# Patient Record
Sex: FEMALE | Race: WHITE | NOT HISPANIC OR LATINO | Employment: OTHER | URBAN - METROPOLITAN AREA
[De-identification: names, ages, dates, MRNs, and addresses within clinical notes are randomized per-mention and may not be internally consistent; named-entity substitution may affect disease eponyms.]

---

## 2017-01-03 ENCOUNTER — MYC MEDICAL ADVICE (OUTPATIENT)
Dept: FAMILY MEDICINE | Facility: OTHER | Age: 32
End: 2017-01-03

## 2017-01-03 DIAGNOSIS — D59.39 ATYPICAL HUS (HEMOLYTIC UREMIC SYNDROME) WITH MUTATION IN THROMBOMODULIN GENE (H): ICD-10-CM

## 2017-01-03 LAB
ALBUMIN UR-MCNC: NEGATIVE MG/DL
APPEARANCE UR: CLEAR
BILIRUB UR QL STRIP: NEGATIVE
COLOR UR AUTO: YELLOW
GLUCOSE UR STRIP-MCNC: NEGATIVE MG/DL
HGB UR QL STRIP: NEGATIVE
KETONES UR STRIP-MCNC: NEGATIVE MG/DL
LEUKOCYTE ESTERASE UR QL STRIP: NEGATIVE
NITRATE UR QL: NEGATIVE
NON-SQ EPI CELLS #/AREA URNS LPF: NORMAL /LPF
PH UR STRIP: 6.5 PH (ref 5–7)
RBC #/AREA URNS AUTO: NORMAL /HPF (ref 0–2)
SP GR UR STRIP: 1.01 (ref 1–1.03)
URN SPEC COLLECT METH UR: NORMAL
UROBILINOGEN UR STRIP-ACNC: 0.2 EU/DL (ref 0.2–1)
WBC #/AREA URNS AUTO: NORMAL /HPF (ref 0–2)

## 2017-01-03 PROCEDURE — 81001 URINALYSIS AUTO W/SCOPE: CPT | Performed by: FAMILY MEDICINE

## 2017-01-03 PROCEDURE — 84156 ASSAY OF PROTEIN URINE: CPT | Mod: 90 | Performed by: FAMILY MEDICINE

## 2017-01-03 PROCEDURE — 99000 SPECIMEN HANDLING OFFICE-LAB: CPT | Performed by: FAMILY MEDICINE

## 2017-01-04 LAB
CREAT UR-MCNC: 57 MG/DL
PROT UR-MCNC: 0.06 G/L
PROT/CREAT 24H UR: 0.1 G/G CR (ref 0–0.2)

## 2017-01-05 ENCOUNTER — OFFICE VISIT (OUTPATIENT)
Dept: PSYCHOLOGY | Facility: CLINIC | Age: 32
End: 2017-01-05
Payer: COMMERCIAL

## 2017-01-05 DIAGNOSIS — D59.4 MICROANGIOPATHIC HEMOLYTIC ANEMIA (H): ICD-10-CM

## 2017-01-05 DIAGNOSIS — F33.0 MILD EPISODE OF RECURRENT MAJOR DEPRESSIVE DISORDER (H): ICD-10-CM

## 2017-01-05 DIAGNOSIS — F43.10 PTSD (POST-TRAUMATIC STRESS DISORDER): ICD-10-CM

## 2017-01-05 DIAGNOSIS — F41.1 GAD (GENERALIZED ANXIETY DISORDER): Primary | ICD-10-CM

## 2017-01-05 LAB
ALBUMIN SERPL-MCNC: 4.1 G/DL (ref 3.4–5)
ALP SERPL-CCNC: 39 U/L (ref 40–150)
ALT SERPL W P-5'-P-CCNC: 22 U/L (ref 0–50)
ANION GAP SERPL CALCULATED.3IONS-SCNC: 7 MMOL/L (ref 3–14)
AST SERPL W P-5'-P-CCNC: 15 U/L (ref 0–45)
BASOPHILS # BLD AUTO: 0 10E9/L (ref 0–0.2)
BASOPHILS NFR BLD AUTO: 0.6 %
BILIRUB SERPL-MCNC: 0.2 MG/DL (ref 0.2–1.3)
BUN SERPL-MCNC: 16 MG/DL (ref 7–30)
CALCIUM SERPL-MCNC: 8.8 MG/DL (ref 8.5–10.1)
CHLORIDE SERPL-SCNC: 107 MMOL/L (ref 94–109)
CO2 SERPL-SCNC: 28 MMOL/L (ref 20–32)
CREAT SERPL-MCNC: 1.09 MG/DL (ref 0.52–1.04)
DIFFERENTIAL METHOD BLD: ABNORMAL
EOSINOPHIL # BLD AUTO: 0 10E9/L (ref 0–0.7)
EOSINOPHIL NFR BLD AUTO: 0.8 %
ERYTHROCYTE [DISTWIDTH] IN BLOOD BY AUTOMATED COUNT: 12.5 % (ref 10–15)
GFR SERPL CREATININE-BSD FRML MDRD: 59 ML/MIN/1.7M2
GLUCOSE SERPL-MCNC: 81 MG/DL (ref 70–99)
HCT VFR BLD AUTO: 35.3 % (ref 35–47)
HGB BLD-MCNC: 11.7 G/DL (ref 11.7–15.7)
LDH SERPL L TO P-CCNC: 136 U/L (ref 81–234)
LYMPHOCYTES # BLD AUTO: 1.3 10E9/L (ref 0.8–5.3)
LYMPHOCYTES NFR BLD AUTO: 25.2 %
MCH RBC QN AUTO: 31.5 PG (ref 26.5–33)
MCHC RBC AUTO-ENTMCNC: 33.1 G/DL (ref 31.5–36.5)
MCV RBC AUTO: 95 FL (ref 78–100)
MONOCYTES # BLD AUTO: 0.4 10E9/L (ref 0–1.3)
MONOCYTES NFR BLD AUTO: 6.7 %
NEUTROPHILS # BLD AUTO: 3.5 10E9/L (ref 1.6–8.3)
NEUTROPHILS NFR BLD AUTO: 66.7 %
PLATELET # BLD AUTO: 256 10E9/L (ref 150–450)
POTASSIUM SERPL-SCNC: 4.5 MMOL/L (ref 3.4–5.3)
PROT SERPL-MCNC: 7.3 G/DL (ref 6.8–8.8)
RBC # BLD AUTO: 3.72 10E12/L (ref 3.8–5.2)
RETICS # AUTO: 62.4 10E9/L (ref 25–95)
RETICS/RBC NFR AUTO: 1.6 % (ref 0.5–2)
SODIUM SERPL-SCNC: 142 MMOL/L (ref 133–144)
WBC # BLD AUTO: 5.2 10E9/L (ref 4–11)

## 2017-01-05 PROCEDURE — 83010 ASSAY OF HAPTOGLOBIN QUANT: CPT | Mod: 90 | Performed by: FAMILY MEDICINE

## 2017-01-05 PROCEDURE — 36415 COLL VENOUS BLD VENIPUNCTURE: CPT | Performed by: FAMILY MEDICINE

## 2017-01-05 PROCEDURE — 85045 AUTOMATED RETICULOCYTE COUNT: CPT | Mod: 90 | Performed by: FAMILY MEDICINE

## 2017-01-05 PROCEDURE — 90834 PSYTX W PT 45 MINUTES: CPT | Performed by: MARRIAGE & FAMILY THERAPIST

## 2017-01-05 PROCEDURE — 80053 COMPREHEN METABOLIC PANEL: CPT | Mod: 90 | Performed by: FAMILY MEDICINE

## 2017-01-05 PROCEDURE — 85025 COMPLETE CBC W/AUTO DIFF WBC: CPT | Performed by: FAMILY MEDICINE

## 2017-01-05 PROCEDURE — 83615 LACTATE (LD) (LDH) ENZYME: CPT | Mod: 90 | Performed by: FAMILY MEDICINE

## 2017-01-05 PROCEDURE — 99000 SPECIMEN HANDLING OFFICE-LAB: CPT | Performed by: FAMILY MEDICINE

## 2017-01-05 NOTE — PROGRESS NOTES
Progress Note    Client Name: Queta Cortez  Date: 1/5/2017       Service Type: Individual      Session Start Time: 9:30am  Session End Time: 10:15am      Session Length: 45 minutes     Session #: 3     Attendees: Client attended alone    Treatment Plan Last Reviewed: completed today  PHQ-9 / JOAO-7 : completed 12/21/16     DATA      Progress Since Last Session (Related to Symptoms / Goals / Homework):   Symptoms: Improving, depression  and anxiety reducing    Homework: Achieved / completed to satisfaction      Episode of Care Goals: Satisfactory progress - ACTION (Actively working towards change); Intervened by reinforcing change plan / affirming steps taken     Current / Ongoing Stressors and Concerns:   medical, recent birth of first child, marital, financial, occupational     Treatment Objective(s) Addressed in This Session:   use at least 1-2 coping skills for anxiety management in the next 2 weeks  ID triggers for anxiety  Client will share thoughts, feelings, and wants with      Intervention:   CBT:     Client report she has noticed since last week the following triggers for anxiety: conflict with , past hx of 's gambling addiction/lack of trust, medical health, return to work, and balance of home/work. Client reports hx of shutting down when anxious, withdrawal, and not communicating. Discussed that she and  have had difficulty communicating since the beginning of the relationship as they are both a bit conflict avoidant. Gave client a handout on the Awareness Wheel and discussed how to use to improve communication between client and . 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 Processed anxiety related to managing children and getting places on time. Disussed that as she gets accustomed over time it will become easier. Problem  solved ways to get nephew to the but stop with other children. Client reports this week she has been bonding well with her son and is talking with a lactation consultant today about re starting lactation if possible. She is working with her MD to wean herself off her Wellbutrin as she believes mood is more stable and can't be on this medication to breastfeed. Discussed considering keeping a journal for several weeks while weaning off medication to monitor mood, and consider a breast feeding safe med if mood shifts. Client reprots she has been practicing radical acceptance with illness and focusing on positive things that are going on in her life presently, as well as taking one day at a time. Briefly discussed thought containment, creating a worry time, thought stopping, and distraction to reduce anxiety.      ASSESSMENT: Current Emotional / Mental Status (status of significant symptoms):   Risk status (Self / Other harm or suicidal ideation)   Client denies current fears or concerns for personal safety.   Client denies current or recent suicidal ideation or behaviors.   Client denies current or recent homicidal ideation or behaviors.   Client denies current or recent self injurious behavior or ideation.   Client denies other safety concerns.   A safety and risk management plan has not been developed at this time, however client was given the after-hours number / 911 should there be a change in any of these risk factors.     Appearance:   Appropriate    Eye Contact:   Good    Psychomotor Behavior: Normal    Attitude:   Cooperative    Orientation:   All   Speech    Rate / Production: Normal     Volume:  Normal    Mood:    Anxious    Affect:    Appropriate    Thought Content:  Clear    Thought Form:  Coherent  Logical    Insight:    Fair      Medication Review:   Changes to psychiatric medications, see updated Medication List in EPIC.    Current Outpatient Prescriptions   Medication     norethindrone-ethinyl estradiol  (ORTHO-NOVUM 1-35 TAB,NORTREL 1-35 TAB) 1-35 MG-MCG per tablet     NIFEdipine ER (ADALAT CC) 90 MG TB24     order for DME     carvedilol (COREG) 6.25 MG tablet     buPROPion (WELLBUTRIN SR) 100 MG 12 hr tablet     Ferrous Sulfate (IRON SUPPLEMENT PO)     Prenatal Vit-Min-FA-Fish Oil (CVS PRENATAL GUMMY) 0.4-113.5 MG CHEW     Cholecalciferol (D 1000) 1000 UNITS CAPS     FOLIC ACID PO     No current facility-administered medications for this visit.          Medication Compliance:   Yes- tapering off Wellbutrin with help of MD     Changes in Health Issues:   None reported     Chemical Use Review:   Substance Use: Chemical use reviewed, no active concerns identified      Tobacco Use: No current tobacco use.       Collateral Reports Completed:   Not Applicable    PLAN: (Client Tasks / Therapist Tasks / Other)   Encouraged client to notice anxiety triggers this week and identify whether trigger is avoidable, if not utilize her own coping skills to get through the anxiety triggering event.  Encouraged client to utilize coping skills when first noticing bodily cues to deescalate anxiety.  Encouraged client to share thoughts, feelings, and wants with .          Bella Styles                                                    Treatment Plan    Client's Name: Queta Cortez  YOB: 1985    Date: 1/5/2017      Diagnoses: 296.31 Major Depressive Disorder, Recurrent Episode, Mild With peripartum onset  300.02 (F41.1) Generalized Anxiety Disorder  309.81 (F43.10) Posttraumatic Stress Disorder (includes Posttraumatic Stress Disorder for Children 6 Years and Younger)  Without dissociative symptoms    Medical dx: HELLP syndrome, HUS, recent birth of son  Psychosocial & Contextual Factors: medical, recent birth of first child, marital, financial, occupational  WHODAS 2.0 (12 item) 17    Referral / Collaboration:  Collaboration was initiated with PCP MD.    Anticipated number of session or this episode of care:  6-12      MeasurableTreatment Goal(s) related to diagnosis / functional impairment(s)  Goal 1: Client will decrease depressed mood and anxiety as evidenced by PHQ9 and GAD7 scores 4 and Under within the next 3 months. Initial scorin2016:  GAD7:16,  PHQ9: 9.     I will know I've met my goal when I'm better able to manage my anxiety.        Objective #A (Client Action)   Continued - Date(s): 2017  Client will identify 1-2 initial signs or symptoms of anxiety and utilize anxiety reduction techniques daily to decrease anxiety over the next month.Current Baseline is sporadic use. Client will ID triggers for depression and anxiety and work towards decreasing reactivity to or eliminating stressor if possible. client will develop more effective coping skills to manage depression and anxiety symptoms, will develop healthy cognitive patterns and beliefs, will increase ability to function adaptively and will continue to take medications as prescribed / participate in supportive activities. Client will improve communication with others and be able to work through anxiety rather than shutting down. 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  Intervention(s)   Therapist will teach client how to ID body cues for anxiety, anxiety reduction techniques, how to ID triggers for depression and anxiety- decrease reactivity/eliminate, lifestyle changes to reduce depression and anxiety, communication skills, explore cognitive beliefs and help client to develop healthy cognitive patterns and beliefs.  Client has reviewed and agreed to the above plan.    Bella Styles  2017

## 2017-01-06 LAB — HAPTOGLOB SERPL-MCNC: 95 MG/DL (ref 15–200)

## 2017-01-09 ENCOUNTER — OFFICE VISIT (OUTPATIENT)
Dept: NEPHROLOGY | Facility: CLINIC | Age: 32
End: 2017-01-09
Payer: COMMERCIAL

## 2017-01-09 ENCOUNTER — TELEPHONE (OUTPATIENT)
Dept: OBGYN | Facility: OTHER | Age: 32
End: 2017-01-09

## 2017-01-09 VITALS
DIASTOLIC BLOOD PRESSURE: 81 MMHG | HEART RATE: 65 BPM | BODY MASS INDEX: 22.47 KG/M2 | WEIGHT: 158 LBS | SYSTOLIC BLOOD PRESSURE: 125 MMHG

## 2017-01-09 DIAGNOSIS — D59.4 MICROANGIOPATHIC HEMOLYTIC ANEMIA (H): ICD-10-CM

## 2017-01-09 DIAGNOSIS — I10 BENIGN ESSENTIAL HYPERTENSION: ICD-10-CM

## 2017-01-09 DIAGNOSIS — D59.39 ATYPICAL HUS (HEMOLYTIC UREMIC SYNDROME) WITH MUTATION IN THROMBOMODULIN GENE (H): ICD-10-CM

## 2017-01-09 DIAGNOSIS — M31.19 TTP (THROMBOTIC THROMBOCYTOPENIC PURPURA) (H): ICD-10-CM

## 2017-01-09 DIAGNOSIS — N17.9 ACUTE KIDNEY INJURY (H): Primary | ICD-10-CM

## 2017-01-09 LAB
ALBUMIN SERPL-MCNC: 4.2 G/DL (ref 3.4–5)
ALBUMIN UR-MCNC: NEGATIVE MG/DL
ALP SERPL-CCNC: 39 U/L (ref 40–150)
ALT SERPL W P-5'-P-CCNC: 26 U/L (ref 0–50)
ANION GAP SERPL CALCULATED.3IONS-SCNC: 8 MMOL/L (ref 3–14)
APPEARANCE UR: CLEAR
AST SERPL W P-5'-P-CCNC: 14 U/L (ref 0–45)
BASOPHILS # BLD AUTO: 0 10E9/L (ref 0–0.2)
BASOPHILS NFR BLD AUTO: 0.2 %
BILIRUB SERPL-MCNC: 0.3 MG/DL (ref 0.2–1.3)
BILIRUB UR QL STRIP: NEGATIVE
BUN SERPL-MCNC: 14 MG/DL (ref 7–30)
CALCIUM SERPL-MCNC: 9.3 MG/DL (ref 8.5–10.1)
CHLORIDE SERPL-SCNC: 108 MMOL/L (ref 94–109)
CO2 SERPL-SCNC: 25 MMOL/L (ref 20–32)
COLOR UR AUTO: ABNORMAL
CREAT SERPL-MCNC: 1.08 MG/DL (ref 0.52–1.04)
CREAT UR-MCNC: 17 MG/DL
DIFFERENTIAL METHOD BLD: NORMAL
EOSINOPHIL # BLD AUTO: 0.1 10E9/L (ref 0–0.7)
EOSINOPHIL NFR BLD AUTO: 1 %
ERYTHROCYTE [DISTWIDTH] IN BLOOD BY AUTOMATED COUNT: 12.5 % (ref 10–15)
GFR SERPL CREATININE-BSD FRML MDRD: 59 ML/MIN/1.7M2
GLUCOSE SERPL-MCNC: 83 MG/DL (ref 70–99)
GLUCOSE UR STRIP-MCNC: NEGATIVE MG/DL
HCT VFR BLD AUTO: 35.8 % (ref 35–47)
HGB BLD-MCNC: 12.2 G/DL (ref 11.7–15.7)
HGB UR QL STRIP: NEGATIVE
KETONES UR STRIP-MCNC: NEGATIVE MG/DL
LDH SERPL L TO P-CCNC: 144 U/L (ref 81–234)
LEUKOCYTE ESTERASE UR QL STRIP: NEGATIVE
LYMPHOCYTES # BLD AUTO: 1.4 10E9/L (ref 0.8–5.3)
LYMPHOCYTES NFR BLD AUTO: 27.9 %
MCH RBC QN AUTO: 31.4 PG (ref 26.5–33)
MCHC RBC AUTO-ENTMCNC: 34.1 G/DL (ref 31.5–36.5)
MCV RBC AUTO: 92 FL (ref 78–100)
MONOCYTES # BLD AUTO: 0.3 10E9/L (ref 0–1.3)
MONOCYTES NFR BLD AUTO: 5.1 %
NEUTROPHILS # BLD AUTO: 3.2 10E9/L (ref 1.6–8.3)
NEUTROPHILS NFR BLD AUTO: 65.8 %
NITRATE UR QL: NEGATIVE
PH UR STRIP: 6.5 PH (ref 5–7)
PLATELET # BLD AUTO: 239 10E9/L (ref 150–450)
POTASSIUM SERPL-SCNC: 3.9 MMOL/L (ref 3.4–5.3)
PROT SERPL-MCNC: 7.6 G/DL (ref 6.8–8.8)
PROT UR-MCNC: <0.05 G/L
PROT/CREAT 24H UR: NORMAL G/G CR (ref 0–0.2)
RBC # BLD AUTO: 3.89 10E12/L (ref 3.8–5.2)
RBC #/AREA URNS AUTO: ABNORMAL /HPF (ref 0–2)
RETICS # AUTO: 77.8 10E9/L (ref 25–95)
RETICS/RBC NFR AUTO: 2 % (ref 0.5–2)
SODIUM SERPL-SCNC: 141 MMOL/L (ref 133–144)
SP GR UR STRIP: 1 (ref 1–1.03)
URN SPEC COLLECT METH UR: ABNORMAL
UROBILINOGEN UR STRIP-MCNC: NORMAL MG/DL (ref 0–2)
WBC # BLD AUTO: 4.9 10E9/L (ref 4–11)
WBC #/AREA URNS AUTO: ABNORMAL /HPF (ref 0–2)

## 2017-01-09 PROCEDURE — 84156 ASSAY OF PROTEIN URINE: CPT | Mod: 90 | Performed by: INTERNAL MEDICINE

## 2017-01-09 PROCEDURE — 81001 URINALYSIS AUTO W/SCOPE: CPT | Performed by: INTERNAL MEDICINE

## 2017-01-09 PROCEDURE — 99000 SPECIMEN HANDLING OFFICE-LAB: CPT | Performed by: INTERNAL MEDICINE

## 2017-01-09 PROCEDURE — 85045 AUTOMATED RETICULOCYTE COUNT: CPT | Performed by: FAMILY MEDICINE

## 2017-01-09 PROCEDURE — 85025 COMPLETE CBC W/AUTO DIFF WBC: CPT | Performed by: FAMILY MEDICINE

## 2017-01-09 PROCEDURE — 83010 ASSAY OF HAPTOGLOBIN QUANT: CPT | Mod: 90 | Performed by: FAMILY MEDICINE

## 2017-01-09 PROCEDURE — 99214 OFFICE O/P EST MOD 30 MIN: CPT | Performed by: INTERNAL MEDICINE

## 2017-01-09 PROCEDURE — 36415 COLL VENOUS BLD VENIPUNCTURE: CPT | Performed by: FAMILY MEDICINE

## 2017-01-09 PROCEDURE — 80053 COMPREHEN METABOLIC PANEL: CPT | Performed by: FAMILY MEDICINE

## 2017-01-09 PROCEDURE — 83615 LACTATE (LD) (LDH) ENZYME: CPT | Performed by: FAMILY MEDICINE

## 2017-01-09 NOTE — PROGRESS NOTES
2017     CC: RYAN    HPI: Queta Cortez is a 31 year old female who presents for follow-up of RYAN. Ms. Cortez was hospitalized from 16- 16 and is here today for follow-up. She is a healthy female who underwent  for failure to descend on 10/29. She then presented with dizziness, weakness, nausea, vomiting on POD 6. She was found to be in RYAN which was felt tob e aHUS vs HELLP syndrome. She received plasmapheresis while inpt and her creatinine and proteinuria have been improving since. She is following with hematology closely since discharge. NSAID use was 600 mg q 6 hours post initial discharge after her delivery. 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 Please see Dr. Vanessa's note for further details. She is overall feeling well. Blood pressures at home have been 103-126. She is attempting to reinitiate her milk supply at this time by pumping - working with lactation nurse for this issue.        Allergies   Allergen Reactions     Hydralazine Shortness Of Breath, Anxiety, Other (See Comments), Swelling and Rash     40-50 minutes after IV administration had swelling to the arm where hydralazine was given, had swelling to lips, flushing to face, generalized anxiety which lead to shortness of breath. Symptoms resolved with dose of 25 mg IV benadryl.          Current Outpatient Prescriptions on File Prior to Visit:  NIFEdipine ER (ADALAT CC) 90 MG TB24 Take 1 tablet (90 mg) by mouth daily   order for DME Equipment being ordered: Compression stockings. Knee high 20-30   Ferrous Sulfate (IRON SUPPLEMENT PO)    Prenatal Vit-Min-FA-Fish Oil (CVS PRENATAL GUMMY) 0.4-113.5 MG CHEW    Cholecalciferol (D 1000) 1000 UNITS CAPS    FOLIC ACID PO Take 800 mcg by mouth daily     No current facility-administered medications on file prior to visit.    Past Medical History   Diagnosis Date     NO ACTIVE PROBLEMS        Past Surgical History    Procedure Laterality Date     Appendectomy  2014      section N/A 10/29/2016     Procedure:  SECTION;  Surgeon: Adonis Dooley MD;  Location:  L+D       Social History   Substance Use Topics     Smoking status: Never Smoker      Smokeless tobacco: Never Used     Alcohol Use: No       Family History   Problem Relation Age of Onset     Hypertension Mother      Unknown/Adopted Father      OSTEOPOROSIS Maternal Grandmother      Unknown/Adopted Paternal Grandmother      Unknown/Adopted Paternal Grandfather        ROS: A 4 system review of systems was negative other than noted here or above.     Exam:  /81 mmHg  Pulse 65  Wt 71.668 kg (158 lb)  LMP 2015    GENERAL APPEARANCE: alert and no distress  RESP: lungs clear to auscultation   CV: regular rhythm, normal rate, no rub  Extremities: no clubbing, cyanosis, or edema  SKIN: no rash  NEURO: mentation intact and speech normal  PSYCH: affect normal/bright    Results:   Orders Only on 2017   Component Date Value Ref Range Status     Color Urine 2017 Straw   Final     Appearance Urine 2017 Clear   Final     Glucose Urine 2017 Negative  NEG mg/dL Final     Bilirubin Urine 2017 Negative  NEG Final     Ketones Urine 2017 Negative  NEG mg/dL Final     Specific Gravity Urine 2017 1.000* 1.003 - 1.035 Final     Blood Urine 2017 Negative  NEG Final     pH Urine 2017 6.5  5.0 - 7.0 pH Final     Protein Albumin Urine 2017 Negative  NEG mg/dL Final     Urobilinogen mg/dL 2017 Normal  0.0 - 2.0 mg/dL Final     Nitrite Urine 2017 Negative  NEG Final     Leukocyte Esterase Urine 2017 Negative  NEG Final     Source 2017 Midstream Urine   Final     WBC Urine 2017 O - 2  0 - 2 /HPF Final     RBC Urine 2017 O - 2  0 - 2 /HPF Final     Sodium 2017 141  133 - 144 mmol/L Final     Potassium 2017 3.9  3.4 - 5.3 mmol/L Final     Chloride 2017 108   94 - 109 mmol/L Final     Carbon Dioxide 01/09/2017 25  20 - 32 mmol/L Final     Anion Gap 01/09/2017 8  3 - 14 mmol/L Final     Glucose 01/09/2017 83  70 - 99 mg/dL Final    Non Fasting     Urea Nitrogen 01/09/2017 14  7 - 30 mg/dL Final     Creatinine 01/09/2017 1.08* 0.52 - 1.04 mg/dL Final     GFR Estimate 01/09/2017 59* >60 mL/min/1.7m2 Final    Non  GFR Calc     GFR Estimate If Black 01/09/2017 72  >60 mL/min/1.7m2 Final    African American GFR Calc     Calcium 01/09/2017 9.3  8.5 - 10.1 mg/dL Final     Bilirubin Total 01/09/2017 0.3  0.2 - 1.3 mg/dL Final     Albumin 01/09/2017 4.2  3.4 - 5.0 g/dL Final     Protein Total 01/09/2017 7.6  6.8 - 8.8 g/dL Final     Alkaline Phosphatase 01/09/2017 39* 40 - 150 U/L Final     ALT 01/09/2017 26  0 - 50 U/L Final     AST 01/09/2017 14  0 - 45 U/L Final     Lactate Dehydrogenase 01/09/2017 144  81 - 234 U/L Final     WBC 01/09/2017 4.9  4.0 - 11.0 10e9/L Final     RBC Count 01/09/2017 3.89  3.8 - 5.2 10e12/L Final     Hemoglobin 01/09/2017 12.2  11.7 - 15.7 g/dL Final     Hematocrit 01/09/2017 35.8  35.0 - 47.0 % Final     MCV 01/09/2017 92  78 - 100 fl Final     MCH 01/09/2017 31.4  26.5 - 33.0 pg Final     MCHC 01/09/2017 34.1  31.5 - 36.5 g/dL Final     RDW 01/09/2017 12.5  10.0 - 15.0 % Final     Platelet Count 01/09/2017 239  150 - 450 10e9/L Final     Diff Method 01/09/2017 Automated Method   Final     % Neutrophils 01/09/2017 65.8   Final     % Lymphocytes 01/09/2017 27.9   Final     % Monocytes 01/09/2017 5.1   Final     % Eosinophils 01/09/2017 1.0   Final     % Basophils 01/09/2017 0.2   Final     Absolute Neutrophil 01/09/2017 3.2  1.6 - 8.3 10e9/L Final     Absolute Lymphocytes 01/09/2017 1.4  0.8 - 5.3 10e9/L Final     Absolute Monocytes 01/09/2017 0.3  0.0 - 1.3 10e9/L Final     Absolute Eosinophils 01/09/2017 0.1  0.0 - 0.7 10e9/L Final     Absolute Basophils 01/09/2017 0.0  0.0 - 0.2 10e9/L Final     % Retic 01/09/2017 2.0  0.5 - 2.0 %  Final     Absolute Retic 01/09/2017 77.8  25 - 95 10e9/L Final        Assessment/Plan:   1. 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 Pleased to see that her creatinine has improved (peaked at 2.93 and now 1.08). It is possible that she will have a sustained kidney injury from this event but too early to tell. From a kidney standpoint, will plan to have her back in 6 months or sooner as needed.     2.Hypertension: at goal of <140/90 - if she is interested in breastfeeding at this time, I would like to DC the carvedilol at this time. Will have blood pressure followed at home and educated to call if greater than 140/90.     3. Anemia/aHUS: anemia resolved. Please see heme's note for further details about her genetic findings.      Patient Instructions   1. Stop the carvedilol  2. Follow pressures - please call if greater than 140/90. [phone] or MYChart  3. Follow-up in 6 months      Karen Collins,

## 2017-01-09 NOTE — MR AVS SNAPSHOT
After Visit Summary   1/9/2017    Queta Cortez    MRN: [id number]           Patient Information     Date Of Birth          1985        Visit Information        Provider Department      1/9/2017 3:00 PM Karen Collins MD [facility]        Care Instructions    1. Stop the carvedilol  2. Follow pressures - please call if greater than 140/90. [phone] or Synerscopehart  3.         Follow-ups after your visit        Your next 10 appointments already scheduled     Jan 18, 2017 11:00 AM   Return Visit with Bella DUTTA [facility] ([facility])    [address]  [facility] [id number] [phone]            Jan 18, 2017  3:45 PM   LAB with LAB ONC [facility] ([facility])    [id number] [place] [address] [phone]           Patient must bring picture ID.  Patient should be prepared to give a urine specimen  Please do not eat 10-12 hours before your appointment if you are coming in fasting for labs on lipids, cholesterol, or glucose (sugar).  Pregnant women should follow their Care Team instructions. Water with medications is okay. Do not drink coffee or other fluids.   If you have concerns about taking  your medications, please ask at office or if scheduling via Glance Apphart, send a message by clicking on Secure Messaging, Message Your Care Team.            Jan 18, 2017  4:15 PM   Return Visit with Ivan Vanessa MD   [facility])    [address] [address] [place] [address] [phone]            Feb 09, 2017  5:00 PM   Return Visit with Bella DUTTA [facility] ([facility])    [address]  [facility] [id number] [phone]            Jul 11, 2017  2:30 PM   LAB with LAB FIRST FLOOR [facility] ([facility])    [id number]  [facility] [address] [phone]           Patient must bring picture ID.  Patient should be prepared to give a urine specimen  Please do not eat 10-12 hours before your appointment if you are coming in fasting for labs on lipids, cholesterol, or glucose (sugar).  Pregnant women should follow their Care Team instructions. Water with medications is okay. Do not drink coffee or other fluids.   If you have concerns about taking  your medications, please ask at office or if scheduling via Telepathy, send a message by clicking on Secure Messaging, Message Your Care Team.            Jul 11, 2017  3:00 PM   Return Visit with Karen Collins MD   [facility]    [address] [facility] [address] [phone]              Who to contact     If you have questions or need follow up information about today's clinic visit or your schedule please contact [facility] directly at [phone].  Normal or non-critical lab and imaging results will be communicated to you by Netheoshart, letter or phone within 4 business days after the clinic has received the results. If you do not hear from us within 7 days, please contact the clinic through Telepathy or phone. If you have a critical or abnormal lab result, we will notify you by phone as soon as possible.  Submit refill requests through Telepathy or call your pharmacy and they will forward the refill request to us. Please allow 3 business days for your refill to be completed.          Additional Information About Your Visit        Telepathy Information     Telepathy gives you secure access to your electronic health record. If you see a primary care provider, you can also send messages to your care team and make appointments. If you have questions, please call your primary care clinic.  If you do not have a primary care provider, please call [phone] and they will assist you.      Telepathy is an  electronic gateway that provides easy, online access to your medical records. With [employer], you can request a clinic appointment, read your test results, renew a prescription or communicate with your care team.     To access your existing account, please contact your [facility] or call [phone] for assistance.        Care EveryWhere ID     This is your Care EveryWhere ID. This could be used by other organizations to access your [facility] medical records  [id number]        Your Vitals Were     Pulse Last Period                65 12/11/2015           Blood Pressure from Last 3 Encounters:   01/09/17 125/81   12/27/16 110/84   12/21/16 117/80    Weight from Last 3 Encounters:   01/09/17 71.668 kg (158 lb)   12/27/16 73.029 kg (161 lb)   12/21/16 72.576 kg (160 lb)              Today, you had the following     No orders found for display         Today's Medication Changes          These changes are accurate as of: 1/9/17  3:23 PM.  If you have any questions, ask your nurse or doctor.               Stop taking these medicines if you haven't already. Please contact your care team if you have questions.     buPROPion 100 MG 12 hr tablet   Commonly known as:  WELLBUTRIN SR   Stopped by:  Karen Collins MD           carvedilol 6.25 MG tablet   Commonly known as:  COREG   Stopped by:  Karen Collins MD           norethindrone-ethinyl estradiol 1-35 MG-MCG per tablet   Commonly known as:  ORTHO-NOVUM 1-35 TAB,NORTREL 1-35 TAB   Stopped by:  Karen Collins MD                    Primary Care Provider Office Phone # Fax #    Alanis Lundberg [phone]       [facility] [address] [facility] [address]    [facility] [address]        Thank you!     Thank you for choosing [facility]  for your care. Our goal is always to provide you with excellent care. Hearing back from our patients is one way we can continue to improve our services.  Please take a few minutes to complete the written survey that you may receive in the mail after your visit with us. Thank you!             Your Updated Medication List - Protect others around you: Learn how to safely use, store and throw away your medicines at www.disposemymeds.org.          This list is accurate as of: 1/9/17  3:23 PM.  Always use your most recent med list.                   Brand Name Dispense Instructions for use    CVS PRENATAL GUMMY 0.4-113.5 MG Chew          D 1000 1000 UNITS Caps          FOLIC ACID PO      Take 800 mcg by mouth daily       IRON SUPPLEMENT PO          NIFEdipine ER 90 MG Tb24    ADALAT CC    30 tablet    Take 1 tablet (90 mg) by mouth daily       order for DME     2 Units    Equipment being ordered: Compression stockings. Knee high 20-30

## 2017-01-09 NOTE — Clinical Note
[address] [address]  Phone: [phone]    January 9, 2017        Queta Cortez  [address] [facility] [facility] [address]          To whom it may concern:    RE: Queta Cortez    Patient is able to return to work on January 14th with no restrictions.        Please contact me for questions or concerns.      Sincerely,        Harriet Rascon, DO

## 2017-01-09 NOTE — TELEPHONE ENCOUNTER
Reason for call:  Pt needs a letter stating she can go back to work on Saturday Jan 14th. She would like to  at the [place] location. Please call pt when ready.

## 2017-01-09 NOTE — PATIENT INSTRUCTIONS
1. Stop the carvedilol  2. Follow pressures - please call if greater than 140/90. [phone] or MYChart  3. Follow-up in 6 months

## 2017-01-10 LAB — HAPTOGLOB SERPL-MCNC: 100 MG/DL (ref 15–200)

## 2017-01-16 ENCOUNTER — DOCUMENTATION ONLY (OUTPATIENT)
Dept: LAB | Facility: CLINIC | Age: 32
End: 2017-01-16

## 2017-01-16 DIAGNOSIS — D59.4 MICROANGIOPATHIC HEMOLYTIC ANEMIA (H): Primary | ICD-10-CM

## 2017-01-16 DIAGNOSIS — M31.19 TTP (THROMBOTIC THROMBOCYTOPENIC PURPURA) (H): ICD-10-CM

## 2017-01-18 ENCOUNTER — ONCOLOGY VISIT (OUTPATIENT)
Dept: ONCOLOGY | Facility: CLINIC | Age: 32
End: 2017-01-18
Payer: COMMERCIAL

## 2017-01-18 ENCOUNTER — OFFICE VISIT (OUTPATIENT)
Dept: PSYCHOLOGY | Facility: CLINIC | Age: 32
End: 2017-01-18
Payer: COMMERCIAL

## 2017-01-18 VITALS
BODY MASS INDEX: 22.76 KG/M2 | HEART RATE: 90 BPM | OXYGEN SATURATION: 100 % | RESPIRATION RATE: 18 BRPM | DIASTOLIC BLOOD PRESSURE: 86 MMHG | TEMPERATURE: 97.7 F | HEIGHT: 70 IN | WEIGHT: 159 LBS | SYSTOLIC BLOOD PRESSURE: 126 MMHG

## 2017-01-18 DIAGNOSIS — D59.4 MICROANGIOPATHIC HEMOLYTIC ANEMIA (H): ICD-10-CM

## 2017-01-18 DIAGNOSIS — F43.10 PTSD (POST-TRAUMATIC STRESS DISORDER): ICD-10-CM

## 2017-01-18 DIAGNOSIS — F41.1 GAD (GENERALIZED ANXIETY DISORDER): Primary | ICD-10-CM

## 2017-01-18 DIAGNOSIS — F33.0 MILD EPISODE OF RECURRENT MAJOR DEPRESSIVE DISORDER (H): ICD-10-CM

## 2017-01-18 DIAGNOSIS — D59.4 MICROANGIOPATHIC HEMOLYTIC ANEMIA (H): Primary | ICD-10-CM

## 2017-01-18 DIAGNOSIS — M31.19 TTP (THROMBOTIC THROMBOCYTOPENIC PURPURA) (H): ICD-10-CM

## 2017-01-18 LAB
ALBUMIN SERPL-MCNC: 4 G/DL (ref 3.4–5)
ALP SERPL-CCNC: 38 U/L (ref 40–150)
ALT SERPL W P-5'-P-CCNC: 29 U/L (ref 0–50)
ANION GAP SERPL CALCULATED.3IONS-SCNC: 5 MMOL/L (ref 3–14)
AST SERPL W P-5'-P-CCNC: 13 U/L (ref 0–45)
BASOPHILS # BLD AUTO: 0 10E9/L (ref 0–0.2)
BASOPHILS NFR BLD AUTO: 0.5 %
BILIRUB SERPL-MCNC: 0.2 MG/DL (ref 0.2–1.3)
BUN SERPL-MCNC: 12 MG/DL (ref 7–30)
CALCIUM SERPL-MCNC: 8.9 MG/DL (ref 8.5–10.1)
CHLORIDE SERPL-SCNC: 109 MMOL/L (ref 94–109)
CO2 SERPL-SCNC: 28 MMOL/L (ref 20–32)
CREAT SERPL-MCNC: 1 MG/DL (ref 0.52–1.04)
DIFFERENTIAL METHOD BLD: ABNORMAL
EOSINOPHIL # BLD AUTO: 0 10E9/L (ref 0–0.7)
EOSINOPHIL NFR BLD AUTO: 0.9 %
ERYTHROCYTE [DISTWIDTH] IN BLOOD BY AUTOMATED COUNT: 12.3 % (ref 10–15)
GFR SERPL CREATININE-BSD FRML MDRD: 65 ML/MIN/1.7M2
GLUCOSE SERPL-MCNC: 121 MG/DL (ref 70–99)
HCT VFR BLD AUTO: 34.2 % (ref 35–47)
HGB BLD-MCNC: 11.7 G/DL (ref 11.7–15.7)
LDH SERPL L TO P-CCNC: 144 U/L (ref 81–234)
LYMPHOCYTES # BLD AUTO: 1.4 10E9/L (ref 0.8–5.3)
LYMPHOCYTES NFR BLD AUTO: 33.2 %
MCH RBC QN AUTO: 31.6 PG (ref 26.5–33)
MCHC RBC AUTO-ENTMCNC: 34.2 G/DL (ref 31.5–36.5)
MCV RBC AUTO: 92 FL (ref 78–100)
MONOCYTES # BLD AUTO: 0.2 10E9/L (ref 0–1.3)
MONOCYTES NFR BLD AUTO: 5.1 %
NEUTROPHILS # BLD AUTO: 2.6 10E9/L (ref 1.6–8.3)
NEUTROPHILS NFR BLD AUTO: 60.3 %
PLATELET # BLD AUTO: 229 10E9/L (ref 150–450)
POTASSIUM SERPL-SCNC: 3.5 MMOL/L (ref 3.4–5.3)
PROT SERPL-MCNC: 6.9 G/DL (ref 6.8–8.8)
RBC # BLD AUTO: 3.7 10E12/L (ref 3.8–5.2)
RETICS # AUTO: 62.2 10E9/L (ref 25–95)
RETICS/RBC NFR AUTO: 1.7 % (ref 0.5–2)
SODIUM SERPL-SCNC: 142 MMOL/L (ref 133–144)
WBC # BLD AUTO: 4.3 10E9/L (ref 4–11)

## 2017-01-18 PROCEDURE — 90834 PSYTX W PT 45 MINUTES: CPT | Performed by: MARRIAGE & FAMILY THERAPIST

## 2017-01-18 PROCEDURE — 85025 COMPLETE CBC W/AUTO DIFF WBC: CPT | Performed by: FAMILY MEDICINE

## 2017-01-18 PROCEDURE — 80053 COMPREHEN METABOLIC PANEL: CPT | Performed by: FAMILY MEDICINE

## 2017-01-18 PROCEDURE — 83615 LACTATE (LD) (LDH) ENZYME: CPT | Performed by: FAMILY MEDICINE

## 2017-01-18 PROCEDURE — 99214 OFFICE O/P EST MOD 30 MIN: CPT | Performed by: INTERNAL MEDICINE

## 2017-01-18 PROCEDURE — 85045 AUTOMATED RETICULOCYTE COUNT: CPT | Performed by: FAMILY MEDICINE

## 2017-01-18 PROCEDURE — 36415 COLL VENOUS BLD VENIPUNCTURE: CPT | Performed by: FAMILY MEDICINE

## 2017-01-18 PROCEDURE — 83010 ASSAY OF HAPTOGLOBIN QUANT: CPT | Performed by: FAMILY MEDICINE

## 2017-01-18 ASSESSMENT — PAIN SCALES - GENERAL: PAINLEVEL: NO PAIN (0)

## 2017-01-18 NOTE — NURSING NOTE
"Queta Cortez is a 31 year old female who presents for:  Chief Complaint   Patient presents with     Oncology Clinic Visit     4 wk f/u        Initial Vitals:  /86 mmHg  Pulse 90  Temp(Src) 97.7  F (36.5  C) (Oral)  Resp 18  Ht 1.786 m (5' 10.32\")  Wt 72.122 kg (159 lb)  BMI 22.61 kg/m2  SpO2 100%  LMP 01/12/2017 Estimated body mass index is 22.61 kg/(m^2) as calculated from the following:    Height as of this encounter: 1.786 m (5' 10.32\").    Weight as of this encounter: 72.122 kg (159 lb).. Body surface area is 1.89 meters squared. BP completed using cuff size: regular  No Pain (0) Patient's last menstrual period was 01/12/2017. Allergies and medications reviewed.     Medications: Medication refills not needed today.  Pharmacy name entered into EPIC:    [id number] - [place], [place] - [address] [facility]  [facility] [place] [address] [facility]    Comments:     8 minutes for nursing intake (face to face time)   SHANA DIA LPN          "

## 2017-01-18 NOTE — MR AVS SNAPSHOT
After Visit Summary   1/18/2017    Queta Cortez    MRN: [id number]           Patient Information     Date Of Birth          1985        Visit Information        Provider Department      1/18/2017 4:15 PM Ivan Vanessa MD [facility]        Today's Diagnoses     Microangiopathic hemolytic anemia (H)    -  1        Follow-ups after your visit        Your next 10 appointments already scheduled     Feb 01, 2017  2:00 PM   Return Visit with Bella DUTTA  ([facility])    [address]  [facility] [address]   [phone]            Feb 15, 2017  2:00 PM   Return Visit with Bella DUTTA  ([facility])    [address]  [facility] [address]   [phone]            Apr 19, 2017  3:00 PM   LAB with LAB ONC [facility])    [address] [place] [id number] [phone]           Patient must bring picture ID.  Patient should be prepared to give a urine specimen  Please do not eat 10-12 hours before your appointment if you are coming in fasting for labs on lipids, cholesterol, or glucose (sugar).  Pregnant women should follow their Care Team instructions. Water with medications is okay. Do not drink coffee or other fluids.   If you have concerns about taking  your medications, please ask at office or if scheduling via [employer], send a message by clicking on Secure Messaging, Message Your Care Team.            Apr 19, 2017  3:45 PM   Return Visit with Ivan Vanessa MD   [facility])    [address] [facility] [id number] [phone]            Jul 11, 2017  2:30 PM   LAB with LAB FIRST FLOOR [facility])    [address] [facility] [id number] [phone]           Patient  must bring picture ID.  Patient should be prepared to give a urine specimen  Please do not eat 10-12 hours before your appointment if you are coming in fasting for labs on lipids, cholesterol, or glucose (sugar).  Pregnant women should follow their Care Team instructions. Water with medications is okay. Do not drink coffee or other fluids.   If you have concerns about taking  your medications, please ask at office or if scheduling via [employer], send a message by clicking on Secure Messaging, Message Your Care Team.            Jul 11, 2017  3:00 PM   [facility] Visit with Karen Collins MD   [facility]    [address] [place] [address] [phone]              Who to contact     If you have questions or need follow up information about today's clinic visit or your schedule please contact [facility] directly at [phone].  Normal or non-critical lab and imaging results will be communicated to you by [employer], letter or phone within 4 business days after the clinic has received the results. If you do not hear from us within 7 days, please contact the clinic through [employer] or phone. If you have a critical or abnormal lab result, we will notify you by phone as soon as possible.  Submit refill requests through [employer] or call your pharmacy and they will forward the refill request to us. Please allow 3 business days for your refill to be completed.          Additional Information About Your Visit        [employer] Information     [employer] gives you secure access to your electronic health record. If you see a primary care provider, you can also send messages to your care team and make appointments. If you have questions, please call your primary care clinic.  If you do not have a primary care provider, please call [phone] and they will assist you.      [employer] is an electronic gateway that provides easy, online access to your medical  "records. With [employer], you can request a clinic appointment, read your test results, renew a prescription or communicate with your care team.     To access your existing account, please contact your [facility] or call [phone] for assistance.        Care EveryWhere ID     This is your Care EveryWhere ID. This could be used by other organizations to access your [facility] medical records  [id number]        Your Vitals Were     Pulse Temperature Respirations Height BMI (Body Mass Index) Pulse Oximetry    90 97.7  F (36.5  C) (Oral) 18 1.786 m (5' 10.32\") 22.61 kg/m2 100%    Last Period                   01/12/2017            Blood Pressure from Last 3 Encounters:   01/18/17 126/86   01/09/17 125/81   12/27/16 110/84    Weight from Last 3 Encounters:   01/18/17 72.122 kg (159 lb)   01/09/17 71.668 kg (158 lb)   12/27/16 73.029 kg (161 lb)               Primary Care Provider Office Phone # Fax #    Alanis Lundberg [phone]       [facility] [address] [address]        Thank you!     Thank you for choosing [facility]  for your care. Our goal is always to provide you with excellent care. Hearing back from our patients is one way we can continue to improve our services. Please take a few minutes to complete the written survey that you may receive in the mail after your visit with us. Thank you!             Your Updated Medication List - Protect others around you: Learn how to safely use, store and throw away your medicines at www.disposemymeds.org.          This list is accurate as of: 1/18/17 11:59 PM.  Always use your most recent med list.                   Brand Name Dispense Instructions for use    [employer] PRENATAL GUMMY 0.4-113.5 MG Chew          D 1000 1000 UNITS Caps          FOLIC ACID PO      Take 800 mcg by mouth daily       IRON SUPPLEMENT PO          order for DME     2 Units    Equipment being ordered: " Compression stockings. Knee high 20-30

## 2017-01-18 NOTE — PROGRESS NOTES
Progress Note    Client Name: Queta Cortez  Date: 1/18/2017       Service Type: Individual      Session Start Time: 11am  Session End Time: 11:45am      Session Length: 45 minutes     Session #: 4     Attendees: Client attended alone      PHQ-9 / JOAO-7 : declined     DATA      Progress Since Last Session (Related to Symptoms / Goals / Homework):   Symptoms: Improving, depression  and anxiety reducing    Homework: Achieved / completed to satisfaction      Episode of Care Goals: Satisfactory progress - ACTION (Actively working towards change); Intervened by reinforcing change plan / affirming steps taken     Current / Ongoing Stressors and Concerns:   medical, recent birth of first child, marital, financial, occupational     Treatment Objective(s) Addressed in This Session:   identify at least 1-2 triggers for anxiety  Client will share thoughts, feelings, and wants with      Intervention:   CBT:     Client reports her appointment with lactation was positive and she has started ro pump every 2 hours and milk seems to be starting to return. She has been working to continue to build up bond with son which has improved, and is enjoying being a mother. Discussed frustrations related to 's ideal view of roles as a wife, and wanting  to share the load. Encouraged client to share these feelings with . Discussed anxiety related to upcoming medical appointments, dx, and  what it means for her future. Discussed focusing on here and now rather than allowing what she believes could happen in the future to increase anxiety. Client reports she started back at both jobs in the past week. Reports some stress and anxiety related but feels over time she will adjust.    ASSESSMENT: Current Emotional / Mental Status (status of significant symptoms):   Risk status (Self / Other harm or suicidal ideation)   Client denies current fears or concerns for personal  safety.   Client denies current or recent suicidal ideation or behaviors.   Client denies current or recent homicidal ideation or behaviors.   Client denies current or recent self injurious behavior or ideation.   Client denies other safety concerns.   A safety and risk management plan has not been developed at this time, however client was given the after-hours number / 911 should there be a change in any of these risk factors.     Appearance:   Appropriate    Eye Contact:   Good    Psychomotor Behavior: Normal    Attitude:   Cooperative    Orientation:   All   Speech    Rate / Production: Normal     Volume:  Normal    Mood:    Anxious    Affect:    Appropriate    Thought Content:  Clear    Thought Form:  Coherent  Logical    Insight:    Fair      Medication Review:   Changes to psychiatric medications, see updated Medication List in EPIC.    Current Outpatient Prescriptions   Medication     NIFEdipine ER (ADALAT CC) 90 MG TB24     order for DME     Ferrous Sulfate (IRON SUPPLEMENT PO)     Prenatal Vit-Min-FA-Fish Oil ([employer] PRENATAL GUMMY) 0.4-113.5 MG CHEW     Cholecalciferol (D 1000) 1000 UNITS CAPS     FOLIC ACID PO     No current facility-administered medications for this visit.          Medication Compliance:   Yes- tapering off Wellbutrin with help of MD     Changes in Health Issues:   None reported     Chemical Use Review:   Substance Use: Chemical use reviewed, no active concerns identified      Tobacco Use: No current tobacco use.       Collateral Reports Completed:   Not Applicable    PLAN: (Client Tasks / Therapist Tasks / Other)   Encouraged client to notice anxiety triggers this week and identify whether trigger is avoidable, if not utilize her own coping skills to get through the anxiety triggering event.  Encouraged client to utilize coping skills when first noticing bodily cues to deescalate anxiety.  Encouraged client to share thoughts, feelings, and wants with .   Encouraged client to focus  on here and now rather than allowing what she believes could happen in the future to increase anxiety.         Bella Styles                                                    Treatment Plan    Client's Name: Queta Cortez  YOB: 1985    Date: 2017      Diagnoses: 296.31 Major Depressive Disorder, Recurrent Episode, Mild With peripartum onset  300.02 (F41.1) Generalized Anxiety Disorder  309.81 (F43.10) Posttraumatic Stress Disorder (includes Posttraumatic Stress Disorder for Children 6 Years and Younger)  Without dissociative symptoms    Medical dx: HELLP syndrome, HUS, recent birth of son  Psychosocial & Contextual Factors: medical, recent birth of first child, marital, financial, occupational  WHODAS 2.0 (12 item) 17    Referral / Collaboration:  Collaboration was initiated with PCP MD.    Anticipated number of session or this episode of care: 6-12      MeasurableTreatment Goal(s) related to diagnosis / functional impairment(s)  Goal 1: Client will decrease depressed mood and anxiety as evidenced by PHQ9 and GAD7 scores 4 and Under within the next 3 months. Initial scorin2016:  GAD7:16,  PHQ9: 9.     I will know I've met my goal when I'm better able to manage my anxiety.        Objective #A (Client Action)   Continued - Date(s): 2017  Client will identify 1-2 initial signs or symptoms of anxiety and utilize anxiety reduction techniques daily to decrease anxiety over the next month.Current Baseline is sporadic use. Client will ID triggers for depression and anxiety and work towards decreasing reactivity to or eliminating stressor if possible. client will develop more effective coping skills to manage depression and anxiety symptoms, will develop healthy cognitive patterns and beliefs, will increase ability to function adaptively and will continue to take medications as prescribed / participate in supportive activities. Client will improve communication with others and be able  to work through anxiety rather than shutting down. 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  Intervention(s)   Therapist will teach client how to ID body cues for anxiety, anxiety reduction techniques, how to ID triggers for depression and anxiety- decrease reactivity/eliminate, lifestyle changes to reduce depression and anxiety, communication skills, explore cognitive beliefs and help client to develop healthy cognitive patterns and beliefs.  Client has reviewed and agreed to the above plan.    Bella Styles  January 5, 2017

## 2017-01-18 NOTE — PROGRESS NOTES
2017      CHIEF COMPLAINT:  Followup for microangiopathic hemolytic anemia.      HISTORY OF PRESENT ILLNESS:  Please refer to my note from 11/15/2016 for more details.  Briefly, Queta is a very pleasant, previously healthy 31-year-old female who was recently pregnant with her first child when at 39 weeks of gestation she went into labor but due to failure to descend she had an uncomplicated  on 10/29/2016.  She was discharged on postoperative day #3, but a couple of days later started experiencing profound nausea and vomiting with dizziness and weakness and decreased appetite.  At that point, she was found to have marked anemia and thrombocytopenia with a hemoglobin of 4.4 and platelet count of 20.  There was schistocytes seen on peripheral blood smear and LDH was elevated and she also found to have acute kidney injury.  At that point she was admitted to the [facility] for the possibility of TTP versus HELLP syndrome.  Initially she was started on plasma exchange as well as dexamethasone 10 mg twice a day, but her HKLECP15 returned to be 45%, making TTP unlikely.  As the transaminitis was not very severe the diagnosis of HELLP syndrome was entertained, but thought relatively unlikely.  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  Eventually plasma exchange was continued for 7 days.  She had resolution of thrombocytopenia, anemia improved, renal insufficiency also improved.  She did have significant proteinuria which also was improving at the time of the discharge.  The genetic testing for atypical HUS was sent prior to the discharge.  She also developed uncontrolled hypertension during that admission for which she was started on Coreg and nifedipine.  She was seen by Psychiatry for depression and anxiety and was recommended Wellbutrin, which she just started.    On 16 she was evaluated by primary care doctor as she was complaining of dyspnea on  "exertion.  Chest x-ray showed the possibility of minimal pleural effusion.  She was evaluated in the emergency room and was discharged home as the symptoms were not felt to be consistent with PE or heart failure or an infection. I even did an echo to rule out post partum cardiomyopathy which was unremarkable as she was complaining that if she lies down flat on the bed then she feels congested and she coughs and sometimes gets some shortness of breath    She continues to do well and now essentially feels back to her baseline. She is trying to breast feed and recently her Coreg was d/cd. Her menstrual periods are back to normal. She has been using condoms for contraception.   Mood is good.    REVIEW OF SYSTEMS:  Otherwise, a comprehensive review of the systems was negative.      I did review her past medical history, family history, social history as well as medications and allergies.      PHYSICAL EXAMINATION:   VITAL SIGNS:  /86 mmHg  Pulse 90  Temp(Src) 97.7  F (36.5  C) (Oral)  Resp 18  Ht 1.786 m (5' 10.32\")  Wt 72.122 kg (159 lb)  BMI 22.61 kg/m2  SpO2 100%  LMP 01/12/2017    CONSTITUTIONAL:  A young female who looks well today and in no acute distress  EYES:  Pupils are equal, reactive to light and accommodation.  No pallor or icterus.   ENT:  Ears are unremarkable.  Mouth without any lesions or ulcers.   LYMPHATICS:  No palpable LNs  CARDIOVASCULAR:  S1, S2 is audible.  No murmurs, rubs or gallops. No JVD  RESPIRATORY:  Clear to auscultation bilaterally.    ABDOMEN:  Soft, nontender, no hepatosplenomegaly. Surgical scar has healed very well  EXTREMITIES:  She has trace pedal edema bilaterally. She is wearing stockings  PSYCHIATRIC:  Mentation, mood and affect are normal.   INTEGUMENT:  Skin without any obvious rashes.   NEUROLOGIC:  Alert, oriented x3.  She has a nonfocal neurological exam.      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  It is a known pathogenic mutation in homozygous state but she is heterozygous     Echo unremarkable     ASSESSMENT AND PLAN:     1.  Microangiopathic hemolytic anemia along with acute kidney injury, marked thrombocytopenia with some elevation of liver enzymes in a patient who recently delivered a baby boy through .  Her UYNTAO22 was 46% on  and 72% on 11/10/2016, making TTP unlikely. 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 It is a known pathogenic mutation in homozygous state but she is heterozygous for it.  It is hard to label her as congenital TTP since she never had low RQCBKV08.  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   Currently, with a wait and watch approach, the patient is recuperating well and her CBC has normalized with improving kidney functions and proteinuria. I plan to continue observing her for now.  She will continue with prenatal vitamins, folic acid, vitamin B12 as well as oral iron.   I would like to repeat her labs in 3 months with labs prior    Based on the above, I think she will be at a high risk of recurrence of her disease if she again becomes pregnant in the future  It would be important that in the future, if she decides to get pregnant again, that she be evaluated by Maternal Fetal Medicine for a risk assessment and detailed plan for monitoring during her pregnancy.      I did discuss her case with Dr Jeremy Vaughn at the [facility], who is an expert on thrombotic microangiopathies. I offered her to see Dr Vaughn but at this time she feels comfortable and does not feel a need to see him which I think is reasonable. In the future if her clinical picture changes, then I will reconsider sending her to the [facility] to see him    At some point it will be worthwhile to get genetic testing for her son as well. We talked  about this today and she will ask his pediatrician about this    2.  Acute kidney injury is improving and proteinuria has resolved. She is following with Nephrology  3.  Hypertension.  Blood pressure is stable. Nifedipine was decreased by Dr Collins to 90mg from 120 mg daily. Her coreg has been d/cd  4.  Lower extremity swelling and dyspnea on exertion and some dyspnea lying down flat, this is likely in the setting of recent illness and edema could also be a side effect of nifedipine. Her dyspnea on exertion could also be due to the anemia.  Considering she is postpartum I did echo to rule out postpartum cardiomyopathy and Echo is unremarkable. Her SOB has resolved. Her edema is also better after wearing stockings.  5.  Depression and anxiety.  She was seen by inpatient Psychiatry during hospitalization and she is on Wellbutrin.  Her mood is better.      All of her questions were answered to her satisfaction, and she is agreeable and is comfortable with the plan as mentioned above.         TEMO ENGLE MD

## 2017-01-19 LAB — HAPTOGLOB SERPL-MCNC: 95 MG/DL (ref 15–200)

## 2017-01-24 DIAGNOSIS — I10 BENIGN ESSENTIAL HYPERTENSION: ICD-10-CM

## 2017-01-24 DIAGNOSIS — D59.39 ATYPICAL HUS (HEMOLYTIC UREMIC SYNDROME) WITH MUTATION IN THROMBOMODULIN GENE (H): Primary | ICD-10-CM

## 2017-01-24 RX ORDER — NIFEDIPINE 30 MG
60 TABLET, EXTENDED RELEASE ORAL DAILY
Qty: 180 TABLET | Refills: 3 | Status: SHIPPED | OUTPATIENT
Start: 2017-01-24 | End: 2017-07-11

## 2017-01-25 ENCOUNTER — CARE COORDINATION (OUTPATIENT)
Dept: ONCOLOGY | Facility: CLINIC | Age: 32
End: 2017-01-25

## 2017-02-01 ENCOUNTER — OFFICE VISIT (OUTPATIENT)
Dept: PSYCHOLOGY | Facility: CLINIC | Age: 32
End: 2017-02-01
Payer: COMMERCIAL

## 2017-02-01 DIAGNOSIS — F41.1 GAD (GENERALIZED ANXIETY DISORDER): Primary | ICD-10-CM

## 2017-02-01 DIAGNOSIS — F43.10 PTSD (POST-TRAUMATIC STRESS DISORDER): ICD-10-CM

## 2017-02-01 DIAGNOSIS — F33.0 MILD EPISODE OF RECURRENT MAJOR DEPRESSIVE DISORDER (H): ICD-10-CM

## 2017-02-01 PROCEDURE — 90834 PSYTX W PT 45 MINUTES: CPT | Performed by: MARRIAGE & FAMILY THERAPIST

## 2017-02-01 NOTE — PROGRESS NOTES
Progress Note    Client Name: Queta Cortze  Date: 2/1/2017       Service Type: Individual      Session Start Time: 2pm  Session End Time: 2:45pm      Session Length: 45 minutes     Session #: 5     Attendees: Client attended alone      PHQ-9 / JOAO-7 : declined     DATA      Progress Since Last Session (Related to Symptoms / Goals / Homework):   Symptoms: Improving, depression  and anxiety reducing    Homework: Achieved / completed to satisfaction      Episode of Care Goals: Satisfactory progress - ACTION (Actively working towards change); Intervened by reinforcing change plan / affirming steps taken     Current / Ongoing Stressors and Concerns:   medical, recent birth of first child, marital, financial, occupational     Treatment Objective(s) Addressed in This Session:   identify at least 1-2 triggers for anxiety  Client will share thoughts, feelings, and wants with      Intervention:   CBT:     Client reports medical dx is still unknown and finds it frustrating. She is wanting a dx, wanting to know how it will impact her in the future, and has worry she may not be able to carry another child. Discussed that as providers find out more details they will continue to talk with her about how her health conditions may effect her if she were to be pregnant again. Discussed focusing on the present and positive aspects in life: supportive family, her baby, that health is improving, that she has been able to start producing some breast milk.  Discussed ongoing conflict with  related to sharing household chores, and care for son. Discussed that they are both rather conflict avoidant, and has noticed that this at times leads to a negative evaluation of herself. Encouraged client to consider whether  would be willing to come in with her for a visit to discuss dis chord and how to improve communication.  Encouraged use of awareness wheel and sharing thoughts,  feelings, and wants.     ASSESSMENT: Current Emotional / Mental Status (status of significant symptoms):   Risk status (Self / Other harm or suicidal ideation)   Client denies current fears or concerns for personal safety.   Client denies current or recent suicidal ideation or behaviors.   Client denies current or recent homicidal ideation or behaviors.   Client denies current or recent self injurious behavior or ideation.   Client denies other safety concerns.   A safety and risk management plan has not been developed at this time, however client was given the after-hours number / 911 should there be a change in any of these risk factors.     Appearance:   Appropriate    Eye Contact:   Good    Psychomotor Behavior: Normal    Attitude:   Cooperative    Orientation:   All   Speech    Rate / Production: Normal     Volume:  Normal    Mood:    Anxious    Affect:    Appropriate    Thought Content:  Clear    Thought Form:  Coherent  Logical    Insight:    Fair      Medication Review:   No current psychiatric medications prescribed   Current Outpatient Prescriptions   Medication     NIFEdipine ER (ADALAT CC) 30 MG TB24     order for DME     Ferrous Sulfate (IRON SUPPLEMENT PO)     Prenatal Vit-Min-FA-Fish Oil ([employer] PRENATAL GUMMY) 0.4-113.5 MG CHEW     Cholecalciferol (D 1000) 1000 UNITS CAPS     FOLIC ACID PO     No current facility-administered medications for this visit.          Medication Compliance:   Yes   Changes in Health Issues:   None reported     Chemical Use Review:   Substance Use: Chemical use reviewed, no active concerns identified      Tobacco Use: No current tobacco use.       Collateral Reports Completed:   Not Applicable    PLAN: (Client Tasks / Therapist Tasks / Other)    Encouraged client to share thoughts, feelings, and wants with . Ask  if he is willing to come in for a session with client. Encouraged client to focus on here and now rather than allowing what she believes could happen in  the future to increase anxiety.         Bella Styles                                                    Treatment Plan    Client's Name: Queta Cortez  YOB: 1985    Date: 2017      Diagnoses: 296.31 Major Depressive Disorder, Recurrent Episode, Mild With peripartum onset  300.02 (F41.1) Generalized Anxiety Disorder  309.81 (F43.10) Posttraumatic Stress Disorder (includes Posttraumatic Stress Disorder for Children 6 Years and Younger)  Without dissociative symptoms    Medical dx: HELLP syndrome, HUS, recent birth of son  Psychosocial & Contextual Factors: medical, recent birth of first child, marital, financial, occupational  WHODAS 2.0 (12 item) 17    Referral / Collaboration:  Collaboration was initiated with PCP MD.    Anticipated number of session or this episode of care: 6-12      MeasurableTreatment Goal(s) related to diagnosis / functional impairment(s)  Goal 1: Client will decrease depressed mood and anxiety as evidenced by PHQ9 and GAD7 scores 4 and Under within the next 3 months. Initial scorin2016:  GAD7:16,  PHQ9: 9.     I will know I've met my goal when I'm better able to manage my anxiety.        Objective #A (Client Action)   Continued - Date(s): 2017  Client will identify 1-2 initial signs or symptoms of anxiety and utilize anxiety reduction techniques daily to decrease anxiety over the next month.Current Baseline is sporadic use. Client will ID triggers for depression and anxiety and work towards decreasing reactivity to or eliminating stressor if possible. client will develop more effective coping skills to manage depression and anxiety symptoms, will develop healthy cognitive patterns and beliefs, will increase ability to function adaptively and will continue to take medications as prescribed / participate in supportive activities. Client will improve communication with others and be able to work through anxiety rather than shutting down. Client will maintain  remission from hx of SIB- scratching that last occurred as a teen.  Intervention(s)   Therapist will teach client how to ID body cues for anxiety, anxiety reduction techniques, how to ID triggers for depression and anxiety- decrease reactivity/eliminate, lifestyle changes to reduce depression and anxiety, communication skills, explore cognitive beliefs and help client to develop healthy cognitive patterns and beliefs.  Client has reviewed and agreed to the above plan.    Bella Styles  January 5, 2017

## 2017-02-15 ENCOUNTER — OFFICE VISIT (OUTPATIENT)
Dept: PSYCHOLOGY | Facility: CLINIC | Age: 32
End: 2017-02-15
Payer: COMMERCIAL

## 2017-02-15 DIAGNOSIS — F41.1 GAD (GENERALIZED ANXIETY DISORDER): Primary | ICD-10-CM

## 2017-02-15 DIAGNOSIS — F43.10 PTSD (POST-TRAUMATIC STRESS DISORDER): ICD-10-CM

## 2017-02-15 DIAGNOSIS — F33.0 MILD EPISODE OF RECURRENT MAJOR DEPRESSIVE DISORDER (H): ICD-10-CM

## 2017-02-15 PROCEDURE — 90834 PSYTX W PT 45 MINUTES: CPT | Performed by: MARRIAGE & FAMILY THERAPIST

## 2017-02-15 ASSESSMENT — ANXIETY QUESTIONNAIRES
IF YOU CHECKED OFF ANY PROBLEMS ON THIS QUESTIONNAIRE, HOW DIFFICULT HAVE THESE PROBLEMS MADE IT FOR YOU TO DO YOUR WORK, TAKE CARE OF THINGS AT HOME, OR GET ALONG WITH OTHER PEOPLE: SOMEWHAT DIFFICULT
1. FEELING NERVOUS, ANXIOUS, OR ON EDGE: SEVERAL DAYS
7. FEELING AFRAID AS IF SOMETHING AWFUL MIGHT HAPPEN: MORE THAN HALF THE DAYS
2. NOT BEING ABLE TO STOP OR CONTROL WORRYING: SEVERAL DAYS
3. WORRYING TOO MUCH ABOUT DIFFERENT THINGS: SEVERAL DAYS
6. BECOMING EASILY ANNOYED OR IRRITABLE: MORE THAN HALF THE DAYS
GAD7 TOTAL SCORE: 7
5. BEING SO RESTLESS THAT IT IS HARD TO SIT STILL: NOT AT ALL

## 2017-02-15 ASSESSMENT — PATIENT HEALTH QUESTIONNAIRE - PHQ9: 5. POOR APPETITE OR OVEREATING: NOT AT ALL

## 2017-02-15 NOTE — MR AVS SNAPSHOT
MRN:[id number]                      After Visit Summary   2/15/2017    Queta Cortez    MRN: [id number]           Visit Information        Provider Department      2/15/2017 2:00 PM Bella Harrington [facility] Generic      Your next 10 appointments already scheduled     Mar 01, 2017  2:00 PM CST   Return Visit with Bella Ortegalure   [facility] ([facility])    [address]  [facility] [address]   [phone]            Mar 16, 2017  1:00 PM CDT   Return Visit with Bella LUZMARIA Clayre   [facility] ([facility])    [address]  [facility] [address]   [phone]            Apr 19, 2017  3:00 PM CDT   LAB with LAB ONC [facility])    [address] [place] [address] [phone]           Patient must bring picture ID.  Patient should be prepared to give a urine specimen  Please do not eat 10-12 hours before your appointment if you are coming in fasting for labs on lipids, cholesterol, or glucose (sugar).  Pregnant women should follow their Care Team instructions. Water with medications is okay. Do not drink coffee or other fluids.   If you have concerns about taking  your medications, please ask at office or if scheduling via [employer], send a message by clicking on Secure Messaging, Message Your Care Team.            Apr 19, 2017  3:45 PM CDT   Return Visit with Ivan Vanessa MD   [facility])    [address] [facility] [id number] [phone]            Jul 11, 2017  2:30 PM CDT   LAB with LAB FIRST FLOOR [facility])    [id number] [facility] [address] [phone]           Patient must bring picture ID.  Patient should be prepared to give a urine specimen  Please do not eat  10-12 hours before your appointment if you are coming in fasting for labs on lipids, cholesterol, or glucose (sugar).  Pregnant women should follow their Care Team instructions. Water with medications is okay. Do not drink coffee or other fluids.   If you have concerns about taking  your medications, please ask at office or if scheduling via [employer] send a message by clicking on Secure Messaging, Message Your Care Team.            Jul 11, 2017  3:00 PM CDT   [facility] Visit with Karen Collins MD   [facility]    [address] [place] [address] [phone]              [employer] Information     [employer] gives you secure access to your electronic health record. If you see a primary care provider, you can also send messages to your care team and make appointments. If you have questions, please call your primary care clinic.  If you do not have a primary care provider, please call [phone] and they will assist you.        Care EveryWhere ID     This is your Care EveryWhere ID. This could be used by other organizations to access your [facility] medical records  [id number]

## 2017-02-15 NOTE — PROGRESS NOTES
Progress Note    Client Name: Queta Cortez  Date: 2/15/2017       Service Type: Individual      Session Start Time: 2pm  Session End Time: 2:45pm      Session Length: 45 minutes     Session #: 6     Attendees: Client attended alone      PHQ-9 / JOAO-7 : completed today   DATA      Progress Since Last Session (Related to Symptoms / Goals / Homework):   Symptoms: Improving, depression  and anxiety reducing    Homework: Achieved / completed to satisfaction      Episode of Care Goals: Satisfactory progress - ACTION (Actively working towards change); Intervened by reinforcing change plan / affirming steps taken     Current / Ongoing Stressors and Concerns:   medical, recent birth of first child, marital, financial, occupational     Treatment Objective(s) Addressed in This Session:   identify at least 1-2 triggers for anxiety  Client will share thoughts, feelings, and wants with      Intervention:   CBT:     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 She has been working on making a little time each day for herself. Client reports because she often wants to be in control and do things right, this overwhelms her and at times does nothing at all. Discussed the idea of mindfulness- 1 thing at a time, 1 day at a time, and focusing on the present rather than the past or future. Discussed breaking house cleaning into smaller tasks rather than making a large list of to do's.  Encouraged client to notice anxiety triggers this week and identify whether trigger is avoidable, if not utilize her own coping skills to get through the anxiety triggering event. Discussed bodily cues for anxiety as rapid heart beat. Encouraged client to utilize coping skills when first noticing bodily cues to deescalate anxiety. Gave client handouts on lifestyle changes to reduce anxiety and anxiety reduction techniques: deep breathing, sensate  interventions, visualization/guided imagery, mindfulness, thought confinement/worry time, thought stopping, distraction, wise mind vs emotion mind and encouraged daily use.   ASSESSMENT: Current Emotional / Mental Status (status of significant symptoms):   Risk status (Self / Other harm or suicidal ideation)   Client denies current fears or concerns for personal safety.   Client denies current or recent suicidal ideation or behaviors.   Client denies current or recent homicidal ideation or behaviors.   Client denies current or recent self injurious behavior or ideation.   Client denies other safety concerns.   A safety and risk management plan has not been developed at this time, however client was given the after-hours number / 911 should there be a change in any of these risk factors.     Appearance:   Appropriate    Eye Contact:   Good    Psychomotor Behavior: Normal    Attitude:   Cooperative    Orientation:   All   Speech    Rate / Production: Normal     Volume:  Normal    Mood:    Anxious  Depressed    Affect:    Appropriate    Thought Content:  Clear    Thought Form:  Coherent  Logical    Insight:    Fair      Medication Review:   No current psychiatric medications prescribed   Current Outpatient Prescriptions   Medication     NIFEdipine ER (ADALAT CC) 30 MG TB24     order for DME     Ferrous Sulfate (IRON SUPPLEMENT PO)     Prenatal Vit-Min-FA-Fish Oil ([employer] PRENATAL GUMMY) 0.4-113.5 MG CHEW     Cholecalciferol (D 1000) 1000 UNITS CAPS     FOLIC ACID PO     No current facility-administered medications for this visit.           Medication Compliance:   Yes   Changes in Health Issues:   None reported     Chemical Use Review:   Substance Use: Chemical use reviewed, no active concerns identified      Tobacco Use: No current tobacco use.       Collateral Reports Completed:   Not Applicable    PLAN: (Client Tasks / Therapist Tasks / Other)    Encouraged client to share thoughts, feelings, and wants with .   Encouraged client to focus on here and now rather than allowing what she believes could happen in the future to increase anxiety.  Discussed breaking house cleaning into smaller tasks rather than making a large list of to do's.  Encouraged client to notice anxiety triggers this week and identify whether trigger is avoidable, if not utilize her own coping skills to get through the anxiety triggering event. Discussed bodily cues for anxiety as rapid heart beat. Encouraged client to utilize coping skills when first noticing bodily cues to deescalate anxiety. Gave client handouts on lifestyle changes to reduce anxiety and anxiety reduction techniques: deep breathing, sensate interventions, visualization/guided imagery, mindfulness, thought confinement/worry time, thought stopping, distraction, wise mind vs emotion mind and encouraged daily use.           Bella Styles                                                    Treatment Plan    Client's Name: Queta Cortez  YOB: 1985    Date: 2017      Diagnoses: 296.31 Major Depressive Disorder, Recurrent Episode, Mild With peripartum onset  300.02 (F41.1) Generalized Anxiety Disorder  309.81 (F43.10) Posttraumatic Stress Disorder (includes Posttraumatic Stress Disorder for Children 6 Years and Younger)  Without dissociative symptoms    Medical dx: HELLP syndrome, HUS, recent birth of son  Psychosocial & Contextual Factors: medical, recent birth of first child, marital, financial, occupational  WHODAS 2.0 (12 item) 17    Referral / Collaboration:  Collaboration was initiated with PCP MD.    Anticipated number of session or this episode of care: 6-12      MeasurableTreatment Goal(s) related to diagnosis / functional impairment(s)  Goal 1: Client will decrease depressed mood and anxiety as evidenced by PHQ9 and GAD7 scores 4 and Under within the next 3 months. Initial scorin2016:  GAD7:16,  PHQ9: 9. 2/15/2017: :  GAD7:7,  PHQ9: 8.    I will know  I've met my goal when I'm better able to manage my anxiety.        Objective #A (Client Action)   Continued - Date(s): 2/15/2017  Client will identify 1-2 initial signs or symptoms of anxiety and utilize anxiety reduction techniques daily to decrease anxiety over the next month.Current Baseline is sporadic use. Client will ID triggers for depression and anxiety and work towards decreasing reactivity to or eliminating stressor if possible. client will develop more effective coping skills to manage depression and anxiety symptoms, will develop healthy cognitive patterns and beliefs, will increase ability to function adaptively and will continue to take medications as prescribed / participate in supportive activities. Client will improve communication with others and be able to work through anxiety rather than shutting down. 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  Intervention(s)   Therapist will teach client how to ID body cues for anxiety, anxiety reduction techniques, how to ID triggers for depression and anxiety- decrease reactivity/eliminate, lifestyle changes to reduce depression and anxiety, communication skills, explore cognitive beliefs and help client to develop healthy cognitive patterns and beliefs.  Client has reviewed and agreed to the above plan.    Bella Styles  January 5, 2017

## 2017-02-16 ENCOUNTER — TELEPHONE (OUTPATIENT)
Dept: FAMILY MEDICINE | Facility: OTHER | Age: 32
End: 2017-02-16

## 2017-02-16 ASSESSMENT — PATIENT HEALTH QUESTIONNAIRE - PHQ9: SUM OF ALL RESPONSES TO PHQ QUESTIONS 1-9: 8

## 2017-02-16 ASSESSMENT — ANXIETY QUESTIONNAIRES: GAD7 TOTAL SCORE: 7

## 2017-02-16 NOTE — TELEPHONE ENCOUNTER
Received request to call client back. Returned call and left message. Client returned call to provider and was distraught. Stated that last night her 13 yr old niece was at her own home and was sick and vomiting. Some time in the night she became ill again and choked on her own vomit and passed away. Family did not find niece until morning and they were unable to resuscitate. Client reports she has spent all day at the hospital with her family. States she doesn't know what to do and is feeling very sad. Discussed that what she is feeling is a very normal grief response. Encouraged client time to allow herself to grieve. Gave client option of open appointment today or next week. Client chose an opening next week, and will call if she has any needs or concerns prior to this appointment.

## 2017-02-16 NOTE — TELEPHONE ENCOUNTER
Reason for Call:  Wants to talk to Bella from mental health    Detailed comments: Patient wouldn't leave a detailed message. Just said that she knows what it is regarding.    Phone Number Patient can be reached at: Home number on file [phone] (home)    Best Time: Anytime    Can we leave a detailed message on this number? YES    Call taken on 2/16/2017 at 10:36 AM by Claritza Monterroso

## 2017-02-23 ENCOUNTER — OFFICE VISIT (OUTPATIENT)
Dept: PSYCHOLOGY | Facility: CLINIC | Age: 32
End: 2017-02-23
Payer: COMMERCIAL

## 2017-02-23 DIAGNOSIS — F33.0 MILD EPISODE OF RECURRENT MAJOR DEPRESSIVE DISORDER (H): ICD-10-CM

## 2017-02-23 DIAGNOSIS — F43.10 PTSD (POST-TRAUMATIC STRESS DISORDER): ICD-10-CM

## 2017-02-23 DIAGNOSIS — F41.1 GAD (GENERALIZED ANXIETY DISORDER): Primary | ICD-10-CM

## 2017-02-23 PROCEDURE — 90834 PSYTX W PT 45 MINUTES: CPT | Performed by: MARRIAGE & FAMILY THERAPIST

## 2017-02-23 NOTE — MR AVS SNAPSHOT
MRN:[id number]                      After Visit Summary   2/23/2017    Queta Cortez    MRN: [id number]           Visit Information        Provider Department      2/23/2017 5:00 PM Bella Harrington [facility] [place] [facility] Generic      Your next 10 appointments already scheduled     Mar 01, 2017  2:00 PM CST   Return Visit with Bella Ortegalure   [facility] [place] ([facility] [place])    [address]  [place] [address]   [phone]            Mar 08, 2017 12:00 PM CST   Return Visit with Bella Ortegalure   [facility] [place] ([facility] [place])    [address]  [place] [address]   [phone]            Mar 16, 2017  1:00 PM CDT   Return Visit with Bella Harrington   [facility] [place] ([facility] [place])    [address]  [facility] [address]   [phone]            Apr 19, 2017  3:00 PM CDT   LAB with LAB ONC [facility] ([facility])    [address] [address] [place] [address] [phone]           Patient must bring picture ID.  Patient should be prepared to give a urine specimen  Please do not eat 10-12 hours before your appointment if you are coming in fasting for labs on lipids, cholesterol, or glucose (sugar).  Pregnant women should follow their Care Team instructions. Water with medications is okay. Do not drink coffee or other fluids.   If you have concerns about taking  your medications, please ask at office or if scheduling via [employer], send a message by clicking on Secure Messaging, Message Your Care Team.            Apr 19, 2017  3:45 PM CDT   Return Visit with Ivan Vanessa MD   [facility])    [address] [address] [place] [address] [phone]            Jul 11, 2017  2:30 PM CDT   LAB with LAB FIRST FLOOR [facility])    [address] [place] [address] [phone]           Patient must bring picture ID.  Patient should be prepared to give a urine specimen  Please do not eat 10-12 hours before your appointment if you are coming in fasting for labs on lipids, cholesterol, or glucose (sugar).  Pregnant women should follow their Care Team instructions. Water with medications is okay. Do not drink coffee or other fluids.   If you have concerns about taking  your medications, please ask at office or if scheduling via [employer], send a message by clicking on Secure Messaging, Message Your Care Team.            Jul 11, 2017  3:00 PM CDT   Return Visit with Karen Collins MD   [facility])    [address] [place] [address] [phone]              [employer] Information     [employer] gives you secure access to your electronic health record. If you see a primary care provider, you can also send messages to your care team and make appointments. If you have questions, please call your primary care clinic.  If you do not have a primary care provider, please call [phone] and they will assist you.        Care EveryWhere ID     This is your Care EveryWhere ID. This could be used by other organizations to access your [facility] medical records  [id number]

## 2017-02-24 NOTE — PROGRESS NOTES
Progress Note    Client Name: Queta Cortez  Date: 2/23/2017       Service Type: Individual      Session Start Time: 5pm  Session End Time: 5:45pm      Session Length: 45 minutes     Session #: 7     Attendees: Client attended alone      PHQ-9 / JOAO-7 : completed 2/15/2017  DATA      Progress Since Last Session (Related to Symptoms / Goals / Homework):  Symptoms: worsened to to situational stressors, depression and anxiety   Homework: Achieved / completed to satisfaction      Episode of Care Goals: Satisfactory progress - ACTION (Actively working towards change); Intervened by reinforcing change plan / affirming steps taken     Current / Ongoing Stressors and Concerns:   medical, recent birth of first child, marital, financial, occupational, death of niece     Treatment Objective(s) Addressed in This Session:   increase understanding of steps in the grief process       Intervention:   CBT:     Client reports this past week has been extremely difficult for her. Niece age 13 passed away last week at mother's home when she choked on her vomit in her bed. The child's mother found her unresponsive the next morning and they were unable to resuscitate her.Client reports this niece was from brother's first marriage, and that client was close with this niece but had not seen her a few months as she lived in [place], [place], and had plans this upcoming weekend to spend time together. Client reports after she left mother's home as a teen she lived with brother, his ex wife, and this niece for several years.Discussed the grief process, and encouraged client to allow herself times to grieve. Discussed several books or movies which may help with the grief process, as well as client reports her benita in [place] has been helpful to her in the process. Encouraged client to consider reminiscing about positive memories she had with niece, and know that saying goodbye to niece is a  temporary good bye, and they will be joined again in [facility]. Encouraged client to practice good self care, and set an alarm on her phone for self care needs if forgetting.     ASSESSMENT: Current Emotional / Mental Status (status of significant symptoms):   Risk status (Self / Other harm or suicidal ideation)   Client denies current fears or concerns for personal safety.   Client denies current or recent suicidal ideation or behaviors.   Client denies current or recent homicidal ideation or behaviors.   Client denies current or recent self injurious behavior or ideation.   Client denies other safety concerns.   A safety and risk management plan has not been developed at this time, however client was given the after-hours number / 911 should there be a change in any of these risk factors.     Appearance:   Appropriate    Eye Contact:   Good    Psychomotor Behavior: Normal    Attitude:   Cooperative    Orientation:   All   Speech    Rate / Production: Normal     Volume:  Normal    Mood:    Anxious  Depressed  Sad    Affect:    Appropriate    Thought Content:  Clear    Thought Form:  Coherent  Logical    Insight:    Fair      Medication Review:   No current psychiatric medications prescribed   Current Outpatient Prescriptions   Medication     NIFEdipine ER (ADALAT CC) 30 MG TB24     order for DME     Ferrous Sulfate (IRON SUPPLEMENT PO)     Prenatal Vit-Min-FA-Fish Oil ([facility] PRENATAL GUMMY) 0.4-113.5 MG CHEW     Cholecalciferol (D 1000) 1000 UNITS CAPS     FOLIC ACID PO     No current facility-administered medications for this visit.           Medication Compliance:   Yes   Changes in Health Issues:   None reported     Chemical Use Review:   Substance Use: Chemical use reviewed, no active concerns identified      Tobacco Use: No current tobacco use.       Collateral Reports Completed:   Not Applicable    PLAN: (Client Tasks / Therapist Tasks / Other)    Discussed several books or movies which may help with the grief  process, as well as client reports her benita in [place] has been helpful to her in the process. Encouraged client to allow herself time to grieve. Encouraged client to practice good self care, and set an alarm on her phone for self care needs if forgetting.           Bella Styles                                                    Treatment Plan    Client's Name: Queta Cortez  YOB: 1985    Date: 2017      Diagnoses: 296.31 Major Depressive Disorder, Recurrent Episode, Mild With peripartum onset  300.02 (F41.1) Generalized Anxiety Disorder  309.81 (F43.10) Posttraumatic Stress Disorder (includes Posttraumatic Stress Disorder for Children 6 Years and Younger)  Without dissociative symptoms    Medical dx: HELLP syndrome, HUS, recent birth of son  Psychosocial & Contextual Factors: medical, recent birth of first child, marital, financial, occupational  WHODAS 2.0 (12 item) 17    Referral / Collaboration:  Collaboration was initiated with PCP MD.    Anticipated number of session or this episode of care: 6-12      MeasurableTreatment Goal(s) related to diagnosis / functional impairment(s)  Goal 1: Client will decrease depressed mood and anxiety as evidenced by PHQ9 and GAD7 scores 4 and Under within the next 3 months. Initial scorin2016:  GAD7:16,  PHQ9: 9. 2/15/2017: :  GAD7:7,  PHQ9: 8.    I will know I've met my goal when I'm better able to manage my anxiety.        Objective #A (Client Action)   Continued - Date(s): 2017  Client will identify 1-2 initial signs or symptoms of anxiety and utilize anxiety reduction techniques daily to decrease anxiety over the next month.Current Baseline is sporadic use. Client will ID triggers for depression and anxiety and work towards decreasing reactivity to or eliminating stressor if possible. client will develop more effective coping skills to manage depression and anxiety symptoms, will develop healthy cognitive patterns and beliefs,  will increase ability to function adaptively and will continue to take medications as prescribed / participate in supportive activities. Client will improve communication with others and be able to work through anxiety rather than shutting down. 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  Intervention(s)   Therapist will teach client how to ID body cues for anxiety, anxiety reduction techniques, how to ID triggers for depression and anxiety- decrease reactivity/eliminate, lifestyle changes to reduce depression and anxiety, communication skills, explore cognitive beliefs and help client to develop healthy cognitive patterns and beliefs.  Client has reviewed and agreed to the above plan.    Bella Styles  January 5, 2017

## 2017-03-01 ENCOUNTER — OFFICE VISIT (OUTPATIENT)
Dept: PSYCHOLOGY | Facility: CLINIC | Age: 32
End: 2017-03-01
Payer: COMMERCIAL

## 2017-03-01 ENCOUNTER — MYC REFILL (OUTPATIENT)
Dept: NEPHROLOGY | Facility: CLINIC | Age: 32
End: 2017-03-01

## 2017-03-01 DIAGNOSIS — F41.1 GAD (GENERALIZED ANXIETY DISORDER): ICD-10-CM

## 2017-03-01 DIAGNOSIS — D59.39 ATYPICAL HUS (HEMOLYTIC UREMIC SYNDROME) WITH MUTATION IN THROMBOMODULIN GENE (H): ICD-10-CM

## 2017-03-01 DIAGNOSIS — F33.0 MILD EPISODE OF RECURRENT MAJOR DEPRESSIVE DISORDER (H): Primary | ICD-10-CM

## 2017-03-01 DIAGNOSIS — F43.10 PTSD (POST-TRAUMATIC STRESS DISORDER): ICD-10-CM

## 2017-03-01 PROCEDURE — 90834 PSYTX W PT 45 MINUTES: CPT | Performed by: MARRIAGE & FAMILY THERAPIST

## 2017-03-01 RX ORDER — NIFEDIPINE 30 MG
60 TABLET, EXTENDED RELEASE ORAL DAILY
Qty: 180 TABLET | Refills: 3 | Status: CANCELLED | OUTPATIENT
Start: 2017-03-01

## 2017-03-01 NOTE — MR AVS SNAPSHOT
MRN:[id number]                      After Visit Summary   3/1/2017    Queta Cortez    MRN: [id number]           Visit Information        Provider Department      3/1/2017 12:00 PM Bella Harrington [facility]      Your next 10 appointments already scheduled     Mar 08, 2017 12:00 PM CST   Return Visit with Bella Ortegalure   [facility] ([facility])    [address]  [facility] [address]   [phone]            Mar 16, 2017  1:00 PM CDT   Return Visit with Bella LUZMARIA Clayre   [facility] ([facility])    [address]  [facility] [address]   [phone]            Apr 19, 2017  3:00 PM CDT   LAB [facility]    [address] [place] [address] [phone]           Patient must bring picture ID.  Patient should be prepared to give a urine specimen  Please do not eat 10-12 hours before your appointment if you are coming in fasting for labs on lipids, cholesterol, or glucose (sugar).  Pregnant women should follow their Care Team instructions. Water with medications is okay. Do not drink coffee or other fluids.   If you have concerns about taking  your medications, please ask at office or if scheduling via [employer], send a message by clicking on Secure Messaging, Message Your Care Team.            Apr 19, 2017  3:45 PM CDT   Return Visit with Ivan Vanessa MD   [facility])    [address] [facility] [address] [phone]            Jul 11, 2017  2:30 PM CDT   LAB with LAB [facility]    [id number] [facility] [address] [phone]           Patient must bring picture ID.  Patient should be prepared to give a urine specimen  Please do not eat  10-12 hours before your appointment if you are coming in fasting for labs on lipids, cholesterol, or glucose (sugar).  Pregnant women should follow their Care Team instructions. Water with medications is okay. Do not drink coffee or other fluids.   If you have concerns about taking  your medications, please ask at office or if scheduling via [employer], send a message by clicking on Secure Messaging, Message Your Care Team.            Jul 11, 2017  3:00 PM CDT   [facility] Visit with Karen Collins MD   [facility]    [address] [place] [address] [phone]              [email or web address] Information     [employer] gives you secure access to your electronic health record. If you see a primary care provider, you can also send messages to your care team and make appointments. If you have questions, please call your primary care clinic.  If you do not have a primary care provider, please call [phone] and they will assist you.        Care EveryWhere ID     This is your Care EveryWhere ID. This could be used by other organizations to access your [facility] medical records  [id number]

## 2017-03-01 NOTE — PROGRESS NOTES
Progress Note    Client Name: Queta Cortez  Date: 3/1/2017       Service Type: Individual      Session Start Time: 12pm  Session End Time: 12:45pm      Session Length: 45 minutes     Session #: 8     Attendees: Client attended alone      PHQ-9 / JOAO-7 : completed 2/15/2017  DATA      Progress Since Last Session (Related to Symptoms / Goals / Homework):  Symptoms: worsened to to situational stressors, depression and anxiety   Homework: Achieved / completed to satisfaction      Episode of Care Goals: Satisfactory progress - ACTION (Actively working towards change); Intervened by reinforcing change plan / affirming steps taken     Current / Ongoing Stressors and Concerns:   medical, recent birth of first child, marital, financial, occupational, death of niece     Treatment Objective(s) Addressed in This Session:   increase understanding of steps in the grief process  Improve relationship with spouse     Intervention:   CBT:     Client reports the  for niece last week went well. Processed continued grief related, and encouraged client to allow herself time to grieve. Discussed ongoing difficulties in marital relationship and conflict avoidance. Client reports her family of origin growing up were confronters/screamers and 's family was stuffers/conflict avoiders. Discussed that client will initiate conversations about conflict but  will shut them down. Client was able to share the awareness wheel with  but he did not want to use the skill. Encouraged client to continue to talk with  about improving connection, reducing conflict, and spending quality time together. Encouraged client to pick back up her [place] book.         ASSESSMENT: Current Emotional / Mental Status (status of significant symptoms):   Risk status (Self / Other harm or suicidal ideation)   Client denies current fears or concerns for personal safety.   Client denies  current or recent suicidal ideation or behaviors.   Client denies current or recent homicidal ideation or behaviors.   Client denies current or recent self injurious behavior or ideation.   Client denies other safety concerns.   A safety and risk management plan has not been developed at this time, however client was given the after-hours number / 911 should there be a change in any of these risk factors.     Appearance:   Appropriate    Eye Contact:   Good    Psychomotor Behavior: Normal    Attitude:   Cooperative    Orientation:   All   Speech    Rate / Production: Normal     Volume:  Normal    Mood:    Anxious  Depressed    Affect:    Appropriate    Thought Content:  Clear    Thought Form:  Coherent  Logical    Insight:    Fair      Medication Review:   No current psychiatric medications prescribed   Current Outpatient Prescriptions   Medication     NIFEdipine ER (ADALAT CC) 30 MG TB24     order for DME     Ferrous Sulfate (IRON SUPPLEMENT PO)     Prenatal Vit-Min-FA-Fish Oil ([facility] PRENATAL GUMMY) 0.4-113.5 MG CHEW     Cholecalciferol (D 1000) 1000 UNITS CAPS     FOLIC ACID PO     No current facility-administered medications for this visit.           Medication Compliance:   Yes   Changes in Health Issues:   None reported     Chemical Use Review:   Substance Use: Chemical use reviewed, no active concerns identified      Tobacco Use: No current tobacco use.       Collateral Reports Completed:   Not Applicable    PLAN: (Client Tasks / Therapist Tasks / Other)    Encouraged client to allow herself time to grieve. Encouraged client to continue to talk with  about improving connection, reducing conflict, and spending quality time together. Encouraged client to pick back up her Love [place] book.             Bella Styles                                                    Treatment Plan    Client's Name: Queta Cortez  YOB: 1985    Date: 1/5/2017      Diagnoses: 296.31 Major Depressive Disorder,  Recurrent Episode, Mild With peripartum onset  300.02 (F41.1) Generalized Anxiety Disorder  309.81 (F43.10) Posttraumatic Stress Disorder (includes Posttraumatic Stress Disorder for Children 6 Years and Younger)  Without dissociative symptoms    Medical dx: HELLP syndrome, HUS, recent birth of son  Psychosocial & Contextual Factors: medical, recent birth of first child, marital, financial, occupational  WHODAS 2.0 (12 item) 17    Referral / Collaboration:  Collaboration was initiated with PCP MD.    Anticipated number of session or this episode of care: 6-12      MeasurableTreatment Goal(s) related to diagnosis / functional impairment(s)  Goal 1: Client will decrease depressed mood and anxiety as evidenced by PHQ9 and GAD7 scores 4 and Under within the next 3 months. Initial scorin2016:  GAD7:16,  PHQ9: 9. 2/15/2017: :  GAD7:7,  PHQ9: 8.    I will know I've met my goal when I'm better able to manage my anxiety.        Objective #A (Client Action)   Continued - Date(s): 3/1/2017  Client will identify 1-2 initial signs or symptoms of anxiety and utilize anxiety reduction techniques daily to decrease anxiety over the next month.Current Baseline is sporadic use. Client will ID triggers for depression and anxiety and work towards decreasing reactivity to or eliminating stressor if possible. client will develop more effective coping skills to manage depression and anxiety symptoms, will develop healthy cognitive patterns and beliefs, will increase ability to function adaptively and will continue to take medications as prescribed / participate in supportive activities. Client will improve communication with others and be able to work through anxiety rather than shutting down. 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  Intervention(s)   Therapist will teach client how to ID body cues for anxiety, anxiety reduction techniques, how to ID triggers for depression and  anxiety- decrease reactivity/eliminate, lifestyle changes to reduce depression and anxiety, communication skills, explore cognitive beliefs and help client to develop healthy cognitive patterns and beliefs.  Client has reviewed and agreed to the above plan.    Bella Styles  January 5, 2017

## 2017-03-01 NOTE — TELEPHONE ENCOUNTER
Confirmed with pharmacy that refills are available at this time.    Chelsi Salgado RN, BSN  Nephrology Care Coordinator  [facility]

## 2017-03-08 ENCOUNTER — OFFICE VISIT (OUTPATIENT)
Dept: PSYCHOLOGY | Facility: CLINIC | Age: 32
End: 2017-03-08
Payer: COMMERCIAL

## 2017-03-08 DIAGNOSIS — F33.0 MILD EPISODE OF RECURRENT MAJOR DEPRESSIVE DISORDER (H): ICD-10-CM

## 2017-03-08 DIAGNOSIS — F43.10 PTSD (POST-TRAUMATIC STRESS DISORDER): ICD-10-CM

## 2017-03-08 DIAGNOSIS — F41.1 GAD (GENERALIZED ANXIETY DISORDER): Primary | ICD-10-CM

## 2017-03-08 PROCEDURE — 90834 PSYTX W PT 45 MINUTES: CPT | Performed by: MARRIAGE & FAMILY THERAPIST

## 2017-03-08 NOTE — PROGRESS NOTES
Progress Note    Client Name: Queta Cortez  Date: 3/8/2017       Service Type: Individual      Session Start Time: 12pm  Session End Time: 12:45pm      Session Length: 45 minutes     Session #: 9     Attendees: Client attended alone      PHQ-9 / JOAO-7 : completed 2/15/2017  DATA      Progress Since Last Session (Related to Symptoms / Goals / Homework):  Symptoms: worsened to to situational stressors, depression and anxiety   Homework: Achieved / completed to satisfaction      Episode of Care Goals: Satisfactory progress - ACTION (Actively working towards change); Intervened by reinforcing change plan / affirming steps taken     Current / Ongoing Stressors and Concerns:   medical, recent birth of first child, marital, financial, occupational, death of niece     Treatment Objective(s) Addressed in This Session:   increase understanding of steps in the grief process       Intervention:   grief counseling   Client reports continued grief over loss of niece. Processed feelings of guilt for moving on in her life and feeling that she is going to forget about niece. She plans to attend a [facility] group next week at a [facility]. Brother is still not back to work, which means there is not as much structure as usual in her life which usually provides distraction from her worry Processed frustrations that son has not been feeling well and has been cranky and not sleeping  Since getting his shots last Friday. She has noticed how lack of sleep impacts mood negatively. Encouraged her to advocate for herself with  and family to get sleep or possible naps. Reports due to present situation she and  have not yet talked more about conflict and how they might address it. 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          ASSESSMENT: Current Emotional / Mental Status (status of significant symptoms):   Risk status (Self / Other  harm or suicidal ideation)   Client denies current fears or concerns for personal safety.   Client denies current or recent suicidal ideation or behaviors.   Client denies current or recent homicidal ideation or behaviors.   Client denies current or recent self injurious behavior or ideation.   Client denies other safety concerns.   A safety and risk management plan has not been developed at this time, however client was given the after-hours number / 911 should there be a change in any of these risk factors.     Appearance:   Appropriate    Eye Contact:   Good    Psychomotor Behavior: Normal    Attitude:   Cooperative    Orientation:   All   Speech    Rate / Production: Normal     Volume:  Normal    Mood:    Anxious  Depressed  Sad    Affect:    Appropriate    Thought Content:  Clear    Thought Form:  Coherent  Logical    Insight:    Fair      Medication Review:   No current psychiatric medications prescribed   Current Outpatient Prescriptions   Medication     NIFEdipine ER (ADALAT CC) 30 MG TB24     order for DME     Ferrous Sulfate (IRON SUPPLEMENT PO)     Prenatal Vit-Min-FA-Fish Oil ([employer] PRENATAL GUMMY) 0.4-113.5 MG CHEW     Cholecalciferol (D 1000) 1000 UNITS CAPS     FOLIC ACID PO     No current facility-administered medications for this visit.           Medication Compliance:   Yes   Changes in Health Issues:   None reported     Chemical Use Review:   Substance Use: Chemical use reviewed, no active concerns identified      Tobacco Use: No current tobacco use.       Collateral Reports Completed:   Not Applicable    PLAN: (Client Tasks / Therapist Tasks / Other)    Encouraged client to allow herself time to grieve. Encouraged client to continue to talk with  about improving connection, reducing conflict, and spending quality time together. Encouraged client to pick back up her [place] book.             Bella Styles                                                    Treatment Plan    Client's  Name: Queta Cortez  YOB: 1985    Date: 2017      Diagnoses: 296.31 Major Depressive Disorder, Recurrent Episode, Mild With peripartum onset  300.02 (F41.1) Generalized Anxiety Disorder  309.81 (F43.10) Posttraumatic Stress Disorder (includes Posttraumatic Stress Disorder for Children 6 Years and Younger)  Without dissociative symptoms    Medical dx: HELLP syndrome, HUS, recent birth of son  Psychosocial & Contextual Factors: medical, recent birth of first child, marital, financial, occupational  WHODAS 2.0 (12 item) 17    Referral / Collaboration:  Collaboration was initiated with PCP MD.    Anticipated number of session or this episode of care: 6-12      MeasurableTreatment Goal(s) related to diagnosis / functional impairment(s)  Goal 1: Client will decrease depressed mood and anxiety as evidenced by PHQ9 and GAD7 scores 4 and Under within the next 3 months. Initial scorin2016:  GAD7:16,  PHQ9: 9. 2/15/2017: :  GAD7:7,  PHQ9: 8.    I will know I've met my goal when I'm better able to manage my anxiety.      Objective #A (Client Action)   Continued - Date(s): 3/8/2017  Client will identify 1-2 initial signs or symptoms of anxiety and utilize anxiety reduction techniques daily to decrease anxiety over the next month.Current Baseline is sporadic use. Client will ID triggers for depression and anxiety and work towards decreasing reactivity to or eliminating stressor if possible. client will develop more effective coping skills to manage depression and anxiety symptoms, will develop healthy cognitive patterns and beliefs, will increase ability to function adaptively and will continue to take medications as prescribed / participate in supportive activities. Client will improve communication with others and be able to work through anxiety rather than shutting down. 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  Intervention(s)   Therapist will teach  client how to ID body cues for anxiety, anxiety reduction techniques, how to ID triggers for depression and anxiety- decrease reactivity/eliminate, lifestyle changes to reduce depression and anxiety, communication skills, explore cognitive beliefs and help client to develop healthy cognitive patterns and beliefs.  Client has reviewed and agreed to the above plan.    Bella Styles  January 5, 2017

## 2017-03-08 NOTE — MR AVS SNAPSHOT
MRN:[id number]                      After Visit Summary   3/8/2017    Queta Cortez    MRN: [id number]           Visit Information        Provider Department      3/8/2017 12:00 PM Bella Harrington [facility] Generic      Your next 10 appointments already scheduled     Mar 22, 2017 10:00 AM CDT   Return Visit with Bella Harrington   [facility] ([facility])    [address]  [facility] [address]   [phone]            Apr 19, 2017  3:00 PM CDT   LAB with LAB ONC [facility] ([facility])    [address] [place] [id number] [phone]           Patient must bring picture ID.  Patient should be prepared to give a urine specimen  Please do not eat 10-12 hours before your appointment if you are coming in fasting for labs on lipids, cholesterol, or glucose (sugar).  Pregnant women should follow their Care Team instructions. Water with medications is okay. Do not drink coffee or other fluids.   If you have concerns about taking  your medications, please ask at office or if scheduling via [employer], send a message by clicking on Secure Messaging, Message Your Care Team.            Apr 19, 2017  3:45 PM CDT   Return Visit with Ivan Vanessa MD   [facility])    [address] [place] [id number] [phone]            Jul 11, 2017  2:30 PM CDT   LAB with LAB FIRST FLOOR [facility])    [address] [place] [address] [id number] [phone]           Patient must bring picture ID.  Patient should be prepared to give a urine specimen  Please do not eat 10-12 hours before your appointment if you are coming in fasting for labs on lipids, cholesterol, or glucose (sugar).  Pregnant women should follow their Care Team instructions. Water with medications  is okay. Do not drink coffee or other fluids.   If you have concerns about taking  your medications, please ask at office or if scheduling via [employer], send a message by clicking on Secure Messaging, Message Your Care Team.            Jul 11, 2017  3:00 PM CDT   Return Visit with Karen Collins MD   [facility])    [address] [place] [address] [phone]              elmenus Information     [employer]us gives you secure access to your electronic health record. If you see a primary care provider, you can also send messages to your care team and make appointments. If you have questions, please call your primary care clinic.  If you do not have a primary care provider, please call [phone] and they will assist you.        Care EveryWhere ID     This is your Care EveryWhere ID. This could be used by other organizations to access your [place] medical records  [id number]

## 2017-03-22 ENCOUNTER — OFFICE VISIT (OUTPATIENT)
Dept: PSYCHOLOGY | Facility: CLINIC | Age: 32
End: 2017-03-22
Payer: COMMERCIAL

## 2017-03-22 DIAGNOSIS — F33.0 MILD EPISODE OF RECURRENT MAJOR DEPRESSIVE DISORDER (H): ICD-10-CM

## 2017-03-22 DIAGNOSIS — F43.10 PTSD (POST-TRAUMATIC STRESS DISORDER): ICD-10-CM

## 2017-03-22 DIAGNOSIS — F41.1 GAD (GENERALIZED ANXIETY DISORDER): Primary | ICD-10-CM

## 2017-03-22 PROCEDURE — 90834 PSYTX W PT 45 MINUTES: CPT | Performed by: MARRIAGE & FAMILY THERAPIST

## 2017-03-22 NOTE — PROGRESS NOTES
Progress Note    Client Name: Queta Cortez  Date: 3/22/2017       Service Type: Individual      Session Start Time: 10am  Session End Time: 10:45am      Session Length: 45 minutes     Session #: 10     Attendees: Client attended alone      PHQ-9 / JOAO-7 : declined  DATA      Progress Since Last Session (Related to Symptoms / Goals / Homework):  Symptoms: depression and anxiety related to situational stressors  Homework: Achieved / completed to satisfaction      Episode of Care Goals: Satisfactory progress - ACTION (Actively working towards change); Intervened by reinforcing change plan / affirming steps taken     Current / Ongoing Stressors and Concerns:   medical, recent birth of first child, marital, financial, occupational, death of niece     Treatment Objective(s) Addressed in This Session:   increase understanding of steps in the grief process  Discussed concept of radical acceptance and considering options     Intervention:   grief counseling, solution focused   Client is back to work at both positions now. Brother and sister in law are still struggling with loss of their daughter and having some difficulty functioning which causes client increased worry. Client asked provider for area referrals for sister in law, and provider gave referrals. She continues to attend once a month grief support group which she has found to be helpful. Discussed frustration and worry related to whether she will be able to have another child in the next few years as she had previously planned. Plans to schedule appointment with MD's to seek advisement, and then make a determination for herself what she plans to do as she would like to have a second child. Discussed alternative options such as surrogacy or adoption as well but client is not interested in exploring these options at this time.       ASSESSMENT: Current Emotional / Mental Status (status of significant symptoms):   Risk  status (Self / Other harm or suicidal ideation)   Client denies current fears or concerns for personal safety.   Client denies current or recent suicidal ideation or behaviors.   Client denies current or recent homicidal ideation or behaviors.   Client denies current or recent self injurious behavior or ideation.   Client denies other safety concerns.   A safety and risk management plan has not been developed at this time, however client was given the after-hours number / 911 should there be a change in any of these risk factors.     Appearance:   Appropriate    Eye Contact:   Good    Psychomotor Behavior: Normal    Attitude:   Cooperative    Orientation:   All   Speech    Rate / Production: Normal     Volume:  Normal    Mood:    Anxious  Depressed    Affect:    Appropriate    Thought Content:  Clear    Thought Form:  Coherent  Logical    Insight:    Fair      Medication Review:   No current psychiatric medications prescribed   Current Outpatient Prescriptions   Medication     NIFEdipine ER (ADALAT CC) 30 MG TB24     order for DME     Ferrous Sulfate (IRON SUPPLEMENT PO)     Prenatal Vit-Min-FA-Fish Oil ([employer] PRENATAL GUMMY) 0.4-113.5 MG CHEW     Cholecalciferol (D 1000) 1000 UNITS CAPS     FOLIC ACID PO     No current facility-administered medications for this visit.           Medication Compliance:   Yes   Changes in Health Issues:   None reported     Chemical Use Review:   Substance Use: Chemical use reviewed, no active concerns identified      Tobacco Use: No current tobacco use.       Collateral Reports Completed:   Not Applicable    PLAN: (Client Tasks / Therapist Tasks / Other)    Encouraged client to allow herself time to grieve. Encouraged client to continue to talk with  about improving connection, reducing conflict, and spending quality time together. Plans to schedule appointment with MD's to seek advisement, and then make a determination for herself what she plans to do as she would like to have a  second child.           Bella Styles                                                    Treatment Plan    Client's Name: Queta Coretz  YOB: 1985    Date: 2017      Diagnoses: 296.31 Major Depressive Disorder, Recurrent Episode, Mild With peripartum onset  300.02 (F41.1) Generalized Anxiety Disorder  309.81 (F43.10) Posttraumatic Stress Disorder (includes Posttraumatic Stress Disorder for Children 6 Years and Younger)  Without dissociative symptoms    Medical dx: HELLP syndrome, HUS, recent birth of son  Psychosocial & Contextual Factors: medical, recent birth of first child, marital, financial, occupational  WHODAS 2.0 (12 item) 17    Referral / Collaboration:  Collaboration was initiated with PCP MD.    Anticipated number of session or this episode of care: 6-12      MeasurableTreatment Goal(s) related to diagnosis / functional impairment(s)  Goal 1: Client will decrease depressed mood and anxiety as evidenced by PHQ9 and GAD7 scores 4 and Under within the next 3 months. Initial scorin2016:  GAD7:16,  PHQ9: 9. 2/15/2017: :  GAD7:7,  PHQ9: 8.    I will know I've met my goal when I'm better able to manage my anxiety.      Objective #A (Client Action)   Continued - Date(s): 3/22/2017  Client will identify 1-2 initial signs or symptoms of anxiety and utilize anxiety reduction techniques daily to decrease anxiety over the next month.Current Baseline is sporadic use. Client will ID triggers for depression and anxiety and work towards decreasing reactivity to or eliminating stressor if possible. client will develop more effective coping skills to manage depression and anxiety symptoms, will develop healthy cognitive patterns and beliefs, will increase ability to function adaptively and will continue to take medications as prescribed / participate in supportive activities. Client will improve communication with others and be able to work through anxiety rather than shutting down. Client  will maintain remission from hx of SIB- scratching that last occurred as a teen. Client will process grief related to medical issues and loss of niece.  Intervention(s)   Therapist will teach client how to ID body cues for anxiety, anxiety reduction techniques, how to ID triggers for depression and anxiety- decrease reactivity/eliminate, lifestyle changes to reduce depression and anxiety, communication skills, explore cognitive beliefs and help client to develop healthy cognitive patterns and beliefs.  Client has reviewed and agreed to the above plan.    Bella Styles  January 5, 2017

## 2017-03-22 NOTE — MR AVS SNAPSHOT
MRN:[id number]                      After Visit Summary   3/22/2017    Queta Cortez    MRN: [id number]           Visit Information        Provider Department      3/22/2017 10:00 AM Bella Harrington [facility] Generic      Your next 10 appointments already scheduled     Apr 12, 2017  3:00 PM CDT   Return Visit with Bella Harrington   [facility] ([facility])    [address]  [facility] [address]   [phone]            Apr 19, 2017  3:00 PM CDT   LAB with LAB ONC [facility] ([facility])    [address] [place] [phone]           Patient must bring picture ID.  Patient should be prepared to give a urine specimen  Please do not eat 10-12 hours before your appointment if you are coming in fasting for labs on lipids, cholesterol, or glucose (sugar).  Pregnant women should follow their Care Team instructions. Water with medications is okay. Do not drink coffee or other fluids.   If you have concerns about taking  your medications, please ask at office or if scheduling via [employer], send a message by clicking on Secure Messaging, Message Your Care Team.            Apr 19, 2017  3:45 PM CDT   Return Visit with Ivan Vanessa MD   [facility])    [address] [place] [address] [phone]            Apr 26, 2017  1:00 PM CDT   Return Visit with Bella Harrington   [facility] ([facility])    [address]  [facility] [address]   [phone]            Jul 11, 2017  2:30 PM CDT   LAB with LAB FIRST FLOOR [facility] ([facility])    [address] [place] [address] [phone]           Patient must bring picture ID.  Patient should be prepared to give a urine specimen  Please do not  eat 10-12 hours before your appointment if you are coming in fasting for labs on lipids, cholesterol, or glucose (sugar).  Pregnant women should follow their Care Team instructions. Water with medications is okay. Do not drink coffee or other fluids.   If you have concerns about taking  your medications, please ask at office or if scheduling via [employer], send a message by clicking on Secure Messaging, Message Your Care Team.            Jul 11, 2017  3:00 PM CDT   Return Visit with Karen Collins MD   [facility]    [address] [place] [address] [phone]              [employer] Information     [employer] gives you secure access to your electronic health record. If you see a primary care provider, you can also send messages to your care team and make appointments. If you have questions, please call your primary care clinic.  If you do not have a primary care provider, please call [phone] and they will assist you.        Care EveryWhere ID     This is your Care EveryWhere ID. This could be used by other organizations to access your [place] medical records  [id number]

## 2017-04-12 ENCOUNTER — OFFICE VISIT (OUTPATIENT)
Dept: PSYCHOLOGY | Facility: CLINIC | Age: 32
End: 2017-04-12
Payer: COMMERCIAL

## 2017-04-12 DIAGNOSIS — F41.1 GAD (GENERALIZED ANXIETY DISORDER): Primary | ICD-10-CM

## 2017-04-12 DIAGNOSIS — F43.10 PTSD (POST-TRAUMATIC STRESS DISORDER): ICD-10-CM

## 2017-04-12 DIAGNOSIS — F33.0 MILD EPISODE OF RECURRENT MAJOR DEPRESSIVE DISORDER (H): ICD-10-CM

## 2017-04-12 PROCEDURE — 90834 PSYTX W PT 45 MINUTES: CPT | Performed by: MARRIAGE & FAMILY THERAPIST

## 2017-04-12 NOTE — MR AVS SNAPSHOT
MRN:[id number]                      After Visit Summary   4/12/2017    Queta Cortez    MRN: [id number]           Visit Information        Provider Department      4/12/2017 3:00 PM Bella Harrington [facility] Generic      Your next 10 appointments already scheduled     Apr 19, 2017  3:00 PM CDT   LAB with LAB ONC [facility] ([facility])    [address] [facility] [phone]           Patient must bring picture ID.  Patient should be prepared to give a urine specimen  Please do not eat 10-12 hours before your appointment if you are coming in fasting for labs on lipids, cholesterol, or glucose (sugar).  Pregnant women should follow their Care Team instructions. Water with medications is okay. Do not drink coffee or other fluids.   If you have concerns about taking  your medications, please ask at office or if scheduling via [employer], send a message by clicking on Secure Messaging, Message Your Care Team.            Apr 19, 2017  3:45 PM CDT   Return Visit with Ivan Vanessa MD   [facility]    [address] [facility] [address] [phone]            Apr 26, 2017  1:00 PM CDT   Return Visit with Bella Harrington   [facility] ([facility])    [address]  [facility] [address]   [phone]            May 10, 2017 10:00 AM CDT   Return Visit with Bella Harrington   [facility] ([facility])    [address]  [facility] [address]   [phone]            Jul 11, 2017  2:30 PM CDT   LAB with LAB FIRST FLOOR [facility] ([facility])    [address] [facility] [address] [phone]           Patient must bring picture ID.  Patient should be prepared to give a urine specimen  Please do not eat  10-12 hours before your appointment if you are coming in fasting for labs on lipids, cholesterol, or glucose (sugar).  Pregnant women should follow their Care Team instructions. Water with medications is okay. Do not drink coffee or other fluids.   If you have concerns about taking  your medications, please ask at office or if scheduling via [employer], send a message by clicking on Secure Messaging, Message Your Care Team.            Jul 11, 2017  3:00 PM CDT   Return Visit with Karen Collins MD   [facility] ([facility])    [address] [place] [address] [phone]              [employer] Information     [employer] gives you secure access to your electronic health record. If you see a primary care provider, you can also send messages to your care team and make appointments. If you have questions, please call your primary care clinic.  If you do not have a primary care provider, please call [phone] and they will assist you.        Care EveryWhere ID     This is your Care EveryWhere ID. This could be used by other organizations to access your [place] medical records  [id number]
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
## 2017-04-19 ENCOUNTER — ONCOLOGY VISIT (OUTPATIENT)
Dept: ONCOLOGY | Facility: CLINIC | Age: 32
End: 2017-04-19
Payer: COMMERCIAL

## 2017-04-19 VITALS
WEIGHT: 158.1 LBS | HEART RATE: 70 BPM | HEIGHT: 70 IN | BODY MASS INDEX: 22.63 KG/M2 | SYSTOLIC BLOOD PRESSURE: 131 MMHG | DIASTOLIC BLOOD PRESSURE: 78 MMHG | TEMPERATURE: 98 F | OXYGEN SATURATION: 98 % | RESPIRATION RATE: 15 BRPM

## 2017-04-19 DIAGNOSIS — D59.4 MICROANGIOPATHIC HEMOLYTIC ANEMIA (H): Primary | ICD-10-CM

## 2017-04-19 DIAGNOSIS — D59.4 MICROANGIOPATHIC HEMOLYTIC ANEMIA (H): ICD-10-CM

## 2017-04-19 LAB
ALBUMIN SERPL-MCNC: 3.9 G/DL (ref 3.4–5)
ALP SERPL-CCNC: 46 U/L (ref 40–150)
ALT SERPL W P-5'-P-CCNC: 27 U/L (ref 0–50)
ANION GAP SERPL CALCULATED.3IONS-SCNC: 6 MMOL/L (ref 3–14)
AST SERPL W P-5'-P-CCNC: 13 U/L (ref 0–45)
BASOPHILS # BLD AUTO: 0 10E9/L (ref 0–0.2)
BASOPHILS NFR BLD AUTO: 0.5 %
BILIRUB SERPL-MCNC: 0.3 MG/DL (ref 0.2–1.3)
BUN SERPL-MCNC: 12 MG/DL (ref 7–30)
CALCIUM SERPL-MCNC: 9 MG/DL (ref 8.5–10.1)
CHLORIDE SERPL-SCNC: 107 MMOL/L (ref 94–109)
CO2 SERPL-SCNC: 29 MMOL/L (ref 20–32)
CREAT SERPL-MCNC: 0.92 MG/DL (ref 0.52–1.04)
DIFFERENTIAL METHOD BLD: NORMAL
EOSINOPHIL # BLD AUTO: 0.1 10E9/L (ref 0–0.7)
EOSINOPHIL NFR BLD AUTO: 1.2 %
ERYTHROCYTE [DISTWIDTH] IN BLOOD BY AUTOMATED COUNT: 12.1 % (ref 10–15)
GFR SERPL CREATININE-BSD FRML MDRD: 71 ML/MIN/1.7M2
GLUCOSE SERPL-MCNC: 86 MG/DL (ref 70–99)
HCT VFR BLD AUTO: 36.2 % (ref 35–47)
HGB BLD-MCNC: 12.3 G/DL (ref 11.7–15.7)
LDH SERPL L TO P-CCNC: 169 U/L (ref 81–234)
LYMPHOCYTES # BLD AUTO: 1.4 10E9/L (ref 0.8–5.3)
LYMPHOCYTES NFR BLD AUTO: 22 %
MCH RBC QN AUTO: 31.2 PG (ref 26.5–33)
MCHC RBC AUTO-ENTMCNC: 34 G/DL (ref 31.5–36.5)
MCV RBC AUTO: 92 FL (ref 78–100)
MONOCYTES # BLD AUTO: 0.3 10E9/L (ref 0–1.3)
MONOCYTES NFR BLD AUTO: 5.3 %
NEUTROPHILS # BLD AUTO: 4.6 10E9/L (ref 1.6–8.3)
NEUTROPHILS NFR BLD AUTO: 71 %
PLATELET # BLD AUTO: 302 10E9/L (ref 150–450)
POTASSIUM SERPL-SCNC: 4.1 MMOL/L (ref 3.4–5.3)
PROT SERPL-MCNC: 7.4 G/DL (ref 6.8–8.8)
RBC # BLD AUTO: 3.94 10E12/L (ref 3.8–5.2)
RETICS # AUTO: 78.4 10E9/L (ref 25–95)
RETICS/RBC NFR AUTO: 2 % (ref 0.5–2)
SODIUM SERPL-SCNC: 142 MMOL/L (ref 133–144)
WBC # BLD AUTO: 6.4 10E9/L (ref 4–11)

## 2017-04-19 PROCEDURE — 85025 COMPLETE CBC W/AUTO DIFF WBC: CPT | Performed by: INTERNAL MEDICINE

## 2017-04-19 PROCEDURE — 85045 AUTOMATED RETICULOCYTE COUNT: CPT | Performed by: INTERNAL MEDICINE

## 2017-04-19 PROCEDURE — 99214 OFFICE O/P EST MOD 30 MIN: CPT | Performed by: INTERNAL MEDICINE

## 2017-04-19 PROCEDURE — 83615 LACTATE (LD) (LDH) ENZYME: CPT | Performed by: INTERNAL MEDICINE

## 2017-04-19 PROCEDURE — 80053 COMPREHEN METABOLIC PANEL: CPT | Performed by: INTERNAL MEDICINE

## 2017-04-19 PROCEDURE — 83010 ASSAY OF HAPTOGLOBIN QUANT: CPT | Performed by: INTERNAL MEDICINE

## 2017-04-19 PROCEDURE — 36415 COLL VENOUS BLD VENIPUNCTURE: CPT | Performed by: INTERNAL MEDICINE

## 2017-04-19 ASSESSMENT — PAIN SCALES - GENERAL: PAINLEVEL: NO PAIN (0)

## 2017-04-19 NOTE — MR AVS SNAPSHOT
After Visit Summary   4/19/2017    Queta Cortez    MRN: [id number]           Patient Information     Date Of Birth          1985        Visit Information        Provider Department      4/19/2017 3:45 PM Ivan Vanessa MD [facility]        Today's Diagnoses     Microangiopathic hemolytic anemia (H)    -  1      Care Instructions    See me back in 6 months with labs prior        Follow-ups after your visit        Your next 10 appointments already scheduled     Apr 26, 2017  1:00 PM CDT   Return Visit with Bella DUTTA [facility] ([facility])    [address]  [facility] [address]   [phone]            May 10, 2017 10:00 AM CDT   Return Visit with Bella DUTTA [facility] ([facility])    [address]  [facility] [address]   [phone]            Jul 11, 2017  2:30 PM CDT   LAB with LAB FIRST FLOOR [facility] [place]note 2[place]    [address] [place] [id number] [phone]           Patient must bring picture ID.  Patient should be prepared to give a urine specimen  Please do not eat 10-12 hours before your appointment if you are coming in fasting for labs on lipids, cholesterol, or glucose (sugar).  Pregnant women should follow their Care Team instructions. Water with medications is okay. Do not drink coffee or other fluids.   If you have concerns about taking  your medications, please ask at office or if scheduling via [facility], send a message by clicking on Secure Messaging, Message Your Care Team.            Jul 11, 2017  3:00 PM CDT   Return Visit with Karen Collins MD   [facility])    [address] [place] [address]   [phone]            Oct 18, 2017  2:30 PM CDT   LAB with LAB ONC [facility])    [address] [place] [address] [phone]           Patient must bring picture ID.  Patient should be prepared to give a urine specimen  Please do not eat 10-12 hours before your appointment if you are coming in fasting for labs on lipids, cholesterol, or glucose (sugar).  Pregnant women should follow their Care Team instructions. Water with medications is okay. Do not drink coffee or other fluids.   If you have concerns about taking  your medications, please ask at office or if scheduling via [employer], send a message by clicking on Secure Messaging, Message Your Care Team.            Oct 18, 2017  3:15 PM CDT   Return Visit with Ivan Vanessa MD   [facility] ([facility])    [address] [place] [id number] [phone]              Future tests that were ordered for you today     Open Future Orders        Priority Expected Expires Ordered    *CBC with platelets differential Routine 10/19/2017 4/19/2018 4/19/2017    Reticulocyte count Routine 10/19/2017 4/19/2018 4/19/2017    Lactate Dehydrogenase Routine 10/19/2017 4/19/2018 4/19/2017    Comprehensive metabolic panel Routine 10/19/2017 4/19/2018 4/19/2017            Who to contact     If you have questions or need follow up information about today's clinic visit or your schedule please contact [facility] directly at [phone].  Normal or non-critical lab and imaging results will be communicated to you by MyChart, letter or phone within 4 business days after the clinic has received the results. If you do not hear from us within 7 days, please contact the clinic through [employer] or phone. If you have a critical or abnormal lab result, we will notify you by phone as soon as possible.  Submit refill requests through [employer] or call your pharmacy and they will forward the refill request to us. Please allow 3 business days for your refill to be completed.           "Additional Information About Your Visit        [employer] Information     [employer] gives you secure access to your electronic health record. If you see a primary care provider, you can also send messages to your care team and make appointments. If you have questions, please call your primary care clinic.  If you do not have a primary care provider, please call [phone] and they will assist you.      [employer] is an electronic gateway that provides easy, online access to your medical records. With [employer], you can request a clinic appointment, read your test results, renew a prescription or communicate with your care team.     To access your existing account, please contact your [facility] or call [phone] for assistance.        Care EveryWhere ID     This is your Care EveryWhere ID. This could be used by other organizations to access your [place] medical records  [id number]        Your Vitals Were     Pulse Temperature Respirations Height Last Period Pulse Oximetry    70 98  F (36.7  C) 15 1.786 m (5' 10.32\") 04/17/2017 98%    BMI (Body Mass Index)                   22.48 kg/m2            Blood Pressure from Last 3 Encounters:   04/19/17 131/78   01/18/17 126/86   01/09/17 125/81    Weight from Last 3 Encounters:   04/19/17 71.7 kg (158 lb 1.6 oz)   01/18/17 72.1 kg (159 lb)   01/09/17 71.7 kg (158 lb)               Primary Care Provider Office Phone # Fax #    Alanis Lundberg [phone]       [address] [address]        Thank you!     Thank you for choosing [facility]  for your care. Our goal is always to provide you with excellent care. Hearing back from our patients is one way we can continue to improve our services. Please take a few minutes to complete the written survey that you may receive in the mail after your visit with us. Thank you!             Your Updated Medication List - Protect " others around you: Learn how to safely use, store and throw away your medicines at www.disposemymeds.org.          This list is accurate as of: 4/19/17  4:07 PM.  Always use your most recent med list.                   Brand Name Dispense Instructions for use    [employer] PRENATAL GUMMY 0.4-113.5 MG Chew      Reported on 4/19/2017       D 1000 1000 UNITS Caps      Reported on 4/19/2017       FOLIC ACID PO      Take 800 mcg by mouth daily Reported on 4/19/2017       IRON SUPPLEMENT PO      Reported on 4/19/2017       NIFEdipine ER 30 MG Tb24    ADALAT CC    180 tablet    Take 2 tablets (60 mg) by mouth daily       order for DME     2 Units    Equipment being ordered: Compression stockings. Knee high 20-30

## 2017-04-19 NOTE — PROGRESS NOTES
2017      CHIEF COMPLAINT:  Followup for microangiopathic hemolytic anemia.      HISTORY OF PRESENT ILLNESS:  Please refer to my note from 11/15/2016 for more details.      Copied and updated from prior  Briefly, Queta is a very pleasant, previously healthy 31-year-old female who was recently pregnant with her first child when at 39 weeks of gestation she went into labor but due to failure to descend she had an uncomplicated  on 10/29/2016.  She was discharged on postoperative day #3, but a couple of days later started experiencing profound nausea and vomiting with dizziness and weakness and decreased appetite.  At that point, she was found to have marked anemia and thrombocytopenia with a hemoglobin of 4.4 and platelet count of 20.  There was schistocytes seen on peripheral blood smear and LDH was elevated and she also found to have acute kidney injury.  At that point she was admitted to the [facility] for the possibility of TTP versus HELLP syndrome.  Initially she was started on plasma exchange as well as dexamethasone 10 mg twice a day, but her [id number] returned to be 45%, making TTP unlikely.  As the transaminitis was not very severe the diagnosis of HELLP syndrome was entertained, but thought relatively unlikely.  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  Eventually plasma exchange was continued for 7 days.  She had resolution of thrombocytopenia, anemia improved, renal insufficiency also improved.  She did have significant proteinuria which also was improving at the time of the discharge.  The genetic testing for atypical HUS was sent prior to the discharge.      She also developed uncontrolled hypertension during that admission for which she was started on Coreg and nifedipine.    On 16 she was evaluated by primary care doctor as she was complaining of dyspnea on exertion. Workup for PE and post partum cardiomyopathy was unremarkable. She  "recovered with time  Since she was slowly getting better and her blood work also continued to improve, we opted for a wait and watch approach.      INTERVAL HISTORY:  She is now coming for followup.  She tells me that she has been doing well in terms of energy.  She has been able to eat and drink well without any GI problems.  No interval infections, no new swellings, no trouble breathing.  Her energy is good.  Denies any abnormal bleeding.  Her menses is fine and not heavy.  She tells me that unfortunately her 13-year-old niece  a few days ago, so because of that her mood has been low.  Otherwise, her mood has kind of been stable, but she has been getting counseling for that.      REVIEW OF SYSTEMS:  Otherwise, a comprehensive review of systems was performed and it was negative.         MEDICATIONS:  Currently, she is not using any medication and she ran out of prenatal vitamin about 1-1/2 weeks ago and she has not started taking it back.  She also ran out of nifedipine a few days ago and she has not started taking it.  Currently, she is not taking any medications.        PHYSICAL EXAMINATION:   VITAL SIGNS:  /78  Pulse 70  Temp 98  F (36.7  C)  Resp 15  Ht 1.786 m (5' 10.32\")  Wt 71.7 kg (158 lb 1.6 oz)  LMP 2017  SpO2 98%  BMI 22.48 kg/m2  CONSTITUTIONAL: no acute distress  EYES: PERRLA, no palor or icterus.   ENT/MOUTH: no mouth lesions. Oropharynx normal  CVS: s1s2 no m r g .   RESPIRATORY: clear to auscultation b/l  GI: soft non tender no hepatosplenomegaly  NEURO: AAOX3  Grossly non focal neuro exam  INTEGUMENT: no obvious rashes  LYMPHATIC: no palpable cervical, supraclavicular, axillary or inguinal LAD  MUSCULOSKELETAL: Unremarkable. No bony tenderness.   EXTREMITIES: no edema  PSYCH: Mentation, mood and affect are normal. Decision making capacity is intact       LABORATORY DATA:    Reviewed and now unremarkable  Results for RENEE MORGAN (MRN [id number]) as of 2017 16:25   " Ref. Range 4/19/2017 14:58   Sodium Latest Ref Range: 133 - 144 mmol/L 142   Potassium Latest Ref Range: 3.4 - 5.3 mmol/L 4.1   Chloride Latest Ref Range: 94 - 109 mmol/L 107   Carbon Dioxide Latest Ref Range: 20 - 32 mmol/L 29   Urea Nitrogen Latest Ref Range: 7 - 30 mg/dL 12   Creatinine Latest Ref Range: 0.52 - 1.04 mg/dL 0.92   GFR Estimate Latest Ref Range: >60 mL/min/1.7m2 71   GFR Estimate If Black Latest Ref Range: >60 mL/min/1.7m2 86   Calcium Latest Ref Range: 8.5 - 10.1 mg/dL 9.0   Anion Gap Latest Ref Range: 3 - 14 mmol/L 6   Albumin Latest Ref Range: 3.4 - 5.0 g/dL 3.9   Protein Total Latest Ref Range: 6.8 - 8.8 g/dL 7.4   Bilirubin Total Latest Ref Range: 0.2 - 1.3 mg/dL 0.3   Alkaline Phosphatase Latest Ref Range: 40 - 150 U/L 46   ALT Latest Ref Range: 0 - 50 U/L 27   AST Latest Ref Range: 0 - 45 U/L 13   Lactate Dehydrogenase Latest Ref Range: 81 - 234 U/L 169   Glucose Latest Ref Range: 70 - 99 mg/dL 86   WBC Latest Ref Range: 4.0 - 11.0 10e9/L 6.4   Hemoglobin Latest Ref Range: 11.7 - 15.7 g/dL 12.3   Hematocrit Latest Ref Range: 35.0 - 47.0 % 36.2   Platelet Count Latest Ref Range: 150 - 450 10e9/L 302   RBC Count Latest Ref Range: 3.8 - 5.2 10e12/L 3.94   MCV Latest Ref Range: 78 - 100 fl 92   MCH Latest Ref Range: 26.5 - 33.0 pg 31.2   MCHC Latest Ref Range: 31.5 - 36.5 g/dL 34.0   RDW Latest Ref Range: 10.0 - 15.0 % 12.1   Diff Method Unknown Automated Method   % Neutrophils Latest Units: % 71.0   % Lymphocytes Latest Units: % 22.0   % Monocytes Latest Units: % 5.3   % Eosinophils Latest Units: % 1.2   % Basophils Latest Units: % 0.5   Absolute Neutrophil Latest Ref Range: 1.6 - 8.3 10e9/L 4.6   Absolute Lymphocytes Latest Ref Range: 0.8 - 5.3 10e9/L 1.4   Absolute Monocytes Latest Ref Range: 0.0 - 1.3 10e9/L 0.3   Absolute Eosinophils Latest Ref Range: 0.0 - 0.7 10e9/L 0.1   Absolute Basophils Latest Ref Range: 0.0 - 0.2 10e9/L 0.0   % Retic Latest Ref Range: 0.5 - 2.0 % 2.0   Absolute Retic  Latest Ref Range: 25 - 95 10e9/L 78.4       Genetic testing shows she is 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  It is a known pathogenic mutation in homozygous state but she is heterozygous        ASSESSMENT AND PLAN:     1.  Microangiopathic hemolytic anemia along with acute kidney injury, marked thrombocytopenia with some elevation of liver enzymes in a patient who recently delivered a baby boy through .  Her [id number] was 46% on  and 72% on 11/10/2016, making TTP unlikely. 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 It is a known pathogenic mutation in homozygous state but she is heterozygous for it.  It is hard to label her as congenital TTP since she never had low ZJNDBE86.  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   1.  We discussed that since with just a conservative approach, she has recovered and now her labs look normal.  Her liver functions have returned to baseline.  Her hemoglobin is normal.  My plan will be to continue to observe her.  For the last 1-1/2 weeks or so she has not taken the prenatal vitamins, folic acid and B12, nor oral iron and I am comfortable observing her without these medications as it seems that she has recovered well for now.  We again discussed that going forward in the future if she wants to become pregnant again, then it would be a very high risk pregnancy because there would be a higher risk of recurrence of the same condition which happened with her prior pregnancy and in that situation, I would really want her to be evaluated by Maternal Fetal Medicine to get a proper risk assessment as well as an adequate plan for managing her pregnancy.  She does note that she has also discussed with her son's pediatrician about this and they will decide when to test the son in the future   2.  For the acute kidney injury,  this has now resolved.  She has a followup with Dr. Collins in July.   3.  Hypertension.  Today her blood pressure is under good control and this is despite not taking any medications for it at this time.  We will keep a watch on this to see how she does in terms of her blood pressure, even without nifedipine.     4.  She does have some low mood and stress ongoing in her family, especially with the recent death of her niece and she is getting counseling for that.   5.  Since she is otherwise medically stable, I would like to see her back in 6 months with repeat blood work prior to that.  All of her questions were answered to her satisfaction.  She is agreeable and comfortable with this plan.         TEMO ENGLE MD

## 2017-04-19 NOTE — NURSING NOTE
"Queta Cortez is a 31 year old female who presents for:  Chief Complaint   Patient presents with     Oncology Clinic Visit     follow up        Initial Vitals:  /78  Pulse 70  Temp 98  F (36.7  C)  Resp 15  Ht 1.786 m (5' 10.32\")  Wt 71.7 kg (158 lb 1.6 oz)  LMP 04/17/2017  SpO2 98%  BMI 22.48 kg/m2 Estimated body mass index is 22.48 kg/(m^2) as calculated from the following:    Height as of this encounter: 1.786 m (5' 10.32\").    Weight as of this encounter: 71.7 kg (158 lb 1.6 oz).. Body surface area is 1.89 meters squared. BP completed using cuff size: regular  No Pain (0) Patient's last menstrual period was 04/17/2017. Allergies and medications reviewed.     Medications: Medication refills not needed today.  Pharmacy name entered into EPIC:    [id number] - [place], [place] - [address] [facility]  [facility] [place] [address] [facility]    Comments:     5 minutes for nursing intake (face to face time)   Rocio Knapp LPN        "

## 2017-04-20 LAB — HAPTOGLOB SERPL-MCNC: 143 MG/DL (ref 15–200)

## 2017-04-26 ENCOUNTER — OFFICE VISIT (OUTPATIENT)
Dept: PSYCHOLOGY | Facility: CLINIC | Age: 32
End: 2017-04-26
Payer: COMMERCIAL

## 2017-04-26 DIAGNOSIS — F33.0 MILD EPISODE OF RECURRENT MAJOR DEPRESSIVE DISORDER (H): ICD-10-CM

## 2017-04-26 DIAGNOSIS — F43.10 PTSD (POST-TRAUMATIC STRESS DISORDER): ICD-10-CM

## 2017-04-26 DIAGNOSIS — F41.1 GAD (GENERALIZED ANXIETY DISORDER): Primary | ICD-10-CM

## 2017-04-26 PROCEDURE — 90834 PSYTX W PT 45 MINUTES: CPT | Performed by: MARRIAGE & FAMILY THERAPIST

## 2017-04-26 ASSESSMENT — ANXIETY QUESTIONNAIRES
2. NOT BEING ABLE TO STOP OR CONTROL WORRYING: MORE THAN HALF THE DAYS
1. FEELING NERVOUS, ANXIOUS, OR ON EDGE: MORE THAN HALF THE DAYS
IF YOU CHECKED OFF ANY PROBLEMS ON THIS QUESTIONNAIRE, HOW DIFFICULT HAVE THESE PROBLEMS MADE IT FOR YOU TO DO YOUR WORK, TAKE CARE OF THINGS AT HOME, OR GET ALONG WITH OTHER PEOPLE: VERY DIFFICULT
GAD7 TOTAL SCORE: 14
7. FEELING AFRAID AS IF SOMETHING AWFUL MIGHT HAPPEN: NEARLY EVERY DAY
3. WORRYING TOO MUCH ABOUT DIFFERENT THINGS: MORE THAN HALF THE DAYS
6. BECOMING EASILY ANNOYED OR IRRITABLE: NEARLY EVERY DAY
5. BEING SO RESTLESS THAT IT IS HARD TO SIT STILL: NOT AT ALL

## 2017-04-26 ASSESSMENT — PATIENT HEALTH QUESTIONNAIRE - PHQ9: 5. POOR APPETITE OR OVEREATING: MORE THAN HALF THE DAYS
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
## 2017-04-26 NOTE — MR AVS SNAPSHOT
MRN:[id number]                      After Visit Summary   4/26/2017    Queta Cortez    MRN: [id number]           Visit Information        Provider Department      4/26/2017 1:00 PM Bella Harrington [facility] [place] [facility] Generic      Your next 10 appointments already scheduled     May 10, 2017 10:00 AM CDT   Return Visit with Bella LUZMARIA OrtegaHarrington   [facility] [place] ([facility])    [address]  [facility] [address]   [phone]            Jun 01, 2017  9:30 AM CDT   Return Visit with Bellalowell Harrington   [facility] [place] ([facility] [place])    [address]  [facility] [address]   [phone]            Jul 11, 2017  2:30 PM CDT   LAB with LAB FIRST FLOOR [facility])    [address] [place] [phone]           Patient must bring picture ID.  Patient should be prepared to give a urine specimen  Please do not eat 10-12 hours before your appointment if you are coming in fasting for labs on lipids, cholesterol, or glucose (sugar).  Pregnant women should follow their Care Team instructions. Water with medications is okay. Do not drink coffee or other fluids.   If you have concerns about taking  your medications, please ask at office or if scheduling via [facility], send a message by clicking on Secure Messaging, Message Your Care Team.            Jul 11, 2017  3:00 PM CDT   Return Visit with Karen Collins MD   [facility])    [address] [place] [address]   [phone]            Oct 18, 2017  2:30 PM CDT   LAB with LAB ONC [facility])    [address] [place] [phone]           Patient must bring picture ID.  Patient should be prepared to give a urine specimen  Please do  not eat 10-12 hours before your appointment if you are coming in fasting for labs on lipids, cholesterol, or glucose (sugar).  Pregnant women should follow their Care Team instructions. Water with medications is okay. Do not drink coffee or other fluids.   If you have concerns about taking  your medications, please ask at office or if scheduling via [employer], send a message by clicking on Secure Messaging, Message Your Care Team.            Oct 18, 2017  3:15 PM CDT   [facility]   [facility]    [address] [place] [address] [phone]              [employer] Information     [employer] gives you secure access to your electronic health record. If you see a primary care provider, you can also send messages to your care team and make appointments. If you have questions, please call your primary care clinic.  If you do not have a primary care provider, please call [phone] and they will assist you.        Care EveryWhere ID     This is your Care EveryWhere ID. This could be used by other organizations to access your [place] medical records  [id number]

## 2017-04-27 ASSESSMENT — ANXIETY QUESTIONNAIRES: GAD7 TOTAL SCORE: 14

## 2017-04-27 ASSESSMENT — PATIENT HEALTH QUESTIONNAIRE - PHQ9: SUM OF ALL RESPONSES TO PHQ QUESTIONS 1-9: 5

## 2017-05-10 ENCOUNTER — OFFICE VISIT (OUTPATIENT)
Dept: PSYCHOLOGY | Facility: CLINIC | Age: 32
End: 2017-05-10
Payer: COMMERCIAL

## 2017-05-10 DIAGNOSIS — F43.10 PTSD (POST-TRAUMATIC STRESS DISORDER): ICD-10-CM

## 2017-05-10 DIAGNOSIS — F33.0 MILD EPISODE OF RECURRENT MAJOR DEPRESSIVE DISORDER (H): ICD-10-CM

## 2017-05-10 DIAGNOSIS — F41.1 GAD (GENERALIZED ANXIETY DISORDER): Primary | ICD-10-CM

## 2017-05-10 PROCEDURE — 90834 PSYTX W PT 45 MINUTES: CPT | Performed by: MARRIAGE & FAMILY THERAPIST

## 2017-05-10 NOTE — PROGRESS NOTES
Progress Note    Client Name: Queta Cortez  Date: 5/10/2017       Service Type: Individual      Session Start Time: 10am Session End Time: 10:45am      Session Length: 45 minutes     Session #: 13     Attendees: Client attended alone      PHQ-9 / JOAO-7 : completed last session  DATA      Progress Since Last Session (Related to Symptoms / Goals / Homework):  Symptoms: depression and anxiety related to situational stressors  Homework: Achieved / completed to satisfaction      Episode of Care Goals: Satisfactory progress - ACTION (Actively working towards change); Intervened by reinforcing change plan / affirming steps taken     Current / Ongoing Stressors and Concerns:   medical, marital, financial, death of niece     Treatment Objective(s) Addressed in This Session:   Increase interest, engagement, and pleasure in doing things  Decrease frequency and intensity of feeling down, depressed, hopeless  increase understanding of steps in the grief process       Intervention:   grief counseling, solution focused, CBT   Client has been working on making time for herself. Was able to get coffee with two different friends this past week. Encouraged client to ask  for some support in getting at least one day a week where she has time to self. Client has not been attending [employer] due to frustration after nieces death but also because friend is not going due to friend feeling she is not spending enough time with her son and not wanting to put son in the nursery. Discussed building up trust with nursery providers and build up time left with son. Discussed that same staff are in [employer] nursery as the [facility]  during the week. As exercise has been helpful for mood and anxiety in the past encouraged client to consider a CA membership. Client has been talking more with  about how to build up relationship but much of the time  shuts down the conversation.  Discussed working on building up trust with others and how past trauma and losses in life have made trusting others difficult.         ASSESSMENT: Current Emotional / Mental Status (status of significant symptoms):   Risk status (Self / Other harm or suicidal ideation)   Client denies current fears or concerns for personal safety.   Client denies current or recent suicidal ideation or behaviors.   Client denies current or recent homicidal ideation or behaviors.   Client denies current or recent self injurious behavior or ideation.   Client denies other safety concerns.   A safety and risk management plan has not been developed at this time, however client was given the after-hours number / 911 should there be a change in any of these risk factors.     Appearance:   Appropriate    Eye Contact:   Good    Psychomotor Behavior: Normal    Attitude:   Cooperative    Orientation:   All   Speech    Rate / Production: Normal     Volume:  Normal    Mood:    Anxious  Depressed    Affect:    Appropriate    Thought Content:  Clear    Thought Form:  Coherent  Logical    Insight:    Fair      Medication Review:   No current psychiatric medications prescribed   Current Outpatient Prescriptions   Medication     NIFEdipine ER (ADALAT CC) 30 MG TB24     order for DME     Ferrous Sulfate (IRON SUPPLEMENT PO)     Prenatal Vit-Min-FA-Fish Oil ([employer] PRENATAL GUMMY) 0.4-113.5 MG CHEW     Cholecalciferol (D 1000) 1000 UNITS CAPS     FOLIC ACID PO     No current facility-administered medications for this visit.           Medication Compliance:   Yes   Changes in Health Issues:   None reported     Chemical Use Review:   Substance Use: Chemical use reviewed, no active concerns identified      Tobacco Use: No current tobacco use.       Collateral Reports Completed:   Not Applicable    PLAN: (Client Tasks / Therapist Tasks / Other)    Encouraged client to allow herself time to grieve. Encouraged client to continue to talk with  about  improving connection, reducing conflict, and spending quality time together. Encouraged client to explore hobbies and interests and make time for herself daily.             Bella Styles                                                    Treatment Plan    Client's Name: Queta Cortez  YOB: 1985    Date: 2017      Diagnoses: 296.31 Major Depressive Disorder, Recurrent Episode, Mild With peripartum onset  300.02 (F41.1) Generalized Anxiety Disorder  309.81 (F43.10) Posttraumatic Stress Disorder (includes Posttraumatic Stress Disorder for Children 6 Years and Younger)  Without dissociative symptoms    Medical dx: HELLP syndrome, HUS, recent birth of son  Psychosocial & Contextual Factors: medical, recent birth of first child, marital, financial, occupational  WHODAS 2.0 (12 item) 17    Referral / Collaboration:  Collaboration was initiated with PCP MD.    Anticipated number of session or this episode of care: 6-12      MeasurableTreatment Goal(s) related to diagnosis / functional impairment(s)  Goal 1: Client will decrease depressed mood and anxiety as evidenced by PHQ9 and GAD7 scores 4 and Under within the next 3 months. Initial scorin2016:  GAD7:16,  PHQ9: 9. 2/15/2017: :  GAD7:7,  PHQ9: 8.    I will know I've met my goal when I'm better able to manage my anxiety.      Objective #A (Client Action)   Continued - Date(s): 5/10/2017  Client will identify 1-2 initial signs or symptoms of anxiety and utilize anxiety reduction techniques daily to decrease anxiety over the next month.Current Baseline is sporadic use. Client will ID triggers for depression and anxiety and work towards decreasing reactivity to or eliminating stressor if possible. client will develop more effective coping skills to manage depression and anxiety symptoms, will develop healthy cognitive patterns and beliefs, will increase ability to function adaptively and will continue to take medications as prescribed /  participate in supportive activities. Client will improve communication with others and be able to work through anxiety rather than shutting down. 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 Client will process grief related to medical issues and loss of niece.  Intervention(s)   Therapist will teach client how to ID body cues for anxiety, anxiety reduction techniques, how to ID triggers for depression and anxiety- decrease reactivity/eliminate, lifestyle changes to reduce depression and anxiety, communication skills, explore cognitive beliefs and help client to develop healthy cognitive patterns and beliefs.  Client has reviewed and agreed to the above plan.    Bella Styles  January 5, 2017

## 2017-05-10 NOTE — MR AVS SNAPSHOT
MRN:[id number]                      After Visit Summary   5/10/2017    Queta Cortez    MRN: [id number]           Visit Information        Provider Department      5/10/2017 10:00 AM Bella Harrington [facility] Generic      Your next 10 appointments already scheduled     Jun 01, 2017  9:30 AM CDT   Return Visit with Bella Harrington   [facility] ([facility])    [address]  [facility] [address]   [phone]            Jun 21, 2017  9:00 AM CDT   Return Visit with Bella LUZMARIA Clayre   [facility] ([facility])    [address]  [facility] [address]   [phone]            Jul 11, 2017  2:30 PM CDT   LAB with LAB FIRST FLOOR [facility])    [address] [place] [phone]           Patient must bring picture ID.  Patient should be prepared to give a urine specimen  Please do not eat 10-12 hours before your appointment if you are coming in fasting for labs on lipids, cholesterol, or glucose (sugar).  Pregnant women should follow their Care Team instructions. Water with medications is okay. Do not drink coffee or other fluids.   If you have concerns about taking  your medications, please ask at office or if scheduling via [facility], send a message by clicking on Secure Messaging, Message Your Care Team.            Jul 11, 2017  3:00 PM CDT   Return Visit with Karen Collins MD   [facility])    [address] [place] [address]   [phone]            Oct 18, 2017  2:30 PM CDT   LAB with LAB ONC [facility])    [address] [place] [phone]           Patient must bring picture ID.  Patient should be prepared to give a urine specimen  Please  do not eat 10-12 hours before your appointment if you are coming in fasting for labs on lipids, cholesterol, or glucose (sugar).  Pregnant women should follow their Care Team instructions. Water with medications is okay. Do not drink coffee or other fluids.   If you have concerns about taking  your medications, please ask at office or if scheduling via [employer], send a message by clicking on Secure Messaging, Message Your Care Team.            Oct 18, 2017  3:15 PM CDT   Return Visit with Ivan Vanessa MD   [facility])    [address] [place] [address] [phone]              [employer] Information     [employer] gives you secure access to your electronic health record. If you see a primary care provider, you can also send messages to your care team and make appointments. If you have questions, please call your primary care clinic.  If you do not have a primary care provider, please call [phone] and they will assist you.        Care EveryWhere ID     This is your Care EveryWhere ID. This could be used by other organizations to access your [place] medical records  [id number]

## 2017-05-30 ENCOUNTER — OFFICE VISIT (OUTPATIENT)
Dept: PSYCHOLOGY | Facility: CLINIC | Age: 32
End: 2017-05-30
Payer: COMMERCIAL

## 2017-05-30 DIAGNOSIS — F33.0 MILD EPISODE OF RECURRENT MAJOR DEPRESSIVE DISORDER (H): ICD-10-CM

## 2017-05-30 DIAGNOSIS — F41.1 GAD (GENERALIZED ANXIETY DISORDER): Primary | ICD-10-CM

## 2017-05-30 DIAGNOSIS — F43.10 PTSD (POST-TRAUMATIC STRESS DISORDER): ICD-10-CM

## 2017-05-30 PROCEDURE — 90834 PSYTX W PT 45 MINUTES: CPT | Performed by: MARRIAGE & FAMILY THERAPIST

## 2017-05-30 NOTE — PROGRESS NOTES
Progress Note    Client Name: Queta Cortez  Date: 5/30/2017       Service Type: Individual      Session Start Time: 1pm Session End Time: 1:45pm      Session Length: 45 minutes     Session #: 14     Attendees: Client attended alone      PHQ-9 / JOAO-7 : declined  DATA      Progress Since Last Session (Related to Symptoms / Goals / Homework):  Symptoms: depression and anxiety related to situational stressors  Homework: Achieved / completed to satisfaction      Episode of Care Goals: Satisfactory progress - ACTION (Actively working towards change); Intervened by reinforcing change plan / affirming steps taken     Current / Ongoing Stressors and Concerns:   medical, marital, financial, death of niece     Treatment Objective(s) Addressed in This Session:   Increase interest, engagement, and pleasure in doing things  Decrease frequency and intensity of feeling down, depressed, hopeless  increase understanding of steps in the grief process       Intervention:   grief counseling, solution focused, CBT   Client reports  is frustrated with client's ongoing grief related to loss of niece and how it's effecting their family. Encouraged client to consider use of a grief workbook, in addition to continuing to attend grief support group. Client reports she has been working on making time for friends and getting some time away. Reports  is not always supportive of this. Encouraged client to talk with  about how they can each get some time to themselves as well as making time for each other. Client reports she and  are opposites and have little in common. Encouraged client to talk with  about building up hobbies and interests together. Discussed  possibly coming in to counseling with client to discuss marriage concerns and improve communication. Continue to encouraged client to increase socialization.    ASSESSMENT: Current Emotional / Mental  Status (status of significant symptoms):   Risk status (Self / Other harm or suicidal ideation)   Client denies current fears or concerns for personal safety.   Client denies current or recent suicidal ideation or behaviors.   Client denies current or recent homicidal ideation or behaviors.   Client denies current or recent self injurious behavior or ideation.   Client denies other safety concerns.   A safety and risk management plan has not been developed at this time, however client was given the after-hours number / 911 should there be a change in any of these risk factors.     Appearance:   Appropriate    Eye Contact:   Good    Psychomotor Behavior: Normal    Attitude:   Cooperative    Orientation:   All   Speech    Rate / Production: Normal     Volume:  Normal    Mood:    Anxious  Depressed    Affect:    Appropriate    Thought Content:  Clear    Thought Form:  Coherent  Logical    Insight:    Fair      Medication Review:   No current psychiatric medications prescribed   Current Outpatient Prescriptions   Medication     NIFEdipine ER (ADALAT CC) 30 MG TB24     order for DME     Ferrous Sulfate (IRON SUPPLEMENT PO)     Prenatal Vit-Min-FA-Fish Oil ([employer] PRENATAL GUMMY) 0.4-113.5 MG CHEW     Cholecalciferol (D 1000) 1000 UNITS CAPS     FOLIC ACID PO     No current facility-administered medications for this visit.           Medication Compliance:   Yes   Changes in Health Issues:   None reported     Chemical Use Review:   Substance Use: Chemical use reviewed, no active concerns identified      Tobacco Use: No current tobacco use.       Collateral Reports Completed:   Not Applicable    PLAN: (Client Tasks / Therapist Tasks / Other)    Encouraged client to allow herself time to grieve. Encouraged client to continue to talk with  about improving connection, reducing conflict, and spending quality time together. Encouraged client to explore hobbies and interests and make time for herself daily.              Bella Styles                                                    Treatment Plan    Client's Name: Queta Cortez  YOB: 1985    Date: 2017      Diagnoses: 296.31 Major Depressive Disorder, Recurrent Episode, Mild With peripartum onset  300.02 (F41.1) Generalized Anxiety Disorder  309.81 (F43.10) Posttraumatic Stress Disorder (includes Posttraumatic Stress Disorder for Children 6 Years and Younger)  Without dissociative symptoms    Medical dx: HELLP syndrome, HUS, recent birth of son  Psychosocial & Contextual Factors: medical, recent birth of first child, marital, financial, occupational  WHODAS 2.0 (12 item) 17    Referral / Collaboration:  Collaboration was initiated with PCP MD.    Anticipated number of session or this episode of care: 6-12      MeasurableTreatment Goal(s) related to diagnosis / functional impairment(s)  Goal 1: Client will decrease depressed mood and anxiety as evidenced by PHQ9 and GAD7 scores 4 and Under within the next 3 months. Initial scorin2016:  GAD7:16,  PHQ9: 9. 2/15/2017: :  GAD7:7,  PHQ9: 8.    I will know I've met my goal when I'm better able to manage my anxiety.      Objective #A (Client Action)   Continued - Date(s): 2017  Client will identify 1-2 initial signs or symptoms of anxiety and utilize anxiety reduction techniques daily to decrease anxiety over the next month.Current Baseline is sporadic use. Client will ID triggers for depression and anxiety and work towards decreasing reactivity to or eliminating stressor if possible. client will develop more effective coping skills to manage depression and anxiety symptoms, will develop healthy cognitive patterns and beliefs, will increase ability to function adaptively and will continue to take medications as prescribed / participate in supportive activities. Client will improve communication with others and be able to work through anxiety rather than shutting down. Client will maintain  remission from hx of SIB- scratching that last occurred as a teen. Client will process grief related to medical issues and loss of niece.  Intervention(s)   Therapist will teach client how to ID body cues for anxiety, anxiety reduction techniques, how to ID triggers for depression and anxiety- decrease reactivity/eliminate, lifestyle changes to reduce depression and anxiety, communication skills, explore cognitive beliefs and help client to develop healthy cognitive patterns and beliefs.  Client has reviewed and agreed to the above plan.    Bella Styels  January 5, 2017

## 2017-05-30 NOTE — MR AVS SNAPSHOT
MRN:[id number]                      After Visit Summary   5/30/2017    Queta Cortez    MRN: [id number]           Visit Information        Provider Department      5/30/2017 1:00 PM Bella Harrington [facility] Generic      Your next 10 appointments already scheduled     Jun 21, 2017  9:00 AM CDT   Return Visit with Bella Harrington   [facility] ([facility])    [address]  [facility] [address]   [phone]            Jul 11, 2017  2:30 PM CDT   LAB with LAB FIRST FLOOR [facility] ([facility])    [address] [place] [phone]           Patient must bring picture ID.  Patient should be prepared to give a urine specimen  Please do not eat 10-12 hours before your appointment if you are coming in fasting for labs on lipids, cholesterol, or glucose (sugar).  Pregnant women should follow their Care Team instructions. Water with medications is okay. Do not drink coffee or other fluids.   If you have concerns about taking  your medications, please ask at office or if scheduling via [employer], send a message by clicking on Secure Messaging, Message Your Care Team.            Jul 11, 2017  3:00 PM CDT   Return Visit with Karen Clolins MD   [facility])    [address] [place] [phone]   [phone]            Jul 12, 2017  1:00 PM CDT   Return Visit with Bella Harrington   [facility] ([facility])    [address]  [facility] [address]   [phone]            Oct 18, 2017  2:30 PM CDT   LAB with LAB ONC [facility] ([facility])    [address] [place] [address]   [phone]           Patient must bring picture ID.  Patient should be prepared to give a urine specimen  Please do  not eat 10-12 hours before your appointment if you are coming in fasting for labs on lipids, cholesterol, or glucose (sugar).  Pregnant women should follow their Care Team instructions. Water with medications is okay. Do not drink coffee or other fluids.   If you have concerns about taking  your medications, please ask at office or if scheduling via [employer], send a message by clicking on Secure Messaging, Message Your Care Team.            Oct 18, 2017  3:15 PM CDT   Return Visit with Ivan Vanessa MD   [facility]    [address] [place] [address] [phone]              [employer] Information     [employer] gives you secure access to your electronic health record. If you see a primary care provider, you can also send messages to your care team and make appointments. If you have questions, please call your primary care clinic.  If you do not have a primary care provider, please call [phone] and they will assist you.        Care EveryWhere ID     This is your Care EveryWhere ID. This could be used by other organizations to access your [place] medical records  [id number]

## 2017-06-19 DIAGNOSIS — N17.9 ACUTE KIDNEY INJURY (H): Primary | ICD-10-CM

## 2017-06-22 ENCOUNTER — OFFICE VISIT (OUTPATIENT)
Dept: PSYCHOLOGY | Facility: CLINIC | Age: 32
End: 2017-06-22
Payer: COMMERCIAL

## 2017-06-22 DIAGNOSIS — F33.0 MILD EPISODE OF RECURRENT MAJOR DEPRESSIVE DISORDER (H): ICD-10-CM

## 2017-06-22 DIAGNOSIS — F41.1 GAD (GENERALIZED ANXIETY DISORDER): Primary | ICD-10-CM

## 2017-06-22 DIAGNOSIS — F43.10 PTSD (POST-TRAUMATIC STRESS DISORDER): ICD-10-CM

## 2017-06-22 PROCEDURE — 90834 PSYTX W PT 45 MINUTES: CPT | Performed by: MARRIAGE & FAMILY THERAPIST

## 2017-06-22 NOTE — PROGRESS NOTES
Progress Note    Client Name: Queta Cortez  Date: 6/22/2017       Service Type: Individual      Session Start Time: 5pm Session End Time:5:45pm      Session Length: 45 minutes     Session #: 15     Attendees: Client attended alone      PHQ-9 / JOAO-7 : declined  DATA      Progress Since Last Session (Related to Symptoms / Goals / Homework):  Symptoms: depression and anxiety related to situational stressors  Homework: Achieved / completed to satisfaction      Episode of Care Goals: Satisfactory progress - ACTION (Actively working towards change); Intervened by reinforcing change plan / affirming steps taken     Current / Ongoing Stressors and Concerns:   medical, marital, financial, death of niece     Treatment Objective(s) Addressed in This Session:   Decrease frequency and intensity of feeling down, depressed, hopeless  Share thoughts, feelings, and wants with . 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  had suggested that client put their son in  and client return to work FT, client declined. In laws have been helping out lately paying for formula and diapers, to reduce financial strain. Due to tight finances client is unsure if she will be able to continue with counseling, plans to try having sessions spaced a month apart, and then determine if it's affordable. Discussed looking into sliding scale clinic if needed. Client reports relationship with  remains distant and strained. Encouraged client to talk with  about building a connection and spending time together during the week, as well as  making time for himself. Continue to encouraged client to increase socialization.    ASSESSMENT: Current Emotional / Mental Status (status of  significant symptoms):   Risk status (Self / Other harm or suicidal ideation)   Client denies current fears or concerns for personal safety.   Client denies current or recent suicidal ideation or behaviors.   Client denies current or recent homicidal ideation or behaviors.   Client denies current or recent self injurious behavior or ideation.   Client denies other safety concerns.   A safety and risk management plan has not been developed at this time, however client was given the after-hours number / 911 should there be a change in any of these risk factors.     Appearance:   Appropriate    Eye Contact:   Good    Psychomotor Behavior: Normal    Attitude:   Cooperative    Orientation:   All   Speech    Rate / Production: Normal     Volume:  Normal    Mood:    Anxious  Depressed    Affect:    Appropriate    Thought Content:  Clear    Thought Form:  Coherent  Logical    Insight:    Fair      Medication Review:   No current psychiatric medications prescribed   Current Outpatient Prescriptions   Medication     NIFEdipine ER (ADALAT CC) 30 MG TB24     order for DME     Ferrous Sulfate (IRON SUPPLEMENT PO)     Prenatal Vit-Min-FA-Fish Oil ([employer] PRENATAL GUMMY) 0.4-113.5 MG CHEW     Cholecalciferol (D 1000) 1000 UNITS CAPS     FOLIC ACID PO     No current facility-administered medications for this visit.           Medication Compliance:   Yes   Changes in Health Issues:   None reported     Chemical Use Review:   Substance Use: Chemical use reviewed, no active concerns identified      Tobacco Use: No current tobacco use.       Collateral Reports Completed:   Not Applicable    PLAN: (Client Tasks / Therapist Tasks / Other)   Encouraged client to continue to talk with  about improving connection, reducing conflict, and spending quality time together. Encouraged client to explore hobbies and interests and make time for herself daily.             Bella Styles                                                     Treatment Plan    Client's Name: Queta Cortez  YOB: 1985    Date: 2017      Diagnoses: 296.31 Major Depressive Disorder, Recurrent Episode, Mild With peripartum onset  300.02 (F41.1) Generalized Anxiety Disorder  309.81 (F43.10) Posttraumatic Stress Disorder (includes Posttraumatic Stress Disorder for Children 6 Years and Younger)  Without dissociative symptoms    Medical dx: HELLP syndrome, HUS, recent birth of son  Psychosocial & Contextual Factors: medical, recent birth of first child, marital, financial, occupational  WHODAS 2.0 (12 item) 17    Referral / Collaboration:  Collaboration was initiated with PCP MD.    Anticipated number of session or this episode of care: 6-12      MeasurableTreatment Goal(s) related to diagnosis / functional impairment(s)  Goal 1: Client will decrease depressed mood and anxiety as evidenced by PHQ9 and GAD7 scores 4 and Under within the next 3 months. Initial scorin2016:  GAD7:16,  PHQ9: 9. 2/15/2017: :  GAD7:7,  PHQ9: 8.    I will know I've met my goal when I'm better able to manage my anxiety.      Objective #A (Client Action)   Continued - Date(s): 2017  Client will identify 1-2 initial signs or symptoms of anxiety and utilize anxiety reduction techniques daily to decrease anxiety over the next month.Current Baseline is sporadic use. Client will ID triggers for depression and anxiety and work towards decreasing reactivity to or eliminating stressor if possible. client will develop more effective coping skills to manage depression and anxiety symptoms, will develop healthy cognitive patterns and beliefs, will increase ability to function adaptively and will continue to take medications as prescribed / participate in supportive activities. Client will improve communication with others and be able to work through anxiety rather than shutting down. 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 Client  will process grief related to medical issues and loss of niece.  Intervention(s)   Therapist will teach client how to ID body cues for anxiety, anxiety reduction techniques, how to ID triggers for depression and anxiety- decrease reactivity/eliminate, lifestyle changes to reduce depression and anxiety, communication skills, explore cognitive beliefs and help client to develop healthy cognitive patterns and beliefs.  Client has reviewed and agreed to the above plan.    Bella Styles  January 5, 2017

## 2017-06-22 NOTE — MR AVS SNAPSHOT
MRN:[id number]                      After Visit Summary   6/22/2017    Queta Cortez    MRN: [id number]           Visit Information        Provider Department      6/22/2017 5:00 PM Bella Harrington [facility] Generic      Your next 10 appointments already scheduled     Jul 11, 2017  2:30 PM CDT   LAB with LAB FIRST FLOOR [facility] ([facility])    [address] [place] [id number] [phone]           Patient must bring picture ID.  Patient should be prepared to give a urine specimen  Please do not eat 10-12 hours before your appointment if you are coming in fasting for labs on lipids, cholesterol, or glucose (sugar).  Pregnant women should follow their Care Team instructions. Water with medications is okay. Do not drink coffee or other fluids.   If you have concerns about taking  your medications, please ask at office or if scheduling via [employer], send a message by clicking on Secure Messaging, Message Your Care Team.            Jul 11, 2017  3:00 PM CDT   Return Visit with Karen Collins MD   [facility])    [address] [place] [id number] [phone]            Jul 12, 2017  1:00 PM CDT   Return Visit with Bella Harrington   [facility] ([facility])    [address]  [facility] [address]   [phone]            Oct 18, 2017  2:30 PM CDT   LAB with LAB ONC [facility] ([facility])    [address] [place] [id number] [phone]           Patient must bring picture ID.  Patient should be prepared to give a urine specimen  Please do not eat 10-12 hours before your appointment if you are coming in fasting for labs on lipids, cholesterol, or glucose (sugar).  Pregnant women should follow their Care Team instructions. Water with  medications is okay. Do not drink coffee or other fluids.   If you have concerns about taking  your medications, please ask at office or if scheduling via [employer], send a message by clicking on Secure Messaging, Message Your Care Team.            Oct 18, 2017  3:15 PM CDT   Return Visit with Ivan Vanessa MD   [facility] ([facility])    [address] [place] [address] [phone]              [facility] Information     [employer] gives you secure access to your electronic health record. If you see a primary care provider, you can also send messages to your care team and make appointments. If you have questions, please call your primary care clinic.  If you do not have a primary care provider, please call [phone] and they will assist you.        Care EveryWhere ID     This is your Care EveryWhere ID. This could be used by other organizations to access your [place] medical records  [id number]        Equal Access to Services     JOAQUIM AYALA AH: Hadii aad ku hadasho Somayo, waaxda luqadaha, qaybta kaalmada adeegottoniel, anselmo colón. So [facility] [phone].    ATENCIÓN: Si habla español, tiene a nova disposición servicios gratuitos de asistencia lingüística. Llame al [phone].    We comply with applicable federal civil rights laws and [place] laws. We do not discriminate on the basis of race, color, national origin, age, disability sex, sexual orientation or gender identity.

## 2017-07-11 ENCOUNTER — OFFICE VISIT (OUTPATIENT)
Dept: NEPHROLOGY | Facility: CLINIC | Age: 32
End: 2017-07-11
Payer: COMMERCIAL

## 2017-07-11 VITALS
HEART RATE: 78 BPM | TEMPERATURE: 98.5 F | OXYGEN SATURATION: 97 % | BODY MASS INDEX: 21.76 KG/M2 | DIASTOLIC BLOOD PRESSURE: 72 MMHG | SYSTOLIC BLOOD PRESSURE: 118 MMHG | WEIGHT: 153 LBS

## 2017-07-11 DIAGNOSIS — N17.9 ACUTE KIDNEY INJURY (H): ICD-10-CM

## 2017-07-11 DIAGNOSIS — D59.39 ATYPICAL HUS (HEMOLYTIC UREMIC SYNDROME) WITH MUTATION IN THROMBOMODULIN GENE (H): ICD-10-CM

## 2017-07-11 DIAGNOSIS — N17.9 ACUTE KIDNEY INJURY (H): Primary | ICD-10-CM

## 2017-07-11 LAB
ALBUMIN SERPL-MCNC: 4 G/DL (ref 3.4–5)
ALBUMIN UR-MCNC: NEGATIVE MG/DL
ANION GAP SERPL CALCULATED.3IONS-SCNC: 6 MMOL/L (ref 3–14)
APPEARANCE UR: CLEAR
BILIRUB UR QL STRIP: NEGATIVE
BUN SERPL-MCNC: 13 MG/DL (ref 7–30)
CALCIUM SERPL-MCNC: 9 MG/DL (ref 8.5–10.1)
CHLORIDE SERPL-SCNC: 109 MMOL/L (ref 94–109)
CO2 SERPL-SCNC: 27 MMOL/L (ref 20–32)
COLOR UR AUTO: NORMAL
CREAT SERPL-MCNC: 0.87 MG/DL (ref 0.52–1.04)
CREAT UR-MCNC: 21 MG/DL
GFR SERPL CREATININE-BSD FRML MDRD: 76 ML/MIN/1.7M2
GLUCOSE SERPL-MCNC: 100 MG/DL (ref 70–99)
GLUCOSE UR STRIP-MCNC: NEGATIVE MG/DL
HGB BLD-MCNC: 12.5 G/DL (ref 11.7–15.7)
HGB UR QL STRIP: NEGATIVE
KETONES UR STRIP-MCNC: NEGATIVE MG/DL
LEUKOCYTE ESTERASE UR QL STRIP: NEGATIVE
NITRATE UR QL: NEGATIVE
NON-SQ EPI CELLS #/AREA URNS LPF: NORMAL /LPF
PH UR STRIP: 7 PH (ref 5–7)
PHOSPHATE SERPL-MCNC: 2.7 MG/DL (ref 2.5–4.5)
POTASSIUM SERPL-SCNC: 3.7 MMOL/L (ref 3.4–5.3)
PROT UR-MCNC: <0.05 G/L
PROT/CREAT 24H UR: NORMAL G/G CR (ref 0–0.2)
PTH-INTACT SERPL-MCNC: 22 PG/ML (ref 12–72)
RBC #/AREA URNS AUTO: NORMAL /HPF (ref 0–2)
SODIUM SERPL-SCNC: 142 MMOL/L (ref 133–144)
SP GR UR STRIP: 1 (ref 1–1.03)
URN SPEC COLLECT METH UR: NORMAL
UROBILINOGEN UR STRIP-MCNC: NORMAL MG/DL (ref 0–2)
WBC #/AREA URNS AUTO: NORMAL /HPF (ref 0–2)

## 2017-07-11 PROCEDURE — 83970 ASSAY OF PARATHORMONE: CPT | Performed by: INTERNAL MEDICINE

## 2017-07-11 PROCEDURE — 80069 RENAL FUNCTION PANEL: CPT | Performed by: INTERNAL MEDICINE

## 2017-07-11 PROCEDURE — 36415 COLL VENOUS BLD VENIPUNCTURE: CPT | Performed by: INTERNAL MEDICINE

## 2017-07-11 PROCEDURE — 99213 OFFICE O/P EST LOW 20 MIN: CPT | Performed by: INTERNAL MEDICINE

## 2017-07-11 PROCEDURE — 85018 HEMOGLOBIN: CPT | Performed by: INTERNAL MEDICINE

## 2017-07-11 PROCEDURE — 81001 URINALYSIS AUTO W/SCOPE: CPT | Performed by: INTERNAL MEDICINE

## 2017-07-11 PROCEDURE — 84156 ASSAY OF PROTEIN URINE: CPT | Performed by: INTERNAL MEDICINE

## 2017-07-11 NOTE — NURSING NOTE
"Queta Cortez's goals for this visit include: cc  She requests these members of her care team be copied on today's visit information: no    PCP: Alanis Lundberg    Referring Provider:  No referring provider defined for this encounter.    Chief Complaint   Patient presents with     RECHECK       Initial There were no vitals taken for this visit. Estimated body mass index is 22.48 kg/(m^2) as calculated from the following:    Height as of 4/19/17: 1.786 m (5' 10.32\").    Weight as of 4/19/17: 71.7 kg (158 lb 1.6 oz).  Medication Reconciliation: complete  "

## 2017-07-12 ENCOUNTER — OFFICE VISIT (OUTPATIENT)
Dept: PSYCHOLOGY | Facility: CLINIC | Age: 32
End: 2017-07-12
Payer: COMMERCIAL

## 2017-07-12 DIAGNOSIS — F41.1 GAD (GENERALIZED ANXIETY DISORDER): ICD-10-CM

## 2017-07-12 DIAGNOSIS — F33.0 MILD EPISODE OF RECURRENT MAJOR DEPRESSIVE DISORDER (H): Primary | ICD-10-CM

## 2017-07-12 DIAGNOSIS — F43.10 PTSD (POST-TRAUMATIC STRESS DISORDER): ICD-10-CM

## 2017-07-12 PROCEDURE — 90834 PSYTX W PT 45 MINUTES: CPT | Performed by: MARRIAGE & FAMILY THERAPIST

## 2017-07-12 ASSESSMENT — ANXIETY QUESTIONNAIRES
5. BEING SO RESTLESS THAT IT IS HARD TO SIT STILL: SEVERAL DAYS
2. NOT BEING ABLE TO STOP OR CONTROL WORRYING: SEVERAL DAYS
GAD7 TOTAL SCORE: 9
3. WORRYING TOO MUCH ABOUT DIFFERENT THINGS: SEVERAL DAYS
1. FEELING NERVOUS, ANXIOUS, OR ON EDGE: SEVERAL DAYS
IF YOU CHECKED OFF ANY PROBLEMS ON THIS QUESTIONNAIRE, HOW DIFFICULT HAVE THESE PROBLEMS MADE IT FOR YOU TO DO YOUR WORK, TAKE CARE OF THINGS AT HOME, OR GET ALONG WITH OTHER PEOPLE: SOMEWHAT DIFFICULT
6. BECOMING EASILY ANNOYED OR IRRITABLE: MORE THAN HALF THE DAYS
7. FEELING AFRAID AS IF SOMETHING AWFUL MIGHT HAPPEN: NEARLY EVERY DAY

## 2017-07-12 ASSESSMENT — PATIENT HEALTH QUESTIONNAIRE - PHQ9: 5. POOR APPETITE OR OVEREATING: NOT AT ALL

## 2017-07-12 NOTE — PROGRESS NOTES
Progress Note    Client Name: Queta Cortez  Date: 7/12/2017       Service Type: Individual      Session Start Time: 1pm Session End Time:1:45pm      Session Length: 45 minutes     Session #: 16     Attendees: Client attended alone      PHQ-9 / JOAO-7 : completed today  DATA      Progress Since Last Session (Related to Symptoms / Goals / Homework):  Symptoms: depression and anxiety related to situational stressors  Homework: Achieved / completed to satisfaction      Episode of Care Goals: Satisfactory progress - ACTION (Actively working towards change); Intervened by reinforcing change plan / affirming steps taken     Current / Ongoing Stressors and Concerns:   medical, marital, financial     Treatment Objective(s) Addressed in This Session:   Decrease frequency and intensity of feeling down, depressed, hopeless  Share thoughts, feelings, and wants with . 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 She is looking forward to having a period of time away from  as he has been negative as of recent. The following weekend she and  will be taking an overnight date night without son and is hoping to spend some quality time reconnecting.  Client reports concerns for 's difficulty handling son when she works.Discussed how family of orgins handling of conflict plays into she and 's way of handling conflict. Processed concern and frustration. Encouraged client to allow several weeks to notice weather relationship with  improves with some time apart and time together, and talk with  about their relationship and ways to improve it. Reports grief related to niece's death has greatly diminished. Discussed grief cycle.   ASSESSMENT: Current Emotional / Mental Status (status of significant symptoms):   Risk status (Self / Other  harm or suicidal ideation)   Client denies current fears or concerns for personal safety.   Client denies current or recent suicidal ideation or behaviors.   Client denies current or recent homicidal ideation or behaviors.   Client denies current or recent self injurious behavior or ideation.   Client denies other safety concerns.   A safety and risk management plan has not been developed at this time, however client was given the after-hours number / 911 should there be a change in any of these risk factors.     Appearance:   Appropriate    Eye Contact:   Good    Psychomotor Behavior: Normal    Attitude:   Cooperative    Orientation:   All   Speech    Rate / Production: Normal     Volume:  Normal    Mood:    Anxious  Depressed    Affect:    Appropriate    Thought Content:  Clear    Thought Form:  Coherent  Logical    Insight:    Fair      Medication Review:   No current psychiatric medications prescribed   Current Outpatient Prescriptions   Medication     order for DME     Prenatal Vit-Min-FA-Fish Oil ([facility] PRENATAL GUMMY) 0.4-113.5 MG CHEW     No current facility-administered medications for this visit.           Medication Compliance:   Yes   Changes in Health Issues:   None reported     Chemical Use Review:   Substance Use: Chemical use reviewed, no active concerns identified      Tobacco Use: No current tobacco use.       Collateral Reports Completed:   Not Applicable    PLAN: (Client Tasks / Therapist Tasks / Other)   Encouraged client to continue to talk with  about improving connection, reducing conflict, and spending quality time together.           Bella Styles                                                    Treatment Plan    Client's Name: Queta Cortez  YOB: 1985    Date: 1/5/2017      Diagnoses: 296.31 Major Depressive Disorder, Recurrent Episode, Mild With peripartum onset  300.02 (F41.1) Generalized Anxiety Disorder  309.81 (F43.10) Posttraumatic Stress Disorder (includes  Posttraumatic Stress Disorder for Children 6 Years and Younger)  Without dissociative symptoms    Medical dx: HELLP syndrome, HUS, recent birth of son  Psychosocial & Contextual Factors: medical, recent birth of first child, marital, financial, occupational  WHODAS 2.0 (12 item) 17    Referral / Collaboration:  Collaboration was initiated with PCP MD.    Anticipated number of session or this episode of care: 6-12      MeasurableTreatment Goal(s) related to diagnosis / functional impairment(s)  Goal 1: Client will decrease depressed mood and anxiety as evidenced by PHQ9 and GAD7 scores 4 and Under within the next 9 months. Initial scorin2016:  GAD7:16,  PHQ9: 9. 2/15/2017: :  GAD7:7,  PHQ9: 8, 2017 :  GAD7:9,  PHQ9: 6    I will know I've met my goal when I'm better able to manage my anxiety.      Objective #A (Client Action)   Continued - Date(s): 2017  Client will identify 1-2 initial signs or symptoms of anxiety and utilize anxiety reduction techniques daily to decrease anxiety over the next month.Current Baseline is sporadic use. Client will ID triggers for depression and anxiety and work towards decreasing reactivity to or eliminating stressor if possible. client will develop more effective coping skills to manage depression and anxiety symptoms, will develop healthy cognitive patterns and beliefs, will increase ability to function adaptively and will continue to take medications as prescribed / participate in supportive activities. Client will improve communication with others and be able to work through anxiety rather than shutting down. 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 Client will process grief related to medical issues and loss of niece.  Intervention(s)   Therapist will teach client how to ID body cues for anxiety, anxiety reduction techniques, how to ID triggers for depression and anxiety- decrease reactivity/eliminate, lifestyle  changes to reduce depression and anxiety, communication skills, explore cognitive beliefs and help client to develop healthy cognitive patterns and beliefs.  Client has reviewed and agreed to the above plan.    Bella Styles  January 5, 2017

## 2017-07-12 NOTE — MR AVS SNAPSHOT
MRN:[id number]                      After Visit Summary   7/12/2017    Queta Cortez    MRN: [id number]           Visit Information        Provider Department      7/12/2017 1:00 PM Bella Harrington [facility] Generic      Your next 10 appointments already scheduled     Aug 23, 2017  1:00 PM CDT   Return Visit with Bella Harrington   [facility] ([facility])    [address]  [facility] [address]   [phone]            Oct 18, 2017  2:30 PM CDT   LAB with LAB ONC [facility] ([facility])    [address] [place] [address] [phone]           Patient must bring picture ID.  Patient should be prepared to give a urine specimen  Please do not eat 10-12 hours before your appointment if you are coming in fasting for labs on lipids, cholesterol, or glucose (sugar).  Pregnant women should follow their Care Team instructions. Water with medications is okay. Do not drink coffee or other fluids.   If you have concerns about taking  your medications, please ask at office or if scheduling via [employer], send a message by clicking on Secure Messaging, Message Your Care Team.            Oct 18, 2017  3:15 PM CDT   Return Visit with Ivan Vanessa MD   [facility]    [address] [place] [address] [phone]              [employer] Information     [employer] gives you secure access to your electronic health record. If you see a primary care provider, you can also send messages to your care team and make appointments. If you have questions, please call your primary care clinic.  If you do not have a primary care provider, please call [phone] and they will assist you.        Care EveryWhere ID     This is your Care EveryWhere ID. This could be used by other organizations to access your [facility]  records  [id number]        Equal Access to Services     [facility] JAMIE : Brett Masters, kathy scott, omar vargas, anselmo colón. So [facility] [phone].    ATENCIÓN: Si habla español, tiene a nova disposición servicios gratuitos de asistencia lingüística. Llame al [phone].    We comply with applicable federal civil rights laws and [place] laws. We do not discriminate on the basis of race, color, national origin, age, disability sex, sexual orientation or gender identity.

## 2017-07-13 ASSESSMENT — PATIENT HEALTH QUESTIONNAIRE - PHQ9: SUM OF ALL RESPONSES TO PHQ QUESTIONS 1-9: 6

## 2017-07-13 ASSESSMENT — ANXIETY QUESTIONNAIRES: GAD7 TOTAL SCORE: 9

## 2017-07-28 ENCOUNTER — TELEPHONE (OUTPATIENT)
Dept: OBGYN | Facility: OTHER | Age: 32
End: 2017-07-28

## 2017-07-28 ENCOUNTER — OFFICE VISIT (OUTPATIENT)
Dept: OBGYN | Facility: OTHER | Age: 32
End: 2017-07-28
Payer: COMMERCIAL

## 2017-07-28 VITALS
HEART RATE: 84 BPM | DIASTOLIC BLOOD PRESSURE: 70 MMHG | SYSTOLIC BLOOD PRESSURE: 110 MMHG | BODY MASS INDEX: 21.79 KG/M2 | WEIGHT: 153.25 LBS

## 2017-07-28 DIAGNOSIS — N39.0 URINARY TRACT INFECTION, SITE NOT SPECIFIED: Primary | ICD-10-CM

## 2017-07-28 LAB
ALBUMIN UR-MCNC: ABNORMAL MG/DL
APPEARANCE UR: CLEAR
BILIRUB UR QL STRIP: NEGATIVE
COLOR UR AUTO: YELLOW
GLUCOSE UR STRIP-MCNC: NEGATIVE MG/DL
HGB UR QL STRIP: NEGATIVE
KETONES UR STRIP-MCNC: NEGATIVE MG/DL
LEUKOCYTE ESTERASE UR QL STRIP: NEGATIVE
NITRATE UR QL: NEGATIVE
PH UR STRIP: 7 PH (ref 5–7)
RBC #/AREA URNS AUTO: ABNORMAL /HPF (ref 0–2)
SP GR UR STRIP: 1.01 (ref 1–1.03)
URN SPEC COLLECT METH UR: ABNORMAL
UROBILINOGEN UR STRIP-ACNC: 0.2 EU/DL (ref 0.2–1)
WBC #/AREA URNS AUTO: ABNORMAL /HPF (ref 0–2)

## 2017-07-28 PROCEDURE — 81001 URINALYSIS AUTO W/SCOPE: CPT | Performed by: OBSTETRICS & GYNECOLOGY

## 2017-07-28 PROCEDURE — 99213 OFFICE O/P EST LOW 20 MIN: CPT | Performed by: OBSTETRICS & GYNECOLOGY

## 2017-07-28 NOTE — MR AVS SNAPSHOT
After Visit Summary   7/28/2017    Queta Cortez    MRN: [id number]           Patient Information     Date Of Birth          1985        Visit Information        Provider Department      7/28/2017 2:15 PM Harriet Rascon DO [facility]        Today's Diagnoses     Urinary tract infection, site not specified    -  1      Care Instructions    Please call if you any questions.    [facility]  [address] [place]   [address]  [phone]        Harriet Rascon            Follow-ups after your visit        Follow-up notes from your care team     Return if symptoms worsen or fail to improve.      Your next 10 appointments already scheduled     Aug 23, 2017  1:00 PM CDT   Return Visit with Bella Harrington   [facility] ([facility])    [address]  [facility] [id number] [phone]            Oct 18, 2017  2:30 PM CDT   LAB with LAB ONC [facility] 2[facility]    [address] [place] [address] [phone]           Patient must bring picture ID. Patient should be prepared to give a urine specimen  Please do not eat 10-12 hours before your appointment if you are coming in fasting for labs on lipids, cholesterol, or glucose (sugar). Pregnant women should follow their Care Team instructions. Water with medications is okay. Do not drink coffee or other fluids. If you have concerns about taking  your medications, please ask at office or if scheduling via [employer], send a message by clicking on Secure Messaging, Message Your Care Team.            Oct 18, 2017  3:15 PM CDT   Return Visit with Ivan Vanessa MD   [facility])    [address] [address] [facility] [address] [phone]              Who to contact     If you have questions or need follow up information about today's clinic visit or your schedule  please contact [facility] directly at [phone].  Normal or non-critical lab and imaging results will be communicated to you by MyChart, letter or phone within 4 business days after the clinic has received the results. If you do not hear from us within 7 days, please contact the clinic through MyChart or phone. If you have a critical or abnormal lab result, we will notify you by phone as soon as possible.  Submit refill requests through [employer] or call your pharmacy and they will forward the refill request to us. Please allow 3 business days for your refill to be completed.          Additional Information About Your Visit        [employer] Information     [employer] gives you secure access to your electronic health record. If you see a primary care provider, you can also send messages to your care team and make appointments. If you have questions, please call your primary care clinic.  If you do not have a primary care provider, please call [phone] and they will assist you.        Care EveryWhere ID     This is your Care EveryWhere ID. This could be used by other organizations to access your [place] medical records  [id number]        Your Vitals Were     Pulse Last Period BMI (Body Mass Index)             84 07/11/2017 21.79 kg/m2          Blood Pressure from Last 3 Encounters:   07/28/17 110/70   07/11/17 118/72   04/19/17 131/78    Weight from Last 3 Encounters:   07/28/17 153 lb 4 oz (69.5 kg)   07/11/17 153 lb (69.4 kg)   04/19/17 158 lb 1.6 oz (71.7 kg)              We Performed the Following     UA with Microscopic reflex to Culture        Primary Care Provider Office Phone # Fax #    Alanis Lundberg [phone]       [facility] [address] [facility] [address]    [facility] [address]        Equal Access to Services     JOAQUIM AYALA : Brett Masters, kathy scott, anselmo jerez. So [facility]  [phone].    ATENCIÓN: Si neville cruz, tiene a nova disposición servicios gratuitos de asistencia lingüística. Precious al [phone].    We comply with applicable federal civil rights laws and [place] laws. We do not discriminate on the basis of race, color, national origin, age, disability sex, sexual orientation or gender identity.            Thank you!     Thank you for choosing [facility]  for your care. Our goal is always to provide you with excellent care. Hearing back from our patients is one way we can continue to improve our services. Please take a few minutes to complete the written survey that you may receive in the mail after your visit with us. Thank you!             Your Updated Medication List - Protect others around you: Learn how to safely use, store and throw away your medicines at www.disposemymeds.org.          This list is accurate as of: 7/28/17 11:59 PM.  Always use your most recent med list.                   Brand Name Dispense Instructions for use Diagnosis    [employer] PRENATAL GUMMY 0.4-113.5 MG Chew      Reported on 4/19/2017        order for DME     2 Units    Equipment being ordered: Compression stockings. Knee high 20-30    Localized edema

## 2017-07-28 NOTE — TELEPHONE ENCOUNTER
Reason for call:  Results  Reason for Call:  Request for results:    Name of test or procedure: lab results    Date of test of procedure: urine    Location of the test or procedure: [place]    OK to leave the result message on voice mail or with a family member? YES    Phone number Patient can be reached at:  Cell number on file:    Telephone Information:   Mobile [phone]       Additional comments: pt is calling was told someone would call her back with her test results. Pt also wanted to inform  that she can mychart a message to her with the results as well. She would like to know before the weekend incase she needs to start on a medication. Please advise.     Call taken on 7/28/2017 at 4:48 PM by Rhea West

## 2017-07-28 NOTE — PATIENT INSTRUCTIONS
Please call if you any questions.    [address]   [address]  [phone]        Harriet Rascon

## 2017-07-28 NOTE — PROGRESS NOTES
Subjective  31 year old non-pregnant female presents today complaining of a possible UTI.  She has had lower pelvic pain since Wednesday.  Her last menstrual period was .  She urinated all night last night and today she has had increased frequency.  No hematuria.  She has had increased dysuria.  Recent urine last month was normal with Nephrology.  No vaginal discharge, itching, or odor.  No back pain.  She has a history of infrequent UTIs.  We discussed Azo if needed for symptom relief.   Patient had HELLP vs HUS with her last delivery almost a year ago.  She is wanting to get pregnant again soon.  Her Hematologist recommended she meet with [facility] and I agree.  Will place that referral.         ROS: 10 point ROS neg other than the symptoms noted above in the HPI.  Past Medical History:   Diagnosis Date     NO ACTIVE PROBLEMS      Past Surgical History:   Procedure Laterality Date     APPENDECTOMY  2014      SECTION N/A 10/29/2016    Procedure:  SECTION;  Surgeon: Adonis Dooley MD;  Location: [facility]     Family History   Problem Relation Age of Onset     Hypertension Mother      OSTEOPOROSIS Maternal Grandmother      Unknown/Adopted Father      Unknown/Adopted Paternal Grandmother      Unknown/Adopted Paternal Grandfather      Social History   Substance Use Topics     Smoking status: Never Smoker     Smokeless tobacco: Never Used     Alcohol use No         Objective  Vitals: /70 (BP Location: Right arm, Patient Position: Chair, Cuff Size: Adult Regular)  Pulse 84  Wt 153 lb 4 oz (69.5 kg)  LMP 2017  BMI 21.79 kg/m2  BMI= Body mass index is 21.79 kg/(m^2).    General appearance=mood is stable, no deformities noted         Assessment  1.)  Dysuria  2.)  Increased urinary frequency  3.)  History of HELLP vs HUS      Plan  1.)  Await U/A results  2.)  Referral to [facility]        20 minutes was spent face to face with the patient today discussing her history, diagnosis, and follow-up plan as  noted above.  Over 50% of the visit was spent in counseling and coordination of care.    Total Visit Time: 20 minutes including counseling and physical exam not including time spent on the procedure.    Nursing notes read and reviewed    Harriet Rascon    Indication for Referral/Diagnosis:  History of HELLP Syndrome  ? Keene   ? Twins    ? Triplets    ? Quadruplets  CONSULTATION: [facility] specialist (the patient may proceed with recommendations for further testing or genetic counseling as directed by [facility])  ? xOne time only  ? Co-management  ? Transfer of complete OB Care ([facility] MD approved)  ? Genetic Counseling Only  ULTRASOUND: Patients will receive interpretation and consultation of ultrasound findings.   ? First trimester screen US with lab and genetic counseling  ? First trimester complete US (less than 14 weeks gestation) (dating, zygosity, etc.)  ? Limited US (fetal heart rate, position, placental location, ANTONIO)  ? Limited including transvaginal (TVS for cervical length assessment.)  ? Comprehensive (level 2) ultrasound (18 wks.  gestation & up) (fetal & maternal evaluation includes biometry & anatomy evaluation) AMA patients with no previous screening will be scheduled for genetic counseling.   ? Fetal echocardiogram  ? Follow up comprehensive (growth)  PROCEDURE:   ? Chorionic Villus Sampling (10+0 to 12+6 wks., gestation, genetic counseling will be scheduled. )  ? Genetic Amniocentesis (15+ wks. gestation, genetic counseling will be scheduled)  ? Lung maturity amniocentesis (if fetal lungs are mature, delivery will be recommended within 36 hours of results please schedule accordingly)  FETAL SURVEILLANCE:  ? Biophysical profile w/o NST   ? Biophysical profile w/ NST   ? Non-stress test (NST)    ? 1x only    ? Weekly    ? Twice weekly    ? Per [facility]

## 2017-07-28 NOTE — NURSING NOTE
"Chief Complaint   Patient presents with     UTI       Initial /70 (BP Location: Right arm, Patient Position: Chair, Cuff Size: Adult Regular)  Pulse 84  Wt 153 lb 4 oz (69.5 kg)  LMP 07/11/2017  BMI 21.79 kg/m2 Estimated body mass index is 21.79 kg/(m^2) as calculated from the following:    Height as of 4/19/17: 5' 10.32\" (1.786 m).    Weight as of this encounter: 153 lb 4 oz (69.5 kg).  Medication Reconciliation: complete     Chelsi Ramirez, [facility]  July 28, 2017      "

## 2017-07-31 ENCOUNTER — TELEPHONE (OUTPATIENT)
Dept: OBGYN | Facility: OTHER | Age: 32
End: 2017-07-31

## 2017-07-31 ENCOUNTER — TELEPHONE (OUTPATIENT)
Dept: OBGYN | Facility: CLINIC | Age: 32
End: 2017-07-31

## 2017-07-31 NOTE — TELEPHONE ENCOUNTER
Kwabena from [facility] is calling to inform us that this patient needs to be seen at the [place] location [facility].  They will be transferring the referral and contacting the patient.  Rhea Díaz, JOSE GUADALUPE  July 31, 2017 4:13 PM

## 2017-07-31 NOTE — TELEPHONE ENCOUNTER
Gaurang called from [facility] asking for the records form her hematologist.  I faxed over records from Dr. Vanessa-oncology.  I informed her the next appointment is 10/18/2017.  Eliana Yan RN

## 2017-08-02 ENCOUNTER — MEDICAL CORRESPONDENCE (OUTPATIENT)
Dept: HEALTH INFORMATION MANAGEMENT | Facility: CLINIC | Age: 32
End: 2017-08-02

## 2017-08-03 DIAGNOSIS — D59.4 MICROANGIOPATHIC HEMOLYTIC ANEMIA (H): Primary | ICD-10-CM

## 2017-08-04 NOTE — PROGRESS NOTES
17    CC: RYAN    HPI: Queta Cortez is a 31 year old female who presents for follow-up of RYAN. Ms. Cortez was hospitalized from 16- 16; She is a healthy female who underwent  for failure to descend on 10/29. She then presented with dizziness, weakness, nausea, vomiting on POD 6. She was found to be in RYAN which was felt to be aHUS vs HELLP syndrome. She received plasmapheresis while inpt and her creatinine and proteinuria improved. She is following with hematology closely since discharge. 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 Please see Dr. Vanessa's note for further details.    At this time her creatinine is back to a preserved level and without proteinuria or hematuria. She is doing well overall other than some difficulties coping with events that have occurred in the past year. She went through this event with her own health but then also lost her niece this winter to influenza which has been very difficult for her to cope with as well. She is no longer on blood pressure medications.        Allergies   Allergen Reactions     Hydralazine Shortness Of Breath, Anxiety, Other (See Comments), Swelling and Rash     40-50 minutes after IV administration had swelling to the arm where hydralazine was given, had swelling to lips, flushing to face, generalized anxiety which lead to shortness of breath. Symptoms resolved with dose of 25 mg IV benadryl.          Current Outpatient Prescriptions on File Prior to Visit:  Prenatal Vit-Min-FA-Fish Oil ([employer] PRENATAL GUMMY) 0.4-113.5 MG CHEW Reported on 2017   order for DME Equipment being ordered: Compression stockings. Knee high 20-30 (Patient not taking: Reported on 2017)     No current facility-administered medications on file prior to visit.     Past Medical History:   Diagnosis Date     NO ACTIVE PROBLEMS        Past Surgical History:   Procedure Laterality Date      APPENDECTOMY  2014      SECTION N/A 10/29/2016    Procedure:  SECTION;  Surgeon: Adonis Dooley MD;  Location:  L+D       Social History   Substance Use Topics     Smoking status: Never Smoker     Smokeless tobacco: Never Used     Alcohol use No       Family History   Problem Relation Age of Onset     Hypertension Mother      OSTEOPOROSIS Maternal Grandmother      Unknown/Adopted Father      Unknown/Adopted Paternal Grandmother      Unknown/Adopted Paternal Grandfather        ROS: A 4 system review of systems was negative other than noted here or above.     Exam:  /72 (BP Location: Left arm, Patient Position: Chair, Cuff Size: Adult Regular)  Pulse 78  Temp 98.5  F (36.9  C) (Oral)  Wt 69.4 kg (153 lb)  SpO2 97%  BMI 21.76 kg/m2    GENERAL APPEARANCE: alert and no distress  RESP: lungs clear to auscultation   CV: regular rhythm, normal rate, no rub  Extremities: no clubbing, cyanosis, or edema  SKIN: no rash  NEURO: mentation intact and speech normal  PSYCH: affect normal/bright    Results:   Orders Only on 2017   Component Date Value Ref Range Status     Protein Random Urine 2017 <0.05  g/L Final     Protein Total Urine g/gr Creatinine 2017 Unable to calculate due to low value  0 - 0.2 g/g Cr Final     Color Urine 2017 Light Yellow   Final     Appearance Urine 2017 Clear   Final     Glucose Urine 2017 Negative  NEG mg/dL Final     Bilirubin Urine 2017 Negative  NEG Final     Ketones Urine 2017 Negative  NEG mg/dL Final     Specific Gravity Urine 2017 1.003  1.003 - 1.035 Final     Blood Urine 2017 Negative  NEG Final     pH Urine 2017 7.0  5.0 - 7.0 pH Final     Protein Albumin Urine 2017 Negative  NEG mg/dL Final     Urobilinogen mg/dL 2017 Normal  0.0 - 2.0 mg/dL Final     Nitrite Urine 2017 Negative  NEG Final     Leukocyte Esterase Urine 2017 Negative  NEG Final     Source 2017  Midstream Urine   Final     WBC Urine 07/11/2017 O - 2  0 - 2 /HPF Final     RBC Urine 07/11/2017 O - 2  0 - 2 /HPF Final     Squamous Epithelial /LPF Urine 07/11/2017 Few  FEW /LPF Final     Parathyroid Hormone Intact 07/11/2017 22  12 - 72 pg/mL Final     Hemoglobin 07/11/2017 12.5  11.7 - 15.7 g/dL Final     Sodium 07/11/2017 142  133 - 144 mmol/L Final     Potassium 07/11/2017 3.7  3.4 - 5.3 mmol/L Final     Chloride 07/11/2017 109  94 - 109 mmol/L Final     Carbon Dioxide 07/11/2017 27  20 - 32 mmol/L Final     Anion Gap 07/11/2017 6  3 - 14 mmol/L Final     Glucose 07/11/2017 100* 70 - 99 mg/dL Final    Non Fasting     Urea Nitrogen 07/11/2017 13  7 - 30 mg/dL Final     Creatinine 07/11/2017 0.87  0.52 - 1.04 mg/dL Final     GFR Estimate 07/11/2017 76  >60 mL/min/1.7m2 Final    Non  GFR Calc     GFR Estimate If Black 07/11/2017   >60 mL/min/1.7m2 Final                    Value:>90   GFR Calc       Calcium 07/11/2017 9.0  8.5 - 10.1 mg/dL Final     Phosphorus 07/11/2017 2.7  2.5 - 4.5 mg/dL Final     Albumin 07/11/2017 4.0  3.4 - 5.0 g/dL Final     Creatinine Urine 07/11/2017 21  mg/dL Final          Assessment/Plan:   1. 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 Pleased to see that her creatinine has improved. No need to have planned follow-up.     2.Hypertension: at goal of <140/90 - off BP meds - hypertension has resolved.     3. Anemia/aHUS: anemia resolved. Please see heme's note for further details about her genetic findings.        Karen Collins DO

## 2017-08-23 ENCOUNTER — OFFICE VISIT (OUTPATIENT)
Dept: PSYCHOLOGY | Facility: CLINIC | Age: 32
End: 2017-08-23
Payer: COMMERCIAL

## 2017-08-23 DIAGNOSIS — F33.0 MILD EPISODE OF RECURRENT MAJOR DEPRESSIVE DISORDER (H): ICD-10-CM

## 2017-08-23 DIAGNOSIS — F41.1 GAD (GENERALIZED ANXIETY DISORDER): Primary | ICD-10-CM

## 2017-08-23 DIAGNOSIS — F43.10 PTSD (POST-TRAUMATIC STRESS DISORDER): ICD-10-CM

## 2017-08-23 PROCEDURE — 90834 PSYTX W PT 45 MINUTES: CPT | Performed by: MARRIAGE & FAMILY THERAPIST

## 2017-08-23 NOTE — MR AVS SNAPSHOT
MRN:[id number]                      After Visit Summary   8/23/2017    Queta Cortez    MRN: [id number]           Visit Information        Provider Department      8/23/2017 1:00 PM Bella Harrington [facility]      Your next 10 appointments already scheduled     Aug 28, 2017 11:00 AM CDT   Office Visit with Tahir Cai MD   [facility]    [address] [place] [address] [phone]           Bring a current list of meds and any records pertaining to this visit. For Physicals, please bring immunization records and any forms needing to be filled out. Please arrive 10 minutes early to complete paperwork.            Sep 12, 2017 10:15 AM CDT   Genetic Counseling with UR GEN COUNSELOR 2   [facility] ([facility])    [address]  [facility] [place] [address]   [phone]            Sep 12, 2017 11:00 AM CDT   Consult MFM with UR EXAM RM 1   [facility] ([facility])    [address]  [place] [address]   [phone]            Oct 04, 2017  1:00 PM CDT   Return Visit with Bella Harrington   [facility] ([facility])    [address]  [facility] [address]   [phone]            Oct 18, 2017  2:30 PM CDT   LAB with LAB ONC [facility] ([facility])    [address] [place] [address] [phone]           Patient must bring picture ID. Patient should be prepared to give a urine specimen  Please do not eat 10-12 hours before your appointment if you are coming in fasting for labs on lipids, cholesterol, or glucose (sugar). Pregnant women should follow their Care Team instructions. Water with medications is okay. Do not drink coffee or other fluids. If  you have concerns about taking  your medications, please ask at office or if scheduling via [employer], send a message by clicking on Secure Messaging, Message Your Care Team.            Oct 18, 2017  3:15 PM CDT   Return Visit with Ivan Vanessa MD   [facility] ([facility])    [address] [place] [address] [phone]              MyChart Information     [employer] gives you secure access to your electronic health record. If you see a primary care provider, you can also send messages to your care team and make appointments. If you have questions, please call your primary care clinic.  If you do not have a primary care provider, please call [phone] and they will assist you.        Care EveryWhere ID     This is your Care EveryWhere ID. This could be used by other organizations to access your [place] medical records  [id number]        Equal Access to Services     JOAQUIM AYALA : Hadii poonam Masters, waaxda sonia, qapeterta katealmabeth vargas, anselmo colón. So [facility] [phone].    ATENCIÓN: Si habla español, tiene a nova disposición servicios gratuitos de asistencia lingüística. Llame al [phone].    We comply with applicable federal civil rights laws and [place] laws. We do not discriminate on the basis of race, color, national origin, age, disability sex, sexual orientation or gender identity.

## 2017-08-23 NOTE — PROGRESS NOTES
Progress Note    Client Name: Queta Cortez  Date: 8/23/2017         Service Type: Individual      Session Start Time: 1pm Session End Time:1:45pm      Session Length: 45 minutes     Session #: 17     Attendees: Client attended alone      PHQ-9 / JOAO-7 : declined  DATA      Progress Since Last Session (Related to Symptoms / Goals / Homework):  Symptoms: anxiety related to situational stressors  Homework: Achieved / completed to satisfaction      Episode of Care Goals: Satisfactory progress - ACTION (Actively working towards change); Intervened by reinforcing change plan / affirming steps taken     Current / Ongoing Stressors and Concerns:   medical, marital, financial     Treatment Objective(s) Addressed in This Session:   use at least 2-3 coping skills for anxiety management in the next 6 weeks       Intervention:   CBT, solution focused   Client will be seeing genetic counselor and fetal and maternal medicine early next month to discuss she and 's plan to have another baby. Reports anxiety related to how others will respond to their decision to have another child. Reports mood has been mostly positive,several intermittant panic attacks related to conflict. Discussed grounding, gave handouts on grounding and encouraged client to notice when anxiety is creeping up and use skills when first noticing anxiety.     ASSESSMENT: Current Emotional / Mental Status (status of significant symptoms):   Risk status (Self / Other harm or suicidal ideation)   Client denies current fears or concerns for personal safety.   Client denies current or recent suicidal ideation or behaviors.   Client denies current or recent homicidal ideation or behaviors.   Client denies current or recent self injurious behavior or ideation.   Client denies other safety concerns.   A safety and risk management plan has not been developed at this time, however client was given the after-hours number  / 911 should there be a change in any of these risk factors.     Appearance:   Appropriate    Eye Contact:   Good    Psychomotor Behavior: Normal    Attitude:   Cooperative    Orientation:   All   Speech    Rate / Production: Normal     Volume:  Normal    Mood:    Anxious    Affect:    Appropriate    Thought Content:  Clear    Thought Form:  Coherent  Logical    Insight:    Fair      Medication Review:   No current psychiatric medications prescribed   Current Outpatient Prescriptions   Medication     order for DME     Prenatal Vit-Min-FA-Fish Oil ([facility] PRENATAL GUMMY) 0.4-113.5 MG CHEW     No current facility-administered medications for this visit.           Medication Compliance:   Yes   Changes in Health Issues:   None reported     Chemical Use Review:   Substance Use: Chemical use reviewed, no active concerns identified      Tobacco Use: No current tobacco use.       Collateral Reports Completed:   Not Applicable    PLAN: (Client Tasks / Therapist Tasks / Other)   Encouraged client to continue to talk with  about improving connection, reducing conflict, and spending quality time together. Encouraged client to notice when anxiety is creeping up and use skills when first noticing anxiety.               Bella Styles                                                    Treatment Plan    Client's Name: Queta Cortez  YOB: 1985    Date: 1/5/2017      Diagnoses: 296.31 Major Depressive Disorder, Recurrent Episode, Mild With peripartum onset  300.02 (F41.1) Generalized Anxiety Disorder  309.81 (F43.10) Posttraumatic Stress Disorder (includes Posttraumatic Stress Disorder for Children 6 Years and Younger)  Without dissociative symptoms    Medical dx: HELLP syndrome, HUS, recent birth of son  Psychosocial & Contextual Factors: medical, recent birth of first child, marital, financial, occupational  WHODAS 2.0 (12 item) 17    Referral / Collaboration:  Collaboration was initiated with PCP  MD.    Anticipated number of session or this episode of care: 16-20      MeasurableTreatment Goal(s) related to diagnosis / functional impairment(s)  Goal 1: Client will decrease depressed mood and anxiety as evidenced by PHQ9 and GAD7 scores 4 and Under within the next 9 months. Initial scorin2016:  GAD7:16,  PHQ9: 9. 2/15/2017: :  GAD7:7,  PHQ9: 8, 2017 :  GAD7:9,  PHQ9: 6    I will know I've met my goal when I'm better able to manage my anxiety.      Objective #A (Client Action)   Continued - Date(s): 2017  Client will identify 1-2 initial signs or symptoms of anxiety and utilize anxiety reduction techniques daily to decrease anxiety over the next month.Current Baseline is sporadic use. Client will ID triggers for depression and anxiety and work towards decreasing reactivity to or eliminating stressor if possible. client will develop more effective coping skills to manage depression and anxiety symptoms, will develop healthy cognitive patterns and beliefs, will increase ability to function adaptively and will continue to take medications as prescribed / participate in supportive activities. Client will improve communication with others and be able to work through anxiety rather than shutting down. 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 Client will process grief related to medical issues and loss of niece.  Intervention(s)   Therapist will teach client how to ID body cues for anxiety, anxiety reduction techniques, how to ID triggers for depression and anxiety- decrease reactivity/eliminate, lifestyle changes to reduce depression and anxiety, communication skills, explore cognitive beliefs and help client to develop healthy cognitive patterns and beliefs.  Client has reviewed and agreed to the above plan.    Bella Styles  2017

## 2017-08-25 ENCOUNTER — OFFICE VISIT (OUTPATIENT)
Dept: OBGYN | Facility: OTHER | Age: 32
End: 2017-08-25
Payer: COMMERCIAL

## 2017-08-25 VITALS
DIASTOLIC BLOOD PRESSURE: 84 MMHG | BODY MASS INDEX: 21.69 KG/M2 | SYSTOLIC BLOOD PRESSURE: 114 MMHG | HEART RATE: 72 BPM | WEIGHT: 152.5 LBS

## 2017-08-25 DIAGNOSIS — N92.0 EXCESSIVE OR FREQUENT MENSTRUATION: ICD-10-CM

## 2017-08-25 DIAGNOSIS — R82.90 NONSPECIFIC FINDING ON EXAMINATION OF URINE: Primary | ICD-10-CM

## 2017-08-25 DIAGNOSIS — R30.0 DYSURIA: ICD-10-CM

## 2017-08-25 LAB
ALBUMIN UR-MCNC: NEGATIVE MG/DL
APPEARANCE UR: CLEAR
BACTERIA #/AREA URNS HPF: ABNORMAL /HPF
BETA HCG QUAL IFA URINE: NEGATIVE
BILIRUB UR QL STRIP: NEGATIVE
COLOR UR AUTO: YELLOW
GLUCOSE UR STRIP-MCNC: NEGATIVE MG/DL
HGB UR QL STRIP: NEGATIVE
KETONES UR STRIP-MCNC: NEGATIVE MG/DL
LEUKOCYTE ESTERASE UR QL STRIP: ABNORMAL
NITRATE UR QL: POSITIVE
PH UR STRIP: 7 PH (ref 5–7)
RBC #/AREA URNS AUTO: ABNORMAL /HPF
SOURCE: ABNORMAL
SP GR UR STRIP: 1.01 (ref 1–1.03)
SPECIMEN SOURCE: NORMAL
UROBILINOGEN UR STRIP-ACNC: 0.2 EU/DL (ref 0.2–1)
WBC #/AREA URNS AUTO: ABNORMAL /HPF
WET PREP SPEC: NORMAL

## 2017-08-25 PROCEDURE — 87088 URINE BACTERIA CULTURE: CPT | Performed by: ADVANCED PRACTICE MIDWIFE

## 2017-08-25 PROCEDURE — 87624 HPV HI-RISK TYP POOLED RSLT: CPT | Performed by: ADVANCED PRACTICE MIDWIFE

## 2017-08-25 PROCEDURE — 87210 SMEAR WET MOUNT SALINE/INK: CPT | Performed by: ADVANCED PRACTICE MIDWIFE

## 2017-08-25 PROCEDURE — 99213 OFFICE O/P EST LOW 20 MIN: CPT | Performed by: ADVANCED PRACTICE MIDWIFE

## 2017-08-25 PROCEDURE — 84703 CHORIONIC GONADOTROPIN ASSAY: CPT | Performed by: ADVANCED PRACTICE MIDWIFE

## 2017-08-25 PROCEDURE — 87491 CHLMYD TRACH DNA AMP PROBE: CPT | Performed by: ADVANCED PRACTICE MIDWIFE

## 2017-08-25 PROCEDURE — G0145 SCR C/V CYTO,THINLAYER,RESCR: HCPCS | Performed by: ADVANCED PRACTICE MIDWIFE

## 2017-08-25 PROCEDURE — 87591 N.GONORRHOEAE DNA AMP PROB: CPT | Performed by: ADVANCED PRACTICE MIDWIFE

## 2017-08-25 PROCEDURE — 87186 SC STD MICRODIL/AGAR DIL: CPT | Performed by: ADVANCED PRACTICE MIDWIFE

## 2017-08-25 PROCEDURE — 87086 URINE CULTURE/COLONY COUNT: CPT | Performed by: ADVANCED PRACTICE MIDWIFE

## 2017-08-25 PROCEDURE — 81001 URINALYSIS AUTO W/SCOPE: CPT | Performed by: ADVANCED PRACTICE MIDWIFE

## 2017-08-25 RX ORDER — SULFAMETHOXAZOLE/TRIMETHOPRIM 800-160 MG
1 TABLET ORAL 2 TIMES DAILY
Qty: 6 TABLET | Refills: 0 | Status: SHIPPED | OUTPATIENT
Start: 2017-08-25 | End: 2017-08-28

## 2017-08-25 NOTE — MR AVS SNAPSHOT
After Visit Summary   8/25/2017    Queta Cortez    MRN: [id number]           Patient Information     Date Of Birth          1985        Visit Information        Provider Department      8/25/2017 1:45 PM Gloria Robertson APRN CNM [facility]        Today's Diagnoses     Nonspecific finding on examination of urine    -  1    Excessive or frequent menstruation        Dysuria           Follow-ups after your visit        Your next 10 appointments already scheduled     Aug 28, 2017  9:10 AM CDT   US PELVIC COMPLETE W TRANSVAGINAL with ERUS1   [facility]    [address] [facility] [id number] [phone]           Please bring a list of your medicines (including vitamins, minerals and over-the-counter drugs). Also, tell your doctor about any allergies you may have. Wear comfortable clothes and leave your valuables at home.  Adults: Drink six 8-ounce glasses of fluid one hour before your exam. Do NOT empty your bladder.  If you need to empty your bladder before your exam, try to release only a little bit of urine. Then, drink another 8oz glass of fluid.  Children: Children who are potty trained should drink at least 4 cups (32 oz) of liquid 45 minutes to one hour prior to the exam. The child s bladder must be full in order to achieve a diagnostic exam. If your child is very uncomfortable or has an urgent need to pee, please notify a technologist; they will try to find out how much longer the wait may be and provide instructions to help relieve the pressure. Occasionally it is medically necessary to insert a urinary catheter to fill the bladder.  Please call the Imaging Department at your exam site with any questions.            Sep 12, 2017 10:15 AM CDT   Genetic Counseling with MELLISSA GEN COUNSELOR 2   [facility] Maternal Fetal Medicine [facility] ([facility])    [address]  [place]  [address]   [phone]            Sep 12, 2017 11:00 AM CDT   Consult MFM with UR EXAM RM 1   [facility] ([facility])    [address]  [place] [address]   [phone]            Oct 04, 2017  1:00 PM CDT   Return Visit with Bella Harrington   [facility] ([facility])    [address]  [facility] [id number] [phone]            Oct 18, 2017  2:30 PM CDT   LAB with LAB ONC [facility] ([facility])    [address] [place] [address] [phone]           Patient must bring picture ID. Patient should be prepared to give a urine specimen  Please do not eat 10-12 hours before your appointment if you are coming in fasting for labs on lipids, cholesterol, or glucose (sugar). Pregnant women should follow their Care Team instructions. Water with medications is okay. Do not drink coffee or other fluids. If you have concerns about taking  your medications, please ask at office or if scheduling via [employer], send a message by clicking on Secure Messaging, Message Your Care Team.            Oct 18, 2017  3:15 PM CDT   [facility]    [address] [address] [place] [address] [phone]              Future tests that were ordered for you today     Open Future Orders        Priority Expected Expires Ordered    US Pelvic Complete w Transvaginal Routine  8/25/2018 8/25/2017            Who to contact     If you have questions or need follow up information about today's clinic visit or your schedule please contact [facility] directly at [phone].  Normal or non-critical lab and imaging results will be communicated to you by MyChart, letter or phone within 4 business days after the clinic has received the results. If you do not hear from us  within 7 days, please contact the clinic through [employer] or phone. If you have a critical or abnormal lab result, we will notify you by phone as soon as possible.  Submit refill requests through [employer] or call your pharmacy and they will forward the refill request to us. Please allow 3 business days for your refill to be completed.          Additional Information About Your Visit        [employer] Information     [employer] gives you secure access to your electronic health record. If you see a primary care provider, you can also send messages to your care team and make appointments. If you have questions, please call your primary care clinic.  If you do not have a primary care provider, please call [phone] and they will assist you.        Care EveryWhere ID     This is your Care EveryWhere ID. This could be used by other organizations to access your [facility] medical records  [id number]        Your Vitals Were     Pulse Last Period BMI (Body Mass Index)             72 08/09/2017 (Exact Date) 21.69 kg/m2          Blood Pressure from Last 3 Encounters:   08/25/17 114/84   07/28/17 110/70   07/11/17 118/72    Weight from Last 3 Encounters:   08/25/17 152 lb 8 oz (69.2 kg)   07/28/17 153 lb 4 oz (69.5 kg)   07/11/17 153 lb (69.4 kg)              We Performed the Following     *UA reflex to Microscopic and Culture ([facility] (except [place] and [place])     Beta HCG qual IFA urine     Chlamydia trachomatis PCR     HPV High Risk Types DNA Cervical     Neisseria gonorrhoeae PCR     Pap imaged thin layer screen with HPV - recommended age 30 - 65 years (select HPV order below)     Urine Culture Aerobic Bacterial     Urine Microscopic     Wet prep          Today's Medication Changes          These changes are accurate as of: 8/25/17  4:36 PM.  If you have any questions, ask your nurse or doctor.               Start taking these medicines.        Dose/Directions    sulfamethoxazole-trimethoprim 800-160 MG  per tablet   Commonly known as:  BACTRIM DS/SEPTRA DS   Used for:  Dysuria   Started by:  Gloria Robertson APRN CNM        Dose:  1 tablet   Take 1 tablet by mouth 2 times daily for 3 days   Quantity:  6 tablet   Refills:  0            Where to get your medicines      These medications were sent to [id number] - [place], [place] - [address]  [address], [facility] [address]    Hours:  Formerly [place] - numbers unchanged   9/8/03 kr Phone:  [phone]     sulfamethoxazole-trimethoprim 800-160 MG per tablet                Primary Care Provider Office Phone # Fax #    Alanis Lundberg [phone]       [address]  [facility] [address]        Equal Access to Services     NANCY AYALA : Brett holguino Sojannetteali, waaxda luqadaha, qaybta kaalmada adeegyada, anselmo freeman . So [facility] [phone].    ATENCIÓN: Si habla español, tiene a nova disposición servicios gratuitos de asistencia lingüística. [facility] [phone].    We comply with applicable federal civil rights laws and [place] laws. We do not discriminate on the basis of race, color, national origin, age, disability sex, sexual orientation or gender identity.            Thank you!     Thank you for choosing [facility]  for your care. Our goal is always to provide you with excellent care. Hearing back from our patients is one way we can continue to improve our services. Please take a few minutes to complete the written survey that you may receive in the mail after your visit with us. Thank you!             Your Updated Medication List - Protect others around you: Learn how to safely use, store and throw away your medicines at www.disposemymeds.org.          This list is accurate as of: 8/25/17  4:36 PM.  Always use your most recent med list.                   Brand Name Dispense Instructions for use Diagnosis    [facility] PRENATAL GUMMY 0.4-113.5 MG Chew      Reported on 4/19/2017         order for DME     2 Units    Equipment being ordered: Compression stockings. Knee high 20-30    Localized edema       sulfamethoxazole-trimethoprim 800-160 MG per tablet    BACTRIM DS/SEPTRA DS    6 tablet    Take 1 tablet by mouth 2 times daily for 3 days    Dysuria

## 2017-08-25 NOTE — NURSING NOTE
"Chief Complaint   Patient presents with     Pelvic Pain       Initial /84 (BP Location: Left arm, Patient Position: Chair, Cuff Size: Adult Regular)  Pulse 72  Wt 152 lb 8 oz (69.2 kg)  LMP 08/09/2017 (Exact Date)  BMI 21.69 kg/m2 Estimated body mass index is 21.69 kg/(m^2) as calculated from the following:    Height as of 4/19/17: 5' 10.32\" (1.786 m).    Weight as of this encounter: 152 lb 8 oz (69.2 kg).  Medication Reconciliation: complete   Gina Mercer CMA      "

## 2017-08-25 NOTE — PROGRESS NOTES
Queta Cortez is a 32 year old who presents to the clinic for evaluation of several complaints/comcerns  Bleeding after intercourse.   Burning with urination  Vaginal pain/burning  Over the last several months has had bleeding after intercourse. Burning on and off with urination.   Vaginal itching from time to time and vaginal pain  No birth control currently.         Histories reviewed and updated  Past Medical History:   Diagnosis Date     NO ACTIVE PROBLEMS      Past Surgical History:   Procedure Laterality Date     APPENDECTOMY  2014      SECTION N/A 10/29/2016    Procedure:  SECTION;  Surgeon: Adonis Dooley MD;  Location[facility]     Social History     Social History     Marital status:      Spouse name: N/A     Number of children: N/A     Years of education: N/A     Occupational History     Not on file.     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  She works as a  and does childcare.   Mikey works in a factory.   Neither of them smokes.   Has two indoor cats.  Advised about toxoplasmosis precautions.  No concerns about domestic violence.     Family History   Problem Relation Age of Onset     Hypertension Mother      OSTEOPOROSIS Maternal Grandmother      Unknown/Adopted Father      Unknown/Adopted Paternal Grandmother      Unknown/Adopted Paternal Grandfather            CONSTITUTIONAL: Healthy,  alert, in no apparent distress.    GI: NEGATIVE  : see above    EXAM:  /84 (BP Location: Left arm, Patient Position: Chair, Cuff Size: Adult Regular)  Pulse 72  Wt 152 lb 8 oz (69.2 kg)  LMP 08/09/2017 (Exact Date)  BMI 21.69 kg/m2  : PELVIC EXAM:  Vulva: No external lesions, normal hair distribution, no adenopathy, BUS WNL  Vagina: Moist, pink, no abnormal discharge, well rugated, no lesions  Cervix:, smooth, pink, no visible lesions, neg CMT friable  Uterus: Normal size, retroverted, non-tender, mobile  Ovaries: No mass, non-tender, mobile  Rectal exam: deferred      Nitrites in urine  ASSESSMENT/PLAN:    (N92.0) Excessive or frequent menstruation  Comment:   Plan: Pap imaged thin layer screen with HPV -         recommended age 30 - 65 years (select HPV order        below), HPV High Risk Types DNA Cervical,         Chlamydia trachomatis PCR, Neisseria         gonorrhoeae PCR, Wet prep, US Pelvic Complete w        Transvaginal, Beta HCG qual IFA urine            (R30.0) Dysuria  Comment:   Plan: *UA reflex to Microscopic and Culture ([place]         [facility] ([facility] and         [place])            Discussed possible causes of bleeding after intercourse, yeast, friable cervix, sti, polyp, and mostly unknown causes.   Understands testing and results  Urine with nitrites so will treat for UTI with septra.  Called and informed.   Gloria Solorzano, ANCELMO, CNM

## 2017-08-27 LAB
C TRACH DNA SPEC QL NAA+PROBE: NEGATIVE
N GONORRHOEA DNA SPEC QL NAA+PROBE: NEGATIVE
SPECIMEN SOURCE: NORMAL
SPECIMEN SOURCE: NORMAL

## 2017-08-28 ENCOUNTER — RADIANT APPOINTMENT (OUTPATIENT)
Dept: ULTRASOUND IMAGING | Facility: OTHER | Age: 32
End: 2017-08-28
Attending: ADVANCED PRACTICE MIDWIFE
Payer: COMMERCIAL

## 2017-08-28 DIAGNOSIS — N92.0 EXCESSIVE OR FREQUENT MENSTRUATION: ICD-10-CM

## 2017-08-28 LAB
BACTERIA SPEC CULT: ABNORMAL
SPECIMEN SOURCE: ABNORMAL

## 2017-08-28 PROCEDURE — 76830 TRANSVAGINAL US NON-OB: CPT

## 2017-08-28 PROCEDURE — 76856 US EXAM PELVIC COMPLETE: CPT

## 2017-08-29 LAB
COPATH REPORT: NORMAL
PAP: NORMAL

## 2017-08-30 LAB
FINAL DIAGNOSIS: NORMAL
HPV HR 12 DNA CVX QL NAA+PROBE: NEGATIVE
HPV16 DNA SPEC QL NAA+PROBE: NEGATIVE
HPV18 DNA SPEC QL NAA+PROBE: NEGATIVE
SPECIMEN DESCRIPTION: NORMAL

## 2017-09-08 ENCOUNTER — PRE VISIT (OUTPATIENT)
Dept: MATERNAL FETAL MEDICINE | Facility: CLINIC | Age: 32
End: 2017-09-08

## 2017-09-12 ENCOUNTER — OFFICE VISIT (OUTPATIENT)
Dept: MATERNAL FETAL MEDICINE | Facility: CLINIC | Age: 32
End: 2017-09-12
Attending: OBSTETRICS & GYNECOLOGY
Payer: COMMERCIAL

## 2017-09-12 DIAGNOSIS — D59.4 MICROANGIOPATHIC HEMOLYTIC ANEMIA (H): Primary | ICD-10-CM

## 2017-09-12 DIAGNOSIS — Z31.69 ENCOUNTER FOR PRECONCEPTION CONSULTATION: Primary | ICD-10-CM

## 2017-09-12 PROCEDURE — 40000072 ZZH STATISTIC GENETIC COUNSELING, < 16 MIN: Mod: ZF | Performed by: GENETIC COUNSELOR, MS

## 2017-09-12 NOTE — MR AVS SNAPSHOT
After Visit Summary   9/12/2017    Queta Cortez    MRN: [id number]           Patient Information     Date Of Birth          1985        Visit Information        Provider Department      9/12/2017 11:00 AM Jenny Brooks,  [facility] Maternal Fetal Medicine [facility]        Today's Diagnoses     Encounter for preconception consultation    -  1       Follow-ups after your visit        Your next 10 appointments already scheduled     Oct 04, 2017  1:00 PM CDT   Return Visit with Bella Harrington   [facility] ([facility])    [address]  [facility] [address]   [phone]            Oct 18, 2017  2:30 PM CDT   LAB with LAB ONC [facility] ([facility])    [address] [place] [address] [phone]           Patient must bring picture ID. Patient should be prepared to give a urine specimen  Please do not eat 10-12 hours before your appointment if you are coming in fasting for labs on lipids, cholesterol, or glucose (sugar). Pregnant women should follow their Care Team instructions. Water with medications is okay. Do not drink coffee or other fluids. If you have concerns about taking  your medications, please ask at office or if scheduling via [employer], send a message by clicking on Secure Messaging, Message Your Care Team.            Oct 18, 2017  3:15 PM CDT   Return Visit with Ivan Vanessa MD   [facility])    [address] [place] [address] [phone]              Who to contact     If you have questions or need follow up information about today's clinic visit or your schedule please contact [facility]ERNAL FETAL [facility]IC[facility] directly at [phone].  Normal or non-critical lab and imaging results will be communicated to you by MyChart, letter or phone within 4 business days after the clinic has received  the results. If you do not hear from us within 7 days, please contact the clinic through [employer] or phone. If you have a critical or abnormal lab result, we will notify you by phone as soon as possible.  Submit refill requests through [employer] or call your pharmacy and they will forward the refill request to us. Please allow 3 business days for your refill to be completed.          Additional Information About Your Visit        [employer] Information     [employer] gives you secure access to your electronic health record. If you see a primary care provider, you can also send messages to your care team and make appointments. If you have questions, please call your primary care clinic.  If you do not have a primary care provider, please call [phone] and they will assist you.        Care EveryWhere ID     This is your Care EveryWhere ID. This could be used by other organizations to access your [facility] medical records  [id number]        Your Vitals Were     Last Period                   08/09/2017 (Exact Date)            Blood Pressure from Last 3 Encounters:   08/25/17 114/84   07/28/17 110/70   07/11/17 118/72    Weight from Last 3 Encounters:   08/25/17 69.2 kg (152 lb 8 oz)   07/28/17 69.5 kg (153 lb 4 oz)   07/11/17 69.4 kg (153 lb)              We Performed the Following     [facility] Office Visit        Primary Care Provider Office Phone # Fax #    Alanis Lundberg [phone]       [address] [facility] [facility] [address]  [facility] [address]        Equal Access to Services     JOAQUIM AYALA : Hadii poonam ku hadasho Sojannetteali, waaxda luqadaha, qaybta kaalmada alicia, anselmo freeman . So [facility] [phone].    ATENCIÓN: Si habla binañol, tiene a nova disposición servicios gratuitos de asistencia lingüística. Llgladis al [phone].    We comply with applicable federal civil rights laws and [place] laws. We do not discriminate on the basis of race, color, national origin, age, disability sex,  sexual orientation or gender identity.            Thank you!     Thank you for choosing [facility]  for your care. Our goal is always to provide you with excellent care. Hearing back from our patients is one way we can continue to improve our services. Please take a few minutes to complete the written survey that you may receive in the mail after your visit with us. Thank you!             Your Updated Medication List - Protect others around you: Learn how to safely use, store and throw away your medicines at www.disposemymeds.org.          This list is accurate as of: 9/12/17  4:10 PM.  Always use your most recent med list.                   Brand Name Dispense Instructions for use Diagnosis    [facility] PRENATAL GUMMY 0.4-113.5 MG Chew      Reported on 4/19/2017        order for DME     2 Units    Equipment being ordered: Compression stockings. Knee high 20-30    Localized edema

## 2017-09-12 NOTE — PROGRESS NOTES
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       Impression/Plan:   1.  Queta had a genetic consultation today to discuss her genetic test results and recurrence risks for aHUS in future pregnancies, as well as the possibility for testing her son for the mutation in [id number] that she carries.    2.  Queta a consultation with Dr. Brooks and her team to discuss future pregnancy risks and management, please see corresponding note for details.     Pregnancy History:   /Parity:    Age at Delivery: 32 year old  Queta kebede pregnancy history is significant for 1 full term pregnancy delivered via  in 2016.  After discharge from the hospital, Queta was readmitted with marked anemia and thrombocytopenia.  HELLP and TTP were ruled out and the most likely explanation was atypical HUS.  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  Typically individuals with 2 mutations in this gene are at risk for TTP, and the implications of having only 1 mutation are less well defined.    Queta is not currently pregnant, although per her report they are considering trying to achieve pregnancy again within the coming months.      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  Queta is being followed by hematology and her blood work and kidney function have stabilized over the past year.  She is being seen by MFM today to discuss management and risks for recurrence in future pregnancies.         Family History:   A  three-generation pedigree was obtained, and is scanned under the  Media  tab.   The following significant findings were reported by Queta:    Queta has 1 son, eleven months old, healthy and developmentally on track    1 brother, 32 healthy    Mother, 50, suffers from high blood pressure, liver disease, alcohol abuse, and recently had surgery to remove a tumor from behind her eye    Father, no information available    FOB: 33, healthy, with no children from any other relationships    FOB: no health concerns in any 1st degree relatives, limited information available for any more distant relatives    Otherwise, the reported family history is negative for multiple miscarriages, stillbirths, birth defects, cognitive impairment, known genetic conditions, and consanguinity.       Carrier Screening:   The patient reports that she and the father of the pregnancy have  ancestry:      Cystic fibrosis is an autosomal recessive genetic condition that occurs with increased frequency in individuals of  ancestry and carrier screening for this condition is available.  In addition,  screening in the [facility] includes cystic fibrosis.      Expanded carrier screening for mutations in a large panel of genes associated with autosomal recessive conditions including cystic fibrosis, spinal muscular atrophy, and others, is now available.      The patient has declined the carrier screening options reviewed today.       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  The specific mutation is: c.3178C>T, [id number].  This mutation is considered to be a disease causing mutation when seen in a homozygous state, meaning that an individual has this mutation in both copies of the LUTOTE04 gene, or when seen with a second mutation in the JVPQXM77 gene.  Queta's genetic testing only identified the single mutation, so the testing does not provide a definitive cause  for her microangiopathic hemolytic anemia/aHUS.  In published research, there is some evidence that individuals diagnosed with aHUS are more likely to have a single mutation in the TGWUZM67 gene than the general population, suggesting an association.  LPTORF58 is known to be involved in the molecular pathways associated with aHUS.  While her genetic test results do not definitively provide an answer for the cause of her aHUS, it is certainly a possibility.      Recurrence risks for aHUS in subsequent pregnancies is unknown due to the rarity of the condition.      Queta had questions about testing her son for her PXTPTK44 mutation.  There is a 50% chance that her son inherited this mutation.  We discussed that testing minors for genetic conditions is generally not recommended, unless those test results would impact his medical care.  This is to protect the autonomy of the individual who is a minor and allow them to make decisions regarding their own genetic testing as they reach an age to participate in that decision.  At this point in time her son is not currently having any screening or observation, and it is unclear what utility testing him for this variant would provide.  If there was a clear plan of action to be taken based on the genetic test results, then genetic testing would be available.  Queta was encouraged to discuss this with her hematologist and potentially with a  or pediatric genetic counselor.          It was a pleasure to be involved with Queta s care. Face-to-face time of the meeting was 35 minutes.      Justice Cole MS  Genetic Counselor  Phone: [phone]  Pager: [phone]

## 2017-09-12 NOTE — MR AVS SNAPSHOT
After Visit Summary   9/12/2017    Queta Cortez    MRN: [id number]           Patient Information     Date Of Birth          1985        Visit Information        Provider Department      9/12/2017 10:15 AM Justice Cole GC [facility] Maternal Fetal Medicine [facility]        Today's Diagnoses     Microangiopathic hemolytic anemia (H)    -  1       Follow-ups after your visit        Your next 10 appointments already scheduled     Oct 04, 2017  1:00 PM CDT   Return Visit with Bella Harrington   [facility] ([facility])    [address]  [facility] [address]   [phone]            Oct 18, 2017  2:30 PM CDT   LAB with LAB ONC [facility] access hospital ([facility])    [address] [place] [id number] [phone]           Patient must bring picture ID. Patient should be prepared to give a urine specimen  Please do not eat 10-12 hours before your appointment if you are coming in fasting for labs on lipids, cholesterol, or glucose (sugar). Pregnant women should follow their Care Team instructions. Water with medications is okay. Do not drink coffee or other fluids. If you have concerns about taking  your medications, please ask at office or if scheduling via [employer], send a message by clicking on Secure Messaging, Message Your Care Team.            Oct 18, 2017  3:15 PM CDT   Return Visit with Ivan Vanessa MD   [facility])    [address] [place] [id number] [phone]              Who to contact     If you have questions or need follow up information about today's clinic visit or your schedule please contact [facility] MATERNAL FETAL MEDICINE [facility] directly at [phone].  Normal or non-critical lab and imaging results will be communicated to you by MyChart, letter or phone within 4 business days after the clinic has received the  results. If you do not hear from us within 7 days, please contact the clinic through [employer] or phone. If you have a critical or abnormal lab result, we will notify you by phone as soon as possible.  Submit refill requests through [employer] or call your pharmacy and they will forward the refill request to us. Please allow 3 business days for your refill to be completed.          Additional Information About Your Visit        [employer] Information     [employer] gives you secure access to your electronic health record. If you see a primary care provider, you can also send messages to your care team and make appointments. If you have questions, please call your primary care clinic.  If you do not have a primary care provider, please call [phone] and they will assist you.        Care EveryWhere ID     This is your Care EveryWhere ID. This could be used by other organizations to access your [place] medical records  [id number]        Your Vitals Were     Last Period                   08/09/2017 (Exact Date)            Blood Pressure from Last 3 Encounters:   08/25/17 114/84   07/28/17 110/70   07/11/17 118/72    Weight from Last 3 Encounters:   08/25/17 69.2 kg (152 lb 8 oz)   07/28/17 69.5 kg (153 lb 4 oz)   07/11/17 69.4 kg (153 lb)              We Performed the Following     [facility] Genetic Counseling        Primary Care Provider Office Phone # Fax #    Alanis Lundberg [phone]       [address] [facility] [address]  [facility] [address]        Equal Access to Services     JOAQUIM AYALA : Hadii poonam holguino Somayo, waaxda luqadaha, qaybta kaalmada alicia, anselmo freeman . So [facility] [phone].    ATENCIÓN: Si habla binañol, tiene a nova disposición servicios gratuitos de asistencia lingüística. Llame al [phone].    We comply with applicable federal civil rights laws and [place] laws. We do not discriminate on the basis of race, color, national origin, age, disability sex,  sexual orientation or gender identity.            Thank you!     Thank you for choosing [facility]  for your care. Our goal is always to provide you with excellent care. Hearing back from our patients is one way we can continue to improve our services. Please take a few minutes to complete the written survey that you may receive in the mail after your visit with us. Thank you!             Your Updated Medication List - Protect others around you: Learn how to safely use, store and throw away your medicines at www.disposemymeds.org.          This list is accurate as of: 9/12/17  3:55 PM.  Always use your most recent med list.                   Brand Name Dispense Instructions for use Diagnosis    [employer] PRENATAL GUMMY 0.4-113.5 MG Chew      Reported on 4/19/2017        order for DME     2 Units    Equipment being ordered: Compression stockings. Knee high 20-30    Localized edema

## 2017-09-12 NOTE — PROGRESS NOTES
Pt presents to [facility] for preconception consult due to hx of HELLP vs HUS in her last pregnancy. Pt met with GC. See separate note for discussion. Pt also met with Dr. Oropeza and Dr. Brooks re [facility] consult. See epic note for today's consult discussion and recommendations. Questions answered. No further follow up at [facility] at this time. Peewee stable. Lindsay Velasquez RN

## 2017-09-12 NOTE — PROGRESS NOTES
Maternal Fetal Medicine Consultation    Dear Dr. 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      HPI:  As you know, Ms. Cortez is a 32 year old  who presents for preconception counseling regarding her history of microangiopathic hemolytic anemia vs HELLP syndrome after her previous pregnancy.  Her prior pregnancy went to 39 weeks, at which time she presented in active labor.  She ultimately underwent  delivery for arrest of descent with non-reassuring fetal status.  On POD#6 she was readmitted with intractable nausea/vomiting and dizziness.  She was noted to have a hemoglobin of 4.4 and platelets of 25,000.  She was immediately admitted to the hospital and transfused and an urgent hematology consultation was requested.  Further workup showed an elevated LDH, creatinine elevated at 2.29, liver enzymes were elevated with ALT 62, , a peripheral smear showed schistocytes.  An RGSVKM64 level was drawn and was 46%.  She did undergo plasma exchange and dexamethasone administration with slow improvement.  She required treatment for hypertension at that time as well, reaching 120mg nifedipine daily.  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    Since that time, her clinical picture has entirely resolved.  Her acute kidney injury has resolved with her most recent creatinine being 0.87.    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    Assessment:  32 year old  for preconception counseling regarding history of TTP-HUS  vs HELLP syndrome in last pregnancy.    Plan  1.  History of TTP-HUS vs HELLP syndrome    After review of the patient's history, it does seem likely the patient had HELLP syndrome given her clinical scenario and non-diagnostic IJATOI28 level.  We discussed that a history of HELLP syndrome is correlated with a recurrence risk of anywhere from 5-20% in retrospective studies.  The recurrence risk for any type of preeclampsia in a subsequent pregnancy likely approaches 40%.  We discussed that in a subsequent pregnancy she should have baseline HELLP labs obtained at the beginning of pregnancy, and repeat them at least once per trimester, or more frequently with problems.  We also advised that she begin low dose aspirin at 12-13 weeks as this has been associated with a decreased recurrence risk of preeclampsia.  The patient has a home blood pressure monitor and I have encouraged her to take her blood pressure once weekly in pregnancy.  In the absence of any other signs or symptoms, prenatal care can otherwise likely continue as normal.  It would be reasonable to do one follow up growth scan at 28-32 weeks, but in the absence of hypertension, no  testing is necessary.    We discussed that if the patient's true diagnosis was aHUS, recurrence risk is truly unknown given the rarity of the condition.  We discussed that aspirin would be unlikely to provide a benefit against recurrence in a subsequent pregnancy if this was the case.     Summary of recommendations:  1.  Begin 81 mg aspirin daily at 12-13 weeks gestation  2.  Baseline HELLP labs with subsequent pregnancy to be repeated once per trimester  3.  Pt to monitor blood pressure at home weekly, and if normal, continue with routine prenatal care  4.  Consideration for a repeat ultrasound at 28-32 weeks due to the patient's history is reasonable  5.  Delivery by EDC.  Patient would like to consider a TOLAC and while reasonable, this will depend on the clinical  situation.    Thank you for the opportunity to see this patient in consultation.  A copy of this consult will be sent to your office.    I served as scribe for Dr. Jenny Brooks.    Marielso Oropeza MD  [facility] Fellow    Attestation:   The documentation recorded by the scribe accurately reflects the services I personally performed and the decisions made by me.   Jenny Brooks, DO  Maternal Fetal Medicine Specialist           The patient was seen for an established outpatient visit.  I spent a total of 30 minutes face to face with Queta Cortez during today's visit. Over 50% of this time was spent counseling the patient and/or coordinating care history of HELLP syndrome postpartum.

## 2017-09-29 ENCOUNTER — MYC MEDICAL ADVICE (OUTPATIENT)
Dept: OBGYN | Facility: OTHER | Age: 32
End: 2017-09-29

## 2017-09-29 NOTE — TELEPHONE ENCOUNTER
Queta Cortez is a 32 year old female who calls with UTI symptoms.    NURSING ASSESSMENT:  Description:  Patient has been experiencing burning with urination and urgency. Activity trying to become pregnant. Denies fevers, inability to urinate, blood in urine, back pain, foul odor.   Onset/duration:  2 days  Pain scale (0-10)   Burning   Last exam/Treatment:  09/12/2017  Allergies:   Allergies   Allergen Reactions     Hydralazine Shortness Of Breath, Anxiety, Other (See Comments), Swelling and Rash     40-50 minutes after IV administration had swelling to the arm where hydralazine was given, had swelling to lips, flushing to face, generalized anxiety which lead to shortness of breath. Symptoms resolved with dose of 25 mg IV benadryl.      NURSING PLAN: Nursing advice to patient to be seen today    RECOMMENDED DISPOSITION:  See in 24 hours - for burning urination sensation and urgency  Will comply with recommendation: Yes  If further questions/concerns or if symptoms do not improve, worsen or new symptoms develop, call your PCP or [facility] as soon as possible.    NOTES:  Disposition was determined by the first positive assessment question, therefore all previous assessment questions were negative    Guideline used:  Telephone Triage Protocols for Nurses, Fifth Edition, Nohelia Florez  Urination, Painful  Nursing Judgment    Brynn Durant, RN, BSN

## 2017-10-04 ENCOUNTER — OFFICE VISIT (OUTPATIENT)
Dept: PSYCHOLOGY | Facility: CLINIC | Age: 32
End: 2017-10-04
Payer: COMMERCIAL

## 2017-10-04 DIAGNOSIS — F43.10 PTSD (POST-TRAUMATIC STRESS DISORDER): ICD-10-CM

## 2017-10-04 DIAGNOSIS — F41.1 GAD (GENERALIZED ANXIETY DISORDER): Primary | ICD-10-CM

## 2017-10-04 DIAGNOSIS — F33.0 MILD EPISODE OF RECURRENT MAJOR DEPRESSIVE DISORDER (H): ICD-10-CM

## 2017-10-04 PROCEDURE — 90834 PSYTX W PT 45 MINUTES: CPT | Performed by: MARRIAGE & FAMILY THERAPIST

## 2017-10-04 ASSESSMENT — ANXIETY QUESTIONNAIRES
6. BECOMING EASILY ANNOYED OR IRRITABLE: SEVERAL DAYS
2. NOT BEING ABLE TO STOP OR CONTROL WORRYING: SEVERAL DAYS
5. BEING SO RESTLESS THAT IT IS HARD TO SIT STILL: SEVERAL DAYS
7. FEELING AFRAID AS IF SOMETHING AWFUL MIGHT HAPPEN: MORE THAN HALF THE DAYS
GAD7 TOTAL SCORE: 6
1. FEELING NERVOUS, ANXIOUS, OR ON EDGE: NOT AT ALL
3. WORRYING TOO MUCH ABOUT DIFFERENT THINGS: SEVERAL DAYS
IF YOU CHECKED OFF ANY PROBLEMS ON THIS QUESTIONNAIRE, HOW DIFFICULT HAVE THESE PROBLEMS MADE IT FOR YOU TO DO YOUR WORK, TAKE CARE OF THINGS AT HOME, OR GET ALONG WITH OTHER PEOPLE: SOMEWHAT DIFFICULT

## 2017-10-04 ASSESSMENT — PATIENT HEALTH QUESTIONNAIRE - PHQ9
SUM OF ALL RESPONSES TO PHQ QUESTIONS 1-9: 1
5. POOR APPETITE OR OVEREATING: NOT AT ALL

## 2017-10-04 NOTE — PROGRESS NOTES
Progress Note    Client Name: Queta Cortez  Date: 10/4/2017         Service Type: Individual      Session Start Time: 1pm Session End Time:1:45pm      Session Length: 45 minutes     Session #: 18     Attendees: Client attended alone      PHQ-9 / JOAO-7 : completed today  DATA      Progress Since Last Session (Related to Symptoms / Goals / Homework):  Symptoms: anxiety related to situational stressors decreasing  Homework: Achieved / completed to satisfaction      Episode of Care Goals: Satisfactory progress - ACTION (Actively working towards change); Intervened by reinforcing change plan / affirming steps taken     Current / Ongoing Stressors and Concerns:   medical, marital, financial     Treatment Objective(s) Addressed in This Session:   use at least 2-3 coping skills for anxiety management in the next 6 weeks       Intervention:   CBT, solution focused   Client felt OB/GYN and genetic counselor appointments went well, and felt supported in trying to have a second child. Client has been working on trying to focus on living one day at a time, and being happy with present life circumstances. Processed feelings that at times get in the way of being happy in the present. Suggested client consider reading the book [place] My Actual Life to support this mindset change. Client has been using ground techniques as need which she has felt decreases her overall anxiety. She and  have been communicating better and asking for time to themselves when needed.     ASSESSMENT: Current Emotional / Mental Status (status of significant symptoms):   Risk status (Self / Other harm or suicidal ideation)   Client denies current fears or concerns for personal safety.   Client denies current or recent suicidal ideation or behaviors.   Client denies current or recent homicidal ideation or behaviors.   Client denies current or recent self injurious behavior or ideation.   Client denies  other safety concerns.   A safety and risk management plan has not been developed at this time, however client was given the after-hours number / 911 should there be a change in any of these risk factors.     Appearance:   Appropriate    Eye Contact:   Good    Psychomotor Behavior: Normal    Attitude:   Cooperative    Orientation:   All   Speech    Rate / Production: Normal     Volume:  Normal    Mood:    Anxious    Affect:    Appropriate    Thought Content:  Clear    Thought Form:  Coherent  Logical    Insight:    Fair      Medication Review:   No current psychiatric medications prescribed   Current Outpatient Prescriptions   Medication     order for DME     Prenatal Vit-Min-FA-Fish Oil ([employer] PRENATAL GUMMY) 0.4-113.5 MG CHEW     No current facility-administered medications for this visit.           Medication Compliance:   Yes   Changes in Health Issues:   None reported     Chemical Use Review:   Substance Use: Chemical use reviewed, no active concerns identified      Tobacco Use: No current tobacco use.       Collateral Reports Completed:   Not Applicable    PLAN: (Client Tasks / Therapist Tasks / Other)   Encouraged client to continue to talk with  about improving connection, reducing conflict, and spending quality time together. Encouraged client to notice when anxiety is creeping up and use skills when first noticing anxiety. Suggested client consider reading the book [employer] My Actual Life and continue to work on choosing [place]mar and staying in the present rather than getting ahead of herself.               Bella Styles                                                    Treatment Plan    Client's Name: Queta Cortez  YOB: 1985    Date: 1/5/2017      Diagnoses: 296.31 Major Depressive Disorder, Recurrent Episode, Mild With peripartum onset  300.02 (F41.1) Generalized Anxiety Disorder  309.81 (F43.10) Posttraumatic Stress Disorder (includes Posttraumatic Stress Disorder for Children 6  Years and Younger)  Without dissociative symptoms    Medical dx: HELLP syndrome, HUS, recent birth of son  Psychosocial & Contextual Factors: medical, recent birth of first child, marital, financial, occupational  WHODAS 2.0 (12 item) 17    Referral / Collaboration:  Collaboration was initiated with PCP MD.    Anticipated number of session or this episode of care: 16-20      MeasurableTreatment Goal(s) related to diagnosis / functional impairment(s)  Goal 1: Client will decrease depressed mood and anxiety as evidenced by PHQ9 and GAD7 scores 4 and Under within the next 9 months. Initial scorin2016:  GAD7:16,  PHQ9: 9. 2/15/2017: :  GAD7:7,  PHQ9: 8, 2017 :  GAD7:9,  PHQ9: 6    I will know I've met my goal when I'm better able to manage my anxiety.      Objective #A (Client Action)   Continued - Date(s):10/4/2017  Client will identify 1-2 initial signs or symptoms of anxiety and utilize anxiety reduction techniques daily to decrease anxiety over the next month.Current Baseline is sporadic use. Client will ID triggers for depression and anxiety and work towards decreasing reactivity to or eliminating stressor if possible. client will develop more effective coping skills to manage depression and anxiety symptoms, will develop healthy cognitive patterns and beliefs, will increase ability to function adaptively and will continue to take medications as prescribed / participate in supportive activities. Client will improve communication with others and be able to work through anxiety rather than shutting down. 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 Client will process grief related to medical issues and loss of niece.  Intervention(s)   Therapist will teach client how to ID body cues for anxiety, anxiety reduction techniques, how to ID triggers for depression and anxiety- decrease reactivity/eliminate, lifestyle changes to reduce depression and anxiety,  communication skills, explore cognitive beliefs and help client to develop healthy cognitive patterns and beliefs.  Client has reviewed and agreed to the above plan.    Bella Styles  January 5, 2017

## 2017-10-04 NOTE — MR AVS SNAPSHOT
MRN:[id number]                      After Visit Summary   10/4/2017    Queta Cortez    MRN: [id number]           Visit Information        Provider Department      10/4/2017 1:00 PM Bella Harrington [facility] Generic      Your next 10 appointments already scheduled     Oct 18, 2017  2:30 PM CDT   LAB with LAB ONC [facility] ([facility])    [address] [address] [place] [id number] [phone]           Patient must bring picture ID. Patient should be prepared to give a urine specimen  Please do not eat 10-12 hours before your appointment if you are coming in fasting for labs on lipids, cholesterol, or glucose (sugar). Pregnant women should follow their Care Team instructions. Water with medications is okay. Do not drink coffee or other fluids. If you have concerns about taking  your medications, please ask at office or if scheduling via [employer], send a message by clicking on Secure Messaging, Message Your Care Team.            Oct 18, 2017  3:15 PM CDT   Return Visit with Ivan Vanessa MD   [facility] ([facility])    [address] [place] [address] [phone]            Dec 06, 2017  1:00 PM CST   Return Visit with Bella Harrington   [facility] ([facility])    [address] [facility] [address]  [facility] [id number] [phone]              MyChart Information     [employer] gives you secure access to your electronic health record. If you see a primary care provider, you can also send messages to your care team and make appointments. If you have questions, please call your primary care clinic.  If you do not have a primary care provider, please call [phone] and they will assist you.        Care EveryWhere ID     This is your Care EveryWhere ID. This could be used by other organizations to access your [facility]  records  [id number]        Equal Access to Services     [facility] JAMIE : Brett Masters, kathy scott, omar vargas, anselmo colón. So [facility] [phone].    ATENCIÓN: Si habla español, tiene a nova disposición servicios gratuitos de asistencia lingüística. Llame al [phone].    We comply with applicable federal civil rights laws and [place] laws. We do not discriminate on the basis of race, color, national origin, age, disability, sex, sexual orientation, or gender identity.

## 2017-10-05 ASSESSMENT — ANXIETY QUESTIONNAIRES: GAD7 TOTAL SCORE: 6

## 2017-10-18 ENCOUNTER — ONCOLOGY VISIT (OUTPATIENT)
Dept: ONCOLOGY | Facility: CLINIC | Age: 32
End: 2017-10-18
Payer: COMMERCIAL

## 2017-10-18 VITALS
HEART RATE: 66 BPM | HEIGHT: 70 IN | DIASTOLIC BLOOD PRESSURE: 82 MMHG | BODY MASS INDEX: 21.76 KG/M2 | OXYGEN SATURATION: 100 % | TEMPERATURE: 98.9 F | RESPIRATION RATE: 18 BRPM | WEIGHT: 152 LBS | SYSTOLIC BLOOD PRESSURE: 129 MMHG

## 2017-10-18 DIAGNOSIS — D59.4 MICROANGIOPATHIC HEMOLYTIC ANEMIA (H): Primary | ICD-10-CM

## 2017-10-18 DIAGNOSIS — D59.4 MICROANGIOPATHIC HEMOLYTIC ANEMIA (H): ICD-10-CM

## 2017-10-18 LAB
ALBUMIN SERPL-MCNC: 3.9 G/DL (ref 3.4–5)
ALP SERPL-CCNC: 47 U/L (ref 40–150)
ALT SERPL W P-5'-P-CCNC: 26 U/L (ref 0–50)
ANION GAP SERPL CALCULATED.3IONS-SCNC: 5 MMOL/L (ref 3–14)
AST SERPL W P-5'-P-CCNC: 14 U/L (ref 0–45)
BASOPHILS # BLD AUTO: 0 10E9/L (ref 0–0.2)
BASOPHILS NFR BLD AUTO: 0.6 %
BILIRUB SERPL-MCNC: 0.3 MG/DL (ref 0.2–1.3)
BUN SERPL-MCNC: 12 MG/DL (ref 7–30)
CALCIUM SERPL-MCNC: 8.8 MG/DL (ref 8.5–10.1)
CHLORIDE SERPL-SCNC: 107 MMOL/L (ref 94–109)
CO2 SERPL-SCNC: 28 MMOL/L (ref 20–32)
CREAT SERPL-MCNC: 0.81 MG/DL (ref 0.52–1.04)
DIFFERENTIAL METHOD BLD: NORMAL
EOSINOPHIL # BLD AUTO: 0.1 10E9/L (ref 0–0.7)
EOSINOPHIL NFR BLD AUTO: 1.4 %
ERYTHROCYTE [DISTWIDTH] IN BLOOD BY AUTOMATED COUNT: 12 % (ref 10–15)
GFR SERPL CREATININE-BSD FRML MDRD: 83 ML/MIN/1.7M2
GLUCOSE SERPL-MCNC: 91 MG/DL (ref 70–99)
HCT VFR BLD AUTO: 38.4 % (ref 35–47)
HGB BLD-MCNC: 12.9 G/DL (ref 11.7–15.7)
LDH SERPL L TO P-CCNC: 168 U/L (ref 81–234)
LYMPHOCYTES # BLD AUTO: 1.6 10E9/L (ref 0.8–5.3)
LYMPHOCYTES NFR BLD AUTO: 24.8 %
MCH RBC QN AUTO: 31.5 PG (ref 26.5–33)
MCHC RBC AUTO-ENTMCNC: 33.6 G/DL (ref 31.5–36.5)
MCV RBC AUTO: 94 FL (ref 78–100)
MONOCYTES # BLD AUTO: 0.4 10E9/L (ref 0–1.3)
MONOCYTES NFR BLD AUTO: 5.7 %
NEUTROPHILS # BLD AUTO: 4.2 10E9/L (ref 1.6–8.3)
NEUTROPHILS NFR BLD AUTO: 67.5 %
PLATELET # BLD AUTO: 256 10E9/L (ref 150–450)
POTASSIUM SERPL-SCNC: 3.8 MMOL/L (ref 3.4–5.3)
PROT SERPL-MCNC: 7.7 G/DL (ref 6.8–8.8)
RBC # BLD AUTO: 4.09 10E12/L (ref 3.8–5.2)
RETICS # AUTO: 62.2 10E9/L (ref 25–95)
RETICS/RBC NFR AUTO: 1.5 % (ref 0.5–2)
SODIUM SERPL-SCNC: 140 MMOL/L (ref 133–144)
WBC # BLD AUTO: 6.3 10E9/L (ref 4–11)

## 2017-10-18 PROCEDURE — 85025 COMPLETE CBC W/AUTO DIFF WBC: CPT | Performed by: INTERNAL MEDICINE

## 2017-10-18 PROCEDURE — 83615 LACTATE (LD) (LDH) ENZYME: CPT | Performed by: INTERNAL MEDICINE

## 2017-10-18 PROCEDURE — 36415 COLL VENOUS BLD VENIPUNCTURE: CPT | Performed by: INTERNAL MEDICINE

## 2017-10-18 PROCEDURE — 80053 COMPREHEN METABOLIC PANEL: CPT | Performed by: INTERNAL MEDICINE

## 2017-10-18 PROCEDURE — 99214 OFFICE O/P EST MOD 30 MIN: CPT | Performed by: INTERNAL MEDICINE

## 2017-10-18 PROCEDURE — 85045 AUTOMATED RETICULOCYTE COUNT: CPT | Performed by: INTERNAL MEDICINE

## 2017-10-18 ASSESSMENT — PAIN SCALES - GENERAL: PAINLEVEL: NO PAIN (0)

## 2017-10-18 NOTE — NURSING NOTE
"Oncology Rooming Note    October 18, 2017 3:01 PM   Queta Cortez is a 32 year old female who presents for:    Chief Complaint   Patient presents with     Oncology Clinic Visit     3 mo f/u     Initial Vitals: There were no vitals taken for this visit. Estimated body mass index is 21.69 kg/(m^2) as calculated from the following:    Height as of 4/19/17: 1.786 m (5' 10.32\").    Weight as of 8/25/17: 69.2 kg (152 lb 8 oz). There is no height or weight on file to calculate BSA.  Data Unavailable Comment: Data Unavailable   No LMP recorded.  Allergies reviewed: Yes  Medications reviewed: Yes    Medications: Medication refills not needed today.  Pharmacy name entered into EPIC:    [id number] - [place], [place] - [address] [facility]  [facility] [place] [address] [facility]    Clinical concerns:     8 minutes for nursing intake (face to face time)     SHANA DIA LPN              "

## 2017-10-18 NOTE — PROGRESS NOTES
10/18/2017        CHIEF COMPLAINT:  Followup for microangiopathic hemolytic anemia.      HISTORY OF PRESENT ILLNESS:  Please refer to my note from 11/15/2016 for more details.      Copied and updated from prior  Briefly, Queta is a very pleasant, previously healthy 31-year-old female who was recently pregnant with her first child when at 39 weeks of gestation she went into labor but due to failure to descend she had an uncomplicated  on 10/29/2016.  She was discharged on postoperative day #3, but a couple of days later started experiencing profound nausea and vomiting with dizziness and weakness and decreased appetite.  At that point, she was found to have marked anemia and thrombocytopenia with a hemoglobin of 4.4 and platelet count of 20.  There was schistocytes seen on peripheral blood smear and LDH was elevated and she also found to have acute kidney injury.  At that point she was admitted to the [facility] for the possibility of TTP versus HELLP syndrome.  Initially she was started on plasma exchange as well as dexamethasone 10 mg twice a day, but her MWSDKO49 returned to be 45%, making TTP unlikely.  As the transaminitis was not very severe the diagnosis of HELLP syndrome was entertained, but thought relatively unlikely.  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  Eventually plasma exchange was continued for 7 days.  She had resolution of thrombocytopenia, anemia improved, renal insufficiency also improved.  She did have significant proteinuria which also was improving at the time of the discharge.  The genetic testing for atypical HUS was sent prior to the discharge.      She also developed uncontrolled hypertension during that admission for which she was started on Coreg and nifedipine.    On 16 she was evaluated by primary care doctor as she was complaining of dyspnea on exertion. Workup for PE and post partum cardiomyopathy was unremarkable.  "She recovered with time  Since she was slowly getting better and her blood work also continued to improve, we opted for a wait and watch approach.   She did well after that and her blood work normalized and she recovered and was feeling normal. She was weaned off antihypertensive medications.     INTERVAL HISTORY:   She comes in for follow-up today and tells me that she has been feeling really evaluated decent energy. Denies any new pain or new swellings. No bleeding. No infections. No GI problems. No respiratory problems. She and her  are planning to have a second child and she has met with maternal fetal medicine for risk assessment for subsequent pregnancy and she found that visit to really helpful.     ROS:  A comprehensive ROS was otherwise neg        MEDICATIONS:  Prenatal vitamins       PHYSICAL EXAMINATION:   VITAL SIGNS:  /82  Pulse 66  Temp 98.9  F (37.2  C) (Oral)  Resp 18  Ht 1.786 m (5' 10.32\")  Wt 68.9 kg (152 lb)  LMP 10/17/2017  SpO2 100%  BMI 21.61 kg/m2  CONSTITUTIONAL: No apparent distress  EYES: PERRLA, without pallor or jaundice  ENT/MOUTH: Ears unremarkable. No oral lesions  CVS: s1s2 normal  RESPIRATORY: Chest is clear  GI: Abdomen is benign  NEURO: He is alert and oriented ×3  INTEGUMENT: no concerning his skin rashes   LYMPHATIC: no palpable lymphadenopathy  MUSCULOSKELETAL: Unremarkable. No bony tenderness.   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  It is a known pathogenic mutation in homozygous state but she is heterozygous        ASSESSMENT AND PLAN:     1.  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  Her WJMWAR15 was 46% on 11/04 and 72% on 11/10/2016, making TTP unlikely. Her " genetic testing came back revealing she is heterozygous for [id number] in exon 24 of ELTDFE39. It is a known pathogenic mutation in homozygous state but she is heterozygous for it.  It is hard to label her as congenital TTP since she never had low XFAIAX10.  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   At this time she is doing well with normalization of her blood work as well as symptomatically she is feeling normal.  We will continue to observe for now with repeat blood work in 6 months.  Her acute kidney injury as well as hypertension has resolved.      Since she is contemplating another pregnancy she has met with maternal fetal medicine for risk assessment and found that visit helpful.  Since her overall picture was more consistent with postpartum HELLP/eclampsia, she was quoted her chance of recurrence from anywhere between 5-20% with subsequent pregnancy. The risk of preeclampsia was quoted to approach 40%.  Although her clinical picture was less likely to be atypical HUS, it is hard to predict the actual chance of recurrence in future pregnancies for patients who had developed atypical HUS in the past. She understands this well.    The recommendation from [facility] was to do baseline labs for HELLP syndrome at the beginning of pregnancy and then monitor her labs frequently  She should be started on low-dose aspirin at 12-13 weeks of gestation.  She should have close monitoring for blood pressure during pregnancy.  They also recommended a follow-up growth scan at 28-32 weeks of gestation  All of these recommendations were also sent to her OB physician    I would like to see her back in 6 months    I answered all of her questions to her satisfaction and she is agreeable and comfortable with the plan    Ivan Vanessa

## 2017-10-18 NOTE — MR AVS SNAPSHOT
After Visit Summary   10/18/2017    Queta Cortez    MRN: [id number]           Patient Information     Date Of Birth          1985        Visit Information        Provider Department      10/18/2017 3:15 PM Ivan Vanessa MD [facility]        Today's Diagnoses     Microangiopathic hemolytic anemia (H)    -  1      Care Instructions    Labs in 6 months and see me back then              Follow-ups after your visit        Your next 10 appointments already scheduled     Dec 06, 2017  1:00 PM CST   Return Visit with Bella Harrington   [facility] ([facility])    [address]  [facility] [address]   [phone]            Apr 18, 2018  1:30 PM CDT   LAB with LAB ONC [facility] ([facility])    [address] [place] [id number] [phone]           Patient must bring picture ID. Patient should be prepared to give a urine specimen  Please do not eat 10-12 hours before your appointment if you are coming in fasting for labs on lipids, cholesterol, or glucose (sugar). Pregnant women should follow their Care Team instructions. Water with medications is okay. Do not drink coffee or other fluids. If you have concerns about taking  your medications, please ask at office or if scheduling via [employer], send a message by clicking on Secure Messaging, Message Your Care Team.            Apr 18, 2018  2:15 PM CDT   Return Visit with Ivan Vanessa MD   [facility])    [address] [address] [place] [id number] [phone]              Future tests that were ordered for you today     Open Future Orders        Priority Expected Expires Ordered    *CBC with platelets differential Routine 4/18/2018 10/18/2018 10/18/2017    Reticulocyte count Routine 4/18/2018 10/18/2018 10/18/2017    Lactate Dehydrogenase Routine 4/18/2018 10/18/2018 10/18/2017     "Comprehensive metabolic panel Routine 4/18/2018 10/18/2018 10/18/2017            Who to contact     If you have questions or need follow up information about today's clinic visit or your schedule please contact [facility] directly at [phone].  Normal or non-critical lab and imaging results will be communicated to you by Snapettehart, letter or phone within 4 business days after the clinic has received the results. If you do not hear from us within 7 days, please contact the clinic through Snapettehart or phone. If you have a critical or abnormal lab result, we will notify you by phone as soon as possible.  Submit refill requests through [employer] or call your pharmacy and they will forward the refill request to us. Please allow 3 business days for your refill to be completed.          Additional Information About Your Visit        Snapettehart Information     [employer] gives you secure access to your electronic health record. If you see a primary care provider, you can also send messages to your care team and make appointments. If you have questions, please call your primary care clinic.  If you do not have a primary care provider, please call [phone] and they will assist you.      [employer] is an electronic gateway that provides easy, online access to your medical records. With [employer], you can request a clinic appointment, read your test results, renew a prescription or communicate with your care team.     To access your existing account, please contact your [facility] or call [phone] for assistance.        Care EveryWhere ID     This is your Care EveryWhere ID. This could be used by other organizations to access your [place] medical records  [id number]        Your Vitals Were     Pulse Temperature Respirations Height Last Period Pulse Oximetry    66 98.9  F (37.2  C) (Oral) 18 1.786 m (5' 10.32\") 10/17/2017 100%    BMI (Body Mass Index)                   21.61 kg/m2   "          Blood Pressure from Last 3 Encounters:   10/18/17 129/82   08/25/17 114/84   07/28/17 110/70    Weight from Last 3 Encounters:   10/18/17 68.9 kg (152 lb)   08/25/17 69.2 kg (152 lb 8 oz)   07/28/17 69.5 kg (153 lb 4 oz)               Primary Care Provider Office Phone # Fax #    Alanis Lundberg [phone]       [address] [facility] [address]  [facility] [address]        Equal Access to Services     [facility]: Hadii aad ku hadasho Soomaali, waaxda luqadaha, qaybta kaalmada adeegyada, anselmo freeman . So [facility] [phone].    ATENCIÓN: Si habla español, tiene a nova disposición servicios gratuitos de asistencia lingüística. [facility] [phone].    We comply with applicable federal civil rights laws and [place] laws. We do not discriminate on the basis of race, color, national origin, age, disability, sex, sexual orientation, or gender identity.            Thank you!     Thank you for choosing [facility]  for your care. Our goal is always to provide you with excellent care. Hearing back from our patients is one way we can continue to improve our services. Please take a few minutes to complete the written survey that you may receive in the mail after your visit with us. Thank you!             Your Updated Medication List - Protect others around you: Learn how to safely use, store and throw away your medicines at www.disposemymeds.org.          This list is accurate as of: 10/18/17  3:23 PM.  Always use your most recent med list.                   Brand Name Dispense Instructions for use Diagnosis    [employer] PRENATAL GUMMY 0.4-113.5 MG Chew      Reported on 4/19/2017        order for DME     2 Units    Equipment being ordered: Compression stockings. Knee high 20-30    Localized edema

## 2017-11-03 ENCOUNTER — ALLIED HEALTH/NURSE VISIT (OUTPATIENT)
Dept: FAMILY MEDICINE | Facility: OTHER | Age: 32
End: 2017-11-03
Payer: COMMERCIAL

## 2017-11-03 DIAGNOSIS — Z23 NEED FOR PROPHYLACTIC VACCINATION AND INOCULATION AGAINST INFLUENZA: Primary | ICD-10-CM

## 2017-11-03 PROCEDURE — 90471 IMMUNIZATION ADMIN: CPT

## 2017-11-03 PROCEDURE — 90686 IIV4 VACC NO PRSV 0.5 ML IM: CPT

## 2017-11-03 PROCEDURE — 99207 ZZC NO CHARGE NURSE ONLY: CPT

## 2017-11-03 NOTE — MR AVS SNAPSHOT
After Visit Summary   11/3/2017    Queta Cortez    MRN: [id number]           Patient Information     Date Of Birth          1985        Visit Information        Provider Department      11/3/2017 4:00 PM NL FLU SHOT [facility]C [facility]        Today's Diagnoses     Need for prophylactic vaccination and inoculation against influenza    -  1       Follow-ups after your visit        Your next 10 appointments already scheduled     Dec 06, 2017  1:00 PM CST   Return Visit with Bella Harrington   [facility])    [address]  [facility] [address] [phone]            Apr 18, 2018  1:30 PM CDT   [email or web address]    [address] [place] [address] [phone]           Please do not eat 10-12 hours before your appointment if you are coming in fasting for labs on lipids, cholesterol, or glucose (sugar). This does not apply to pregnant women. Water, hot tea and black coffee (with nothing added) are okay. Do not drink other fluids, diet soda or chew gum.            Apr 18, 2018  2:15 PM CDT   Return Visit with Ivan Vanessa MD   [email or web address] ([facility] [[email or web address]    [address] [place] [address] [phone]              Who to contact     If you have questions or need follow up information about today's clinic visit or your schedule please contact [facility] directly at [phone].  Normal or non-critical lab and imaging results will be communicated to you by MyChart, letter or phone within 4 business days after the clinic has received the results. If you do not hear from us within 7 days, please contact the clinic through MyChart or phone. If you have a critical or abnormal lab result, we will notify you by phone as soon as possible.  Submit refill requests through [employer] or call your  pharmacy and they will forward the refill request to us. Please allow 3 business days for your refill to be completed.          Additional Information About Your Visit        MyChart Information     [employer] gives you secure access to your electronic health record. If you see a primary care provider, you can also send messages to your care team and make appointments. If you have questions, please call your primary care clinic.  If you do not have a primary care provider, please call [phone] and they will assist you.        Care EveryWhere ID     This is your Care EveryWhere ID. This could be used by other organizations to access your [place] medical records  [id number]        Your Vitals Were     Last Period                   10/17/2017            Blood Pressure from Last 3 Encounters:   10/18/17 129/82   08/25/17 114/84   07/28/17 110/70    Weight from Last 3 Encounters:   10/18/17 152 lb (68.9 kg)   08/25/17 152 lb 8 oz (69.2 kg)   07/28/17 153 lb 4 oz (69.5 kg)              We Performed the Following     FLU VAC, SPLIT VIRUS IM > 3 YO (QUADRIVALENT) [10704]     Vaccine Administration, Initial [89546]        Primary Care Provider Office Phone # Fax #    Alanis Lundberg [phone]       [address] [place] [address]        Equal Access to Services     [facility] JAMIE : Hadii aad ku hadasho Somayo, waaxda luqadaha, qaybta kaalmada adeegyada, anselmo freeman . So [facility] [phone].    ATENCIÓN: Si habla español, tiene a nova disposición servicios gratuitos de asistencia lingüística. Llame al [phone].    We comply with applicable federal civil rights laws and [place] laws. We do not discriminate on the basis of race, color, national origin, age, disability, sex, sexual orientation, or gender identity.            Thank you!     Thank you for choosing [facility]  for your care. Our goal is always to provide you with excellent care.  Hearing back from our patients is one way we can continue to improve our services. Please take a few minutes to complete the written survey that you may receive in the mail after your visit with us. Thank you!             Your Updated Medication List - Protect others around you: Learn how to safely use, store and throw away your medicines at www.disposemymeds.org.          This list is accurate as of: 11/3/17  5:11 PM.  Always use your most recent med list.                   Brand Name Dispense Instructions for use Diagnosis    CVS PRENATAL GUMMY 0.4-113.5 MG Chew      Reported on 4/19/2017        order for DME     2 Units    Equipment being ordered: Compression stockings. Knee high 20-30    Localized edema

## 2017-11-03 NOTE — NURSING NOTE
Injectable Influenza Immunization Documentation      1.  Is the person to be vaccinated sick today?  No    2. Does the person to be vaccinated have an allergy to eggs or to a component of the vaccine?   No      3. Has the person to be vaccinated today ever had a serious reaction to influenza vaccine in the past?  No      4. Has the person to be vaccinated ever had Guillain-[place] syndrome?  No    Prior to injection verified patient identity using patient's name and date of birth.    Patient instructed to wait 20 minutes and report any reactions such as shortness of breath, swelling, itching to medical staff.     Form completed by David Cisneros MA

## 2017-12-06 ENCOUNTER — OFFICE VISIT (OUTPATIENT)
Dept: PSYCHOLOGY | Facility: CLINIC | Age: 32
End: 2017-12-06
Payer: COMMERCIAL

## 2017-12-06 DIAGNOSIS — F33.0 MILD EPISODE OF RECURRENT MAJOR DEPRESSIVE DISORDER (H): ICD-10-CM

## 2017-12-06 DIAGNOSIS — F41.1 GAD (GENERALIZED ANXIETY DISORDER): Primary | ICD-10-CM

## 2017-12-06 DIAGNOSIS — F43.10 PTSD (POST-TRAUMATIC STRESS DISORDER): ICD-10-CM

## 2017-12-06 PROCEDURE — 90834 PSYTX W PT 45 MINUTES: CPT | Performed by: MARRIAGE & FAMILY THERAPIST

## 2017-12-06 NOTE — MR AVS SNAPSHOT
MRN:[id number]                      After Visit Summary   12/6/2017    Queta Cortez    MRN: [id number]           Visit Information        Provider Department      12/6/2017 1:00 PM Bella Harrington [facility] Generic      Your next 10 appointments already scheduled     Feb 07, 2018  1:00 PM CST   Return Visit with Bella Harrington   [facility] ([facility])    [address]  [facility] [address] [phone]            Apr 18, 2018  1:30 PM CDT   LAB with LAB ONC [facility] ([facility])    [address] [place] [address] [phone]           Please do not eat 10-12 hours before your appointment if you are coming in fasting for labs on lipids, cholesterol, or glucose (sugar). This does not apply to pregnant women. Water, hot tea and black coffee (with nothing added) are okay. Do not drink other fluids, diet soda or chew gum.            Apr 18, 2018  2:15 PM CDT   Return Visit with Ivan Vanessa MD   [facility] ([facility])    [address] [place] [address] [phone]              MyChart Information     [employer] gives you secure access to your electronic health record. If you see a primary care provider, you can also send messages to your care team and make appointments. If you have questions, please call your primary care clinic.  If you do not have a primary care provider, please call [phone] and they will assist you.        Care EveryWhere ID     This is your Care EveryWhere ID. This could be used by other organizations to access your [place] medical records  [id number]        Equal Access to Services     JOAQUIM AYALA : Brett Masters, kathy scott, omar vargas, anselmo colón. [facility] [phone].    ATENCIÓN: Si neville espkenneth,  tiene a nova disposición servicios gratuitos de asistencia lingüística. Llame al [phone].    We comply with applicable federal civil rights laws and [place] laws. We do not discriminate on the basis of race, color, national origin, age, disability, sex, sexual orientation, or gender identity.

## 2017-12-06 NOTE — PROGRESS NOTES
Progress Note    Client Name: Queta Cortez  Date: 12/6/2017         Service Type: Individual      Session Start Time: 1pm Session End Time:1:45pm      Session Length: 45 minutes     Session #: 19     Attendees: Client attended alone      PHQ-9 / JOAO-7 : declined  DATA      Progress Since Last Session (Related to Symptoms / Goals / Homework):  Symptoms: anxiety related to situational stressors   Homework: Achieved / completed to satisfaction      Episode of Care Goals: Satisfactory progress - ACTION (Actively working towards change); Intervened by reinforcing change plan / affirming steps taken     Current / Ongoing Stressors and Concerns:   medical, marital, financial     Treatment Objective(s) Addressed in This Session:   use at least 2-3 coping skills for anxiety management in the next 9 weeks  Identify negative self-talk and behaviors: challenge core beliefs, myths, and actions       Intervention:   CBT, solution focused   Client reports some frustrations with period irregularities and not being pregnant again yet. She feels this may be related to some recent stress with sister in law as well as arguing with . Client reports that sister in law has been on edge lately and recently suggested that she may be considering putting her kids in  rather than client watching them due to a disagreement about parenting. Processed frustration. 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 Discussed on going dis chord in marriage. Discussed that when there is conflict client often feels deep depression, and when resolved seems very happy. Feels she is always waiting for the other shoe to drop. Encouraged client to consider reading the following books: [place] My Actual Life, Present Over Perfect, and Rising Strong. Encouraged client to create a self affirmation to use 30 times a day to decrease  negative self talk and refute negative thoughts.     ASSESSMENT: Current Emotional / Mental Status (status of significant symptoms):   Risk status (Self / Other harm or suicidal ideation)   Client denies current fears or concerns for personal safety.   Client denies current or recent suicidal ideation or behaviors.   Client denies current or recent homicidal ideation or behaviors.   Client denies current or recent self injurious behavior or ideation.   Client denies other safety concerns.   A safety and risk management plan has not been developed at this time, however client was given the after-hours number / 911 should there be a change in any of these risk factors.     Appearance:   Appropriate    Eye Contact:   Good    Psychomotor Behavior: Normal    Attitude:   Cooperative    Orientation:   All   Speech    Rate / Production: Normal     Volume:  Normal    Mood:    Anxious    Affect:    Appropriate    Thought Content:  Clear    Thought Form:  Coherent  Logical    Insight:    Fair      Medication Review:   No current psychiatric medications prescribed   Current Outpatient Prescriptions   Medication     order for DME     Prenatal Vit-Min-FA-Fish Oil ([facility] PRENATAL GUMMY) 0.4-113.5 MG CHEW     No current facility-administered medications for this visit.           Medication Compliance:   Yes   Changes in Health Issues:   None reported     Chemical Use Review:   Substance Use: Chemical use reviewed, no active concerns identified      Tobacco Use: No current tobacco use.       Collateral Reports Completed:   Not Applicable    PLAN: (Client Tasks / Therapist Tasks / Other)   Encouraged client to continue to talk with  about improving connection, reducing conflict, and spending quality time together. Encouraged client to consider reading the following books: [place] My Actual Life, Present Over Perfect, and Rising Strong. Encouraged client to create a self affirmation to use 30 times a day to decrease negative self  talk and refute negative thoughts.                 Bella Styles                                                    Treatment Plan    Client's Name: Queta Cortez  YOB: 1985    Date: 2017      Diagnoses: 296.31 Major Depressive Disorder, Recurrent Episode, Mild With peripartum onset  300.02 (F41.1) Generalized Anxiety Disorder  309.81 (F43.10) Posttraumatic Stress Disorder (includes Posttraumatic Stress Disorder for Children 6 Years and Younger)  Without dissociative symptoms    Medical dx: HELLP syndrome, HUS, recent birth of son  Psychosocial & Contextual Factors: medical, recent birth of first child, marital, financial, occupational  WHODAS 2.0 (12 item) 17    Referral / Collaboration:  Collaboration was initiated with PCP MD.    Anticipated number of session or this episode of care: 16-20      MeasurableTreatment Goal(s) related to diagnosis / functional impairment(s)  Goal 1: Client will decrease depressed mood and anxiety as evidenced by PHQ9 and GAD7 scores 4 and Under within the next 9 months. Initial scorin2016:  GAD7:16,  PHQ9: 9. 2/15/2017: :  GAD7:7,  PHQ9: 8, 2017 :  GAD7:9,  PHQ9: 6    I will know I've met my goal when I'm better able to manage my anxiety.      Objective #A (Client Action)   Continued - Date(s):2017  Client will identify 1-2 initial signs or symptoms of anxiety and utilize anxiety reduction techniques daily to decrease anxiety over the next month.Current Baseline is sporadic use. Client will ID triggers for depression and anxiety and work towards decreasing reactivity to or eliminating stressor if possible. client will develop more effective coping skills to manage depression and anxiety symptoms, will develop healthy cognitive patterns and beliefs, will increase ability to function adaptively and will continue to take medications as prescribed / participate in supportive activities. Client will improve communication with others and be able to  work through anxiety rather than shutting down. 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 Client will process grief related to medical issues and loss of niece.  Intervention(s)   Therapist will teach client how to ID body cues for anxiety, anxiety reduction techniques, how to ID triggers for depression and anxiety- decrease reactivity/eliminate, lifestyle changes to reduce depression and anxiety, communication skills, explore cognitive beliefs and help client to develop healthy cognitive patterns and beliefs.  Client has reviewed and agreed to the above plan.    Bella Styles  January 5, 2017

## 2018-03-26 ENCOUNTER — OFFICE VISIT (OUTPATIENT)
Dept: OBGYN | Facility: OTHER | Age: 33
End: 2018-03-26
Payer: COMMERCIAL

## 2018-03-26 VITALS
SYSTOLIC BLOOD PRESSURE: 124 MMHG | DIASTOLIC BLOOD PRESSURE: 80 MMHG | HEART RATE: 90 BPM | WEIGHT: 152 LBS | BODY MASS INDEX: 21.61 KG/M2

## 2018-03-26 DIAGNOSIS — N92.6 IRREGULAR MENSTRUAL CYCLE: ICD-10-CM

## 2018-03-26 DIAGNOSIS — E03.9 HYPOTHYROIDISM, UNSPECIFIED TYPE: ICD-10-CM

## 2018-03-26 DIAGNOSIS — R10.2 PELVIC PAIN IN FEMALE: Primary | ICD-10-CM

## 2018-03-26 LAB
ESTRADIOL SERPL-MCNC: 83 PG/ML
FSH SERPL-ACNC: 3.5 IU/L
LH SERPL-ACNC: 5.7 IU/L
PROGEST SERPL-MCNC: 0.5 NG/ML
PROLACTIN SERPL-MCNC: 7 UG/L (ref 3–27)
TSH SERPL DL<=0.005 MIU/L-ACNC: 7.56 MU/L (ref 0.4–4)

## 2018-03-26 PROCEDURE — 84270 ASSAY OF SEX HORMONE GLOBUL: CPT | Performed by: OBSTETRICS & GYNECOLOGY

## 2018-03-26 PROCEDURE — 84443 ASSAY THYROID STIM HORMONE: CPT | Performed by: OBSTETRICS & GYNECOLOGY

## 2018-03-26 PROCEDURE — 84144 ASSAY OF PROGESTERONE: CPT | Performed by: OBSTETRICS & GYNECOLOGY

## 2018-03-26 PROCEDURE — 36415 COLL VENOUS BLD VENIPUNCTURE: CPT | Performed by: OBSTETRICS & GYNECOLOGY

## 2018-03-26 PROCEDURE — 83001 ASSAY OF GONADOTROPIN (FSH): CPT | Performed by: OBSTETRICS & GYNECOLOGY

## 2018-03-26 PROCEDURE — 82670 ASSAY OF TOTAL ESTRADIOL: CPT | Performed by: OBSTETRICS & GYNECOLOGY

## 2018-03-26 PROCEDURE — 83002 ASSAY OF GONADOTROPIN (LH): CPT | Performed by: OBSTETRICS & GYNECOLOGY

## 2018-03-26 PROCEDURE — 84146 ASSAY OF PROLACTIN: CPT | Performed by: OBSTETRICS & GYNECOLOGY

## 2018-03-26 PROCEDURE — 84403 ASSAY OF TOTAL TESTOSTERONE: CPT | Performed by: OBSTETRICS & GYNECOLOGY

## 2018-03-26 PROCEDURE — 99213 OFFICE O/P EST LOW 20 MIN: CPT | Performed by: OBSTETRICS & GYNECOLOGY

## 2018-03-26 PROCEDURE — 82627 DEHYDROEPIANDROSTERONE: CPT | Performed by: OBSTETRICS & GYNECOLOGY

## 2018-03-26 ASSESSMENT — PAIN SCALES - GENERAL: PAINLEVEL: NO PAIN (0)

## 2018-03-26 NOTE — PROGRESS NOTES
Subjective  32 year old non-pregnant female presents today complaining of metrorrhagia.  Patient states her last menstrual period was 3/19.  This cycle was only 1 1/2 days.  Her menses have become irregular since 2017.  She was going through a lot in .  Patient had an  in  and was dx with HELLP vs HUS.  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  She is still seeing a hematologist for this. Patient had intense pain with her cycle in February.  The pain was right sided and extreme for 10 mins.  She did not go in to get evaluated. Patient has now developed acne.  No abnormal hair growth.  We discussed getting a pelvic ultrasound and labs today. Patient is wanting to get pregnant again.  She has seen MFM and the consult is in her chart.      ROS: 10 point ROS neg other than the symptoms noted above in the HPI.  Past Medical History:   Diagnosis Date     NO ACTIVE PROBLEMS      Past Surgical History:   Procedure Laterality Date     APPENDECTOMY  2014      SECTION N/A 10/29/2016    Procedure:  SECTION;  Surgeon: Adonis Dooley MD;  Location: [facility]     Family History   Problem Relation Age of Onset     Hypertension Mother      OSTEOPOROSIS Maternal Grandmother      Unknown/Adopted Father      Unknown/Adopted Paternal Grandmother      Unknown/Adopted Paternal Grandfather      Social History   Substance Use Topics     Smoking status: Never Smoker     Smokeless tobacco: Never Used     Alcohol use No         Objective  Vitals: /80  Pulse 90  Wt 152 lb (68.9 kg)  LMP 2018  BMI 21.61 kg/m2  BMI= Body mass index is 21.61 kg/(m^2).    General appearance=well developed, well-nourished female  Psych=mood is stable  PELVIC:    External genitalia: normal without lesions or masses  Urethral meatus: no lesions or prolapse noted, normal size  Urethra: no masses, non tender  Bladder: non tender, no fullness  Vagina:  normal mucosa and rugae, no discharge.  Cervix: normal without lesion, no cervical motion tenderness, healthy, nulliparous  Uterus: small, mobile, nontender.  Adnexa: non tender, without masses  Rectal: deffered  Ext=no clubbing or cyanosis, no swelling      Assessment  1.)  Metrorrhagia  2.)  History of HELLP vs HUS  3.)  Anxiety/depression      Plan  1.)  Pelvic ultrasound  2.)  Labs ordered      25 minutes was spent face to face with the patient today discussing her history, diagnosis, and follow-up plan as noted above.  Over 50% of the visit was spent in counseling and coordination of care.        Nursing notes read and reviewed    Harriet Rascon

## 2018-03-26 NOTE — MR AVS SNAPSHOT
After Visit Summary   3/26/2018    Queta Cortez    MRN: [id number]           Patient Information     Date Of Birth          1985        Visit Information        Provider Department      3/26/2018 2:30 PM Harriet Rascon,  [facility]        Today's Diagnoses     Pelvic pain in female    -  1    Irregular menstrual cycle          Care Instructions    Please call if you any questions.    [address]   [address]  [phone]        Harriet Rascon            Follow-ups after your visit        Follow-up notes from your care team     Return in about 4 weeks (around 4/23/2018).      Your next 10 appointments already scheduled     Mar 27, 2018  2:30 PM CDT   US PELVIC COMPLETE W TRANSVAGINAL with ERUS1   [facility]    [address] [facility] [address] [phone]           Please bring a list of your medicines (including vitamins, minerals and over-the-counter drugs). Also, tell your doctor about any allergies you may have. Wear comfortable clothes and leave your valuables at home.  Adults: Drink six 8-ounce glasses of fluid one hour before your exam. Do NOT empty your bladder.  If you need to empty your bladder before your exam, try to release only a little bit of urine. Then, drink another 8oz glass of fluid.  Children: Children who are potty trained should drink at least 4 cups (32 oz) of liquid 45 minutes to one hour prior to the exam. The child s bladder must be full in order to achieve a diagnostic exam. If your child is very uncomfortable or has an urgent need to pee, please notify a technologist; they will try to find out how much longer the wait may be and provide instructions to help relieve the pressure. Occasionally it is medically necessary to insert a urinary catheter to fill the bladder.  Please call the Imaging Department at your exam site with any questions.             Apr 18, 2018  1:30 PM CDT   LAB with LAB ONC Replaced by [facility] ([facility])    [address] [place] [id number] [phone]           Please do not eat 10-12 hours before your appointment if you are coming in fasting for labs on lipids, cholesterol, or glucose (sugar). This does not apply to pregnant women. Water, hot tea and black coffee (with nothing added) are okay. Do not drink other fluids, diet soda or chew gum.            Apr 18, 2018  2:15 PM CDT   Return Visit with Ivan Vanessa MD   [facility]    [address] [place] [address] [phone]              Future tests that were ordered for you today     Open Future Orders        Priority Expected Expires Ordered    US Pelvic Complete with Transvaginal Routine  6/24/2018 3/26/2018            Who to contact     If you have questions or need follow up information about today's clinic visit or your schedule please contact [facility] directly at [phone].  Normal or non-critical lab and imaging results will be communicated to you by MyChart, letter or phone within 4 business days after the clinic has received the results. If you do not hear from us within 7 days, please contact the clinic through [employer] or phone. If you have a critical or abnormal lab result, we will notify you by phone as soon as possible.  Submit refill requests through [email or web address] or call your pharmacy and they will forward the refill request to us. Please allow 3 business days for your refill to be completed.          Additional Information About Your Visit        MyChart Information     [email or web address] gives you secure access to your electronic health record. If you see a primary care provider, you can also send messages to your care team and make appointments. If you have questions, please call your primary care clinic.  If you do not have a primary care  provider, please call [phone] and they will assist you.        Care EveryWhere ID     This is your Care EveryWhere ID. This could be used by other organizations to access your [facility] medical records  [id number]        Your Vitals Were     Pulse Last Period BMI (Body Mass Index)             90 03/19/2018 21.61 kg/m2          Blood Pressure from Last 3 Encounters:   03/26/18 124/80   10/18/17 129/82   08/25/17 114/84    Weight from Last 3 Encounters:   03/26/18 152 lb (68.9 kg)   10/18/17 152 lb (68.9 kg)   08/25/17 152 lb 8 oz (69.2 kg)              We Performed the Following     DHEA sulfate     Estradiol     Follicle stimulating hormone     Lutropin     Progesterone     Prolactin     Testosterone Free and Total     TSH        Primary Care Provider Office Phone # Fax #    Alanis Lundberg [phone]       [address]  [facility] [address]        Equal Access to Services     JOAQUIM AYALA : Hadii aad ku hadasho Soomaali, waaxda luqadaha, qaybta kaalmada adeegyada, anselmo freeman . So [facility] [phone].    ATENCIÓN: Si saeedla espkenneth, tiene a nova disposición servicios gratuitos de asistencia lingüística. Llame al [phone].    We comply with applicable federal civil rights laws and [place] laws. We do not discriminate on the basis of race, color, national origin, age, disability, sex, sexual orientation, or gender identity.            Thank you!     Thank you for choosing [facility]  for your care. Our goal is always to provide you with excellent care. Hearing back from our patients is one way we can continue to improve our services. Please take a few minutes to complete the written survey that you may receive in the mail after your visit with us. Thank you!             Your Updated Medication List - Protect others around you: Learn how to safely use, store and throw away your medicines at www.disposemymeds.org.          This list is  accurate as of 3/26/18  5:43 PM.  Always use your most recent med list.                   Brand Name Dispense Instructions for use Diagnosis    [facility] PRENATAL GUMMY 0.4-113.5 MG Chew      Reported on 4/19/2017

## 2018-03-26 NOTE — NURSING NOTE
"Chief Complaint   Patient presents with     Consult     Discuss irregular cycles       Initial /80  Pulse 90  Wt 152 lb (68.9 kg)  LMP 03/19/2018  BMI 21.61 kg/m2 Estimated body mass index is 21.61 kg/(m^2) as calculated from the following:    Height as of 10/18/17: 5' 10.32\" (1.786 m).    Weight as of this encounter: 152 lb (68.9 kg).  Medication Reconciliation: complete         "

## 2018-03-27 ENCOUNTER — RADIANT APPOINTMENT (OUTPATIENT)
Dept: ULTRASOUND IMAGING | Facility: OTHER | Age: 33
End: 2018-03-27
Attending: OBSTETRICS & GYNECOLOGY
Payer: COMMERCIAL

## 2018-03-27 DIAGNOSIS — R10.2 PELVIC PAIN IN FEMALE: ICD-10-CM

## 2018-03-27 DIAGNOSIS — N92.6 IRREGULAR MENSTRUAL CYCLE: ICD-10-CM

## 2018-03-27 LAB — DHEA-S SERPL-MCNC: 215 UG/DL (ref 35–430)

## 2018-03-27 PROCEDURE — 76856 US EXAM PELVIC COMPLETE: CPT

## 2018-03-27 PROCEDURE — 76830 TRANSVAGINAL US NON-OB: CPT

## 2018-03-27 PROCEDURE — 93976 VASCULAR STUDY: CPT | Mod: 59

## 2018-03-29 ENCOUNTER — TELEPHONE (OUTPATIENT)
Dept: OBGYN | Facility: CLINIC | Age: 33
End: 2018-03-29

## 2018-03-29 NOTE — TELEPHONE ENCOUNTER
Notes Recorded by Harriet Rascon DO on 3/29/2018 at 12:20 PM  I called and left patient a message to call the office back.    Return call from patient to the call center and spoke to Ivet. Explained Dr. Rascon is not in clinic today and is in surgery. Will give message to Dr. Rascon but not sure if she will be able to call her back until tomorrow when she is on call at the hospital.   Will route to Dr. Rascon as an FYI. Claritza Patricia RN, BAN

## 2018-03-30 RX ORDER — LEVOTHYROXINE SODIUM 25 UG/1
25 TABLET ORAL DAILY
Qty: 90 TABLET | Refills: 0 | Status: SHIPPED | OUTPATIENT
Start: 2018-03-30 | End: 2018-06-23

## 2018-04-03 LAB
SHBG SERPL-SCNC: 26 NMOL/L (ref 30–135)
TESTOST FREE SERPL-MCNC: 0.36 NG/DL (ref 0.13–0.92)
TESTOST SERPL-MCNC: 18 NG/DL (ref 8–60)

## 2018-04-18 ENCOUNTER — ONCOLOGY VISIT (OUTPATIENT)
Dept: ONCOLOGY | Facility: CLINIC | Age: 33
End: 2018-04-18
Payer: COMMERCIAL

## 2018-04-18 VITALS
OXYGEN SATURATION: 100 % | HEART RATE: 66 BPM | HEIGHT: 70 IN | WEIGHT: 154 LBS | BODY MASS INDEX: 22.05 KG/M2 | SYSTOLIC BLOOD PRESSURE: 122 MMHG | DIASTOLIC BLOOD PRESSURE: 86 MMHG | TEMPERATURE: 98.3 F

## 2018-04-18 DIAGNOSIS — D59.4 MICROANGIOPATHIC HEMOLYTIC ANEMIA (H): ICD-10-CM

## 2018-04-18 DIAGNOSIS — D59.4 MICROANGIOPATHIC HEMOLYTIC ANEMIA (H): Primary | ICD-10-CM

## 2018-04-18 LAB
ALBUMIN SERPL-MCNC: 3.9 G/DL (ref 3.4–5)
ALP SERPL-CCNC: 41 U/L (ref 40–150)
ALT SERPL W P-5'-P-CCNC: 25 U/L (ref 0–50)
ANION GAP SERPL CALCULATED.3IONS-SCNC: 6 MMOL/L (ref 3–14)
AST SERPL W P-5'-P-CCNC: 22 U/L (ref 0–45)
BASOPHILS # BLD AUTO: 0.1 10E9/L (ref 0–0.2)
BASOPHILS NFR BLD AUTO: 1.1 %
BILIRUB SERPL-MCNC: 0.5 MG/DL (ref 0.2–1.3)
BUN SERPL-MCNC: 11 MG/DL (ref 7–30)
CALCIUM SERPL-MCNC: 8.6 MG/DL (ref 8.5–10.1)
CHLORIDE SERPL-SCNC: 105 MMOL/L (ref 94–109)
CO2 SERPL-SCNC: 28 MMOL/L (ref 20–32)
CREAT SERPL-MCNC: 0.83 MG/DL (ref 0.52–1.04)
DIFFERENTIAL METHOD BLD: NORMAL
EOSINOPHIL # BLD AUTO: 0.1 10E9/L (ref 0–0.7)
EOSINOPHIL NFR BLD AUTO: 0.9 %
ERYTHROCYTE [DISTWIDTH] IN BLOOD BY AUTOMATED COUNT: 11.7 % (ref 10–15)
GFR SERPL CREATININE-BSD FRML MDRD: 80 ML/MIN/1.7M2
GLUCOSE SERPL-MCNC: 97 MG/DL (ref 70–99)
HCT VFR BLD AUTO: 39 % (ref 35–47)
HGB BLD-MCNC: 12.5 G/DL (ref 11.7–15.7)
IMM GRANULOCYTES # BLD: 0 10E9/L (ref 0–0.4)
IMM GRANULOCYTES NFR BLD: 0.4 %
LDH SERPL L TO P-CCNC: 179 U/L (ref 81–234)
LYMPHOCYTES # BLD AUTO: 1.3 10E9/L (ref 0.8–5.3)
LYMPHOCYTES NFR BLD AUTO: 24 %
MCH RBC QN AUTO: 30.9 PG (ref 26.5–33)
MCHC RBC AUTO-ENTMCNC: 32.1 G/DL (ref 31.5–36.5)
MCV RBC AUTO: 96 FL (ref 78–100)
MONOCYTES # BLD AUTO: 0.4 10E9/L (ref 0–1.3)
MONOCYTES NFR BLD AUTO: 7 %
NEUTROPHILS # BLD AUTO: 3.7 10E9/L (ref 1.6–8.3)
NEUTROPHILS NFR BLD AUTO: 66.6 %
PLATELET # BLD AUTO: 260 10E9/L (ref 150–450)
POTASSIUM SERPL-SCNC: 4 MMOL/L (ref 3.4–5.3)
PROT SERPL-MCNC: 7.4 G/DL (ref 6.8–8.8)
RBC # BLD AUTO: 4.05 10E12/L (ref 3.8–5.2)
RETICS # AUTO: 57.1 10E9/L (ref 25–95)
RETICS/RBC NFR AUTO: 1.4 % (ref 0.5–2)
SODIUM SERPL-SCNC: 139 MMOL/L (ref 133–144)
WBC # BLD AUTO: 5.5 10E9/L (ref 4–11)

## 2018-04-18 PROCEDURE — 85045 AUTOMATED RETICULOCYTE COUNT: CPT | Performed by: INTERNAL MEDICINE

## 2018-04-18 PROCEDURE — 80053 COMPREHEN METABOLIC PANEL: CPT | Performed by: INTERNAL MEDICINE

## 2018-04-18 PROCEDURE — 83615 LACTATE (LD) (LDH) ENZYME: CPT | Performed by: INTERNAL MEDICINE

## 2018-04-18 PROCEDURE — 85025 COMPLETE CBC W/AUTO DIFF WBC: CPT | Performed by: INTERNAL MEDICINE

## 2018-04-18 PROCEDURE — 99213 OFFICE O/P EST LOW 20 MIN: CPT | Performed by: INTERNAL MEDICINE

## 2018-04-18 PROCEDURE — 36415 COLL VENOUS BLD VENIPUNCTURE: CPT | Performed by: INTERNAL MEDICINE

## 2018-04-18 ASSESSMENT — PAIN SCALES - GENERAL: PAINLEVEL: NO PAIN (0)

## 2018-04-18 NOTE — NURSING NOTE
"Oncology Rooming Note    April 18, 2018 1:50 PM   Queta Cortez is a 32 year old female who presents for:    Chief Complaint   Patient presents with     Oncology Clinic Visit     3 month follow up     Initial Vitals: /86  Pulse 66  Temp 98.3  F (36.8  C)  Ht 1.786 m (5' 10.32\")  Wt 69.9 kg (154 lb)  LMP 03/19/2018  SpO2 100%  BMI 21.9 kg/m2 Estimated body mass index is 21.9 kg/(m^2) as calculated from the following:    Height as of this encounter: 1.786 m (5' 10.32\").    Weight as of this encounter: 69.9 kg (154 lb). Body surface area is 1.86 meters squared.  No Pain (0) Comment: Data Unavailable   Patient's last menstrual period was 03/19/2018.  Allergies reviewed: Yes  Medications reviewed: Yes    Medications: Medication refills not needed today.  Pharmacy name entered into EPIC:    [id number] - [place], [place] - [address] [facility]  [facility] [place] [address]    5 minutes for nursing intake (face to face time)     Fallon Reddy LPN              "

## 2018-04-18 NOTE — PROGRESS NOTES
2018     CHIEF COMPLAINT:  Followup for microangiopathic hemolytic anemia.      HISTORY OF PRESENT ILLNESS:  Please refer to my note from 11/15/2016 for more details.      Copied and updated from prior  Briefly, Queta is a very pleasant, previously healthy 31-year-old female who was recently pregnant with her first child when at 39 weeks of gestation she went into labor but due to failure to descend she had an uncomplicated  on 10/29/2016.  She was discharged on postoperative day #3, but a couple of days later started experiencing profound nausea and vomiting with dizziness and weakness and decreased appetite.  At that point, she was found to have marked anemia and thrombocytopenia with a hemoglobin of 4.4 and platelet count of 20.  There was schistocytes seen on peripheral blood smear and LDH was elevated and she also found to have acute kidney injury.  At that point she was admitted to the [facility] for the possibility of TTP versus HELLP syndrome.  Initially she was started on plasma exchange as well as dexamethasone 10 mg twice a day, but her NSXWNY29 returned to be 45%, making TTP unlikely.  As the transaminitis was not very severe the diagnosis of HELLP syndrome was entertained, but thought relatively unlikely.  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  Eventually plasma exchange was continued for 7 days.  She had resolution of thrombocytopenia, anemia improved, renal insufficiency also improved.  She did have significant proteinuria which also was improving at the time of the discharge.  The genetic testing for atypical HUS was sent prior to the discharge.      She also developed uncontrolled hypertension during that admission for which she was started on Coreg and nifedipine.    On 16 she was evaluated by primary care doctor as she was complaining of dyspnea on exertion. Workup for PE and post partum cardiomyopathy was unremarkable. She  "recovered with time  Since she was slowly getting better and her blood work also continued to improve, we opted for a wait and watch approach.   She did well after that and her blood work normalized and she recovered and was feeling normal. She was weaned off antihypertensive medications.     INTERVAL HISTORY:   She tells me that lately her menstrual periods have been irregular so she was seen by her gynecologist who did some testing and she was found to have hypothyroidism and she was started on levothyroxine. After she started the levothyroxine she felt much better and thought that in retrospect she was thinking that off and on she has been tired because taking care to offer her young son. Since starting Synthroid she has also noticed that her mood has been better and she is feeling normal. Otherwise she She contidenies any major complaints. No pain. Denies that the menstrual bleeding was heavier than usual. No nausea vomiting diarrhea or constipation. No new swellings. No bleeding. No infections. No trouble breathing. She is trying to become pregnant.       ROS:  A 10 point review of the system is otherwise completely negative         MEDICATIONS:  Prenatal vitamins and levothyroxin       PHYSICAL EXAMINATION:   VITAL SIGNS:  /86  Pulse 66  Temp 98.3  F (36.8  C)  Ht 1.786 m (5' 10.32\")  Wt 69.9 kg (154 lb)  LMP 03/19/2018  SpO2 100%  BMI 21.9 kg/m2  CONSTITUTIONAL: no acute distress  EYES: PERRLA, no palor or icterus.   ENT/MOUTH: no mouth lesions. Ears normal  CVS: s1s2 no m r g .   RESPIRATORY: clear to auscultation b/l  GI: soft non tender no hepatosplenomegaly  NEURO: AAOX3  Grossly non focal neuro exam  INTEGUMENT: no obvious rashes  LYMPHATIC: no palpable cervical, supraclavicular, axillary or inguinal LAD  MUSCULOSKELETAL: Unremarkable. No bony tenderness.   EXTREMITIES: no edema  PSYCH: Mentation, mood and affect are normal. 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  It is a known pathogenic mutation in homozygous state but she is heterozygous        ASSESSMENT AND PLAN:     1.  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  Her TPSVHI68 was 46% on 11/04 and 72% on 11/10/2016, making TTP unlikely. Her genetic testing came back revealing she is heterozygous for [id number]>T [id number] in exon 24 of HZCTXA74. It is a known pathogenic mutation in homozygous state but she is heterozygous for it.  It is hard to label her as congenital TTP since she never had low HYUPNT76.  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     She is doing well. Her blood counts are stable and clinically she is feeling good. We will continue with serial monitoring of her blood counts every 6 months      she was recently found to have hypothyroidism and has been taking levothyroxine and is feeling much better after starting it.     She is trying to become pregnant again. Previously she was evaluated by  maternal fetal medicine for risk assessment.  Her risk during subsequent pregnancies was extensively discussed with her and she has good understanding of the following. We did not discuss this in detail today.  Since her overall picture was more consistent with postpartum HELLP/eclampsia, she was quoted her chance of recurrence from anywhere between 5-20% with subsequent pregnancy. The risk of preeclampsia was quoted to approach 40%.  Although her clinical picture was less likely to be atypical HUS, it is hard to predict the actual chance of recurrence in future pregnancies for patients who had developed atypical HUS in the past.     The recommendation from [facility] was to do baseline labs for HELLP syndrome  at the beginning of pregnancy and then monitor her labs frequently  She should be started on low-dose aspirin at 12-13 weeks of gestation.  She should have close monitoring for blood pressure during pregnancy.  They also recommended a follow-up growth scan at 28-32 weeks of gestation  All of these recommendations were also sent to her OB physician      As she is doing well we will repeat labs in 6 months and I will see her back in one year    I answered all of her questions to her satisfaction and she is agreeable and comfortable with the plan    Ivan Vanessa

## 2018-04-18 NOTE — MR AVS SNAPSHOT
After Visit Summary   4/18/2018    Queta Cortez    MRN: [id number]           Patient Information     Date Of Birth          1985        Visit Information        Provider Department      4/18/2018 2:15 PM Ivan Vanessa MD [facility]        Today's Diagnoses     Microangiopathic hemolytic anemia (H)    -  1      Care Instructions    Labs in 6 months and see me back in 1 year with labs prior               Follow-ups after your visit        Your next 10 appointments already scheduled     May 04, 2018  4:00 PM CDT   LAB with NL LAB [facility] ([facility])    [address] [facility] [address]   [phone]           Please do not eat 10-12 hours before your appointment if you are coming in fasting for labs on lipids, cholesterol, or glucose (sugar). This does not apply to pregnant women. Water, hot tea and black coffee (with nothing added) are okay. Do not drink other fluids, diet soda or chew gum.            Oct 17, 2018 10:00 AM CDT   LAB with NL LAB [facility] ([facility])    [address] [facility] [address]   [phone]           Please do not eat 10-12 hours before your appointment if you are coming in fasting for labs on lipids, cholesterol, or glucose (sugar). This does not apply to pregnant women. Water, hot tea and black coffee (with nothing added) are okay. Do not drink other fluids, diet soda or chew gum.            Apr 17, 2019  1:00 PM CDT   LAB with LAB ONC [facility] ([facility])    [address] [place] [id number] [phone]           Please do not eat 10-12 hours before your appointment if you are coming in fasting for labs on lipids, cholesterol, or glucose (sugar). This does not apply to pregnant women. Water, hot tea and black coffee (with nothing added) are okay. Do not drink other fluids, diet soda or chew gum.             Apr 17, 2019  1:45 PM CDT   Return Visit with Ivan Vanessa MD   [facility] ([facility]    [address] [address] [place] [address] [phone]              Future tests that were ordered for you today     Open Standing Orders        Priority Remaining Interval Expires Ordered    *CBC with platelets differential Routine 2/2 6 months 4/18/2019 4/18/2018    Reticulocyte count Routine 2/2 6 months 4/18/2019 4/18/2018    Comprehensive metabolic panel Routine 2/2 6 months 4/18/2019 4/18/2018    Lactate Dehydrogenase Routine 2/2 6 months 4/18/2019 4/18/2018            Who to contact     If you have questions or need follow up information about today's clinic visit or your schedule please contact [facility] directly at [phone].  Normal or non-critical lab and imaging results will be communicated to you by Clipikhart, letter or phone within 4 business days after the clinic has received the results. If you do not hear from us within 7 days, please contact the clinic through Facteryt or phone. If you have a critical or abnormal lab result, we will notify you by phone as soon as possible.  Submit refill requests through HistoPathway or call your pharmacy and they will forward the refill request to us. Please allow 3 business days for your refill to be completed.          Additional Information About Your Visit        Clipikhart Information     HistoPathway gives you secure access to your electronic health record. If you see a primary care provider, you can also send messages to your care team and make appointments. If you have questions, please call your primary care clinic.  If you do not have a primary care provider, please call [phone] and they will assist you.      HistoPathway is an electronic gateway that provides easy, online access to your medical records. With HistoPathway, you can request a clinic appointment, read your test results, renew a prescription or  "communicate with your care team.     To access your existing account, please contact your [facility] or call [phone] for assistance.        Care EveryWhere ID     This is your Care EveryWhere ID. This could be used by other organizations to access your [facility] medical records  [id number]        Your Vitals Were     Pulse Temperature Height Last Period Pulse Oximetry BMI (Body Mass Index)    66 98.3  F (36.8  C) 1.786 m (5' 10.32\") 03/19/2018 100% 21.9 kg/m2       Blood Pressure from Last 3 Encounters:   04/18/18 122/86   03/26/18 124/80   10/18/17 129/82    Weight from Last 3 Encounters:   04/18/18 69.9 kg (154 lb)   03/26/18 68.9 kg (152 lb)   10/18/17 68.9 kg (152 lb)               Primary Care Provider Office Phone # Fax #    Alanis Lundberg [phone]       [address] [place] [address]        Equal Access to Services     [facility]: Hadii aad ku hadasho Soomaali, waaxda luqadaha, qaybta kaalmada adeegyada, waxtyrone loredo hayjonah freeman . So [facility] [phone].    ATENCIÓN: Si habla español, tiene a nova disposición servicios gratuitos de asistencia lingüística. [facility] [phone].    We comply with applicable federal civil rights laws and [place] laws. We do not discriminate on the basis of race, color, national origin, age, disability, sex, sexual orientation, or gender identity.            Thank you!     Thank you for choosing [facility]  for your care. Our goal is always to provide you with excellent care. Hearing back from our patients is one way we can continue to improve our services. Please take a few minutes to complete the written survey that you may receive in the mail after your visit with us. Thank you!             Your Updated Medication List - Protect others around you: Learn how to safely use, store and throw away your medicines at www.disposemymeds.org.          This list is accurate as of " 4/18/18  2:18 PM.  Always use your most recent med list.                   Brand Name Dispense Instructions for use Diagnosis    [facility] PRENATAL GUMMY 0.4-113.5 MG Chew      Reported on 4/19/2017        levothyroxine 25 MCG tablet    SYNTHROID/LEVOTHROID    90 tablet    Take 1 tablet (25 mcg) by mouth daily    Hypothyroidism, unspecified type

## 2018-04-18 NOTE — LETTER
2018         RE: Queta Cortez  [address] [facility] [address] [facility] [address]        Dear Colleague,    Thank you for referring your patient, Queta Cortez, to the [facility]. Please see a copy of my visit note below.    2018     CHIEF COMPLAINT:  Followup for microangiopathic hemolytic anemia.      HISTORY OF PRESENT ILLNESS:  Please refer to my note from 11/15/2016 for more details.      Copied and updated from prior  Briefly, Queta is a very pleasant, previously healthy 31-year-old female who was recently pregnant with her first child when at 39 weeks of gestation she went into labor but due to failure to descend she had an uncomplicated  on 10/29/2016.  She was discharged on postoperative day #3, but a couple of days later started experiencing profound nausea and vomiting with dizziness and weakness and decreased appetite.  At that point, she was found to have marked anemia and thrombocytopenia with a hemoglobin of 4.4 and platelet count of 20.  There was schistocytes seen on peripheral blood smear and LDH was elevated and she also found to have acute kidney injury.  At that point she was admitted to the [facility] for the possibility of TTP versus HELLP syndrome.  Initially she was started on plasma exchange as well as dexamethasone 10 mg twice a day, but her [id number] returned to be 45%, making TTP unlikely.  As the transaminitis was not very severe the diagnosis of HELLP syndrome was entertained, but thought relatively unlikely.  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  Eventually plasma exchange was continued for 7 days.  She had resolution of thrombocytopenia, anemia improved, renal insufficiency also improved.  She did have significant proteinuria which also was improving at the time of the discharge.  The genetic testing for atypical HUS was sent prior to the discharge.      She also developed uncontrolled hypertension  "during that admission for which she was started on Coreg and nifedipine.    On 11/17/16 she was evaluated by primary care doctor as she was complaining of dyspnea on exertion. Workup for PE and post partum cardiomyopathy was unremarkable. She recovered with time  Since she was slowly getting better and her blood work also continued to improve, we opted for a wait and watch approach.   She did well after that and her blood work normalized and she recovered and was feeling normal. She was weaned off antihypertensive medications.     INTERVAL HISTORY:   She tells me that lately her menstrual periods have been irregular so she was seen by her gynecologist who did some testing and she was found to have hypothyroidism and she was started on levothyroxine. After she started the levothyroxine she felt much better and thought that in retrospect she was thinking that off and on she has been tired because taking care to offer her young son. Since starting Synthroid she has also noticed that her mood has been better and she is feeling normal. Otherwise she She contidenies any major complaints. No pain. Denies that the menstrual bleeding was heavier than usual. No nausea vomiting diarrhea or constipation. No new swellings. No bleeding. No infections. No trouble breathing. She is trying to become pregnant.       ROS:  A 10 point review of the system is otherwise completely negative         MEDICATIONS:  Prenatal vitamins and levothyroxin       PHYSICAL EXAMINATION:   VITAL SIGNS:  /86  Pulse 66  Temp 98.3  F (36.8  C)  Ht 1.786 m (5' 10.32\")  Wt 69.9 kg (154 lb)  LMP 03/19/2018  SpO2 100%  BMI 21.9 kg/m2  CONSTITUTIONAL: no acute distress  EYES: PERRLA, no palor or icterus.   ENT/MOUTH: no mouth lesions. Ears normal  CVS: s1s2 no m r g .   RESPIRATORY: clear to auscultation b/l  GI: soft non tender no hepatosplenomegaly  NEURO: AAOX3  Grossly non focal neuro exam  INTEGUMENT: no obvious rashes  LYMPHATIC: no palpable " cervical, supraclavicular, axillary or inguinal LAD  MUSCULOSKELETAL: Unremarkable. No bony tenderness.   EXTREMITIES: no edema  PSYCH: Mentation, mood and affect are normal. 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  It is a known pathogenic mutation in homozygous state but she is heterozygous        ASSESSMENT AND PLAN:     1.  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  Her PEFHSO60 was 46% on 11/04 and 72% on 11/10/2016, making TTP unlikely. 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 It is a known pathogenic mutation in homozygous state but she is heterozygous for it.  It is hard to label her as congenital TTP since she never had low GWSIEU38.  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     She is doing well. Her blood counts are stable and clinically she is feeling good. We will continue with serial monitoring of her blood counts every 6 months      she was recently found to have hypothyroidism and has been taking levothyroxine and is feeling much better after starting it.     She is trying to become pregnant again. Previously she was evaluated by  maternal fetal medicine for risk assessment.  Her risk during subsequent pregnancies was extensively discussed with her and she has good understanding of the following. We did not discuss this in detail today.  Since her overall picture was more consistent with postpartum HELLP/eclampsia, she was quoted her chance of recurrence from anywhere between 5-20% with subsequent pregnancy. The risk of preeclampsia was quoted to approach 40%.  Although her clinical picture was  less likely to be atypical HUS, it is hard to predict the actual chance of recurrence in future pregnancies for patients who had developed atypical HUS in the past.     The recommendation from [facility] was to do baseline labs for HELLP syndrome at the beginning of pregnancy and then monitor her labs frequently  She should be started on low-dose aspirin at 12-13 weeks of gestation.  She should have close monitoring for blood pressure during pregnancy.  They also recommended a follow-up growth scan at 28-32 weeks of gestation  All of these recommendations were also sent to her OB physician      As she is doing well we will repeat labs in 6 months and I will see her back in one year    I answered all of her questions to her satisfaction and she is agreeable and comfortable with the plan    Ivan Vanessa              Again, thank you for allowing me to participate in the care of your patient.        Sincerely,        Ivan Vanessa MD

## 2018-05-04 DIAGNOSIS — E03.9 HYPOTHYROIDISM, UNSPECIFIED TYPE: ICD-10-CM

## 2018-05-04 LAB — TSH SERPL DL<=0.005 MIU/L-ACNC: 3.66 MU/L (ref 0.4–4)

## 2018-05-04 PROCEDURE — 84443 ASSAY THYROID STIM HORMONE: CPT | Performed by: OBSTETRICS & GYNECOLOGY

## 2018-05-04 PROCEDURE — 36415 COLL VENOUS BLD VENIPUNCTURE: CPT | Performed by: OBSTETRICS & GYNECOLOGY

## 2018-06-23 ENCOUNTER — MYC REFILL (OUTPATIENT)
Dept: OBGYN | Facility: OTHER | Age: 33
End: 2018-06-23

## 2018-06-23 DIAGNOSIS — E03.9 HYPOTHYROIDISM, UNSPECIFIED TYPE: ICD-10-CM

## 2018-06-25 NOTE — TELEPHONE ENCOUNTER
Message from [employer]:  Original authorizing provider: DO Emilie Anglinah Dana would like a refill of the following medications:  levothyroxine (SYNTHROID/LEVOTHROID) 25 MCG tablet [Harriet Rsacon DO]    Preferred pharmacy: [id number] [address]    Comment:

## 2018-06-26 RX ORDER — LEVOTHYROXINE SODIUM 25 UG/1
25 TABLET ORAL DAILY
Qty: 90 TABLET | Refills: 1 | Status: SHIPPED | OUTPATIENT
Start: 2018-06-26 | End: 2018-09-14

## 2018-06-26 NOTE — TELEPHONE ENCOUNTER
Levothyroxine    Prescription approved per [facility] Refill Protocol.    Alyssa Hercules, RN, BSN

## 2018-06-27 ENCOUNTER — TELEPHONE (OUTPATIENT)
Dept: OBGYN | Facility: OTHER | Age: 33
End: 2018-06-27

## 2018-06-27 DIAGNOSIS — Z32.00 UNCONFIRMED PREGNANCY: Primary | ICD-10-CM

## 2018-06-27 DIAGNOSIS — Z32.00 UNCONFIRMED PREGNANCY: ICD-10-CM

## 2018-06-27 LAB — B-HCG SERPL-ACNC: 307 IU/L (ref 0–5)

## 2018-06-27 PROCEDURE — 84702 CHORIONIC GONADOTROPIN TEST: CPT | Performed by: OBSTETRICS & GYNECOLOGY

## 2018-06-27 PROCEDURE — 36415 COLL VENOUS BLD VENIPUNCTURE: CPT | Performed by: OBSTETRICS & GYNECOLOGY

## 2018-06-27 NOTE — TELEPHONE ENCOUNTER
Patient can be seen at her scheduled office visit.  I will let her know the quant results when I have them back.  If the quant is high enough, I will order an ultrasound.      Harriet Rascon

## 2018-06-27 NOTE — TELEPHONE ENCOUNTER
RN spoke with patient. Has irregular periods, LMP 03/25/2018. Discussed completing HCG Quant. GG to review results and determine if patient should have OV scheduled for alternative date/time based on lab results. Brynn Durant RN, BSN

## 2018-06-27 NOTE — TELEPHONE ENCOUNTER
Reason for Call:  Other call back    Detailed comments: Patient took pregnancy test and it was positive. Was told to contact Abiodun and make an appointment with her due to pregnancy being high risk. Patient is scheduled 7/16 (Abiodun's next available 30 min slot). If patient needs to be seen sooner or complete pregnancy confirmation please contact her.    Phone Number Patient can be reached at: Home number on file [phone] (home)    Best Time: any    Can we leave a detailed message on this number? YES    Call taken on 6/27/2018 at 8:10 AM by Tobias Salmeron

## 2018-06-27 NOTE — TELEPHONE ENCOUNTER
Left message for patient to return call to clinic. Please give 's message below.  Gina Mercer CMA

## 2018-07-03 ENCOUNTER — DOCUMENTATION ONLY (OUTPATIENT)
Dept: LAB | Facility: OTHER | Age: 33
End: 2018-07-03

## 2018-07-03 DIAGNOSIS — N91.2 ABSENCE OF MENSTRUATION: Primary | ICD-10-CM

## 2018-07-03 NOTE — PROGRESS NOTES
This patient has a lab only appointment on 7/5/2018 but does not have future orders. Please review, associate diagnosis and sign pending lab orders for the upcoming appointment.  She has an appointment with you on 7/16/2018.    Thank you,    [facility]

## 2018-07-05 DIAGNOSIS — N91.2 ABSENCE OF MENSTRUATION: ICD-10-CM

## 2018-07-05 LAB — B-HCG SERPL-ACNC: 5487 IU/L (ref 0–5)

## 2018-07-05 PROCEDURE — 36415 COLL VENOUS BLD VENIPUNCTURE: CPT | Performed by: NURSE PRACTITIONER

## 2018-07-05 PROCEDURE — 84702 CHORIONIC GONADOTROPIN TEST: CPT | Performed by: NURSE PRACTITIONER

## 2018-07-10 ENCOUNTER — RADIANT APPOINTMENT (OUTPATIENT)
Dept: ULTRASOUND IMAGING | Facility: OTHER | Age: 33
End: 2018-07-10
Attending: NURSE PRACTITIONER
Payer: COMMERCIAL

## 2018-07-10 DIAGNOSIS — N91.2 ABSENCE OF MENSTRUATION: ICD-10-CM

## 2018-07-10 PROCEDURE — 76801 OB US < 14 WKS SINGLE FETUS: CPT

## 2018-07-11 ENCOUNTER — TELEPHONE (OUTPATIENT)
Dept: OBGYN | Facility: CLINIC | Age: 33
End: 2018-07-11

## 2018-07-11 DIAGNOSIS — Z36.89 ESTABLISH GESTATIONAL AGE, ULTRASOUND: Primary | ICD-10-CM

## 2018-07-11 NOTE — TELEPHONE ENCOUNTER
Telephone call to patient and discussed ultrasound results. LMP was March, but cycles irregular. 6 week pregnancy correlates to HCG levels. Will check ultrasound in 2 weeks again due to size/date discrepancy. Also discussed subchorionic hemorrhage. Reviewed recommendations, restrictions. Questions answered. Keep scheduled appointment with Dr. Rascon on Monday due to high risk pregnancy. Anum AG CNP

## 2018-07-16 ENCOUNTER — OFFICE VISIT (OUTPATIENT)
Dept: OBGYN | Facility: OTHER | Age: 33
End: 2018-07-16
Payer: COMMERCIAL

## 2018-07-16 VITALS
SYSTOLIC BLOOD PRESSURE: 100 MMHG | WEIGHT: 148 LBS | BODY MASS INDEX: 21.05 KG/M2 | HEART RATE: 80 BPM | DIASTOLIC BLOOD PRESSURE: 60 MMHG

## 2018-07-16 DIAGNOSIS — O14.20 HEMOLYSIS, ELEVATED LIVER ENZYMES, AND LOW PLATELET (HELLP) SYNDROME DURING PREGNANCY, ANTEPARTUM: ICD-10-CM

## 2018-07-16 DIAGNOSIS — E03.8 OTHER SPECIFIED HYPOTHYROIDISM: ICD-10-CM

## 2018-07-16 DIAGNOSIS — O09.91 HIGH-RISK PREGNANCY IN FIRST TRIMESTER: Primary | ICD-10-CM

## 2018-07-16 DIAGNOSIS — O34.219 DESIRES VBAC (VAGINAL BIRTH AFTER CESAREAN) TRIAL: ICD-10-CM

## 2018-07-16 DIAGNOSIS — M31.19 TTP (THROMBOTIC THROMBOCYTOPENIC PURPURA) (H): ICD-10-CM

## 2018-07-16 LAB
ABO + RH BLD: NORMAL
ABO + RH BLD: NORMAL
ALBUMIN SERPL-MCNC: 4 G/DL (ref 3.4–5)
ALBUMIN UR-MCNC: NEGATIVE MG/DL
ALP SERPL-CCNC: 38 U/L (ref 40–150)
ALT SERPL W P-5'-P-CCNC: 28 U/L (ref 0–50)
ANION GAP SERPL CALCULATED.3IONS-SCNC: 9 MMOL/L (ref 3–14)
APPEARANCE UR: CLEAR
AST SERPL W P-5'-P-CCNC: 17 U/L (ref 0–45)
BACTERIA #/AREA URNS HPF: ABNORMAL /HPF
BASOPHILS # BLD AUTO: 0 10E9/L (ref 0–0.2)
BASOPHILS NFR BLD AUTO: 0.2 %
BILIRUB SERPL-MCNC: 0.5 MG/DL (ref 0.2–1.3)
BILIRUB UR QL STRIP: NEGATIVE
BLD GP AB SCN SERPL QL: NORMAL
BLOOD BANK CMNT PATIENT-IMP: NORMAL
BUN SERPL-MCNC: 11 MG/DL (ref 7–30)
CALCIUM SERPL-MCNC: 8.5 MG/DL (ref 8.5–10.1)
CHLORIDE SERPL-SCNC: 103 MMOL/L (ref 94–109)
CO2 SERPL-SCNC: 26 MMOL/L (ref 20–32)
COLOR UR AUTO: YELLOW
CREAT SERPL-MCNC: 0.68 MG/DL (ref 0.52–1.04)
CREAT UR-MCNC: 37 MG/DL
DIFFERENTIAL METHOD BLD: NORMAL
EOSINOPHIL # BLD AUTO: 0 10E9/L (ref 0–0.7)
EOSINOPHIL NFR BLD AUTO: 0.2 %
ERYTHROCYTE [DISTWIDTH] IN BLOOD BY AUTOMATED COUNT: 11.8 % (ref 10–15)
GFR SERPL CREATININE-BSD FRML MDRD: >90 ML/MIN/1.7M2
GLUCOSE SERPL-MCNC: 80 MG/DL (ref 70–99)
GLUCOSE UR STRIP-MCNC: NEGATIVE MG/DL
HCT VFR BLD AUTO: 37.7 % (ref 35–47)
HGB BLD-MCNC: 12.4 G/DL (ref 11.7–15.7)
HGB UR QL STRIP: NEGATIVE
KETONES UR STRIP-MCNC: NEGATIVE MG/DL
LEUKOCYTE ESTERASE UR QL STRIP: ABNORMAL
LYMPHOCYTES # BLD AUTO: 1.4 10E9/L (ref 0.8–5.3)
LYMPHOCYTES NFR BLD AUTO: 15.6 %
MCH RBC QN AUTO: 30.9 PG (ref 26.5–33)
MCHC RBC AUTO-ENTMCNC: 32.9 G/DL (ref 31.5–36.5)
MCV RBC AUTO: 94 FL (ref 78–100)
MONOCYTES # BLD AUTO: 0.4 10E9/L (ref 0–1.3)
MONOCYTES NFR BLD AUTO: 5 %
NEUTROPHILS # BLD AUTO: 6.9 10E9/L (ref 1.6–8.3)
NEUTROPHILS NFR BLD AUTO: 79 %
NITRATE UR QL: NEGATIVE
PH UR STRIP: 7 PH (ref 5–7)
PLATELET # BLD AUTO: 255 10E9/L (ref 150–450)
POTASSIUM SERPL-SCNC: 4.1 MMOL/L (ref 3.4–5.3)
PROT SERPL-MCNC: 7.4 G/DL (ref 6.8–8.8)
PROT UR-MCNC: 0.06 G/L
PROT/CREAT 24H UR: 0.15 G/G CR (ref 0–0.2)
RBC # BLD AUTO: 4.01 10E12/L (ref 3.8–5.2)
RBC #/AREA URNS AUTO: ABNORMAL /HPF
SODIUM SERPL-SCNC: 138 MMOL/L (ref 133–144)
SOURCE: ABNORMAL
SP GR UR STRIP: 1.01 (ref 1–1.03)
SPECIMEN EXP DATE BLD: NORMAL
UROBILINOGEN UR STRIP-ACNC: 0.2 EU/DL (ref 0.2–1)
WBC # BLD AUTO: 8.7 10E9/L (ref 4–11)
WBC #/AREA URNS AUTO: ABNORMAL /HPF

## 2018-07-16 PROCEDURE — 84156 ASSAY OF PROTEIN URINE: CPT | Performed by: OBSTETRICS & GYNECOLOGY

## 2018-07-16 PROCEDURE — 87340 HEPATITIS B SURFACE AG IA: CPT | Performed by: OBSTETRICS & GYNECOLOGY

## 2018-07-16 PROCEDURE — 86901 BLOOD TYPING SEROLOGIC RH(D): CPT | Performed by: OBSTETRICS & GYNECOLOGY

## 2018-07-16 PROCEDURE — 99207 ZZC FIRST OB VISIT: CPT | Performed by: OBSTETRICS & GYNECOLOGY

## 2018-07-16 PROCEDURE — 87491 CHLMYD TRACH DNA AMP PROBE: CPT | Performed by: OBSTETRICS & GYNECOLOGY

## 2018-07-16 PROCEDURE — 86900 BLOOD TYPING SEROLOGIC ABO: CPT | Performed by: OBSTETRICS & GYNECOLOGY

## 2018-07-16 PROCEDURE — 86780 TREPONEMA PALLIDUM: CPT | Performed by: OBSTETRICS & GYNECOLOGY

## 2018-07-16 PROCEDURE — 86850 RBC ANTIBODY SCREEN: CPT | Performed by: OBSTETRICS & GYNECOLOGY

## 2018-07-16 PROCEDURE — 87591 N.GONORRHOEAE DNA AMP PROB: CPT | Performed by: OBSTETRICS & GYNECOLOGY

## 2018-07-16 PROCEDURE — 36415 COLL VENOUS BLD VENIPUNCTURE: CPT | Performed by: OBSTETRICS & GYNECOLOGY

## 2018-07-16 PROCEDURE — 87389 HIV-1 AG W/HIV-1&-2 AB AG IA: CPT | Performed by: OBSTETRICS & GYNECOLOGY

## 2018-07-16 PROCEDURE — 81001 URINALYSIS AUTO W/SCOPE: CPT | Performed by: OBSTETRICS & GYNECOLOGY

## 2018-07-16 PROCEDURE — 85025 COMPLETE CBC W/AUTO DIFF WBC: CPT | Performed by: OBSTETRICS & GYNECOLOGY

## 2018-07-16 PROCEDURE — 86762 RUBELLA ANTIBODY: CPT | Performed by: OBSTETRICS & GYNECOLOGY

## 2018-07-16 PROCEDURE — 80053 COMPREHEN METABOLIC PANEL: CPT | Performed by: OBSTETRICS & GYNECOLOGY

## 2018-07-16 NOTE — PATIENT INSTRUCTIONS
Prenatal Care  What is prenatal care?   Prenatal care is the care you receive when you are pregnant. It includes care given by your healthcare provider, support from your family, and an extra focus on giving yourself the care you need during this special time. Prenatal care improves your chances for a healthy pregnancy and healthy baby.   When should I see my healthcare provider?   Good care during pregnancy includes regularly scheduled prenatal exams.   If you are not yet pregnant but planning to get pregnant in the next few months, see your provider. Your provider may do some tests and talk about things you can do to have a healthy pregnancy and healthy baby.   You should schedule your first prenatal visit with your provider as soon as you think or know you are pregnant. Depending on your health and health history, your provider will probably schedule visits at least once a month for the first 6 months. During the 7th and 8th months you will see your provider every 2 weeks. During the last month you will probably see your provider once a week until you deliver your baby. If your pregnancy is high risk, your provider will probably want to see you more often. In some cases your provider may refer you to a medical specialist for more help with special needs, such as diabetes.   At each visit your healthcare provider will check to make sure that you and the baby are healthy. Regular visits can help you and your provider prevent possible problems. They can also help your provider find and treat any problems early. In addition to meeting your medical needs, your provider advise you about caring for yourself. You will talk about how to have a healthy diet and get plenty of exercise and rest. Your provider can also help you deal with the emotional changes that can happen during pregnancy.   What will happen at the first prenatal visit?   At your first visit, your provider will ask about your personal medical history. He  or she will also ask about the baby's father and your family health history. This information can help give your provider an idea of any problems you might have during your pregnancy. You will have a physical exam, including checks of your height, weight, and blood pressure and a pelvic exam. You will have a Pap test, urine tests, blood tests, and cultures of the cervix and vagina to check for infection.   Your provider will calculate your due date and the age of your baby. If your periods were regular before you got pregnant, and you are sure of the day when your last period started, your due date will be estimated to be 40 weeks from that day.   Your provider will talk to you about how to stay healthy during your pregnancy.   What will happen at other prenatal visits?   Your provider will check how you are doing and how the baby is developing. He or she will discuss how you are feeling, ask if you have any problems, and answer your questions.   During each prenatal visit your provider will:   weigh you   take your blood pressure   check your urine for sugar, protein, or bacteria   check your face, hands, ankles, and feet for swelling   listen to the baby's heartbeat   measure the size of the uterus to check the baby's growth.   At different times during the pregnancy, other exams and tests may be done. Some are routine and others are done only when a problem is suspected or you have a risk factor for a problem. Examples of other tests you might have are:   tests to check for genetic problems and some birth defects, such as:   chorionic villus sampling of cells from the placenta   amniocentesis to test the fluid around the baby   blood tests called triple or quad screens   ultrasound scans to check the baby's growth, development, and health and to look at your uterus, the amniotic sac, and the placenta   blood tests to check for diabetes   electronic monitoring to check the health of the baby.   How can I take care  of myself during my pregnancy?   Here are some things you can do to take good care of yourself during your pregnancy and prepare for the birth of your child:   Keep all appointments with your healthcare provider. Use these visits to discuss your pregnancy concerns or problems. Write down questions before each visit so that you will not forget about things you want to talk about.   Eat healthy meals that include whole grains, fresh fruits and vegetables, and calcium-rich foods, such as milk, cheese, and yogurt. Choose foods low in saturated fat. Do not eat uncooked or undercooked meats or fish.   Avoid certain fish with high levels of mercury. These fish include shark, kelsy mackerel, swordfish, and tilefish. Do not eat more than 12 ounces of fish per week, or no more than 6 ounces of tuna fish per week. Because albacore tuna fish is also high in mercury, choose light tuna.   Drink plenty of water each day.   Take vitamins, other supplements, and medicines as recommended by your provider.   Unless your healthcare provider tells you not to, try to be physically active for at least 30 minutes a day, most days of the week. If you are pressed for time, you might find it easier to exercise 10 minutes at a time, 3 times a day. Consider taking a prenatal exercise class.   Do not smoke, do not drink alcohol, and do not take illegal drugs.   Talk to your healthcare provider before you take any medicine, including nonprescription and herbal medicines. Some medicines are not safe during pregnancy.   Avoid hot tubs or saunas.   If you have cats in your home, do not empty the cat litter while you are pregnant. It may contain a parasite that causes an infection called toxoplasmosis, which can cause birth defects. Also, use gloves when you work in garden areas used by cats.   Stay away from toxic chemicals like insecticides, solvents (such as some  or paint thinners), lead, and mercury. Check labels on household products.  Most dangerous products have pregnancy warnings on their labels. Ask your healthcare provider about products if you are unsure.   Relax by taking breaks from work or chores.   Help reduce stress by sharing your feelings with others.   Report any violence or other types of abuse in your home.   Learn more about pregnancy, labor, and delivery. Read books, watch videos, go to a childbirth class, and talk with experienced moms.   Plan for the lifestyle changes a new baby will bring. Prepare for possible changes in your budget, work situation, daily schedule, and relationships with family and friends.   Talk to your provider about the pros and cons of breast-feeding.   Before and during your pregnancy, try to do everything you can to keep yourself and your baby healthy during your pregnancy.     Published by [employer].  This content is reviewed periodically and is subject to change as new health information becomes available. The information is intended to inform and educate and is not a replacement for medical evaluation, advice, diagnosis or treatment by a healthcare professional.   Developed by [employer].   ? 2010 [employer] and/or its affiliates. All Rights Reserved.   Copyright   [employer] 2011

## 2018-07-16 NOTE — PROGRESS NOTES
HPI:    Queta is a 32 year old yo  at 7 3/7  weeks by LMP and 6 week ultrasound who presents today for her initial OB visit.  Her last pap smear was in 2017 and was normal.    Issues: Some nausea    Past OB Hx: 1 c section, questionable atypical HUS vs HELLP, FAILURE TO DESCEND-we discussed c section due to this.  She will think about a RCS.     Father of baby: No health issues       Past Medical History:   Diagnosis Date     NO ACTIVE PROBLEMS              Past Surgical History:   Procedure Laterality Date     APPENDECTOMY  2014      SECTION N/A 10/29/2016    Procedure:  SECTION;  Surgeon: Adonis Dooley MD;  Location: [facility]        Family History   Problem Relation Age of Onset     Hypertension Mother      Osteoperosis Maternal Grandmother      Unknown/Adopted Father      Unknown/Adopted Paternal Grandmother      Unknown/Adopted Paternal Grandfather         Social History     Social History     Marital status:      Spouse name: N/A     Number of children: N/A     Years of education: N/A     Occupational History     Not on file.     Social History Main Topics     Smoking status: Never Smoker     Smokeless tobacco: Never Used     Alcohol use No     Drug use: No     Sexual activity: Yes     Partners: Male     Birth control/ protection: None     Other Topics Concern      Service No     Blood Transfusions No     Caffeine Concern No     Occupational Exposure Yes     childcare and      Hobby Hazards No     Sleep Concern No     Stress Concern No     Weight Concern No     Special Diet No     Back Care No     Exercise No     Walking      Seat Belt Yes     Social History Narrative    Lives in [place] with , Mikey.  She works as a  and does childcare.   Mikey works in a factory.   Neither of them smokes.   Has two indoor cats.  Advised about toxoplasmosis precautions.  No concerns about domestic violence.         Current Outpatient  Prescriptions:      levothyroxine (SYNTHROID/LEVOTHROID) 25 MCG tablet, Take 1 tablet (25 mcg) by mouth daily, Disp: 90 tablet, Rfl: 1     Prenatal Vit-Min-FA-Fish Oil (CVS PRENATAL GUMMY) 0.4-113.5 MG CHEW, Reported on 2017, Disp: , Rfl:       Objective:  Physical Exam:   Breast:  Benign exam, no masses palpated.  No skin changes, no axillary lymphadenopathy, no nipple discharge.  Axilla feel completely normal, no lymph node enlargement and non-tender.  Heart=RRR, no murmurs  Thyroid=normal, no masses, no TTP  Lungs=Clear to ascultation bilateral, non-labored breathing  Abd=soft, Nontender/nondistended, +bowel sounds x4  PELVIC:    External genitalia: normal without lesion  Vagina: normal mucosa and rugae, no discharge.  Cervix: normal without lesion, nulliparous, GC and Chlamydia obtained  Uterus: small, mobile, nontender.  Adnexa: non tender, without masses  Rectal: deffered  Ext=no clubbing or cyanosis    Assessment:   1.  IUP at 7 3/7 weeks here for her initial OB visit    Plan:    1.  PNV  2.  NOB Labs   3.  Discussed routine prenatal care, quad screen, GCT, anatomy scan at ~19 weeks, frequency of visits.  4.  Discussed first trimester screen and she declines  5.  [facility]  6.  RTC 4 weeks.  7.  Desires =consent signed  8.  History of atypical HUS vs HELLP=MFM referral, patient did see them last year and their recommendations are noted  9.  Small subchorionic hemorrhage=repeat ultrasound next week  10. Anxiety/depression=stable  11. Hypothyroidism=recent TSH was stable    Discussed physician coverage, tertiary support, diet, exercise, weight gain, schedule of visits, routine and indicated ultrasounds, and childbirth education.    Options for  testing for chromosomal and birth defects were discussed with the patient. Diagnostic tests include CVS and Amniocentesis. We discussed that these tests are definitive but invasive and do carry a risk of fetal loss.   Screening tests  include nuchal translucency/blood marker testing in the first trimester and quad screening in the second trimester. We discussed that these are screening tests and not diagnostic tests and that false positives and negatives are a distinct possibility.     25 minutes was spent face to face with the patient today discussing her history, diagnosis, and follow-up plan as noted above.  Over 50% of the visit was spent in counseling and coordination of care.    Total Visit Time: 30 minutes      Harriet Rascon

## 2018-07-16 NOTE — MR AVS SNAPSHOT
After Visit Summary   2018    Queta Cortez    MRN: [id number]           Patient Information     Date Of Birth          1985        Visit Information        Provider Department      2018 9:30 AM Harriet Rascon,  [facility]        Today's Diagnoses     High-risk pregnancy in first trimester    -  1    Other specified hypothyroidism        Desires  (vaginal birth after ) trial        Hemolysis, elevated liver enzymes, and low platelet (HELLP) syndrome during pregnancy, antepartum        TTP (thrombotic thrombocytopenic purpura) (H)          Care Instructions    Prenatal Care  What is prenatal care?   Prenatal care is the care you receive when you are pregnant. It includes care given by your healthcare provider, support from your family, and an extra focus on giving yourself the care you need during this special time. Prenatal care improves your chances for a healthy pregnancy and healthy baby.   When should I see my healthcare provider?   Good care during pregnancy includes regularly scheduled prenatal exams.   If you are not yet pregnant but planning to get pregnant in the next few months, see your provider. Your provider may do some tests and talk about things you can do to have a healthy pregnancy and healthy baby.   You should schedule your first prenatal visit with your provider as soon as you think or know you are pregnant. Depending on your health and health history, your provider will probably schedule visits at least once a month for the first 6 months. During the 7th and 8th months you will see your provider every 2 weeks. During the last month you will probably see your provider once a week until you deliver your baby. If your pregnancy is high risk, your provider will probably want to see you more often. In some cases your provider may refer you to a medical specialist for more help with special needs, such as diabetes.   At each visit your  healthcare provider will check to make sure that you and the baby are healthy. Regular visits can help you and your provider prevent possible problems. They can also help your provider find and treat any problems early. In addition to meeting your medical needs, your provider advise you about caring for yourself. You will talk about how to have a healthy diet and get plenty of exercise and rest. Your provider can also help you deal with the emotional changes that can happen during pregnancy.   What will happen at the first prenatal visit?   At your first visit, your provider will ask about your personal medical history. He or she will also ask about the baby's father and your family health history. This information can help give your provider an idea of any problems you might have during your pregnancy. You will have a physical exam, including checks of your height, weight, and blood pressure and a pelvic exam. You will have a Pap test, urine tests, blood tests, and cultures of the cervix and vagina to check for infection.   Your provider will calculate your due date and the age of your baby. If your periods were regular before you got pregnant, and you are sure of the day when your last period started, your due date will be estimated to be 40 weeks from that day.   Your provider will talk to you about how to stay healthy during your pregnancy.   What will happen at other prenatal visits?   Your provider will check how you are doing and how the baby is developing. He or she will discuss how you are feeling, ask if you have any problems, and answer your questions.   During each prenatal visit your provider will:   weigh you   take your blood pressure   check your urine for sugar, protein, or bacteria   check your face, hands, ankles, and feet for swelling   listen to the baby's heartbeat   measure the size of the uterus to check the baby's growth.   At different times during the pregnancy, other exams and tests may  be done. Some are routine and others are done only when a problem is suspected or you have a risk factor for a problem. Examples of other tests you might have are:   tests to check for genetic problems and some birth defects, such as:   chorionic villus sampling of cells from the placenta   amniocentesis to test the fluid around the baby   blood tests called triple or quad screens   ultrasound scans to check the baby's growth, development, and health and to look at your uterus, the amniotic sac, and the placenta   blood tests to check for diabetes   electronic monitoring to check the health of the baby.   How can I take care of myself during my pregnancy?   Here are some things you can do to take good care of yourself during your pregnancy and prepare for the birth of your child:   Keep all appointments with your healthcare provider. Use these visits to discuss your pregnancy concerns or problems. Write down questions before each visit so that you will not forget about things you want to talk about.   Eat healthy meals that include whole grains, fresh fruits and vegetables, and calcium-rich foods, such as milk, cheese, and yogurt. Choose foods low in saturated fat. Do not eat uncooked or undercooked meats or fish.   Avoid certain fish with high levels of mercury. These fish include shark, kelsy mackerel, swordfish, and tilefish. Do not eat more than 12 ounces of fish per week, or no more than 6 ounces of tuna fish per week. Because albacore tuna fish is also high in mercury, choose light tuna.   Drink plenty of water each day.   Take vitamins, other supplements, and medicines as recommended by your provider.   Unless your healthcare provider tells you not to, try to be physically active for at least 30 minutes a day, most days of the week. If you are pressed for time, you might find it easier to exercise 10 minutes at a time, 3 times a day. Consider taking a prenatal exercise class.   Do not smoke, do not drink  alcohol, and do not take illegal drugs.   Talk to your healthcare provider before you take any medicine, including nonprescription and herbal medicines. Some medicines are not safe during pregnancy.   Avoid hot tubs or saunas.   If you have cats in your home, do not empty the cat litter while you are pregnant. It may contain a parasite that causes an infection called toxoplasmosis, which can cause birth defects. Also, use gloves when you work in garden areas used by cats.   Stay away from toxic chemicals like insecticides, solvents (such as some  or paint thinners), lead, and mercury. Check labels on household products. Most dangerous products have pregnancy warnings on their labels. Ask your healthcare provider about products if you are unsure.   Relax by taking breaks from work or chores.   Help reduce stress by sharing your feelings with others.   Report any violence or other types of abuse in your home.   Learn more about pregnancy, labor, and delivery. Read books, watch videos, go to a childbirth class, and talk with experienced moms.   Plan for the lifestyle changes a new baby will bring. Prepare for possible changes in your budget, work situation, daily schedule, and relationships with family and friends.   Talk to your provider about the pros and cons of breast-feeding.   Before and during your pregnancy, try to do everything you can to keep yourself and your baby healthy during your pregnancy.     Published by [employer].  This content is reviewed periodically and is subject to change as new health information becomes available. The information is intended to inform and educate and is not a replacement for medical evaluation, advice, diagnosis or treatment by a healthcare professional.   Developed by [employer].   ? 2010 [facility] and/or its affiliates. All Rights Reserved.   Copyright   [employer] 2011              Follow-ups after your visit        Follow-up notes from your  care team     Return in about 4 weeks (around 8/13/2018).      Your next 10 appointments already scheduled     Oct 17, 2018 10:00 AM CDT   [facility]    [address] [facility] [address]   [phone]           Please do not eat 10-12 hours before your appointment if you are coming in fasting for labs on lipids, cholesterol, or glucose (sugar). This does not apply to pregnant women. Water, hot tea and black coffee (with nothing added) are okay. Do not drink other fluids, diet soda or chew gum.            Apr 17, 2019  1:00 PM CDT   LAB with LAB [facility] ([facility]    [address] [place] [id number] [phone]           Please do not eat 10-12 hours before your appointment if you are coming in fasting for labs on lipids, cholesterol, or glucose (sugar). This does not apply to pregnant women. Water, hot tea and black coffee (with nothing added) are okay. Do not drink other fluids, diet soda or chew gum.            Apr 17, 2019  1:45 PM CDT   Return Visit with Ivan Vanessa MD   [facility]    [address] [place] [id number] [phone]              Who to contact     If you have questions or need follow up information about today's clinic visit or your schedule please contact [facility] directly at [phone].  Normal or non-critical lab and imaging results will be communicated to you by MyChart, letter or phone within 4 business days after the clinic has received the results. If you do not hear from us within 7 days, please contact the clinic through MyChart or phone. If you have a critical or abnormal lab result, we will notify you by phone as soon as possible.  Submit refill requests through [employer] or call your pharmacy and they will forward the refill request to us. Please allow 3 business  days for your refill to be completed.          Additional Information About Your Visit        MyChart Information     [employer] gives you secure access to your electronic health record. If you see a primary care provider, you can also send messages to your care team and make appointments. If you have questions, please call your primary care clinic.  If you do not have a primary care provider, please call [phone] and they will assist you.        Care EveryWhere ID     This is your Care EveryWhere ID. This could be used by other organizations to access your [place] medical records  [id number]        Your Vitals Were     Pulse Last Period BMI (Body Mass Index)             80 05/25/2018 (Approximate) 21.05 kg/m2          Blood Pressure from Last 3 Encounters:   07/16/18 100/60   04/18/18 122/86   03/26/18 124/80    Weight from Last 3 Encounters:   07/16/18 148 lb (67.1 kg)   04/18/18 154 lb (69.9 kg)   03/26/18 152 lb (68.9 kg)              We Performed the Following     ABO/Rh type and screen     CBC with platelets differential     Chlamydia trachomatis PCR     Comprehensive metabolic panel     Hepatitis B surface antigen     HIV Antigen Antibody Combo     Neisseria gonorrhoeae PCR     Protein  random urine with Creat Ratio     Rubella Antibody IgG Quantitative     Treponema Abs w Reflex to RPR and Titer     UA with Microscopic reflex to Culture        Primary Care Provider Office Phone # Fax #    Alanis Lundberg [phone]       [address]  [facility] [address]        Equal Access to Services     [facility] Hospital[facility]AMES : Hadii aad ku hadasho Somayo, waaxda luqadaha, qaybta kaalmada alicia, anselmo freeman . So [facility] [place] [phone].    ATENCIÓN: Si habla español, tiene a nova disposición servicios gratuitos de asistencia lingüística. Llame al [phone].    We comply with applicable federal civil rights laws and [place] laws. We do not discriminate on the basis of  race, color, national origin, age, disability, sex, sexual orientation, or gender identity.            Thank you!     Thank you for choosing [facility]  for your care. Our goal is always to provide you with excellent care. Hearing back from our patients is one way we can continue to improve our services. Please take a few minutes to complete the written survey that you may receive in the mail after your visit with us. Thank you!             Your Updated Medication List - Protect others around you: Learn how to safely use, store and throw away your medicines at www.disposemymeds.org.          This list is accurate as of 7/16/18 10:06 AM.  Always use your most recent med list.                   Brand Name Dispense Instructions for use Diagnosis    CVS PRENATAL GUMMY 0.4-113.5 MG Chew      Reported on 4/19/2017        levothyroxine 25 MCG tablet    SYNTHROID/LEVOTHROID    90 tablet    Take 1 tablet (25 mcg) by mouth daily    Hypothyroidism, unspecified type

## 2018-07-17 LAB
C TRACH DNA SPEC QL NAA+PROBE: NEGATIVE
HBV SURFACE AG SERPL QL IA: NONREACTIVE
HIV 1+2 AB+HIV1 P24 AG SERPL QL IA: NONREACTIVE
N GONORRHOEA DNA SPEC QL NAA+PROBE: NEGATIVE
RUBV IGG SERPL IA-ACNC: 11 IU/ML
SPECIMEN SOURCE: NORMAL
SPECIMEN SOURCE: NORMAL
T PALLIDUM AB SER QL: NONREACTIVE

## 2018-07-24 ENCOUNTER — RADIANT APPOINTMENT (OUTPATIENT)
Dept: ULTRASOUND IMAGING | Facility: OTHER | Age: 33
End: 2018-07-24
Attending: NURSE PRACTITIONER
Payer: COMMERCIAL

## 2018-07-24 DIAGNOSIS — Z36.89 ESTABLISH GESTATIONAL AGE, ULTRASOUND: ICD-10-CM

## 2018-07-24 PROCEDURE — 76801 OB US < 14 WKS SINGLE FETUS: CPT

## 2018-08-06 ENCOUNTER — MYC MEDICAL ADVICE (OUTPATIENT)
Dept: OBGYN | Facility: OTHER | Age: 33
End: 2018-08-06

## 2018-08-06 NOTE — TELEPHONE ENCOUNTER
Responded via [employer]. Brynn Durant RN, BSN     TC please connect with patient and give MFM number. Brynn Durant RN, BSN

## 2018-08-07 ENCOUNTER — MYC MEDICAL ADVICE (OUTPATIENT)
Dept: OBGYN | Facility: OTHER | Age: 33
End: 2018-08-07

## 2018-08-07 DIAGNOSIS — O09.291: Primary | ICD-10-CM

## 2018-08-07 NOTE — TELEPHONE ENCOUNTER
I called and spoke to patient.  All questions answered and patient verbalizes understanding.    Harriet Rascon

## 2018-08-08 NOTE — TELEPHONE ENCOUNTER
Dr Rascon patient is wondering if she can continue being seen at [facility] in [place] as this is where she has gone in the past or would you like her to go to [facility] as that was the most recent referral?

## 2018-08-09 NOTE — TELEPHONE ENCOUNTER
Please let patient know I placed the [facility] referral for [place].  Thanks.    Harriet Rascon

## 2018-08-13 ENCOUNTER — PRENATAL OFFICE VISIT (OUTPATIENT)
Dept: OBGYN | Facility: OTHER | Age: 33
End: 2018-08-13
Payer: COMMERCIAL

## 2018-08-13 ENCOUNTER — TELEPHONE (OUTPATIENT)
Dept: OBGYN | Facility: OTHER | Age: 33
End: 2018-08-13

## 2018-08-13 VITALS
DIASTOLIC BLOOD PRESSURE: 70 MMHG | BODY MASS INDEX: 21.47 KG/M2 | SYSTOLIC BLOOD PRESSURE: 110 MMHG | HEART RATE: 62 BPM | WEIGHT: 151 LBS

## 2018-08-13 DIAGNOSIS — O09.299 H/O HELLP SYNDROME, CURRENTLY PREGNANT: Primary | ICD-10-CM

## 2018-08-13 DIAGNOSIS — O34.219 DESIRES VBAC (VAGINAL BIRTH AFTER CESAREAN) TRIAL: ICD-10-CM

## 2018-08-13 DIAGNOSIS — O14.20 HEMOLYSIS, ELEVATED LIVER ENZYMES, AND LOW PLATELET (HELLP) SYNDROME DURING PREGNANCY, ANTEPARTUM: ICD-10-CM

## 2018-08-13 DIAGNOSIS — O41.8X90 SUBCHORIONIC HEMATOMA, ANTEPARTUM, SINGLE OR UNSPECIFIED FETUS: ICD-10-CM

## 2018-08-13 DIAGNOSIS — O09.91 HIGH-RISK PREGNANCY IN FIRST TRIMESTER: Primary | ICD-10-CM

## 2018-08-13 DIAGNOSIS — F41.1 GAD (GENERALIZED ANXIETY DISORDER): ICD-10-CM

## 2018-08-13 DIAGNOSIS — O46.8X9 SUBCHORIONIC HEMATOMA, ANTEPARTUM, SINGLE OR UNSPECIFIED FETUS: ICD-10-CM

## 2018-08-13 DIAGNOSIS — E03.9 HYPOTHYROIDISM, UNSPECIFIED TYPE: Primary | ICD-10-CM

## 2018-08-13 DIAGNOSIS — E03.9 HYPOTHYROIDISM: ICD-10-CM

## 2018-08-13 DIAGNOSIS — O09.299 HX OF HELLP SYNDROME, CURRENTLY PREGNANT: Primary | ICD-10-CM

## 2018-08-13 LAB — TSH SERPL DL<=0.005 MIU/L-ACNC: 1.21 MU/L (ref 0.4–4)

## 2018-08-13 PROCEDURE — 84443 ASSAY THYROID STIM HORMONE: CPT | Performed by: OBSTETRICS & GYNECOLOGY

## 2018-08-13 PROCEDURE — 36415 COLL VENOUS BLD VENIPUNCTURE: CPT | Performed by: OBSTETRICS & GYNECOLOGY

## 2018-08-13 PROCEDURE — 99207 ZZC PRENATAL VISIT: CPT | Performed by: OBSTETRICS & GYNECOLOGY

## 2018-08-13 NOTE — NURSING NOTE
"Chief Complaint   Patient presents with     Prenatal Care       Initial /70 (BP Location: Left arm, Patient Position: Chair, Cuff Size: Adult Regular)  Pulse 62  Wt 151 lb (68.5 kg)  LMP 2018 (Approximate)  BMI 21.47 kg/m2 Estimated body mass index is 21.47 kg/(m^2) as calculated from the following:    Height as of 18: 5' 10.32\" (1.786 m).    Weight as of this encounter: 151 lb (68.5 kg).  BP completed using cuff size: regular        Chelsi Ramirez, [facility]  2018          "

## 2018-08-13 NOTE — TELEPHONE ENCOUNTER
Patient was referred to [facility] by Dr. Rascon.  Patient had not heard yet from [facility] to schedule.  Phone call to [facility]- incorrect referral was placed so they never received.  New referral order.  [facility] will review her chart and contact patient in the next couple days to schedule for Level 2 US and possible consult (determined by perinatologist's after review of patient's chart).  Left voicemail for patient to call.

## 2018-08-13 NOTE — PROGRESS NOTES
33 year old  at 11w3d weeks presents to the clinic for a routine prenatal visit.  Feeling well.  No vaginal bleeding, leakage of fluid, or contractions   Fundal height=s=d  CYZs=265  History of HELLP=pt will see MFM for anatomy scan  Will start ASA next week  Taking blood pressures at home.  Will bring monitor in to check it against ours  Subchorionic hemorrhage=will repeat ultrasound   Anxiety/depression=stable  RTC 4 weeks.    Harriet Rsacon

## 2018-08-13 NOTE — MR AVS SNAPSHOT
After Visit Summary   2018    Queta Cortez    MRN: [id number]           Patient Information     Date Of Birth          1985        Visit Information        Provider Department      2018 9:30 AM Harriet Rascon,  [facility]        Today's Diagnoses     High-risk pregnancy in first trimester    -  1    Subchorionic hematoma, antepartum, single or unspecified fetus        Desires  (vaginal birth after ) trial        Hemolysis, elevated liver enzymes, and low platelet (HELLP) syndrome during pregnancy, antepartum        JOAO (generalized anxiety disorder)          Care Instructions    Prenatal Care  What is prenatal care?   Prenatal care is the care you receive when you are pregnant. It includes care given by your healthcare provider, support from your family, and an extra focus on giving yourself the care you need during this special time. Prenatal care improves your chances for a healthy pregnancy and healthy baby.   When should I see my healthcare provider?   Good care during pregnancy includes regularly scheduled prenatal exams.   If you are not yet pregnant but planning to get pregnant in the next few months, see your provider. Your provider may do some tests and talk about things you can do to have a healthy pregnancy and healthy baby.   You should schedule your first prenatal visit with your provider as soon as you think or know you are pregnant. Depending on your health and health history, your provider will probably schedule visits at least once a month for the first 6 months. During the 7th and 8th months you will see your provider every 2 weeks. During the last month you will probably see your provider once a week until you deliver your baby. If your pregnancy is high risk, your provider will probably want to see you more often. In some cases your provider may refer you to a medical specialist for more help with special needs, such as diabetes.    At each visit your healthcare provider will check to make sure that you and the baby are healthy. Regular visits can help you and your provider prevent possible problems. They can also help your provider find and treat any problems early. In addition to meeting your medical needs, your provider advise you about caring for yourself. You will talk about how to have a healthy diet and get plenty of exercise and rest. Your provider can also help you deal with the emotional changes that can happen during pregnancy.   What will happen at the first prenatal visit?   At your first visit, your provider will ask about your personal medical history. He or she will also ask about the baby's father and your family health history. This information can help give your provider an idea of any problems you might have during your pregnancy. You will have a physical exam, including checks of your height, weight, and blood pressure and a pelvic exam. You will have a Pap test, urine tests, blood tests, and cultures of the cervix and vagina to check for infection.   Your provider will calculate your due date and the age of your baby. If your periods were regular before you got pregnant, and you are sure of the day when your last period started, your due date will be estimated to be 40 weeks from that day.   Your provider will talk to you about how to stay healthy during your pregnancy.   What will happen at other prenatal visits?   Your provider will check how you are doing and how the baby is developing. He or she will discuss how you are feeling, ask if you have any problems, and answer your questions.   During each prenatal visit your provider will:   weigh you   take your blood pressure   check your urine for sugar, protein, or bacteria   check your face, hands, ankles, and feet for swelling   listen to the baby's heartbeat   measure the size of the uterus to check the baby's growth.   At different times during the pregnancy, other  exams and tests may be done. Some are routine and others are done only when a problem is suspected or you have a risk factor for a problem. Examples of other tests you might have are:   tests to check for genetic problems and some birth defects, such as:   chorionic villus sampling of cells from the placenta   amniocentesis to test the fluid around the baby   blood tests called triple or quad screens   ultrasound scans to check the baby's growth, development, and health and to look at your uterus, the amniotic sac, and the placenta   blood tests to check for diabetes   electronic monitoring to check the health of the baby.   How can I take care of myself during my pregnancy?   Here are some things you can do to take good care of yourself during your pregnancy and prepare for the birth of your child:   Keep all appointments with your healthcare provider. Use these visits to discuss your pregnancy concerns or problems. Write down questions before each visit so that you will not forget about things you want to talk about.   Eat healthy meals that include whole grains, fresh fruits and vegetables, and calcium-rich foods, such as milk, cheese, and yogurt. Choose foods low in saturated fat. Do not eat uncooked or undercooked meats or fish.   Avoid certain fish with high levels of mercury. These fish include shark, kelsy mackerel, swordfish, and tilefish. Do not eat more than 12 ounces of fish per week, or no more than 6 ounces of tuna fish per week. Because albacore tuna fish is also high in mercury, choose light tuna.   Drink plenty of water each day.   Take vitamins, other supplements, and medicines as recommended by your provider.   Unless your healthcare provider tells you not to, try to be physically active for at least 30 minutes a day, most days of the week. If you are pressed for time, you might find it easier to exercise 10 minutes at a time, 3 times a day. Consider taking a prenatal exercise class.   Do not  smoke, do not drink alcohol, and do not take illegal drugs.   Talk to your healthcare provider before you take any medicine, including nonprescription and herbal medicines. Some medicines are not safe during pregnancy.   Avoid hot tubs or saunas.   If you have cats in your home, do not empty the cat litter while you are pregnant. It may contain a parasite that causes an infection called toxoplasmosis, which can cause birth defects. Also, use gloves when you work in garden areas used by cats.   Stay away from toxic chemicals like insecticides, solvents (such as some  or paint thinners), lead, and mercury. Check labels on household products. Most dangerous products have pregnancy warnings on their labels. Ask your healthcare provider about products if you are unsure.   Relax by taking breaks from work or chores.   Help reduce stress by sharing your feelings with others.   Report any violence or other types of abuse in your home.   Learn more about pregnancy, labor, and delivery. Read books, watch videos, go to a childbirth class, and talk with experienced moms.   Plan for the lifestyle changes a new baby will bring. Prepare for possible changes in your budget, work situation, daily schedule, and relationships with family and friends.   Talk to your provider about the pros and cons of breast-feeding.   Before and during your pregnancy, try to do everything you can to keep yourself and your baby healthy during your pregnancy.     Published by [employer].  This content is reviewed periodically and is subject to change as new health information becomes available. The information is intended to inform and educate and is not a replacement for medical evaluation, advice, diagnosis or treatment by a healthcare professional.   Developed by [employer].   ? 2010 [facility] and/or its affiliates. All Rights Reserved.   Copyright   Clinical Reference Systems 2011              Follow-ups after your visit        Follow-up  notes from your care team     Return in about 4 weeks (around 9/10/2018).      Your next 10 appointments already scheduled     Oct 17, 2018 10:00 AM CDT   [facility]    [address] [facility] [address]   [phone]           Please do not eat 10-12 hours before your appointment if you are coming in fasting for labs on lipids, cholesterol, or glucose (sugar). This does not apply to pregnant women. Water, hot tea and black coffee (with nothing added) are okay. Do not drink other fluids, diet soda or chew gum.            Apr 17, 2019  1:00 PM CDT   LAB with [facility] ([facility])    [address] [place] [id number] [phone]           Please do not eat 10-12 hours before your appointment if you are coming in fasting for labs on lipids, cholesterol, or glucose (sugar). This does not apply to pregnant women. Water, hot tea and black coffee (with nothing added) are okay. Do not drink other fluids, diet soda or chew gum.            Apr 17, 2019  1:45 PM CDT   Return Visit with Ivan Vanessa MD   [facility]    [address] [place] [id number] [phone]              Future tests that were ordered for you today     Open Future Orders        Priority Expected Expires Ordered    US OB Single Follow Up Repeat Routine  11/11/2018 8/13/2018            Who to contact     If you have questions or need follow up information about today's clinic visit or your schedule please contact [facility] directly at [phone].  Normal or non-critical lab and imaging results will be communicated to you by MyChart, letter or phone within 4 business days after the clinic has received the results. If you do not hear from us within 7 days, please contact the clinic through MyChart or phone. If you have a critical or  abnormal lab result, we will notify you by phone as soon as possible.  Submit refill requests through [facility] or call your pharmacy and they will forward the refill request to us. Please allow 3 business days for your refill to be completed.          Additional Information About Your Visit        [employer] Information     [facility] gives you secure access to your electronic health record. If you see a primary care provider, you can also send messages to your care team and make appointments. If you have questions, please call your primary care clinic.  If you do not have a primary care provider, please call [phone] and they will assist you.        Care EveryWhere ID     This is your Care EveryWhere ID. This could be used by other organizations to access your [facility] medical records  [id number]        Your Vitals Were     Pulse Last Period BMI (Body Mass Index)             62 05/25/2018 (Approximate) 21.47 kg/m2          Blood Pressure from Last 3 Encounters:   08/13/18 110/70   07/16/18 100/60   04/18/18 122/86    Weight from Last 3 Encounters:   08/13/18 151 lb (68.5 kg)   07/16/18 148 lb (67.1 kg)   04/18/18 154 lb (69.9 kg)               Primary Care Provider Office Phone # Fax #    Alanis Lundberg [phone]       [address] [place] [id number]        Equal Access to Services     [facility] JAMIE : Hadii poonam ku hadasho Sojannetteali, waaxda luqadaha, qaybta kaalmada alicia, anselmo freeman . So [facility] [phone].    ATENCIÓN: Si habla español, tiene a nova disposición servicios gratuitos de asistencia lingüística. Precious al [phone].    We comply with applicable federal civil rights laws and [place] laws. We do not discriminate on the basis of race, color, national origin, age, disability, sex, sexual orientation, or gender identity.            Thank you!     Thank you for choosing [facility]  for your care. Our goal is always to provide you  with excellent care. Hearing back from our patients is one way we can continue to improve our services. Please take a few minutes to complete the written survey that you may receive in the mail after your visit with us. Thank you!             Your Updated Medication List - Protect others around you: Learn how to safely use, store and throw away your medicines at www.disposemymeds.org.          This list is accurate as of 8/13/18  9:36 AM.  Always use your most recent med list.                   Brand Name Dispense Instructions for use Diagnosis    [facility] PRENATAL GUMMY 0.4-113.5 MG Chew      Reported on 4/19/2017        levothyroxine 25 MCG tablet    SYNTHROID/LEVOTHROID    90 tablet    Take 1 tablet (25 mcg) by mouth daily    Hypothyroidism, unspecified type

## 2018-08-13 NOTE — PATIENT INSTRUCTIONS
Prenatal Care  What is prenatal care?   Prenatal care is the care you receive when you are pregnant. It includes care given by your healthcare provider, support from your family, and an extra focus on giving yourself the care you need during this special time. Prenatal care improves your chances for a healthy pregnancy and healthy baby.   When should I see my healthcare provider?   Good care during pregnancy includes regularly scheduled prenatal exams.   If you are not yet pregnant but planning to get pregnant in the next few months, see your provider. Your provider may do some tests and talk about things you can do to have a healthy pregnancy and healthy baby.   You should schedule your first prenatal visit with your provider as soon as you think or know you are pregnant. Depending on your health and health history, your provider will probably schedule visits at least once a month for the first 6 months. During the 7th and 8th months you will see your provider every 2 weeks. During the last month you will probably see your provider once a week until you deliver your baby. If your pregnancy is high risk, your provider will probably want to see you more often. In some cases your provider may refer you to a medical specialist for more help with special needs, such as diabetes.   At each visit your healthcare provider will check to make sure that you and the baby are healthy. Regular visits can help you and your provider prevent possible problems. They can also help your provider find and treat any problems early. In addition to meeting your medical needs, your provider advise you about caring for yourself. You will talk about how to have a healthy diet and get plenty of exercise and rest. Your provider can also help you deal with the emotional changes that can happen during pregnancy.   What will happen at the first prenatal visit?   At your first visit, your provider will ask about your personal medical history. He  or she will also ask about the baby's father and your family health history. This information can help give your provider an idea of any problems you might have during your pregnancy. You will have a physical exam, including checks of your height, weight, and blood pressure and a pelvic exam. You will have a Pap test, urine tests, blood tests, and cultures of the cervix and vagina to check for infection.   Your provider will calculate your due date and the age of your baby. If your periods were regular before you got pregnant, and you are sure of the day when your last period started, your due date will be estimated to be 40 weeks from that day.   Your provider will talk to you about how to stay healthy during your pregnancy.   What will happen at other prenatal visits?   Your provider will check how you are doing and how the baby is developing. He or she will discuss how you are feeling, ask if you have any problems, and answer your questions.   During each prenatal visit your provider will:   weigh you   take your blood pressure   check your urine for sugar, protein, or bacteria   check your face, hands, ankles, and feet for swelling   listen to the baby's heartbeat   measure the size of the uterus to check the baby's growth.   At different times during the pregnancy, other exams and tests may be done. Some are routine and others are done only when a problem is suspected or you have a risk factor for a problem. Examples of other tests you might have are:   tests to check for genetic problems and some birth defects, such as:   chorionic villus sampling of cells from the placenta   amniocentesis to test the fluid around the baby   blood tests called triple or quad screens   ultrasound scans to check the baby's growth, development, and health and to look at your uterus, the amniotic sac, and the placenta   blood tests to check for diabetes   electronic monitoring to check the health of the baby.   How can I take care  of myself during my pregnancy?   Here are some things you can do to take good care of yourself during your pregnancy and prepare for the birth of your child:   Keep all appointments with your healthcare provider. Use these visits to discuss your pregnancy concerns or problems. Write down questions before each visit so that you will not forget about things you want to talk about.   Eat healthy meals that include whole grains, fresh fruits and vegetables, and calcium-rich foods, such as milk, cheese, and yogurt. Choose foods low in saturated fat. Do not eat uncooked or undercooked meats or fish.   Avoid certain fish with high levels of mercury. These fish include shark, kelsy mackerel, swordfish, and tilefish. Do not eat more than 12 ounces of fish per week, or no more than 6 ounces of tuna fish per week. Because albacore tuna fish is also high in mercury, choose light tuna.   Drink plenty of water each day.   Take vitamins, other supplements, and medicines as recommended by your provider.   Unless your healthcare provider tells you not to, try to be physically active for at least 30 minutes a day, most days of the week. If you are pressed for time, you might find it easier to exercise 10 minutes at a time, 3 times a day. Consider taking a prenatal exercise class.   Do not smoke, do not drink alcohol, and do not take illegal drugs.   Talk to your healthcare provider before you take any medicine, including nonprescription and herbal medicines. Some medicines are not safe during pregnancy.   Avoid hot tubs or saunas.   If you have cats in your home, do not empty the cat litter while you are pregnant. It may contain a parasite that causes an infection called toxoplasmosis, which can cause birth defects. Also, use gloves when you work in garden areas used by cats.   Stay away from toxic chemicals like insecticides, solvents (such as some  or paint thinners), lead, and mercury. Check labels on household products.  Most dangerous products have pregnancy warnings on their labels. Ask your healthcare provider about products if you are unsure.   Relax by taking breaks from work or chores.   Help reduce stress by sharing your feelings with others.   Report any violence or other types of abuse in your home.   Learn more about pregnancy, labor, and delivery. Read books, watch videos, go to a childbirth class, and talk with experienced moms.   Plan for the lifestyle changes a new baby will bring. Prepare for possible changes in your budget, work situation, daily schedule, and relationships with family and friends.   Talk to your provider about the pros and cons of breast-feeding.   Before and during your pregnancy, try to do everything you can to keep yourself and your baby healthy during your pregnancy.     Published by [employer].  This content is reviewed periodically and is subject to change as new health information becomes available. The information is intended to inform and educate and is not a replacement for medical evaluation, advice, diagnosis or treatment by a healthcare professional.   Developed by [employer].   ? 2010 [employer] and/or its affiliates. All Rights Reserved.   Copyright   Clinical Reference Systems 2011

## 2018-08-20 ENCOUNTER — RADIANT APPOINTMENT (OUTPATIENT)
Dept: ULTRASOUND IMAGING | Facility: OTHER | Age: 33
End: 2018-08-20
Attending: OBSTETRICS & GYNECOLOGY
Payer: COMMERCIAL

## 2018-08-20 DIAGNOSIS — O46.8X9 SUBCHORIONIC HEMATOMA, ANTEPARTUM, SINGLE OR UNSPECIFIED FETUS: ICD-10-CM

## 2018-08-20 DIAGNOSIS — O41.8X90 SUBCHORIONIC HEMATOMA, ANTEPARTUM, SINGLE OR UNSPECIFIED FETUS: ICD-10-CM

## 2018-08-20 PROCEDURE — 76816 OB US FOLLOW-UP PER FETUS: CPT

## 2018-09-14 ENCOUNTER — PRENATAL OFFICE VISIT (OUTPATIENT)
Dept: OBGYN | Facility: CLINIC | Age: 33
End: 2018-09-14
Payer: COMMERCIAL

## 2018-09-14 VITALS
DIASTOLIC BLOOD PRESSURE: 68 MMHG | SYSTOLIC BLOOD PRESSURE: 118 MMHG | WEIGHT: 157 LBS | HEART RATE: 62 BPM | BODY MASS INDEX: 22.33 KG/M2

## 2018-09-14 DIAGNOSIS — O09.92 HIGH-RISK PREGNANCY IN SECOND TRIMESTER: Primary | ICD-10-CM

## 2018-09-14 DIAGNOSIS — Z23 FLU VACCINE NEED: ICD-10-CM

## 2018-09-14 DIAGNOSIS — O34.219 DESIRES VBAC (VAGINAL BIRTH AFTER CESAREAN) TRIAL: ICD-10-CM

## 2018-09-14 DIAGNOSIS — E03.9 HYPOTHYROIDISM, UNSPECIFIED TYPE: ICD-10-CM

## 2018-09-14 DIAGNOSIS — O14.20 HEMOLYSIS, ELEVATED LIVER ENZYMES, AND LOW PLATELET (HELLP) SYNDROME DURING PREGNANCY, ANTEPARTUM: ICD-10-CM

## 2018-09-14 PROCEDURE — 99207 ZZC PRENATAL VISIT: CPT | Performed by: OBSTETRICS & GYNECOLOGY

## 2018-09-14 RX ORDER — LEVOTHYROXINE SODIUM 25 UG/1
25 TABLET ORAL DAILY
Qty: 90 TABLET | Refills: 1 | Status: SHIPPED | OUTPATIENT
Start: 2018-09-14 | End: 2018-12-21

## 2018-09-14 NOTE — MR AVS SNAPSHOT
After Visit Summary   2018    Queta Cortez    MRN: [id number]           Patient Information     Date Of Birth          1985        Visit Information        Provider Department      2018 10:30 AM Harriet Rascon, DO [facility]        Today's Diagnoses     High-risk pregnancy in second trimester    -  1    Flu vaccine need        Hypothyroidism, unspecified type        Desires  (vaginal birth after ) trial        Hemolysis, elevated liver enzymes, and low platelet (HELLP) syndrome during pregnancy, antepartum          Care Instructions    Prenatal Care  What is prenatal care?   Prenatal care is the care you receive when you are pregnant. It includes care given by your healthcare provider, support from your family, and an extra focus on giving yourself the care you need during this special time. Prenatal care improves your chances for a healthy pregnancy and healthy baby.   When should I see my healthcare provider?   Good care during pregnancy includes regularly scheduled prenatal exams.   If you are not yet pregnant but planning to get pregnant in the next few months, see your provider. Your provider may do some tests and talk about things you can do to have a healthy pregnancy and healthy baby.   You should schedule your first prenatal visit with your provider as soon as you think or know you are pregnant. Depending on your health and health history, your provider will probably schedule visits at least once a month for the first 6 months. During the 7th and 8th months you will see your provider every 2 weeks. During the last month you will probably see your provider once a week until you deliver your baby. If your pregnancy is high risk, your provider will probably want to see you more often. In some cases your provider may refer you to a medical specialist for more help with special needs, such as diabetes.   At each visit your healthcare provider will  check to make sure that you and the baby are healthy. Regular visits can help you and your provider prevent possible problems. They can also help your provider find and treat any problems early. In addition to meeting your medical needs, your provider advise you about caring for yourself. You will talk about how to have a healthy diet and get plenty of exercise and rest. Your provider can also help you deal with the emotional changes that can happen during pregnancy.   What will happen at the first prenatal visit?   At your first visit, your provider will ask about your personal medical history. He or she will also ask about the baby's father and your family health history. This information can help give your provider an idea of any problems you might have during your pregnancy. You will have a physical exam, including checks of your height, weight, and blood pressure and a pelvic exam. You will have a Pap test, urine tests, blood tests, and cultures of the cervix and vagina to check for infection.   Your provider will calculate your due date and the age of your baby. If your periods were regular before you got pregnant, and you are sure of the day when your last period started, your due date will be estimated to be 40 weeks from that day.   Your provider will talk to you about how to stay healthy during your pregnancy.   What will happen at other prenatal visits?   Your provider will check how you are doing and how the baby is developing. He or she will discuss how you are feeling, ask if you have any problems, and answer your questions.   During each prenatal visit your provider will:   weigh you   take your blood pressure   check your urine for sugar, protein, or bacteria   check your face, hands, ankles, and feet for swelling   listen to the baby's heartbeat   measure the size of the uterus to check the baby's growth.   At different times during the pregnancy, other exams and tests may be done. Some are routine  and others are done only when a problem is suspected or you have a risk factor for a problem. Examples of other tests you might have are:   tests to check for genetic problems and some birth defects, such as:   chorionic villus sampling of cells from the placenta   amniocentesis to test the fluid around the baby   blood tests called triple or quad screens   ultrasound scans to check the baby's growth, development, and health and to look at your uterus, the amniotic sac, and the placenta   blood tests to check for diabetes   electronic monitoring to check the health of the baby.   How can I take care of myself during my pregnancy?   Here are some things you can do to take good care of yourself during your pregnancy and prepare for the birth of your child:   Keep all appointments with your healthcare provider. Use these visits to discuss your pregnancy concerns or problems. Write down questions before each visit so that you will not forget about things you want to talk about.   Eat healthy meals that include whole grains, fresh fruits and vegetables, and calcium-rich foods, such as milk, cheese, and yogurt. Choose foods low in saturated fat. Do not eat uncooked or undercooked meats or fish.   Avoid certain fish with high levels of mercury. These fish include shark, kelsy mackerel, swordfish, and tilefish. Do not eat more than 12 ounces of fish per week, or no more than 6 ounces of tuna fish per week. Because albacore tuna fish is also high in mercury, choose light tuna.   Drink plenty of water each day.   Take vitamins, other supplements, and medicines as recommended by your provider.   Unless your healthcare provider tells you not to, try to be physically active for at least 30 minutes a day, most days of the week. If you are pressed for time, you might find it easier to exercise 10 minutes at a time, 3 times a day. Consider taking a prenatal exercise class.   Do not smoke, do not drink alcohol, and do not take  illegal drugs.   Talk to your healthcare provider before you take any medicine, including nonprescription and herbal medicines. Some medicines are not safe during pregnancy.   Avoid hot tubs or saunas.   If you have cats in your home, do not empty the cat litter while you are pregnant. It may contain a parasite that causes an infection called toxoplasmosis, which can cause birth defects. Also, use gloves when you work in garden areas used by cats.   Stay away from toxic chemicals like insecticides, solvents (such as some  or paint thinners), lead, and mercury. Check labels on household products. Most dangerous products have pregnancy warnings on their labels. Ask your healthcare provider about products if you are unsure.   Relax by taking breaks from work or chores.   Help reduce stress by sharing your feelings with others.   Report any violence or other types of abuse in your home.   Learn more about pregnancy, labor, and delivery. Read books, watch videos, go to a childbirth class, and talk with experienced moms.   Plan for the lifestyle changes a new baby will bring. Prepare for possible changes in your budget, work situation, daily schedule, and relationships with family and friends.   Talk to your provider about the pros and cons of breast-feeding.   Before and during your pregnancy, try to do everything you can to keep yourself and your baby healthy during your pregnancy.     Published by [employer].  This content is reviewed periodically and is subject to change as new health information becomes available. The information is intended to inform and educate and is not a replacement for medical evaluation, advice, diagnosis or treatment by a healthcare professional.   Developed by [employer].   ? 2010 [facility] and/or its affiliates. All Rights Reserved.   Copyright   Clinical Reference Systems 2011              Follow-ups after your visit        Follow-up notes from your care team     Return in  about 4 weeks (around 10/12/2018).      Your next 10 appointments already scheduled     Oct 01, 2018 10:15 AM CDT   MFM US COMP with [id number]   [facility] [place] ([facility])    [address]  [facility] [id number] [phone]           Wear comfortable clothes and leave your valuables at home.            Oct 01, 2018 10:45 AM CDT   Radiology MD with MELLISSA VALIENTE MD   [facility] - [facility])    [address]  [facility] [address]   [phone]           Please arrive at the time given for your first appointment. This visit is used internally to schedule the physician's time during your ultrasound.            Oct 17, 2018 10:00 AM CDT   LAB with NL LAB [facility] ([facility])    [address] [facility] [id number] [phone]           Please do not eat 10-12 hours before your appointment if you are coming in fasting for labs on lipids, cholesterol, or glucose (sugar). This does not apply to pregnant women. Water, hot tea and black coffee (with nothing added) are okay. Do not drink other fluids, diet soda or chew gum.            Apr 17, 2019  1:00 PM CDT   LAB with LAB ONC [facility] ([facility])    [address] [place] [id number] [phone]           Please do not eat 10-12 hours before your appointment if you are coming in fasting for labs on lipids, cholesterol, or glucose (sugar). This does not apply to pregnant women. Water, hot tea and black coffee (with nothing added) are okay. Do not drink other fluids, diet soda or chew gum.            Apr 17, 2019  1:45 PM CDT   Return Visit with Ivan Vanessa MD   [facility] ([facility])    [address] [facility] [id number] [phone]              Who to  contact     If you have questions or need follow up information about today's clinic visit or your schedule please contact [facility] directly at [phone].  Normal or non-critical lab and imaging results will be communicated to you by MyChart, letter or phone within 4 business days after the clinic has received the results. If you do not hear from us within 7 days, please contact the clinic through MyChart or phone. If you have a critical or abnormal lab result, we will notify you by phone as soon as possible.  Submit refill requests through [employer] or call your pharmacy and they will forward the refill request to us. Please allow 3 business days for your refill to be completed.          Additional Information About Your Visit        [employer] Information     [employer] gives you secure access to your electronic health record. If you see a primary care provider, you can also send messages to your care team and make appointments. If you have questions, please call your primary care clinic.  If you do not have a primary care provider, please call [phone] and they will assist you.        Care EveryWhere ID     This is your Care EveryWhere ID. This could be used by other organizations to access your [place] medical records  [id number]        Your Vitals Were     Pulse Last Period BMI (Body Mass Index)             62 05/25/2018 (Approximate) 22.33 kg/m2          Blood Pressure from Last 3 Encounters:   09/14/18 118/68   08/13/18 110/70   07/16/18 100/60    Weight from Last 3 Encounters:   09/14/18 157 lb (71.2 kg)   08/13/18 151 lb (68.5 kg)   07/16/18 148 lb (67.1 kg)              Today, you had the following     No orders found for display         Where to get your medicines      These medications were sent to [id number] - [place], [place] - [address] [facility]  [address] [facility] [address]    Hours:  Formerly Renetta martel   [birth year]03  Phone:  [phone]     levothyroxine 25 MCG  tablet          Primary Care Provider Office Phone # Fax #    Alanis Lundberg [phone]       [address] [place] [address] [address]  [facility] [address]        Equal Access to Services     JOAQUIM AYALA : Hadii aad ku hadluis Masters, wadulceda luqadaha, qapeterta kagerardda alicia, anselmo frankin hayaaaimee whitney joshuamalvin colón. So [facility] [phone].    ATENCIÓN: Si habla español, tiene a nova disposición servicios gratuitos de asistencia lingüística. Llame al [phone].    We comply with applicable federal civil rights laws and [place] laws. We do not discriminate on the basis of race, color, national origin, age, disability, sex, sexual orientation, or gender identity.            Thank you!     Thank you for choosing [facility]  for your care. Our goal is always to provide you with excellent care. Hearing back from our patients is one way we can continue to improve our services. Please take a few minutes to complete the written survey that you may receive in the mail after your visit with us. Thank you!             Your Updated Medication List - Protect others around you: Learn how to safely use, store and throw away your medicines at www.disposemymeds.org.          This list is accurate as of 9/14/18 10:51 AM.  Always use your most recent med list.                   Brand Name Dispense Instructions for use Diagnosis    [facility] PRENATAL GUMMY 0.4-113.5 MG Chew      Reported on 4/19/2017        levothyroxine 25 MCG tablet    SYNTHROID/LEVOTHROID    90 tablet    Take 1 tablet (25 mcg) by mouth daily    Hypothyroidism, unspecified type

## 2018-09-14 NOTE — PROGRESS NOTES
33 year old  at 16w0d weeks presents to the clinic for a routine prenatal visit.  Feeling well.  No vaginal bleeding, leakage of fluid, or contractions   History of HELLP vs atypical HUS=labs every trimester.  Follow-up with Hematology next month  Fundal height=s=d  [id number]  Discussed quad screen and she declines  MFM ultrasound 10/1  Anxiety=stable  Hypothyroidism=TSH=1.21  RTC 4 weeks.    Harriet Rascon

## 2018-09-14 NOTE — PATIENT INSTRUCTIONS
Prenatal Care  What is prenatal care?   Prenatal care is the care you receive when you are pregnant. It includes care given by your healthcare provider, support from your family, and an extra focus on giving yourself the care you need during this special time. Prenatal care improves your chances for a healthy pregnancy and healthy baby.   When should I see my healthcare provider?   Good care during pregnancy includes regularly scheduled prenatal exams.   If you are not yet pregnant but planning to get pregnant in the next few months, see your provider. Your provider may do some tests and talk about things you can do to have a healthy pregnancy and healthy baby.   You should schedule your first prenatal visit with your provider as soon as you think or know you are pregnant. Depending on your health and health history, your provider will probably schedule visits at least once a month for the first 6 months. During the 7th and 8th months you will see your provider every 2 weeks. During the last month you will probably see your provider once a week until you deliver your baby. If your pregnancy is high risk, your provider will probably want to see you more often. In some cases your provider may refer you to a medical specialist for more help with special needs, such as diabetes.   At each visit your healthcare provider will check to make sure that you and the baby are healthy. Regular visits can help you and your provider prevent possible problems. They can also help your provider find and treat any problems early. In addition to meeting your medical needs, your provider advise you about caring for yourself. You will talk about how to have a healthy diet and get plenty of exercise and rest. Your provider can also help you deal with the emotional changes that can happen during pregnancy.   What will happen at the first prenatal visit?   At your first visit, your provider will ask about your personal medical history. He  or she will also ask about the baby's father and your family health history. This information can help give your provider an idea of any problems you might have during your pregnancy. You will have a physical exam, including checks of your height, weight, and blood pressure and a pelvic exam. You will have a Pap test, urine tests, blood tests, and cultures of the cervix and vagina to check for infection.   Your provider will calculate your due date and the age of your baby. If your periods were regular before you got pregnant, and you are sure of the day when your last period started, your due date will be estimated to be 40 weeks from that day.   Your provider will talk to you about how to stay healthy during your pregnancy.   What will happen at other prenatal visits?   Your provider will check how you are doing and how the baby is developing. He or she will discuss how you are feeling, ask if you have any problems, and answer your questions.   During each prenatal visit your provider will:   weigh you   take your blood pressure   check your urine for sugar, protein, or bacteria   check your face, hands, ankles, and feet for swelling   listen to the baby's heartbeat   measure the size of the uterus to check the baby's growth.   At different times during the pregnancy, other exams and tests may be done. Some are routine and others are done only when a problem is suspected or you have a risk factor for a problem. Examples of other tests you might have are:   tests to check for genetic problems and some birth defects, such as:   chorionic villus sampling of cells from the placenta   amniocentesis to test the fluid around the baby   blood tests called triple or quad screens   ultrasound scans to check the baby's growth, development, and health and to look at your uterus, the amniotic sac, and the placenta   blood tests to check for diabetes   electronic monitoring to check the health of the baby.   How can I take care  of myself during my pregnancy?   Here are some things you can do to take good care of yourself during your pregnancy and prepare for the birth of your child:   Keep all appointments with your healthcare provider. Use these visits to discuss your pregnancy concerns or problems. Write down questions before each visit so that you will not forget about things you want to talk about.   Eat healthy meals that include whole grains, fresh fruits and vegetables, and calcium-rich foods, such as milk, cheese, and yogurt. Choose foods low in saturated fat. Do not eat uncooked or undercooked meats or fish.   Avoid certain fish with high levels of mercury. These fish include shark, kelsy mackerel, swordfish, and tilefish. Do not eat more than 12 ounces of fish per week, or no more than 6 ounces of tuna fish per week. Because albacore tuna fish is also high in mercury, choose light tuna.   Drink plenty of water each day.   Take vitamins, other supplements, and medicines as recommended by your provider.   Unless your healthcare provider tells you not to, try to be physically active for at least 30 minutes a day, most days of the week. If you are pressed for time, you might find it easier to exercise 10 minutes at a time, 3 times a day. Consider taking a prenatal exercise class.   Do not smoke, do not drink alcohol, and do not take illegal drugs.   Talk to your healthcare provider before you take any medicine, including nonprescription and herbal medicines. Some medicines are not safe during pregnancy.   Avoid hot tubs or saunas.   If you have cats in your home, do not empty the cat litter while you are pregnant. It may contain a parasite that causes an infection called toxoplasmosis, which can cause birth defects. Also, use gloves when you work in garden areas used by cats.   Stay away from toxic chemicals like insecticides, solvents (such as some  or paint thinners), lead, and mercury. Check labels on household products.  Most dangerous products have pregnancy warnings on their labels. Ask your healthcare provider about products if you are unsure.   Relax by taking breaks from work or chores.   Help reduce stress by sharing your feelings with others.   Report any violence or other types of abuse in your home.   Learn more about pregnancy, labor, and delivery. Read books, watch videos, go to a childbirth class, and talk with experienced moms.   Plan for the lifestyle changes a new baby will bring. Prepare for possible changes in your budget, work situation, daily schedule, and relationships with family and friends.   Talk to your provider about the pros and cons of breast-feeding.   Before and during your pregnancy, try to do everything you can to keep yourself and your baby healthy during your pregnancy.     Published by [employer].  This content is reviewed periodically and is subject to change as new health information becomes available. The information is intended to inform and educate and is not a replacement for medical evaluation, advice, diagnosis or treatment by a healthcare professional.   Developed by [employer].   ? 2010 [employer] and/or its affiliates. All Rights Reserved.   Copyright   Clinical Reference Systems 2011

## 2018-09-25 ENCOUNTER — PRE VISIT (OUTPATIENT)
Dept: MATERNAL FETAL MEDICINE | Facility: CLINIC | Age: 33
End: 2018-09-25

## 2018-10-01 ENCOUNTER — OFFICE VISIT (OUTPATIENT)
Dept: MATERNAL FETAL MEDICINE | Facility: CLINIC | Age: 33
End: 2018-10-01
Attending: OBSTETRICS & GYNECOLOGY
Payer: COMMERCIAL

## 2018-10-01 ENCOUNTER — HOSPITAL ENCOUNTER (OUTPATIENT)
Dept: ULTRASOUND IMAGING | Facility: CLINIC | Age: 33
Discharge: HOME | End: 2018-10-01
Attending: OBSTETRICS & GYNECOLOGY | Admitting: OBSTETRICS & GYNECOLOGY
Payer: COMMERCIAL

## 2018-10-01 DIAGNOSIS — O09.299: Primary | ICD-10-CM

## 2018-10-01 DIAGNOSIS — Z36.89 SCREENING, ANTENATAL, FOR FETAL ANATOMIC SURVEY: ICD-10-CM

## 2018-10-01 DIAGNOSIS — O09.299 HX OF HELLP SYNDROME, CURRENTLY PREGNANT: ICD-10-CM

## 2018-10-01 PROCEDURE — 76811 OB US DETAILED SNGL FETUS: CPT

## 2018-10-01 NOTE — PROGRESS NOTES
"Please see \"Imaging\" tab under \"Chart Review\" for details of today's US.    Jenny Brooks, DO    "

## 2018-10-01 NOTE — MR AVS SNAPSHOT
After Visit Summary   10/1/2018    Queta Cortez    MRN: [id number]           Patient Information     Date Of Birth          1985        Visit Information        Provider Department      10/1/2018 10:45 AM Jenny Brooks DO [facility] Maternal Fetal Medicine [facility]        Today's Diagnoses     History of maternal HELLP syndrome, currently pregnant, unspecified trimester    -  1    Screening, , for fetal anatomic survey           Follow-ups after your visit        Your next 10 appointments already scheduled     Oct 15, 2018  9:00 AM CDT   ESTABLISHED PRENATAL with Harriet Rascon DO   [facility]    [address] [facility] [address]   [phone]            Oct 17, 2018 10:00 AM CDT   LAB with NL LAB [facility] ([facility])    [address] [facility] [address]   [phone]           Please do not eat 10-12 hours before your appointment if you are coming in fasting for labs on lipids, cholesterol, or glucose (sugar). This does not apply to pregnant women. Water, hot tea and black coffee (with nothing added) are okay. Do not drink other fluids, diet soda or chew gum.            2018 11:00 AM JAMES GRIJALVA COMPRE SINGLE F/U with URMFMUSR2   [facility] Maternal Fetal Medicine [facility] - [facility])    [address]  [facility] [address] [phone]           Wear comfortable clothes and leave your valuables at home.            2018 11:30 AM JAMES   Radiology MD with UR STEFFANIE STRANGE   [facility] Maternal Fetal Medicine - [facility])    [address]  [facility] [address]   [phone]           Please arrive at the time given for your first appointment. This visit is used internally to schedule the physician's time during your ultrasound.            2019   1:00 PM CDT   LAB with LAB ONC [facility] ([facility])    [address] [place] [address] [phone]           Please do not eat 10-12 hours before your appointment if you are coming in fasting for labs on lipids, cholesterol, or glucose (sugar). This does not apply to pregnant women. Water, hot tea and black coffee (with nothing added) are okay. Do not drink other fluids, diet soda or chew gum.            Apr 17, 2019  1:45 PM CDT   Return Visit with Ivan Vanessa MD   [facility] ([facility])    [address] [place] [address] [phone]              Future tests that were ordered for you today     Open Future Orders        Priority Expected Expires Ordered    [facility] Comprehensive Single F/U Routine  10/1/2019 10/1/2018            Who to contact     If you have questions or need follow up information about today's clinic visit or your schedule please contact [facility] directly at [phone].  Normal or non-critical lab and imaging results will be communicated to you by MyChart, letter or phone within 4 business days after the clinic has received the results. If you do not hear from us within 7 days, please contact the clinic through Wireless Safetyhart or phone. If you have a critical or abnormal lab result, we will notify you by phone as soon as possible.  Submit refill requests through [employer] or call your pharmacy and they will forward the refill request to us. Please allow 3 business days for your refill to be completed.          Additional Information About Your Visit        Wireless Safety[employer] Information     [employer] gives you secure access to your electronic health record. If you see a primary care provider, you can also send messages to your care team and make appointments. If you have questions, please call your primary care clinic.  If you do not have a primary care provider,  please call [phone] and they will assist you.        Care EveryWhere ID     This is your Care EveryWhere ID. This could be used by other organizations to access your [place] medical records  [id number]        Your Vitals Were     Last Period                   05/25/2018 (Approximate)            Blood Pressure from Last 3 Encounters:   09/14/18 118/68   08/13/18 110/70   07/16/18 100/60    Weight from Last 3 Encounters:   09/14/18 71.2 kg (157 lb)   08/13/18 68.5 kg (151 lb)   07/16/18 67.1 kg (148 lb)               Primary Care Provider Office Phone # Fax #    Alanis Lundberg [phone]       [address] [facility] [address]  [facility] [address]        Equal Access to Services     JOAQUIM AYALA : Hadjoaquin ludwig Somayo, waaxda luqadaha, qaybta kaalmada alicia, anselmo freeman . So [facility] [phone].    ATENCIÓN: Si habla español, tiene a nova disposición servicios gratuitos de asistencia lingüística. Precious al [phone].    We comply with applicable federal civil rights laws and [place] laws. We do not discriminate on the basis of race, color, national origin, age, disability, sex, sexual orientation, or gender identity.            Thank you!     Thank you for choosing [facility]  for your care. Our goal is always to provide you with excellent care. Hearing back from our patients is one way we can continue to improve our services. Please take a few minutes to complete the written survey that you may receive in the mail after your visit with us. Thank you!             Your Updated Medication List - Protect others around you: Learn how to safely use, store and throw away your medicines at www.disposemymeds.org.          This list is accurate as of 10/1/18  2:43 PM.  Always use your most recent med list.                   Brand Name Dispense Instructions for use Diagnosis    [employer] PRENATAL GUMMY 0.4-113.5 MG Chew      Reported on  4/19/2017        levothyroxine 25 MCG tablet    SYNTHROID/LEVOTHROID    90 tablet    Take 1 tablet (25 mcg) by mouth daily    Hypothyroidism, unspecified type

## 2018-10-15 ENCOUNTER — PRENATAL OFFICE VISIT (OUTPATIENT)
Dept: OBGYN | Facility: OTHER | Age: 33
End: 2018-10-15
Payer: COMMERCIAL

## 2018-10-15 VITALS
WEIGHT: 164.25 LBS | HEART RATE: 80 BPM | BODY MASS INDEX: 23.36 KG/M2 | DIASTOLIC BLOOD PRESSURE: 68 MMHG | SYSTOLIC BLOOD PRESSURE: 118 MMHG

## 2018-10-15 DIAGNOSIS — O34.219 DECLINES VBAC (VAGINAL BIRTH AFTER CESAREAN) TRIAL: ICD-10-CM

## 2018-10-15 DIAGNOSIS — O09.292 HX OF HELLP SYNDROME, CURRENTLY PREGNANT, SECOND TRIMESTER: Primary | ICD-10-CM

## 2018-10-15 DIAGNOSIS — Z23 FLU VACCINE NEED: ICD-10-CM

## 2018-10-15 DIAGNOSIS — E03.9 HYPOTHYROIDISM, UNSPECIFIED TYPE: ICD-10-CM

## 2018-10-15 DIAGNOSIS — O09.91 HIGH-RISK PREGNANCY IN FIRST TRIMESTER: ICD-10-CM

## 2018-10-15 LAB
ALBUMIN UR-MCNC: NEGATIVE MG/DL
APPEARANCE UR: CLEAR
BILIRUB UR QL STRIP: NEGATIVE
COLOR UR AUTO: YELLOW
GLUCOSE UR STRIP-MCNC: NEGATIVE MG/DL
HGB UR QL STRIP: NEGATIVE
KETONES UR STRIP-MCNC: NEGATIVE MG/DL
LEUKOCYTE ESTERASE UR QL STRIP: NEGATIVE
NITRATE UR QL: NEGATIVE
PH UR STRIP: 7 PH (ref 5–7)
SOURCE: NORMAL
SP GR UR STRIP: 1.01 (ref 1–1.03)
UROBILINOGEN UR STRIP-ACNC: 0.2 EU/DL (ref 0.2–1)

## 2018-10-15 PROCEDURE — 99207 ZZC PRENATAL VISIT: CPT | Performed by: OBSTETRICS & GYNECOLOGY

## 2018-10-15 PROCEDURE — 81003 URINALYSIS AUTO W/O SCOPE: CPT | Performed by: OBSTETRICS & GYNECOLOGY

## 2018-10-15 NOTE — PROGRESS NOTES
33 year old  at 20w3d weeks presents to the clinic for a routine prenatal visit.  History of HELLP= blood pressures at home have been stable  No leakage of fluid, vaginal bleeding, or contractions   Good fetal movement.  FHTs:  130's  Fundal height: s=d  Normal anatomy ultrasound  Hypothyroidism=will check TSH next visit  Anxiety=stable  RTC 2 weeks    Harriet Rascon

## 2018-10-15 NOTE — PATIENT INSTRUCTIONS
What to watch out for are: regular contractions every 5 min, vaginal bleeding, decreased fetal movement, or leakage of fluid.  Please call the office or go to L&D if you develop any of these signs and symptoms.      I will see you for your follow up appointment.  Please feel free to call if you have any questions or concerns.      Thanks,  Harriet Rascon, DO

## 2018-10-15 NOTE — MR AVS SNAPSHOT
After Visit Summary   10/15/2018    Queta Cortez    MRN: [id number]           Patient Information     Date Of Birth          1985        Visit Information        Provider Department      10/15/2018 9:00 AM Harriet Rascon,  [facility]        Today's Diagnoses     Hx of HELLP syndrome, currently pregnant, second trimester    -  1    Flu vaccine need        High-risk pregnancy in first trimester        Declines  (vaginal birth after ) trial        Hypothyroidism, unspecified type          Care Instructions    What to watch out for are: regular contractions every 5 min, vaginal bleeding, decreased fetal movement, or leakage of fluid.  Please call the office or go to L&D if you develop any of these signs and symptoms.      I will see you for your follow up appointment.  Please feel free to call if you have any questions or concerns.      Thanks,  Harriet Rascon, DO            Follow-ups after your visit        Your next 10 appointments already scheduled     Oct 17, 2018 10:00 AM CDT   LAB with NL LAB [facility] ([facility])    [address] [facility] [address] [phone]           Please do not eat 10-12 hours before your appointment if you are coming in fasting for labs on lipids, cholesterol, or glucose (sugar). This does not apply to pregnant women. Water, hot tea and black coffee (with nothing added) are okay. Do not drink other fluids, diet soda or chew gum.            2018 11:00 AM JAMES GRIJALVA COMPRE SINGLE F/U with URMFMUSR2   [facility] [place] ([facility])    [address]  [facility] [address] [phone]           Wear comfortable clothes and leave your valuables at home.            2018 11:30 AM JAMES   Radiology MD with UR STEFFANIE STRANGE   [facility] [place] ([facility])    [address]  [facility] [address]   [phone]           Please arrive at the time given for your first appointment. This visit is used internally to schedule the physician's time during your ultrasound.            Apr 17, 2019  1:00 PM CDT   LAB with LAB ONC [facility])    [address] [place] [id number] [phone]           Please do not eat 10-12 hours before your appointment if you are coming in fasting for labs on lipids, cholesterol, or glucose (sugar). This does not apply to pregnant women. Water, hot tea and black coffee (with nothing added) are okay. Do not drink other fluids, diet soda or chew gum.            Apr 17, 2019  1:45 PM CDT   Return Visit with Ivan Vanessa MD   [facility] ([facility])    [address] [place] [address] [phone]              Future tests that were ordered for you today     Open Future Orders        Priority Expected Expires Ordered    TSH Routine  11/30/2018 10/15/2018    Glucose tolerance gest screen 1 hour Routine  11/30/2018 10/15/2018    CBC with platelets Routine  11/30/2018 10/15/2018    Comprehensive metabolic panel Routine  11/30/2018 10/15/2018            Who to contact     If you have questions or need follow up information about today's clinic visit or your schedule please contact [facility] directly at [phone].  Normal or non-critical lab and imaging results will be communicated to you by MyChart, letter or phone within 4 business days after the clinic has received the results. If you do not hear from us within 7 days, please contact the clinic through Next[employer] Healthhart or phone. If you have a critical or abnormal lab result, we will notify you by phone as soon as possible.  Submit refill requests through [email or web address] or call your pharmacy and they will forward the refill request to us. Please  allow 3 business days for your refill to be completed.          Additional Information About Your Visit        MyChart Information     iPowerUphart gives you secure access to your electronic health record. If you see a primary care provider, you can also send messages to your care team and make appointments. If you have questions, please call your primary care clinic.  If you do not have a primary care provider, please call [phone] and they will assist you.        Care EveryWhere ID     This is your Care EveryWhere ID. This could be used by other organizations to access your [place] medical records  [id number]        Your Vitals Were     Pulse Last Period Breastfeeding? BMI (Body Mass Index)          80 05/25/2018 (Approximate) Unknown 23.36 kg/m2         Blood Pressure from Last 3 Encounters:   10/15/18 118/68   09/14/18 118/68   08/13/18 110/70    Weight from Last 3 Encounters:   10/15/18 164 lb 4 oz (74.5 kg)   09/14/18 157 lb (71.2 kg)   08/13/18 151 lb (68.5 kg)              We Performed the Following     UA with Microscopic reflex to Culture        Primary Care Provider Office Phone # Fax #    Alanis Lundberg [phone]       [address] [facility] [address]  [facility] [address]        Equal Access to Services     JOAQUIM AYALA : Hadii poonam holguino Somayo, waaxda luqadaha, qaybta kaalmada adeegyada, anselmo freeman . So [facility] [phone].    ATENCIÓN: Si habla español, tiene a nova disposición servicios gratuitos de asistencia lingüística. Llame al [phone].    We comply with applicable federal civil rights laws and [place] laws. We do not discriminate on the basis of race, color, national origin, age, disability, sex, sexual orientation, or gender identity.            Thank you!     Thank you for choosing [facility]  for your care. Our goal is always to provide you with excellent care. Hearing back from our patients is one way we can continue to  improve our services. Please take a few minutes to complete the written survey that you may receive in the mail after your visit with us. Thank you!             Your Updated Medication List - Protect others around you: Learn how to safely use, store and throw away your medicines at www.disposemymeds.org.          This list is accurate as of 10/15/18  9:20 AM.  Always use your most recent med list.                   Brand Name Dispense Instructions for use Diagnosis    ASPIRIN PO      Take 81 mg by mouth        CVS PRENATAL GUMMY 0.4-113.5 MG Chew      Reported on 4/19/2017        levothyroxine 25 MCG tablet    SYNTHROID/LEVOTHROID    90 tablet    Take 1 tablet (25 mcg) by mouth daily    Hypothyroidism, unspecified type

## 2018-10-17 DIAGNOSIS — D59.4 MICROANGIOPATHIC HEMOLYTIC ANEMIA (H): ICD-10-CM

## 2018-10-17 LAB
ALBUMIN SERPL-MCNC: 2.7 G/DL (ref 3.4–5)
ALP SERPL-CCNC: 35 U/L (ref 40–150)
ALT SERPL W P-5'-P-CCNC: 17 U/L (ref 0–50)
ANION GAP SERPL CALCULATED.3IONS-SCNC: 7 MMOL/L (ref 3–14)
AST SERPL W P-5'-P-CCNC: 12 U/L (ref 0–45)
BASOPHILS # BLD AUTO: 0 10E9/L (ref 0–0.2)
BASOPHILS NFR BLD AUTO: 0.2 %
BILIRUB SERPL-MCNC: 0.2 MG/DL (ref 0.2–1.3)
BUN SERPL-MCNC: 10 MG/DL (ref 7–30)
CALCIUM SERPL-MCNC: 7.7 MG/DL (ref 8.5–10.1)
CHLORIDE SERPL-SCNC: 104 MMOL/L (ref 94–109)
CO2 SERPL-SCNC: 27 MMOL/L (ref 20–32)
CREAT SERPL-MCNC: 0.55 MG/DL (ref 0.52–1.04)
DIFFERENTIAL METHOD BLD: ABNORMAL
EOSINOPHIL # BLD AUTO: 0.1 10E9/L (ref 0–0.7)
EOSINOPHIL NFR BLD AUTO: 0.7 %
ERYTHROCYTE [DISTWIDTH] IN BLOOD BY AUTOMATED COUNT: 12.3 % (ref 10–15)
GFR SERPL CREATININE-BSD FRML MDRD: >90 ML/MIN/1.7M2
GLUCOSE SERPL-MCNC: 75 MG/DL (ref 70–99)
HCT VFR BLD AUTO: 32.3 % (ref 35–47)
HGB BLD-MCNC: 10.6 G/DL (ref 11.7–15.7)
LDH SERPL L TO P-CCNC: 159 U/L (ref 81–234)
LYMPHOCYTES # BLD AUTO: 1.4 10E9/L (ref 0.8–5.3)
LYMPHOCYTES NFR BLD AUTO: 14.3 %
MCH RBC QN AUTO: 32.4 PG (ref 26.5–33)
MCHC RBC AUTO-ENTMCNC: 32.8 G/DL (ref 31.5–36.5)
MCV RBC AUTO: 99 FL (ref 78–100)
MONOCYTES # BLD AUTO: 0.5 10E9/L (ref 0–1.3)
MONOCYTES NFR BLD AUTO: 4.7 %
NEUTROPHILS # BLD AUTO: 7.6 10E9/L (ref 1.6–8.3)
NEUTROPHILS NFR BLD AUTO: 80.1 %
PLATELET # BLD AUTO: 210 10E9/L (ref 150–450)
POTASSIUM SERPL-SCNC: 4.1 MMOL/L (ref 3.4–5.3)
PROT SERPL-MCNC: 6.2 G/DL (ref 6.8–8.8)
RBC # BLD AUTO: 3.27 10E12/L (ref 3.8–5.2)
RETICS # AUTO: 81.3 10E9/L (ref 25–95)
RETICS/RBC NFR AUTO: 2.5 % (ref 0.5–2)
SODIUM SERPL-SCNC: 138 MMOL/L (ref 133–144)
WBC # BLD AUTO: 9.5 10E9/L (ref 4–11)

## 2018-10-17 PROCEDURE — 80053 COMPREHEN METABOLIC PANEL: CPT | Performed by: INTERNAL MEDICINE

## 2018-10-17 PROCEDURE — 36415 COLL VENOUS BLD VENIPUNCTURE: CPT | Performed by: INTERNAL MEDICINE

## 2018-10-17 PROCEDURE — 85025 COMPLETE CBC W/AUTO DIFF WBC: CPT | Performed by: INTERNAL MEDICINE

## 2018-10-17 PROCEDURE — 85045 AUTOMATED RETICULOCYTE COUNT: CPT | Performed by: INTERNAL MEDICINE

## 2018-10-17 PROCEDURE — 83615 LACTATE (LD) (LDH) ENZYME: CPT | Performed by: INTERNAL MEDICINE

## 2018-10-30 ENCOUNTER — ALLIED HEALTH/NURSE VISIT (OUTPATIENT)
Dept: FAMILY MEDICINE | Facility: OTHER | Age: 33
End: 2018-10-30
Payer: COMMERCIAL

## 2018-10-30 DIAGNOSIS — Z23 NEED FOR PROPHYLACTIC VACCINATION AND INOCULATION AGAINST INFLUENZA: Primary | ICD-10-CM

## 2018-10-30 PROCEDURE — 90686 IIV4 VACC NO PRSV 0.5 ML IM: CPT

## 2018-10-30 PROCEDURE — 90471 IMMUNIZATION ADMIN: CPT

## 2018-10-30 PROCEDURE — 99207 ZZC NO CHARGE NURSE ONLY: CPT

## 2018-10-30 NOTE — MR AVS SNAPSHOT
After Visit Summary   10/30/2018    Queta Cortez    MRN: [id number]           Patient Information     Date Of Birth          1985        Visit Information        Provider Department      10/30/2018 9:00 AM NL FLU SHOT ERC [facility]        Today's Diagnoses     Need for prophylactic vaccination and inoculation against influenza    -  1       Follow-ups after your visit        Your next 10 appointments already scheduled     Oct 30, 2018  9:00 AM CDT   Nurse Only with NL FLU SHOT ERC   [facility] ([facility])    [address] [facility] [address]  [facility] [address]   [phone]            Nov 06, 2018 10:45 AM CST   ESTABLISHED PRENATAL with Harriet Malorienilton Rascon [facility])    [address] [place] [address]   [phone]            Nov 20, 2018 10:15 AM CST   ESTABLISHED PRENATAL with Harriet Rascon DO   [facility] ([facility])    [address] [place] [address]   [phone]            Dec 07, 2018 10:15 AM CST   MFM US COMPRE SINGLE F/U with URMFMUSR3   [facility] Ultrasound - [facility])    [address]  [facility] [address] [phone]           Wear comfortable clothes and leave your valuables at home.            Dec 07, 2018 10:45 AM CST   Radiology MD with UR STEFFANIE STRANGE   [facility] - [facility])    [address]  [facility] [address]   [phone]           Please arrive at the time given for your first appointment. This visit is used internally to schedule the physician's time during your ultrasound.            Apr 17, 2019  1:00 PM CDT   LAB with LAB ONC [facility] ([facility])    [address] [facility]  [address] [phone]           Please do not eat 10-12 hours before your appointment if you are coming in fasting for labs on lipids, cholesterol, or glucose (sugar). This does not apply to pregnant women. Water, hot tea and black coffee (with nothing added) are okay. Do not drink other fluids, diet soda or chew gum.            Apr 17, 2019  1:45 PM CDT   Return Visit with Ivan Vanessa MD   [facility] ([facility])    [address] [place] [phone] [phone]              Who to contact     If you have questions or need follow up information about today's clinic visit or your schedule please contact [facility] directly at [phone].  Normal or non-critical lab and imaging results will be communicated to you by SolarGreenhart, letter or phone within 4 business days after the clinic has received the results. If you do not hear from us within 7 days, please contact the clinic through SolarGreenhart or phone. If you have a critical or abnormal lab result, we will notify you by phone as soon as possible.  Submit refill requests through [employer] or call your pharmacy and they will forward the refill request to us. Please allow 3 business days for your refill to be completed.          Additional Information About Your Visit        [employer] Information     [employer] gives you secure access to your electronic health record. If you see a primary care provider, you can also send messages to your care team and make appointments. If you have questions, please call your primary care clinic.  If you do not have a primary care provider, please call [phone] and they will assist you.        Care EveryWhere ID     This is your Care EveryWhere ID. This could be used by other organizations to access your [place] medical records  [id number]         Blood Pressure from Last 3 Encounters:   10/15/18 118/68   09/14/18 118/68   08/13/18 110/70    Weight from Last 3  Encounters:   10/15/18 164 lb 4 oz (74.5 kg)   09/14/18 157 lb (71.2 kg)   08/13/18 151 lb (68.5 kg)              We Performed the Following     FLU VACCINE, SPLIT VIRUS, IM (QUADRIVALENT) [03072]- >3 YRS     Vaccine Administration, Initial [66873]        Primary Care Provider Office Phone # Fax #    Alanis Lundberg [phone]       [address] [place] [address]        Equal Access to Services     [facility] JAMIE : Hadii poonam ludwig Somayo, waaxda luqadaha, qaybta kaalmabeth vargas, anselmo freeman . So [facility] [phone].    ATENCIÓN: Si habla español, tiene a nova disposición servicios gratuitos de asistencia lingüística. [facility] [phone].    We comply with applicable federal civil rights laws and [place] laws. We do not discriminate on the basis of race, color, national origin, age, disability, sex, sexual orientation, or gender identity.            Thank you!     Thank you for choosing [facility]  for your care. Our goal is always to provide you with excellent care. Hearing back from our patients is one way we can continue to improve our services. Please take a few minutes to complete the written survey that you may receive in the mail after your visit with us. Thank you!             Your Updated Medication List - Protect others around you: Learn how to safely use, store and throw away your medicines at www.disposemymeds.org.          This list is accurate as of 10/30/18  8:58 AM.  Always use your most recent med list.                   Brand Name Dispense Instructions for use Diagnosis    ASPIRIN PO      Take 81 mg by mouth        CVS PRENATAL GUMMY 0.4-113.5 MG Chew      Reported on 4/19/2017        levothyroxine 25 MCG tablet    SYNTHROID/LEVOTHROID    90 tablet    Take 1 tablet (25 mcg) by mouth daily    Hypothyroidism, unspecified type

## 2018-10-30 NOTE — NURSING NOTE
Injectable Influenza Immunization Documentation      1.  Is the person to be vaccinated sick today?  No    2. Does the person to be vaccinated have an allergy to eggs or to a component of the vaccine?   No      3. Has the person to be vaccinated today ever had a serious reaction to influenza vaccine in the past?  No      4. Has the person to be vaccinated ever had Guillain-[place] syndrome?  No    Prior to injection verified patient identity using patient's name and date of birth.    Patient instructed to wait 20 minutes and report any reactions such as shortness of breath, swelling, itching to medical staff.     Form completed by David Cisneros MA

## 2018-11-06 ENCOUNTER — PRENATAL OFFICE VISIT (OUTPATIENT)
Dept: OBGYN | Facility: CLINIC | Age: 33
End: 2018-11-06
Payer: COMMERCIAL

## 2018-11-06 VITALS
DIASTOLIC BLOOD PRESSURE: 61 MMHG | BODY MASS INDEX: 24.02 KG/M2 | OXYGEN SATURATION: 98 % | HEART RATE: 90 BPM | SYSTOLIC BLOOD PRESSURE: 104 MMHG | WEIGHT: 168.9 LBS

## 2018-11-06 DIAGNOSIS — O34.219 DECLINES VBAC (VAGINAL BIRTH AFTER CESAREAN) TRIAL: ICD-10-CM

## 2018-11-06 DIAGNOSIS — F41.1 GAD (GENERALIZED ANXIETY DISORDER): ICD-10-CM

## 2018-11-06 DIAGNOSIS — O09.292 H/O HELLP SYNDROME, CURRENTLY PREGNANT, SECOND TRIMESTER: Primary | ICD-10-CM

## 2018-11-06 DIAGNOSIS — E03.8 OTHER SPECIFIED HYPOTHYROIDISM: ICD-10-CM

## 2018-11-06 LAB
ALBUMIN UR-MCNC: NEGATIVE MG/DL
APPEARANCE UR: CLEAR
BILIRUB UR QL STRIP: NEGATIVE
COLOR UR AUTO: NORMAL
GLUCOSE UR STRIP-MCNC: NEGATIVE MG/DL
HGB UR QL STRIP: NEGATIVE
KETONES UR STRIP-MCNC: NEGATIVE MG/DL
LEUKOCYTE ESTERASE UR QL STRIP: NEGATIVE
NITRATE UR QL: NEGATIVE
NON-SQ EPI CELLS #/AREA URNS LPF: NORMAL /LPF
PH UR STRIP: 7 PH (ref 5–7)
RBC #/AREA URNS AUTO: NORMAL /HPF
SOURCE: NORMAL
SP GR UR STRIP: 1 (ref 1–1.03)
UROBILINOGEN UR STRIP-MCNC: NORMAL MG/DL (ref 0–2)
WBC #/AREA URNS AUTO: NORMAL /HPF

## 2018-11-06 PROCEDURE — 81001 URINALYSIS AUTO W/SCOPE: CPT | Performed by: OBSTETRICS & GYNECOLOGY

## 2018-11-06 PROCEDURE — 99207 ZZC PRENATAL VISIT: CPT | Performed by: OBSTETRICS & GYNECOLOGY

## 2018-11-06 NOTE — MR AVS SNAPSHOT
After Visit Summary   2018    Queta Cortez    MRN: [id number]           Patient Information     Date Of Birth          1985        Visit Information        Provider Department      2018 10:45 AM Harriet Rascon DO [facility]        Today's Diagnoses     H/O HELLP syndrome, currently pregnant, second trimester    -  1    JOAO (generalized anxiety disorder)        Other specified hypothyroidism        Declines  (vaginal birth after ) trial          Care Instructions    What to watch out for are: regular contractions every 5 min, vaginal bleeding, decreased fetal movement, or leakage of fluid.  Please call the office or go to L&D if you develop any of these signs and symptoms.      I will see you for your follow up appointment.  Please feel free to call if you have any questions or concerns.      Thanks,  Harriet Rascon, DO            Follow-ups after your visit        Follow-up notes from your care team     Return in about 2 weeks (around 2018).      Your next 10 appointments already scheduled     2018 10:15 AM CST   ESTABLISHED PRENATAL with Harriet Rascon DO   [facility] ([facility])    [address] [place] [id number]   [phone]            Dec 07, 2018 10:15 AM JAMES VALIENTE US COMPRE SINGLE F/U with [id number]   [facility])    [address]  [facility] [id number] [phone]           Wear comfortable clothes and leave your valuables at home.            Dec 07, 2018 10:45 AM CST   Radiology MD with UR STEFFANIE STRANGE   [facility])    [address]  [facility] [address]   [phone]           Please arrive at the time given for your first appointment. This visit is used internally to schedule the  physician's time during your ultrasound.            Apr 17, 2019  1:00 PM CDT   LAB with LAB ONC [facility] ([facility])    [address] [place] [address] [phone]           Please do not eat 10-12 hours before your appointment if you are coming in fasting for labs on lipids, cholesterol, or glucose (sugar). This does not apply to pregnant women. Water, hot tea and black coffee (with nothing added) are okay. Do not drink other fluids, diet soda or chew gum.            Apr 17, 2019  1:45 PM CDT   Return Visit with Ivan Vanessa MD   [facility] ([facility])    [address] [place] [address] [phone]              Future tests that were ordered for you today     Open Future Orders        Priority Expected Expires Ordered    Glucose tolerance gest screen 1 hour Routine  11/30/2018 11/6/2018    OB hemoglobin Routine  11/30/2018 11/6/2018            Who to contact     If you have questions or need follow up information about today's clinic visit or your schedule please contact [facility] directly at [phone].  Normal or non-critical lab and imaging results will be communicated to you by [employer], letter or phone within 4 business days after the clinic has received the results. If you do not hear from us within 7 days, please contact the clinic through [employer] or phone. If you have a critical or abnormal lab result, we will notify you by phone as soon as possible.  Submit refill requests through [employer] or call your pharmacy and they will forward the refill request to us. Please allow 3 business days for your refill to be completed.          Additional Information About Your Visit        [employer] Information     [employer] gives you secure access to your electronic health record. If you see a primary care provider, you can also send messages to your care team and make appointments. If  you have questions, please call your primary care clinic.  If you do not have a primary care provider, please call [phone] and they will assist you.        Care EveryWhere ID     This is your Care EveryWhere ID. This could be used by other organizations to access your [facility] medical records  [id number]        Your Vitals Were     Pulse Last Period Pulse Oximetry BMI (Body Mass Index)          90 05/25/2018 (Approximate) 98% 24.02 kg/m2         Blood Pressure from Last 3 Encounters:   11/06/18 104/61   10/15/18 118/68   09/14/18 118/68    Weight from Last 3 Encounters:   11/06/18 168 lb 14.4 oz (76.6 kg)   10/15/18 164 lb 4 oz (74.5 kg)   09/14/18 157 lb (71.2 kg)              We Performed the Following     UA with Microscopic reflex to Culture        Primary Care Provider Office Phone # Fax #    Alanis Lundberg [phone]       [address] [place] [address]  [facility] [address]        Equal Access to Services     [facility] : Hadii poonam ku hadasho Soomaali, waaxda luqadaha, qaybta kaalmada adeegyabeth, anselmo freeman . So [facility] [phone].    ATENCIÓN: Si habla español, tiene a nova disposición servicios gratuitos de asistencia lingüística. LlAdena Health System [phone].    We comply with applicable federal civil rights laws and [place] laws. We do not discriminate on the basis of race, color, national origin, age, disability, sex, sexual orientation, or gender identity.            Thank you!     Thank you for choosing [facility]  for your care. Our goal is always to provide you with excellent care. Hearing back from our patients is one way we can continue to improve our services. Please take a few minutes to complete the written survey that you may receive in the mail after your visit with us. Thank you!             Your Updated Medication List - Protect others around you: Learn how to safely use, store and throw away your medicines at www.disposemymeds.org.           This list is accurate as of 11/6/18 11:06 AM.  Always use your most recent med list.                   Brand Name Dispense Instructions for use Diagnosis    ASPIRIN PO      Take 81 mg by mouth        [facility] PRENATAL GUMMY 0.4-113.5 MG Chew      Reported on 4/19/2017        levothyroxine 25 MCG tablet    SYNTHROID/LEVOTHROID    90 tablet    Take 1 tablet (25 mcg) by mouth daily    Hypothyroidism, unspecified type

## 2018-11-06 NOTE — PROGRESS NOTES
33 year old  at 23w4d weeks presents to the clinic for a routine prenatal visit.  History of HELLP=MFM ultrasound in one month  Patient is seeing Hematology as well  No vaginal bleeding, leakage of fluid, or contractions   Good fetal movement.  DCHu=176   Fundal height=23cm    Normal anatomy ultrasound  RTC 2 weeks  GCT at that visit  History of c section=unsure of   Hypothyroidism=stable.  Will check next visit  Anxiety=increasing=pt is going to start seeing her counselor again  Blood type:A+    Harriet Rascon

## 2018-11-20 ENCOUNTER — PRENATAL OFFICE VISIT (OUTPATIENT)
Dept: OBGYN | Facility: CLINIC | Age: 33
End: 2018-11-20
Payer: COMMERCIAL

## 2018-11-20 VITALS
DIASTOLIC BLOOD PRESSURE: 73 MMHG | BODY MASS INDEX: 24.39 KG/M2 | WEIGHT: 171.5 LBS | HEART RATE: 83 BPM | OXYGEN SATURATION: 100 % | SYSTOLIC BLOOD PRESSURE: 115 MMHG

## 2018-11-20 DIAGNOSIS — R82.90 NONSPECIFIC FINDING ON EXAMINATION OF URINE: ICD-10-CM

## 2018-11-20 DIAGNOSIS — D64.9 ANEMIA, UNSPECIFIED TYPE: Primary | ICD-10-CM

## 2018-11-20 DIAGNOSIS — N17.9 ACUTE KIDNEY INJURY (H): ICD-10-CM

## 2018-11-20 DIAGNOSIS — O09.292 HX OF HELLP SYNDROME, CURRENTLY PREGNANT, SECOND TRIMESTER: Primary | ICD-10-CM

## 2018-11-20 DIAGNOSIS — O34.219 DECLINES VBAC (VAGINAL BIRTH AFTER CESAREAN) TRIAL: ICD-10-CM

## 2018-11-20 DIAGNOSIS — R82.90 ABNORMAL URINE: ICD-10-CM

## 2018-11-20 DIAGNOSIS — E03.9 HYPOTHYROIDISM, UNSPECIFIED TYPE: ICD-10-CM

## 2018-11-20 DIAGNOSIS — F41.1 GAD (GENERALIZED ANXIETY DISORDER): ICD-10-CM

## 2018-11-20 DIAGNOSIS — O09.91 HIGH-RISK PREGNANCY IN FIRST TRIMESTER: ICD-10-CM

## 2018-11-20 DIAGNOSIS — R82.90 ABNORMAL URINE: Primary | ICD-10-CM

## 2018-11-20 DIAGNOSIS — E03.8 OTHER SPECIFIED HYPOTHYROIDISM: ICD-10-CM

## 2018-11-20 LAB
ALBUMIN SERPL-MCNC: 2.7 G/DL (ref 3.4–5)
ALBUMIN UR-MCNC: NEGATIVE MG/DL
ALBUMIN UR-MCNC: NEGATIVE MG/DL
ALP SERPL-CCNC: 48 U/L (ref 40–150)
ALT SERPL W P-5'-P-CCNC: 29 U/L (ref 0–50)
ANION GAP SERPL CALCULATED.3IONS-SCNC: 6 MMOL/L (ref 3–14)
APPEARANCE UR: CLEAR
AST SERPL W P-5'-P-CCNC: 22 U/L (ref 0–45)
BACTERIA #/AREA URNS HPF: ABNORMAL /HPF
BILIRUB SERPL-MCNC: 0.3 MG/DL (ref 0.2–1.3)
BILIRUB UR QL STRIP: NEGATIVE
BUN SERPL-MCNC: 8 MG/DL (ref 7–30)
CALCIUM SERPL-MCNC: 8.3 MG/DL (ref 8.5–10.1)
CHLORIDE SERPL-SCNC: 106 MMOL/L (ref 94–109)
CO2 SERPL-SCNC: 27 MMOL/L (ref 20–32)
COLOR UR AUTO: ABNORMAL
CREAT SERPL-MCNC: 0.64 MG/DL (ref 0.52–1.04)
ERYTHROCYTE [DISTWIDTH] IN BLOOD BY AUTOMATED COUNT: 11.9 % (ref 10–15)
GFR SERPL CREATININE-BSD FRML MDRD: >90 ML/MIN/1.7M2
GLUCOSE 1H P 50 G GLC PO SERPL-MCNC: 79 MG/DL (ref 60–129)
GLUCOSE SERPL-MCNC: 78 MG/DL (ref 70–99)
GLUCOSE UR STRIP-MCNC: NEGATIVE MG/DL
HCT VFR BLD AUTO: 32.3 % (ref 35–47)
HGB BLD-MCNC: 10.4 G/DL (ref 11.7–15.7)
HGB BLD-MCNC: NORMAL G/DL (ref 11.7–15.7)
HGB UR QL STRIP: NEGATIVE
KETONES UR STRIP-MCNC: NEGATIVE MG/DL
LEUKOCYTE ESTERASE UR QL STRIP: NEGATIVE
MCH RBC QN AUTO: 31.4 PG (ref 26.5–33)
MCHC RBC AUTO-ENTMCNC: 32.2 G/DL (ref 31.5–36.5)
MCV RBC AUTO: 98 FL (ref 78–100)
NITRATE UR QL: POSITIVE
NON-SQ EPI CELLS #/AREA URNS LPF: ABNORMAL /LPF
PH UR STRIP: 7 PH (ref 5–7)
PLATELET # BLD AUTO: 250 10E9/L (ref 150–450)
POTASSIUM SERPL-SCNC: 3.8 MMOL/L (ref 3.4–5.3)
PROT SERPL-MCNC: 6.6 G/DL (ref 6.8–8.8)
RBC # BLD AUTO: 3.31 10E12/L (ref 3.8–5.2)
RBC #/AREA URNS AUTO: ABNORMAL /HPF
SODIUM SERPL-SCNC: 139 MMOL/L (ref 133–144)
SOURCE: ABNORMAL
SP GR UR STRIP: 1 (ref 1–1.03)
TSH SERPL DL<=0.005 MIU/L-ACNC: 1.29 MU/L (ref 0.4–4)
UROBILINOGEN UR STRIP-MCNC: NORMAL MG/DL (ref 0–2)
WBC # BLD AUTO: 11.4 10E9/L (ref 4–11)
WBC #/AREA URNS AUTO: ABNORMAL /HPF

## 2018-11-20 PROCEDURE — 99207 ZZC PRENATAL VISIT: CPT | Performed by: OBSTETRICS & GYNECOLOGY

## 2018-11-20 PROCEDURE — 81001 URINALYSIS AUTO W/SCOPE: CPT | Performed by: OBSTETRICS & GYNECOLOGY

## 2018-11-20 PROCEDURE — 84443 ASSAY THYROID STIM HORMONE: CPT | Performed by: OBSTETRICS & GYNECOLOGY

## 2018-11-20 PROCEDURE — 81003 URINALYSIS AUTO W/O SCOPE: CPT | Performed by: OBSTETRICS & GYNECOLOGY

## 2018-11-20 PROCEDURE — 80053 COMPREHEN METABOLIC PANEL: CPT | Performed by: OBSTETRICS & GYNECOLOGY

## 2018-11-20 PROCEDURE — 82950 GLUCOSE TEST: CPT | Performed by: OBSTETRICS & GYNECOLOGY

## 2018-11-20 PROCEDURE — 85027 COMPLETE CBC AUTOMATED: CPT | Performed by: OBSTETRICS & GYNECOLOGY

## 2018-11-20 PROCEDURE — 36415 COLL VENOUS BLD VENIPUNCTURE: CPT | Performed by: OBSTETRICS & GYNECOLOGY

## 2018-11-20 PROCEDURE — 87086 URINE CULTURE/COLONY COUNT: CPT | Performed by: OBSTETRICS & GYNECOLOGY

## 2018-11-20 RX ORDER — FERROUS SULFATE 325(65) MG
325 TABLET ORAL 2 TIMES DAILY
Qty: 60 TABLET | Refills: 2 | Status: SHIPPED | OUTPATIENT
Start: 2018-11-20 | End: 2019-03-04

## 2018-11-20 NOTE — PROGRESS NOTES
33 year old  at 25w4d weeks presents to the clinic for a routine prenatal visit.  No concerns.  No vaginal bleeding, leakage of fluid, or contractions   Good fetal movement.  FHTs= 158  Fundal height= 27cm   Normal anatomy ultrasound  RTC 2 weeks  GCT today  History of HELLP=MFM ultrasound .  We discussed delivery and timing of it.  She is still unsure about TOLAC or repeat c section.  We discussed getting a c section scheduled and the availability to cancel if needed.  She will think about a date.  U/A today  Hypothyroidism=stable, TSH today  Anxiety=worsening.  She is going to start therapy soon  Blood type:A+    Harriet Rascon

## 2018-11-20 NOTE — MR AVS SNAPSHOT
After Visit Summary   2018    Queta Cortez    MRN: [id number]           Patient Information     Date Of Birth          1985        Visit Information        Provider Department      2018 10:15 AM Harriet Rascon,  [facility]        Today's Diagnoses     Hx of HELLP syndrome, currently pregnant, second trimester    -  1    Hypothyroidism, unspecified type        Acute kidney injury (H)        JOAO (generalized anxiety disorder)        Other specified hypothyroidism        Declines  (vaginal birth after ) trial        High-risk pregnancy in first trimester          Care Instructions    What to watch out for are: regular contractions every 5 min, vaginal bleeding, decreased fetal movement, or leakage of fluid.  Please call the office or go to L&D if you develop any of these signs and symptoms.      I will see you for your follow up appointment.  Please feel free to call if you have any questions or concerns.      Thanks,  Harriet Rascon, DO            Follow-ups after your visit        Follow-up notes from your care team     Return in about 2 weeks (around 2018).      Your next 10 appointments already scheduled     Dec 07, 2018 10:15 AM JAMES GRIJALVA COMPRE SINGLE F/U with [id number]   [facility])    [address]  [facility] [id number] [phone]           Wear comfortable clothes and leave your valuables at home.            Dec 07, 2018 10:45 AM JAMES   Radiology MD with UR STEFFANIE STRANGE   [facility])    [address]  [facility] [address]   [phone]           Please arrive at the time given for your first appointment. This visit is used internally to schedule the physician's time during your ultrasound.            2019  1:00 PM CDT   LAB with LAB ONC  Critical access hospital ([facility])    [address] [place] [address] [phone]           Please do not eat 10-12 hours before your appointment if you are coming in fasting for labs on lipids, cholesterol, or glucose (sugar). This does not apply to pregnant women. Water, hot tea and black coffee (with nothing added) are okay. Do not drink other fluids, diet soda or chew gum.            Apr 17, 2019  1:45 PM CDT   Return Visit with Ivan Vanessa MD   [facility]    [address] [place] [address] [phone]              Who to contact     If you have questions or need follow up information about today's clinic visit or your schedule please contact [facility] directly at [phone].  Normal or non-critical lab and imaging results will be communicated to you by MyChart, letter or phone within 4 business days after the clinic has received the results. If you do not hear from us within 7 days, please contact the clinic through [employer] or phone. If you have a critical or abnormal lab result, we will notify you by phone as soon as possible.  Submit refill requests through [employer] or call your pharmacy and they will forward the refill request to us. Please allow 3 business days for your refill to be completed.          Additional Information About Your Visit        [employer] Information     [employer] gives you secure access to your electronic health record. If you see a primary care provider, you can also send messages to your care team and make appointments. If you have questions, please call your primary care clinic.  If you do not have a primary care provider, please call [phone] and they will assist you.        Care EveryWhere ID     This is your Care EveryWhere ID. This could be used by other organizations to access your [facility] medical records  [id number]        Your Vitals  Were     Pulse Last Period Pulse Oximetry BMI (Body Mass Index)          83 05/25/2018 (Approximate) 100% 24.39 kg/m2         Blood Pressure from Last 3 Encounters:   11/20/18 115/73   11/06/18 104/61   10/15/18 118/68    Weight from Last 3 Encounters:   11/20/18 171 lb 8 oz (77.8 kg)   11/06/18 168 lb 14.4 oz (76.6 kg)   10/15/18 164 lb 4 oz (74.5 kg)              We Performed the Following     CBC with platelets     Comprehensive metabolic panel     Glucose tolerance gest screen 1 hour     OB hemoglobin     Protein qualitative urine     TSH        Primary Care Provider Office Phone # Fax #    Alanis Lundberg [phone]       [address] [facility] [address]  [facility] [address]        Equal Access to Services     JOAQUIM AYALA : Hadii poonam holguino Soomaali, waaxda luqadaha, qaybta kaalmada adeegyada, anselmo freeman . So [facility] [phone].    ATENCIÓN: Si habla español, tiene a nova disposición servicios gratuitos de asistencia lingüística. LlFort Hamilton Hospital [phone].    We comply with applicable federal civil rights laws and [place] laws. We do not discriminate on the basis of race, color, national origin, age, disability, sex, sexual orientation, or gender identity.            Thank you!     Thank you for choosing [facility]  for your care. Our goal is always to provide you with excellent care. Hearing back from our patients is one way we can continue to improve our services. Please take a few minutes to complete the written survey that you may receive in the mail after your visit with us. Thank you!             Your Updated Medication List - Protect others around you: Learn how to safely use, store and throw away your medicines at www.disposemymeds.org.          This list is accurate as of 11/20/18 10:39 AM.  Always use your most recent med list.                   Brand Name Dispense Instructions for use Diagnosis    ASPIRIN PO      Take 81 mg by mouth        [facility]  PRENATAL GUMMY 0.4-113.5 MG Chew      Reported on 4/19/2017        levothyroxine 25 MCG tablet    SYNTHROID/LEVOTHROID    90 tablet    Take 1 tablet (25 mcg) by mouth daily    Hypothyroidism, unspecified type

## 2018-11-21 ENCOUNTER — OFFICE VISIT (OUTPATIENT)
Dept: PSYCHOLOGY | Facility: CLINIC | Age: 33
End: 2018-11-21
Payer: COMMERCIAL

## 2018-11-21 DIAGNOSIS — F41.1 GAD (GENERALIZED ANXIETY DISORDER): Primary | ICD-10-CM

## 2018-11-21 DIAGNOSIS — F43.10 PTSD (POST-TRAUMATIC STRESS DISORDER): ICD-10-CM

## 2018-11-21 LAB
BACTERIA SPEC CULT: NO GROWTH
SPECIMEN SOURCE: NORMAL

## 2018-11-21 PROCEDURE — 90791 PSYCH DIAGNOSTIC EVALUATION: CPT | Performed by: MARRIAGE & FAMILY THERAPIST

## 2018-11-21 ASSESSMENT — ANXIETY QUESTIONNAIRES
7. FEELING AFRAID AS IF SOMETHING AWFUL MIGHT HAPPEN: NEARLY EVERY DAY
6. BECOMING EASILY ANNOYED OR IRRITABLE: MORE THAN HALF THE DAYS
7. FEELING AFRAID AS IF SOMETHING AWFUL MIGHT HAPPEN: NEARLY EVERY DAY
5. BEING SO RESTLESS THAT IT IS HARD TO SIT STILL: SEVERAL DAYS
4. TROUBLE RELAXING: SEVERAL DAYS
GAD7 TOTAL SCORE: 14
2. NOT BEING ABLE TO STOP OR CONTROL WORRYING: NEARLY EVERY DAY
GAD7 TOTAL SCORE: 14
1. FEELING NERVOUS, ANXIOUS, OR ON EDGE: MORE THAN HALF THE DAYS
3. WORRYING TOO MUCH ABOUT DIFFERENT THINGS: MORE THAN HALF THE DAYS
GAD7 TOTAL SCORE: 14

## 2018-11-21 ASSESSMENT — PATIENT HEALTH QUESTIONNAIRE - PHQ9
10. IF YOU CHECKED OFF ANY PROBLEMS, HOW DIFFICULT HAVE THESE PROBLEMS MADE IT FOR YOU TO DO YOUR WORK, TAKE CARE OF THINGS AT HOME, OR GET ALONG WITH OTHER PEOPLE: SOMEWHAT DIFFICULT
SUM OF ALL RESPONSES TO PHQ QUESTIONS 1-9: 5
SUM OF ALL RESPONSES TO PHQ QUESTIONS 1-9: 5
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
## 2018-11-21 NOTE — Clinical Note
Dr. Elizabeth MONTESINOS saw Queta for counseling intake today and have worked with her in the past. Maintained the following dx: JOAO, PTSD. I'll see her again next week. Glad you sent her back!  Bella Harrington MA, [facility]

## 2018-11-21 NOTE — MR AVS SNAPSHOT
MRN:[id number]                      After Visit Summary   11/21/2018    Queta Cortez    MRN: [id number]           Visit Information        Provider Department      11/21/2018 3:00 PM Bella Harrington [facility] Generic      Your next 10 appointments already scheduled     Dec 03, 2018 10:15 AM CST   ESTABLISHED PRENATAL with Harriet Rascon DO   [facility]    [address] [address]   [phone]            Dec 07, 2018 10:15 AM CST   MFM US COMPRE SINGLE F/U with [id number]   [facility])    [address]  [facility] [address] [phone]           Wear comfortable clothes and leave your valuables at home.            Dec 07, 2018 10:45 AM CST   Radiology MD with UR MFBRAD STRANGE   [facility])    [address]  [facility] [address]   [phone]           Please arrive at the time given for your first appointment. This visit is used internally to schedule the physician's time during your ultrasound.            Dec 10, 2018  1:00 PM CST   Return Visit with Bella Harrington   [facility] ([facility])    [address]  [place] [address]   [phone]            Dec 17, 2018 10:15 AM CST   ESTABLISHED PRENATAL with Harriet Rascon DO   [facility] ([facility])    [address] [facility] [address]   [phone]            Dec 28, 2018  4:00 PM CST   Return Visit with Bella Harrington   [facility] ([facility])    [address]  [place] [address]   [phone]            Dec 31, 2018 10:30 AM CST   ESTABLISHED PRENATAL with Harriet Rascon DO   [facility]  ([facility])    [address] [facility] [address]   [phone]            Apr 17, 2019  1:00 PM CDT   LAB with [facility] ([facility])    [address] [place] [id number] [phone]           Please do not eat 10-12 hours before your appointment if you are coming in fasting for labs on lipids, cholesterol, or glucose (sugar). This does not apply to pregnant women. Water, hot tea and black coffee (with nothing added) are okay. Do not drink other fluids, diet soda or chew gum.            Apr 17, 2019  1:45 PM CDT   Return Visit with Ivan Vanessa MD   [facility] ([facility])    [id number] [place] [address] [phone]              MyChart Information     [facility] gives you secure access to your electronic health record. If you see a primary care provider, you can also send messages to your care team and make appointments. If you have questions, please call your primary care clinic.  If you do not have a primary care provider, please call [phone] and they will assist you.        Care EveryWhere ID     This is your Care EveryWhere ID. This could be used by other organizations to access your [place] medical records  [id number]        Equal Access to Services     JOAQUIM AYALA : Hadii poonam holguino Somayo, waaxda luqadaha, qaybta kaalmada anselmo vargas. So [facility] [phone].    ATENCIÓN: Si habla español, tiene a nova disposición servicios gratuitos de asistencia lingüística. Precious al [phone].    We comply with applicable federal civil rights laws and [place] laws. We do not discriminate on the basis of race, color, national origin, age, disability, sex, sexual orientation, or gender identity.

## 2018-11-22 ASSESSMENT — PATIENT HEALTH QUESTIONNAIRE - PHQ9: SUM OF ALL RESPONSES TO PHQ QUESTIONS 1-9: 5

## 2018-11-22 ASSESSMENT — ANXIETY QUESTIONNAIRES: GAD7 TOTAL SCORE: 14

## 2018-11-27 DIAGNOSIS — R30.0 DYSURIA: Primary | ICD-10-CM

## 2018-11-27 DIAGNOSIS — R82.90 ABNORMAL URINE: ICD-10-CM

## 2018-11-27 LAB
ALBUMIN UR-MCNC: NEGATIVE MG/DL
APPEARANCE UR: CLEAR
BACTERIA #/AREA URNS HPF: ABNORMAL /HPF
BILIRUB UR QL STRIP: NEGATIVE
COLOR UR AUTO: YELLOW
GLUCOSE UR STRIP-MCNC: NEGATIVE MG/DL
HGB UR QL STRIP: ABNORMAL
KETONES UR STRIP-MCNC: NEGATIVE MG/DL
LEUKOCYTE ESTERASE UR QL STRIP: NEGATIVE
NITRATE UR QL: NEGATIVE
NON-SQ EPI CELLS #/AREA URNS LPF: ABNORMAL /LPF
PH UR STRIP: 7 PH (ref 5–7)
RBC #/AREA URNS AUTO: ABNORMAL /HPF
SOURCE: ABNORMAL
SP GR UR STRIP: 1.01 (ref 1–1.03)
UROBILINOGEN UR STRIP-ACNC: 0.2 EU/DL (ref 0.2–1)
WBC #/AREA URNS AUTO: ABNORMAL /HPF

## 2018-11-27 PROCEDURE — 81001 URINALYSIS AUTO W/SCOPE: CPT | Performed by: OBSTETRICS & GYNECOLOGY

## 2018-11-27 RX ORDER — NITROFURANTOIN 25; 75 MG/1; MG/1
100 CAPSULE ORAL 2 TIMES DAILY
Qty: 14 CAPSULE | Refills: 0 | Status: SHIPPED | OUTPATIENT
Start: 2018-11-27 | End: 2018-12-21

## 2018-12-03 ENCOUNTER — TELEPHONE (OUTPATIENT)
Dept: OBGYN | Facility: OTHER | Age: 33
End: 2018-12-03

## 2018-12-03 ENCOUNTER — MEDICAL CORRESPONDENCE (OUTPATIENT)
Dept: HEALTH INFORMATION MANAGEMENT | Facility: CLINIC | Age: 33
End: 2018-12-03

## 2018-12-03 ENCOUNTER — PRENATAL OFFICE VISIT (OUTPATIENT)
Dept: OBGYN | Facility: OTHER | Age: 33
End: 2018-12-03
Payer: COMMERCIAL

## 2018-12-03 VITALS
HEART RATE: 105 BPM | WEIGHT: 175.5 LBS | SYSTOLIC BLOOD PRESSURE: 110 MMHG | OXYGEN SATURATION: 99 % | DIASTOLIC BLOOD PRESSURE: 77 MMHG | BODY MASS INDEX: 24.96 KG/M2

## 2018-12-03 DIAGNOSIS — Z23 NEED FOR TDAP VACCINATION: ICD-10-CM

## 2018-12-03 DIAGNOSIS — O14.20 HEMOLYSIS, ELEVATED LIVER ENZYMES, AND LOW PLATELET (HELLP) SYNDROME DURING PREGNANCY, ANTEPARTUM: ICD-10-CM

## 2018-12-03 DIAGNOSIS — F41.1 GAD (GENERALIZED ANXIETY DISORDER): ICD-10-CM

## 2018-12-03 DIAGNOSIS — Z98.891 HISTORY OF CESAREAN SECTION: ICD-10-CM

## 2018-12-03 DIAGNOSIS — E03.8 OTHER SPECIFIED HYPOTHYROIDISM: ICD-10-CM

## 2018-12-03 DIAGNOSIS — O09.90 HIGH-RISK PREGNANCY, UNSPECIFIED TRIMESTER: Primary | ICD-10-CM

## 2018-12-03 DIAGNOSIS — F33.0 MILD EPISODE OF RECURRENT MAJOR DEPRESSIVE DISORDER (H): ICD-10-CM

## 2018-12-03 DIAGNOSIS — O09.91 HIGH-RISK PREGNANCY IN FIRST TRIMESTER: ICD-10-CM

## 2018-12-03 LAB — ALBUMIN UR-MCNC: NEGATIVE MG/DL

## 2018-12-03 PROCEDURE — 99207 ZZC PRENATAL VISIT: CPT | Performed by: OBSTETRICS & GYNECOLOGY

## 2018-12-03 PROCEDURE — 90471 IMMUNIZATION ADMIN: CPT | Performed by: OBSTETRICS & GYNECOLOGY

## 2018-12-03 PROCEDURE — 81003 URINALYSIS AUTO W/O SCOPE: CPT | Performed by: OBSTETRICS & GYNECOLOGY

## 2018-12-03 PROCEDURE — 90715 TDAP VACCINE 7 YRS/> IM: CPT | Performed by: OBSTETRICS & GYNECOLOGY

## 2018-12-03 NOTE — TELEPHONE ENCOUNTER
Associated Diagnoses      History of  section           Comments      Body mass index is 24.96 kg/(m^2).          Order Questions      Question Answer Comment     Procedure name(s) - multi select Repeat c section      Is this a multi surgeon case? No      Laterality N/A      Reason for procedure History of c section, desires repeat c section      Location of Case: [facility]      Surgeon Procedure Time (incision to closure) in minutes (per procedure as applicable) 60      Note:  Surgical Case Time Needed (in minutes)     Date of Surgery (Requested): 2019      Patient Class (for admit prior to surgery, specify number of days in comments): Surgery admit      Length of Stay: 3 days      Anesthesia Spinal      H&P To Be Completed By: Surgeon      Post-Op Appointment 6 weeks      Vendor Needed? No

## 2018-12-03 NOTE — NURSING NOTE
Screening Questionnaire for Adult Immunization    Are you sick today?   No   Do you have allergies to medications, food, a vaccine component or latex?   No   Have you ever had a serious reaction after receiving a vaccination?   No   Do you have a long-term health problem with heart disease, lung disease, asthma, kidney disease, metabolic disease (e.g. diabetes), anemia, or other blood disorder?   No   Do you have cancer, leukemia, HIV/AIDS, or any other immune system problem?   No   In the past 3 months, have you taken medications that affect  your immune system, such as prednisone, other steroids, or anticancer drugs; drugs for the treatment of rheumatoid arthritis, Crohn s disease, or psoriasis; or have you had radiation treatments?   No   Have you had a seizure, or a brain or other nervous system problem?   No   During the past year, have you received a transfusion of blood or blood     products, or been given immune (gamma) globulin or antiviral drug?   No   For women: Are you pregnant or is there a chance you could become        pregnant during the next month?   Yes   Have you received any vaccinations in the past 4 weeks?   No     Immunization questionnaire was positive for at least one answer.  Notified Dr. Rascon.        Per orders of Dr. Rascon, injection of Tdap given by Rhea Díaz. Patient instructed to remain in clinic for 15 minutes afterwards, and to report any adverse reaction to me immediately.       Screening performed by Rhea Díaz on 12/3/2018 at 10:43 AM.

## 2018-12-03 NOTE — PROGRESS NOTES
33 year old  at 27w3d weeks presents to the clinic for a routine prenatal visit.  History of HEELP=MFM on Friday. U/A today.  I called and spoke to Sowmya about patient today  Recent U/A showed Nitrites so patient has been on Macrobid without problems.  No signs and symptoms   No vaginal bleeding, leakage of fluid, or contractions   Good fetal movement.  Fundal height=28cm  DZKp=472's  GCT=79  Hgb=10.4.  Patient is taking additional iron  Rh A+  RTC 2 weeks  TDAP today  Schedule repeat c section=  Hypothyroidism=1.29  Anxiety/depression=stable      Harriet Malorie Greil          TDAP Vaccine (Adacel)      Date Status Dose VIS Date Route Site  Lot# Given By Verified By     12/3/2018 Given 0.5 mL 2015, Given Today IM LD [employer] [id number] Rhea Díaz CMA --         Exp. Date NDC # Product Time Location External Comment     2020 37458-951-05 ADACEL --  --      Updated by: Rhea Díaz CMA on 12/3/2018 10:49 AM

## 2018-12-03 NOTE — TELEPHONE ENCOUNTER
Surgery Scheduled.    Date of Surgery 2/22/19 Time of Surgery 7:30 am  Procedure: Repeat c section  Hospital/Surgical Facility: [facility]  Surgeon: Dr. Rascon  Type of Anesthesia Anticipated: Spinal  Pre-op: at [facility] with Dr. Rascon  at    6 week post op: patient will call back later to set up with Dr. Rascon at   Pre-certification 12.3.18  Consent signed: NA  Hospital Stay yes     Surgery Pre-Certification    Medical Record Number: [id number]  Queta Cortez  YOB: 1985   Phone: [phone] (home) none (work)  Primary Provider: Alanis Lundberg    Reason for Admit:  History of c section, desires repeat c section    Surgeon: Dr. Rascon  Surgical Procedure: Repeat c section  ICD-9 Coded: O09.90,Z23, O14.20, F33.0,F41.1, E03.8,Z98.891,O09.91  Date of Surgery: 2/22/19  Consent signed? N/A    Date signed:   Hospital: [facility]  Inpatient- Length of stay:  3 days.    Requestor:  Lauren Dennis     Location:  [facility]    Mailed surgery packet mailed to patient's home address.  Patient instructed NPO 12 hours prior to surgery, arrive 1 3/4 hours prior to surgery, does not need to have a .  Patient understood and agrees to the plan.     Lauren Dennis  Women's Health

## 2018-12-03 NOTE — MR AVS SNAPSHOT
After Visit Summary   12/3/2018    Queta Cortez    MRN: [id number]           Patient Information     Date Of Birth          1985        Visit Information        Provider Department      12/3/2018 10:15 AM Harriet Rascon, DO [facility]        Today's Diagnoses     High-risk pregnancy, unspecified trimester    -  1    Need for Tdap vaccination        Hemolysis, elevated liver enzymes, and low platelet (HELLP) syndrome during pregnancy, antepartum        Mild episode of recurrent major depressive disorder (H)        JOAO (generalized anxiety disorder)        Other specified hypothyroidism        History of  section        High-risk pregnancy in first trimester          Care Instructions    What to watch out for are: regular contractions every 5 min, vaginal bleeding, decreased fetal movement, or leakage of fluid.  Please call the office or go to L&D if you develop any of these signs and symptoms.      I will see you for your follow up appointment.  Please feel free to call if you have any questions or concerns.      Thanks,  Harriet Rascon, DO            Follow-ups after your visit        Follow-up notes from your care team     Return in about 2 weeks (around 2018).      Your next 10 appointments already scheduled     Dec 07, 2018 10:15 AM JAMES VALIENTE US COMPRE SINGLE F/U with [id number]   [facility])    [address]  [facility] [id number] [phone]           Wear comfortable clothes and leave your valuables at home.            Dec 07, 2018 10:45 AM JAMES   Radiology MD with UR STEFFANIE STRANGE   [facility])    [address]  [facility] [address]   [phone]           Please arrive at the time given for your first appointment. This visit is used internally to schedule the  physician's time during your ultrasound.            Dec 10, 2018  1:00 PM CST   Return Visit with Bella LUZMARIA OrtegaHarrington   [facility])    [address]  [facility] [address]   [phone]            Dec 17, 2018 10:15 AM CST   ESTABLISHED PRENATAL with Harriet Rascon DO   [facility]    [address] [facility] [address]   [phone]            Dec 28, 2018  4:00 PM CST   Return Visit with Bella LUZMARIA OrtegaHarrington   [facility] ([facility])    [address] [facility] [address]  [facility] [address]   [phone]            Dec 31, 2018 10:30 AM CST   ESTABLISHED PRENATAL with Harriet Rascon DO   [facility]    [address] [facility] [address]   [phone]            Apr 17, 2019  1:00 PM CDT   LAB with LAB ONC [facility]    [address] [place] [address] [phone]           Please do not eat 10-12 hours before your appointment if you are coming in fasting for labs on lipids, cholesterol, or glucose (sugar). This does not apply to pregnant women. Water, hot tea and black coffee (with nothing added) are okay. Do not drink other fluids, diet soda or chew gum.            Apr 17, 2019  1:45 PM CDT   Return Visit with Ivan Vanessa MD   [facility]    [address] [place] [address] [phone]              Who to contact     If you have questions or need follow up information about today's clinic visit or your schedule please contact [facility] directly at [phone].  Normal or non-critical lab and imaging results will be communicated to you by MyChart, letter or phone within 4 business days after the clinic has received the results. If you do not hear from us within 7 days,  please contact the clinic through [email or web address] or phone. If you have a critical or abnormal lab result, we will notify you by phone as soon as possible.  Submit refill requests through [email or web address] or call your pharmacy and they will forward the refill request to us. Please allow 3 business days for your refill to be completed.          Additional Information About Your Visit        [employer] Information     [email or web address] gives you secure access to your electronic health record. If you see a primary care provider, you can also send messages to your care team and make appointments. If you have questions, please call your primary care clinic.  If you do not have a primary care provider, please call [phone] and they will assist you.        Care EveryWhere ID     This is your Care EveryWhere ID. This could be used by other organizations to access your [place] medical records  [id number]        Your Vitals Were     Pulse Last Period Pulse Oximetry Breastfeeding? BMI (Body Mass Index)       105 05/25/2018 (Approximate) 99% No 24.96 kg/m2        Blood Pressure from Last 3 Encounters:   12/03/18 110/77   11/20/18 115/73   11/06/18 104/61    Weight from Last 3 Encounters:   12/03/18 175 lb 8 oz (79.6 kg)   11/20/18 171 lb 8 oz (77.8 kg)   11/06/18 168 lb 14.4 oz (76.6 kg)              We Performed the Following     Xochilt-Operative Worksheet     Protein qualitative urine        Primary Care Provider Office Phone # Fax #    Alanis Lundberg [phone]       [address] [facility] [address]  [facility] [address]        Equal Access to Services     [facility]: Hadii poonam ku hadasho Sojannetteali, waaxda luqadaha, qaybta kaalmada alicia, anselmo freeman . So [facility] [phone].    ATENCIÓN: Si habla español, tiene a nova disposición servicios gratuitos de asistencia lingüística. Llame al [phone].    We comply with applicable federal civil rights laws and [place] laws. We do not discriminate on the basis of  race, color, national origin, age, disability, sex, sexual orientation, or gender identity.            Thank you!     Thank you for choosing [facility]  for your care. Our goal is always to provide you with excellent care. Hearing back from our patients is one way we can continue to improve our services. Please take a few minutes to complete the written survey that you may receive in the mail after your visit with us. Thank you!             Your Updated Medication List - Protect others around you: Learn how to safely use, store and throw away your medicines at www.disposemymeds.org.          This list is accurate as of 12/3/18 10:42 AM.  Always use your most recent med list.                   Brand Name Dispense Instructions for use Diagnosis    ASPIRIN PO      Take 81 mg by mouth        [employer] PRENATAL GUMMY 0.4-113.5 MG Chew      Reported on 4/19/2017        ferrous sulfate 325 (65 Fe) MG tablet    FEROSUL    60 tablet    Take 1 tablet (325 mg) by mouth 2 times daily    Anemia, unspecified type       levothyroxine 25 MCG tablet    SYNTHROID/LEVOTHROID    90 tablet    Take 1 tablet (25 mcg) by mouth daily    Hypothyroidism, unspecified type       nitroFURantoin macrocrystal-monohydrate 100 MG capsule    MACROBID    14 capsule    Take 1 capsule (100 mg) by mouth 2 times daily    Dysuria

## 2018-12-07 ENCOUNTER — TRANSFERRED RECORDS (OUTPATIENT)
Dept: HEALTH INFORMATION MANAGEMENT | Facility: CLINIC | Age: 33
End: 2018-12-07

## 2018-12-07 ENCOUNTER — HOSPITAL ENCOUNTER (OUTPATIENT)
Dept: ULTRASOUND IMAGING | Facility: CLINIC | Age: 33
Discharge: HOME | End: 2018-12-07
Attending: OBSTETRICS & GYNECOLOGY | Admitting: OBSTETRICS & GYNECOLOGY
Payer: COMMERCIAL

## 2018-12-07 ENCOUNTER — OFFICE VISIT (OUTPATIENT)
Dept: MATERNAL FETAL MEDICINE | Facility: CLINIC | Age: 33
End: 2018-12-07
Attending: OBSTETRICS & GYNECOLOGY
Payer: COMMERCIAL

## 2018-12-07 DIAGNOSIS — O09.299: ICD-10-CM

## 2018-12-07 DIAGNOSIS — O09.293 HISTORY OF HELLP SYNDROME, CURRENTLY PREGNANT, THIRD TRIMESTER: Primary | ICD-10-CM

## 2018-12-07 PROCEDURE — 76816 OB US FOLLOW-UP PER FETUS: CPT

## 2018-12-07 NOTE — PROGRESS NOTES
"Please see \"Imaging\" tab under \"Chart Review\" for details of today's visit.    Bonilla Mendoza    "

## 2018-12-07 NOTE — MR AVS SNAPSHOT
After Visit Summary   12/7/2018    Queta Cortez    MRN: [id number]           Patient Information     Date Of Birth          1985        Visit Information        Provider Department      12/7/2018 10:45 AM Bonilla Mendoza MD [facility]        Today's Diagnoses     History of HELLP syndrome, currently pregnant, third trimester    -  1       Follow-ups after your visit        Your next 10 appointments already scheduled     Dec 10, 2018  1:00 PM CST   Return Visit with Bella Ortegalure   [facility] ([facility])    [address] [facility] [address]  [facility] [address]   [phone]            Dec 17, 2018 10:15 AM CST   ESTABLISHED PRENATAL with Harriet Rascon DO   [facility]    [address] [facility] [address]   [phone]            Dec 28, 2018  4:00 PM CST   Return Visit with Bella Ortegalure   [facility] ([facility])    [address] [facility] [address]  [facility] [address]   [phone]            Dec 31, 2018 10:30 AM CST   ESTABLISHED PRENATAL with Harriet Rascon DO   [facility]    [address] [facility] [address]   [phone]            Apr 17, 2019  1:00 PM CDT   LAB with LAB ONC [facility] ([facility])    [address] [place] [id number] [phone]           Please do not eat 10-12 hours before your appointment if you are coming in fasting for labs on lipids, cholesterol, or glucose (sugar). This does not apply to pregnant women. Water, hot tea and black coffee (with nothing added) are okay. Do not drink other fluids, diet soda or chew gum.            Apr 17, 2019  1:45 PM CDT   Return Visit with Ivan Vanessa MD   [facility]    [address] [place] [address]  [id number] [phone]              Who to contact     If you have questions or need follow up information about today's clinic visit or your schedule please contact [facility] directly at [phone].  Normal or non-critical lab and imaging results will be communicated to you by STinserhart, letter or phone within 4 business days after the clinic has received the results. If you do not hear from us within 7 days, please contact the clinic through [employer] or phone. If you have a critical or abnormal lab result, we will notify you by phone as soon as possible.  Submit refill requests through [employer] or call your pharmacy and they will forward the refill request to us. Please allow 3 business days for your refill to be completed.          Additional Information About Your Visit        [employer] Information     [employer] gives you secure access to your electronic health record. If you see a primary care provider, you can also send messages to your care team and make appointments. If you have questions, please call your primary care clinic.  If you do not have a primary care provider, please call [phone] and they will assist you.        Care EveryWhere ID     This is your Care EveryWhere ID. This could be used by other organizations to access your [place] medical records  [id number]        Your Vitals Were     Last Period                   05/25/2018 (Approximate)            Blood Pressure from Last 3 Encounters:   12/03/18 110/77   11/20/18 115/73   11/06/18 104/61    Weight from Last 3 Encounters:   12/03/18 79.6 kg (175 lb 8 oz)   11/20/18 77.8 kg (171 lb 8 oz)   11/06/18 76.6 kg (168 lb 14.4 oz)              Today, you had the following     No orders found for display       Primary Care Provider Office Phone # Fax #    Alanis Lundberg [phone]       [address] [facility] [address]  [facility] [address]        Equal Access to Services     JOAQUIM AYALA AH: Brett joseph  vani Masters, kathy hallkaurha, qapeterta kacolton chaulawson, anselmo frankin hayaaaimee ritchiemisty berniepreetmalvin mckinleyLeathajonah katarzyna. So [facility] [phone].    ATENCIÓN: Si habla anthony, tiene a nova disposición servicios gratuitos de asistencia lingüística. Precious al [phone].    We comply with applicable federal civil rights laws and [place] laws. We do not discriminate on the basis of race, color, national origin, age, disability, sex, sexual orientation, or gender identity.            Thank you!     Thank you for choosing [facility]  for your care. Our goal is always to provide you with excellent care. Hearing back from our patients is one way we can continue to improve our services. Please take a few minutes to complete the written survey that you may receive in the mail after your visit with us. Thank you!             Your Updated Medication List - Protect others around you: Learn how to safely use, store and throw away your medicines at www.disposemymeds.org.          This list is accurate as of 12/7/18  5:02 PM.  Always use your most recent med list.                   Brand Name Dispense Instructions for use Diagnosis    ASPIRIN PO      Take 81 mg by mouth        [employer]S PRENATAL GUMMY 0.4-113.5 MG Chew      Reported on 4/19/2017        ferrous sulfate 325 (65 Fe) MG tablet    FEROSUL    60 tablet    Take 1 tablet (325 mg) by mouth 2 times daily    Anemia, unspecified type       levothyroxine 25 MCG tablet    SYNTHROID/LEVOTHROID    90 tablet    Take 1 tablet (25 mcg) by mouth daily    Hypothyroidism, unspecified type       nitroFURantoin macrocrystal-monohydrate 100 MG capsule    MACROBID    14 capsule    Take 1 capsule (100 mg) by mouth 2 times daily    Dysuria

## 2018-12-10 ENCOUNTER — OFFICE VISIT (OUTPATIENT)
Dept: PSYCHOLOGY | Facility: CLINIC | Age: 33
End: 2018-12-10
Payer: COMMERCIAL

## 2018-12-10 DIAGNOSIS — F41.1 GAD (GENERALIZED ANXIETY DISORDER): Primary | ICD-10-CM

## 2018-12-10 DIAGNOSIS — F43.10 PTSD (POST-TRAUMATIC STRESS DISORDER): ICD-10-CM

## 2018-12-10 PROCEDURE — 90834 PSYTX W PT 45 MINUTES: CPT | Performed by: MARRIAGE & FAMILY THERAPIST

## 2018-12-10 NOTE — PROGRESS NOTES
Progress Note    Client Name: Queta Cortez  Date: 12/10/2018           Service Type: Individual      Session Start Time: 1pm  Session End Time: 1:50pm      Session Length: 50 minutes     Session #: 2     Attendees: Client attended alone    Treatment Plan Last Reviewed: NA 2nd session  PHQ-9 / JOAO-7 : declined     DATA      Progress Since Last Session (Related to Symptoms / Goals / Homework):   Symptoms: Worsening increased anxiety, and had a panic attack    Homework: NA      Episode of Care Goals: Satisfactory progress - PREPARATION (Decided to change - considering how); Intervened by negotiating a change plan and determining options / strategies for behavior change, identifying triggers, exploring social supports, and working towards setting a date to begin behavior change     Current / Ongoing Stressors and Concerns:   medical:pregnant, marital, financial     Treatment Objective(s) Addressed in This Session:   identify at least 2-3 triggers for anxiety  Identify negative self-talk and behaviors: challenge core beliefs, myths, and actions       Intervention:   CBT, solution focused   Client reports she would like to work on ongoing lack of self confidence, anxiety, and negative and critical self talk. Encouraged client to create a self affirmation phrase to utilize 30 times daily to build up self confidence. Discussed use of benita/reading devotionals or bible, calm ivania, and happify apps to reduce anxiety. Discussed that anxiety and want for control often leads to conflict with spouse. Encouraged client to consider watching and waiting to see how  responds before taking action. Encouraged client to use Raymundo Steven's technique of  The story I'm telling myself and letting  know how she is feeling.      ASSESSMENT: Current Emotional / Mental Status (status of significant symptoms):   Risk status (Self / Other harm or suicidal ideation)   Client denies current  fears or concerns for personal safety.   Client denies current or recent suicidal ideation or behaviors.   Client denies current or recent homicidal ideation or behaviors.   Client denies current or recent self injurious behavior or ideation.   Client denies other safety concerns.   Client Client reports there has been no change in risk factors since their last session.     Client Client reports there has been no change in protective factors since their last session.     A safety and risk management plan has not been developed at this time, however client was given the after-hours number / 911 should there be a change in any of these risk factors.     Appearance:   Appropriate    Eye Contact:   Good    Psychomotor Behavior: Normal    Attitude:   Cooperative    Orientation:   All   Speech    Rate / Production: Normal     Volume:  Normal    Mood:    Anxious  Sad    Affect:    Appropriate    Thought Content:  Clear    Thought Form:  Coherent  Logical    Insight:    Fair      Medication Review:   No current psychiatric medications prescribed     Medication Compliance:   NA     Changes in Health Issues:   None reported     Chemical Use Review:   Substance Use: Chemical use reviewed, no active concerns identified      Tobacco Use: No current tobacco use.       Collateral Reports Completed:   Not Applicable    PLAN: (Client Tasks / Therapist Tasks / Other)  See above note section.        Bella Styles

## 2018-12-21 ENCOUNTER — PRENATAL OFFICE VISIT (OUTPATIENT)
Dept: OBGYN | Facility: CLINIC | Age: 33
End: 2018-12-21
Payer: COMMERCIAL

## 2018-12-21 VITALS
SYSTOLIC BLOOD PRESSURE: 110 MMHG | HEART RATE: 100 BPM | WEIGHT: 178.8 LBS | BODY MASS INDEX: 25.43 KG/M2 | DIASTOLIC BLOOD PRESSURE: 74 MMHG

## 2018-12-21 DIAGNOSIS — O34.219 DECLINES VBAC (VAGINAL BIRTH AFTER CESAREAN) TRIAL: ICD-10-CM

## 2018-12-21 DIAGNOSIS — O09.299 H/O HELLP SYNDROME, CURRENTLY PREGNANT: Primary | ICD-10-CM

## 2018-12-21 DIAGNOSIS — E03.9 HYPOTHYROIDISM, UNSPECIFIED TYPE: ICD-10-CM

## 2018-12-21 LAB — ALBUMIN UR-MCNC: NEGATIVE MG/DL

## 2018-12-21 PROCEDURE — 99207 ZZC PRENATAL VISIT: CPT | Performed by: OBSTETRICS & GYNECOLOGY

## 2018-12-21 PROCEDURE — 81003 URINALYSIS AUTO W/O SCOPE: CPT | Performed by: OBSTETRICS & GYNECOLOGY

## 2018-12-21 RX ORDER — LEVOTHYROXINE SODIUM 25 UG/1
25 TABLET ORAL DAILY
Qty: 90 TABLET | Refills: 1 | Status: SHIPPED | OUTPATIENT
Start: 2018-12-21 | End: 2019-07-05

## 2018-12-21 NOTE — PROGRESS NOTES
33 year old  at 30w0d weeks presents to the clinic for a routine prenatal visit.  History of HELLP=urine today  Patient denies any vaginal bleeding, leakage of fluid, or contractions   Good fetal movement.    Fundal height=30cm  WLGj=365  GDS=79  Hgb=10.4.  Patient is taking additional iron supplements  Hypothyroidism=stable.  Refill today  Anxiety=stable  RTC 2 weeks    Harriet Rascon

## 2018-12-31 ENCOUNTER — PRENATAL OFFICE VISIT (OUTPATIENT)
Dept: OBGYN | Facility: OTHER | Age: 33
End: 2018-12-31
Payer: COMMERCIAL

## 2018-12-31 VITALS
DIASTOLIC BLOOD PRESSURE: 68 MMHG | WEIGHT: 180 LBS | HEART RATE: 64 BPM | SYSTOLIC BLOOD PRESSURE: 110 MMHG | BODY MASS INDEX: 25.6 KG/M2

## 2018-12-31 DIAGNOSIS — E03.8 OTHER SPECIFIED HYPOTHYROIDISM: ICD-10-CM

## 2018-12-31 DIAGNOSIS — O34.219 DECLINES VBAC (VAGINAL BIRTH AFTER CESAREAN) TRIAL: ICD-10-CM

## 2018-12-31 DIAGNOSIS — F41.1 GAD (GENERALIZED ANXIETY DISORDER): ICD-10-CM

## 2018-12-31 DIAGNOSIS — O09.93 HIGH-RISK PREGNANCY IN THIRD TRIMESTER: Chronic | ICD-10-CM

## 2018-12-31 DIAGNOSIS — O09.299 H/O HELLP SYNDROME, CURRENTLY PREGNANT: Primary | ICD-10-CM

## 2018-12-31 LAB — ALBUMIN UR-MCNC: NEGATIVE MG/DL

## 2018-12-31 PROCEDURE — 81003 URINALYSIS AUTO W/O SCOPE: CPT | Performed by: OBSTETRICS & GYNECOLOGY

## 2018-12-31 PROCEDURE — 99207 ZZC PRENATAL VISIT: CPT | Performed by: OBSTETRICS & GYNECOLOGY

## 2018-12-31 NOTE — PROGRESS NOTES
33 year old  at 31w3d weeks presents to the clinic for a routine prenatal visit.  History of HELLP=urine today  Patient denies any vaginal bleeding, leakage of fluid, or contractions   Good fetal movement.    Fundal height=32cm  DZFi=854  GDS=79  C/S=2/22  Hypothyroidism=1.29   Hgb=10.4.  Patient is taking additional iron supplements  Anxiety=stable  RTC 2 weeks    Harriet Rascon

## 2018-12-31 NOTE — NURSING NOTE
"Chief Complaint   Patient presents with     Prenatal Care       Initial /68 (BP Location: Right arm, Patient Position: Chair, Cuff Size: Adult Regular)   Pulse 64   Wt 81.6 kg (180 lb)   LMP 2018 (Approximate)   BMI 25.60 kg/m   Estimated body mass index is 25.43 kg/m  as calculated from the following:    Height as of 18: 1.786 m (5' 10.32\").    Weight as of 18: 81.1 kg (178 lb 12.8 oz).  BP completed using cuff size: regular        Chelsi Ramirez, JOSE GUADALUPE  2018          "

## 2019-01-11 ENCOUNTER — OFFICE VISIT (OUTPATIENT)
Dept: PSYCHOLOGY | Facility: CLINIC | Age: 34
End: 2019-01-11
Payer: COMMERCIAL

## 2019-01-11 DIAGNOSIS — F41.1 GAD (GENERALIZED ANXIETY DISORDER): Primary | ICD-10-CM

## 2019-01-11 DIAGNOSIS — F43.10 PTSD (POST-TRAUMATIC STRESS DISORDER): ICD-10-CM

## 2019-01-11 PROCEDURE — 90834 PSYTX W PT 45 MINUTES: CPT | Performed by: MARRIAGE & FAMILY THERAPIST

## 2019-01-13 NOTE — PROGRESS NOTES
Progress Note    Client Name: Queta Cortez  Date: 1/11/2019           Service Type: Individual      Session Start Time: 3pm  Session End Time: 3:50pm      Session Length: 50 minutes     Session #: 3     Attendees: Client attended alone    Treatment Plan Last Reviewed: completed today  PHQ-9 / JOAO-7 : declined     DATA      Progress Since Last Session (Related to Symptoms / Goals / Homework):   Symptoms: Improving anxiety    Homework: Achieved / completed to satisfaction      Episode of Care Goals: Satisfactory progress - PREPARATION (Decided to change - considering how); Intervened by negotiating a change plan and determining options / strategies for behavior change, identifying triggers, exploring social supports, and working towards setting a date to begin behavior change     Current / Ongoing Stressors and Concerns:   medical:pregnant, marital, financial     Treatment Objective(s) Addressed in This Session:   identify at least 2-3 triggers for anxiety  Identify negative self-talk and behaviors: challenge core beliefs, myths, and actions       Intervention:   CBT, solution focused   Client reports she and  continue to have issues with communication. They have tried using the 5 love languages, sharing thoughts and feelings,  and discussing priorities. Encouraged client to use talking and listening turns when conflicted.Discussed challenging negative and anxious thinking by creating the exception to the negative thought, and thought refuting. Discussed preparations for upcoming birth and feels they are mostly ready, plans to pack her hospital bag this weekend. Discussed looking into a short term disability policy to give them extra finances in case she had medical issues. She reports her in laws will help them financially as well. Discussed making time for self, and encouraged client to read the book Girl Wash Your Face.       ASSESSMENT: Current Emotional /  Mental Status (status of significant symptoms):   Risk status (Self / Other harm or suicidal ideation)   Client denies current fears or concerns for personal safety.   Client denies current or recent suicidal ideation or behaviors.   Client denies current or recent homicidal ideation or behaviors.   Client denies current or recent self injurious behavior or ideation.   Client denies other safety concerns.   Client Client reports there has been no change in risk factors since their last session.     Client Client reports there has been no change in protective factors since their last session.     A safety and risk management plan has not been developed at this time, however client was given the after-hours number / 911 should there be a change in any of these risk factors.     Appearance:   Appropriate    Eye Contact:   Good    Psychomotor Behavior: Normal    Attitude:   Cooperative    Orientation:   All   Speech    Rate / Production: Normal     Volume:  Normal    Mood:    Anxious    Affect:    Appropriate    Thought Content:  Clear    Thought Form:  Coherent  Logical    Insight:    Fair      Medication Review:   No current psychiatric medications prescribed     Medication Compliance:   NA     Changes in Health Issues:   None reported     Chemical Use Review:   Substance Use: Chemical use reviewed, no active concerns identified      Tobacco Use: No current tobacco use.       Collateral Reports Completed:   Not Applicable    PLAN: (Client Tasks / Therapist Tasks / Other)  See above note section.        Bella Styles                                                         Treatment Plan     Client's Name: Queta Cortez                      YOB: 1985     Date: 1/11/2019        Diagnoses:Diagnoses:  Rule out 296.31 Major Depressive Disorder, Recurrent Episode, Mild With peripartum onset  300.02 (F41.1) Generalized Anxiety Disorder  309.81 (F43.10) Posttraumatic Stress Disorder (includes Posttraumatic  Stress Disorder for Children 6 Years and Younger)  Without dissociative symptoms   Medical dx: 26 weeks pregnat, hx of HELLP syndrome, hx of HUS  Psychosocial & Contextual Factors: medical:pregnant, marital, financial  WHODAS 2.0 (12 item) 17     Referral / Collaboration:  Collaboration was initiated with PCP MD.     Anticipated number of session or this episode of care: 12-15        MeasurableTreatment Goal(s) related to diagnosis / functional impairment(s)  Goal 1: Client will decrease depressed mood and anxiety as evidenced by PHQ9 and GAD7 scores 4 and Under within the next 9 months. Initial scorin2018: GAD7:14    I will know I've met my goal when I'm better able to manage my anxiety and feel better about myself, and am more confident.       Objective #A (Client Action)   Continued - Date(s):2019  Client will identify 1-2 initial signs or symptoms of anxiety and utilize anxiety reduction techniques daily to decrease anxiety over the few months.Current Baseline is sporadic use. Client will ID triggers for anxiety and work towards decreasing reactivity to or eliminating stressor if possible. Client will develop more effective coping skills to manage anxiety symptoms, will develop healthy cognitive patterns and beliefs,and will increase ability to function adaptively Client will improve communication with others and be able to work through anxiety rather than shutting down. Client will experience improve self esteem and self worth.  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 Client will continue to process grief related to medical issues and loss of niece.  Intervention(s)   Therapist will teach client how to ID body cues for anxiety, anxiety reduction techniques, how to ID triggers for anxiety- decrease reactivity/eliminate, lifestyle changes to reduce anxiety, communication skills, explore cognitive beliefs and help client to develop healthy cognitive  patterns and beliefs.    Client has reviewed and agreed to the above plan.

## 2019-01-13 NOTE — PROGRESS NOTES
Progress Note    Client Name: Queta Cortez  Date: 1/11/2019           Service Type: Individual      Session Start Time: 3pm  Session End Time: 3:50pm      Session Length: 50 minutes     Session #: 3     Attendees: Client attended alone    Treatment Plan Last Reviewed: completed today  PHQ-9 / JOAO-7 : declined     DATA      Progress Since Last Session (Related to Symptoms / Goals / Homework):   Symptoms: Improving anxiety    Homework: Achieved / completed to satisfaction      Episode of Care Goals: Satisfactory progress - PREPARATION (Decided to change - considering how); Intervened by negotiating a change plan and determining options / strategies for behavior change, identifying triggers, exploring social supports, and working towards setting a date to begin behavior change     Current / Ongoing Stressors and Concerns:   medical:pregnant, marital, financial     Treatment Objective(s) Addressed in This Session:   identify at least 2-3 triggers for anxiety  Identify negative self-talk and behaviors: challenge core beliefs, myths, and actions       Intervention:   CBT, solution focused   Client reports she would like to work on ongoing lack of self confidence, anxiety, and negative and critical self talk. Encouraged client to create a self affirmation phrase to utilize 30 times daily to build up self confidence. Discussed use of benita/reading devotionals or bible, calm ivania, and happify apps to reduce anxiety. Discussed that anxiety and want for control often leads to conflict with spouse. Encouraged client to consider watching and waiting to see how  responds before taking action. Encouraged client to use Raymundo Steven's technique of  The story I'm telling myself and letting  know how she is feeling.      ASSESSMENT: Current Emotional / Mental Status (status of significant symptoms):   Risk status (Self / Other harm or suicidal ideation)   Client denies current  fears or concerns for personal safety.   Client denies current or recent suicidal ideation or behaviors.   Client denies current or recent homicidal ideation or behaviors.   Client denies current or recent self injurious behavior or ideation.   Client denies other safety concerns.   Client Client reports there has been no change in risk factors since their last session.     Client Client reports there has been no change in protective factors since their last session.     A safety and risk management plan has not been developed at this time, however client was given the after-hours number / 911 should there be a change in any of these risk factors.     Appearance:   Appropriate    Eye Contact:   Good    Psychomotor Behavior: Normal    Attitude:   Cooperative    Orientation:   All   Speech    Rate / Production: Normal     Volume:  Normal    Mood:    Anxious  Sad    Affect:    Appropriate    Thought Content:  Clear    Thought Form:  Coherent  Logical    Insight:    Fair      Medication Review:   No current psychiatric medications prescribed     Medication Compliance:   NA     Changes in Health Issues:   None reported     Chemical Use Review:   Substance Use: Chemical use reviewed, no active concerns identified      Tobacco Use: No current tobacco use.       Collateral Reports Completed:   Not Applicable    PLAN: (Client Tasks / Therapist Tasks / Other)  See above note section.        Bella Styles

## 2019-01-17 ENCOUNTER — PRENATAL OFFICE VISIT (OUTPATIENT)
Dept: OBGYN | Facility: OTHER | Age: 34
End: 2019-01-17
Payer: COMMERCIAL

## 2019-01-17 VITALS
HEART RATE: 78 BPM | DIASTOLIC BLOOD PRESSURE: 70 MMHG | WEIGHT: 181 LBS | SYSTOLIC BLOOD PRESSURE: 104 MMHG | BODY MASS INDEX: 25.74 KG/M2 | TEMPERATURE: 98.9 F

## 2019-01-17 DIAGNOSIS — O34.219 DECLINES VBAC (VAGINAL BIRTH AFTER CESAREAN) TRIAL: ICD-10-CM

## 2019-01-17 DIAGNOSIS — O09.299 H/O HELLP SYNDROME, CURRENTLY PREGNANT: Primary | ICD-10-CM

## 2019-01-17 DIAGNOSIS — F33.0 MILD EPISODE OF RECURRENT MAJOR DEPRESSIVE DISORDER (H): ICD-10-CM

## 2019-01-17 DIAGNOSIS — E03.8 OTHER SPECIFIED HYPOTHYROIDISM: ICD-10-CM

## 2019-01-17 LAB
ALBUMIN UR-MCNC: NEGATIVE MG/DL
TSH SERPL DL<=0.005 MIU/L-ACNC: 1.32 MU/L (ref 0.4–4)

## 2019-01-17 PROCEDURE — 36415 COLL VENOUS BLD VENIPUNCTURE: CPT | Performed by: OBSTETRICS & GYNECOLOGY

## 2019-01-17 PROCEDURE — 81003 URINALYSIS AUTO W/O SCOPE: CPT | Performed by: OBSTETRICS & GYNECOLOGY

## 2019-01-17 PROCEDURE — 99207 ZZC PRENATAL VISIT: CPT | Performed by: OBSTETRICS & GYNECOLOGY

## 2019-01-17 PROCEDURE — 84443 ASSAY THYROID STIM HORMONE: CPT | Performed by: OBSTETRICS & GYNECOLOGY

## 2019-01-17 NOTE — PROGRESS NOTES
33 year old  at 33w6d weeks presents to the clinic for a routine prenatal visit.    No concerns.  Patient denies any vaginal bleeding, leakage of fluid, or contractions     Good fetal movement  Fundal height=35cm  VAZm=211  History of HELLP=Bp=stable.  Will check urine today  C/S= if no labor prior  Anxiety=stable  Hypothyroidism=last TSH was Nov and was stable.  Will check today.  Discussed labor precautions.  RTC 2 weeks    Harriet Rascon

## 2019-01-24 ENCOUNTER — OFFICE VISIT (OUTPATIENT)
Dept: PSYCHOLOGY | Facility: CLINIC | Age: 34
End: 2019-01-24
Payer: COMMERCIAL

## 2019-01-24 DIAGNOSIS — F33.0 MILD EPISODE OF RECURRENT MAJOR DEPRESSIVE DISORDER (H): ICD-10-CM

## 2019-01-24 DIAGNOSIS — F41.1 GAD (GENERALIZED ANXIETY DISORDER): Primary | ICD-10-CM

## 2019-01-24 DIAGNOSIS — F43.10 PTSD (POST-TRAUMATIC STRESS DISORDER): ICD-10-CM

## 2019-01-24 PROCEDURE — 90834 PSYTX W PT 45 MINUTES: CPT | Performed by: MARRIAGE & FAMILY THERAPIST

## 2019-01-24 NOTE — PROGRESS NOTES
Progress Note    Client Name: Queta Cortez  Date: 1/24/2019           Service Type: Individual      Session Start Time: 12:30pm  Session End Time: 1:15pm      Session Length: 45 minutes     Session #: 4     Attendees: Client attended alone      PHQ-9 / JOAO-7 : declined     DATA      Progress Since Last Session (Related to Symptoms / Goals / Homework):   Symptoms: Improving anxiety    Homework: Achieved / completed to satisfaction      Episode of Care Goals: Satisfactory progress - PREPARATION (Decided to change - considering how); Intervened by negotiating a change plan and determining options / strategies for behavior change, identifying triggers, exploring social supports, and working towards setting a date to begin behavior change     Current / Ongoing Stressors and Concerns:   medical:pregnant, marital, financial     Treatment Objective(s) Addressed in This Session:   identify at least 2-3 triggers for anxiety  Improve connection with  and share thoughts/feelings/wants       Intervention:   CBT, solution focused   Client reports she has been actively working on refuting negative thoughts, and when her what if's come into place she has been challenging herself by asking what is true/what are the facts. She has been feeling less anxious, and more confident about upcoming birth. Discussed lack of intimacy and closeness with  and want to improve. Encouraged client and  to discuss what  intimacy and closeness looks like to each of them and work on improving the frequency of the actions partner suggests. Also discussed making positive intentional time together, and using the Life 360 ivania to improve trust. Client continues to work on making time for self, and has been doing a few minutes of reading while son plays near her which has been going well.        ASSESSMENT: Current Emotional / Mental Status (status of significant symptoms):   Risk status  (Self / Other harm or suicidal ideation)   Client denies current fears or concerns for personal safety.   Client denies current or recent suicidal ideation or behaviors.   Client denies current or recent homicidal ideation or behaviors.   Client denies current or recent self injurious behavior or ideation.   Client denies other safety concerns.   Client Client reports there has been no change in risk factors since their last session.     Client Client reports there has been no change in protective factors since their last session.     A safety and risk management plan has not been developed at this time, however client was given the after-hours number / 911 should there be a change in any of these risk factors.     Appearance:   Appropriate    Eye Contact:   Good    Psychomotor Behavior: Normal    Attitude:   Cooperative    Orientation:   All   Speech    Rate / Production: Normal     Volume:  Normal    Mood:    Anxious    Affect:    Appropriate    Thought Content:  Clear    Thought Form:  Coherent  Logical    Insight:    Fair      Medication Review:   No current psychiatric medications prescribed     Medication Compliance:   NA     Changes in Health Issues:   None reported     Chemical Use Review:   Substance Use: Chemical use reviewed, no active concerns identified      Tobacco Use: No current tobacco use.       Collateral Reports Completed:   Not Applicable    PLAN: (Client Tasks / Therapist Tasks / Other)  Encouraged client and  to discuss what  intimacy and closeness looks like to each of them and work on improving the frequency of the actions partner suggests. Also discussed making positive intentional time together, and using the Life 360 ivania to improve trust. Refute negative thoughts, and focus on what is true.        Bella Styles                                                         Treatment Plan     Client's Name: Queta Cortez                      YOB: 1985     Date:  2019        Diagnoses:Diagnoses:  Rule out 296.31 Major Depressive Disorder, Recurrent Episode, Mild With peripartum onset  300.02 (F41.1) Generalized Anxiety Disorder  309.81 (F43.10) Posttraumatic Stress Disorder (includes Posttraumatic Stress Disorder for Children 6 Years and Younger)  Without dissociative symptoms   Medical dx: 26 weeks pregnat, hx of HELLP syndrome, hx of HUS  Psychosocial & Contextual Factors: medical:pregnant, marital, financial  WHODAS 2.0 (12 item) 17     Referral / Collaboration:  Collaboration was initiated with PCP MD.     Anticipated number of session or this episode of care: 12-15        MeasurableTreatment Goal(s) related to diagnosis / functional impairment(s)  Goal 1: Client will decrease depressed mood and anxiety as evidenced by PHQ9 and GAD7 scores 4 and Under within the next 9 months. Initial scorin2018: GAD7:14    I will know I've met my goal when I'm better able to manage my anxiety and feel better about myself, and am more confident.       Objective #A (Client Action)   Continued - Date(s):2019  Client will identify 1-2 initial signs or symptoms of anxiety and utilize anxiety reduction techniques daily to decrease anxiety over the few months.Current Baseline is sporadic use. Client will ID triggers for anxiety and work towards decreasing reactivity to or eliminating stressor if possible. Client will develop more effective coping skills to manage anxiety symptoms, will develop healthy cognitive patterns and beliefs,and will increase ability to function adaptively Client will improve communication with others and be able to work through anxiety rather than shutting down. Client will experience improve self esteem and self worth.  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 Client will continue to process grief related to medical issues and loss of niece.  Intervention(s)   Therapist will teach client how to ID body  cues for anxiety, anxiety reduction techniques, how to ID triggers for anxiety- decrease reactivity/eliminate, lifestyle changes to reduce anxiety, communication skills, explore cognitive beliefs and help client to develop healthy cognitive patterns and beliefs.    Client has reviewed and agreed to the above plan.

## 2019-01-28 ENCOUNTER — PRENATAL OFFICE VISIT (OUTPATIENT)
Dept: OBGYN | Facility: OTHER | Age: 34
End: 2019-01-28
Payer: COMMERCIAL

## 2019-01-28 VITALS
WEIGHT: 183 LBS | BODY MASS INDEX: 26.02 KG/M2 | SYSTOLIC BLOOD PRESSURE: 110 MMHG | HEART RATE: 88 BPM | DIASTOLIC BLOOD PRESSURE: 74 MMHG

## 2019-01-28 DIAGNOSIS — E03.8 OTHER SPECIFIED HYPOTHYROIDISM: ICD-10-CM

## 2019-01-28 DIAGNOSIS — F41.1 GAD (GENERALIZED ANXIETY DISORDER): ICD-10-CM

## 2019-01-28 DIAGNOSIS — O34.219 DECLINES VBAC (VAGINAL BIRTH AFTER CESAREAN) TRIAL: ICD-10-CM

## 2019-01-28 DIAGNOSIS — O14.20 HEMOLYSIS, ELEVATED LIVER ENZYMES, AND LOW PLATELET (HELLP) SYNDROME DURING PREGNANCY, ANTEPARTUM: ICD-10-CM

## 2019-01-28 DIAGNOSIS — O09.93 HIGH-RISK PREGNANCY IN THIRD TRIMESTER: Primary | Chronic | ICD-10-CM

## 2019-01-28 LAB — ALBUMIN UR-MCNC: NEGATIVE MG/DL

## 2019-01-28 PROCEDURE — 81003 URINALYSIS AUTO W/O SCOPE: CPT | Performed by: OBSTETRICS & GYNECOLOGY

## 2019-01-28 PROCEDURE — 99207 ZZC PRENATAL VISIT: CPT | Performed by: OBSTETRICS & GYNECOLOGY

## 2019-01-28 NOTE — PROGRESS NOTES
33 year old  at 35w3d weeks presents to the clinic for a routine prenatal visit.  History of HELLP=will check U/A today  No vaginal bleeding, leakage of fluid, or contractions  Fundal height=35cm  VGAk=537  Labor precautions discussed  RCS if no labor=  Hypothyroidism==1.32  Anxiety=stable  RTC 1 week    Harriet Rascon

## 2019-02-08 ENCOUNTER — PRENATAL OFFICE VISIT (OUTPATIENT)
Dept: OBGYN | Facility: OTHER | Age: 34
End: 2019-02-08
Payer: COMMERCIAL

## 2019-02-08 ENCOUNTER — OFFICE VISIT (OUTPATIENT)
Dept: PSYCHOLOGY | Facility: CLINIC | Age: 34
End: 2019-02-08
Payer: COMMERCIAL

## 2019-02-08 ENCOUNTER — MYC MEDICAL ADVICE (OUTPATIENT)
Dept: OBGYN | Facility: OTHER | Age: 34
End: 2019-02-08

## 2019-02-08 VITALS
HEART RATE: 80 BPM | WEIGHT: 187 LBS | DIASTOLIC BLOOD PRESSURE: 82 MMHG | BODY MASS INDEX: 26.59 KG/M2 | SYSTOLIC BLOOD PRESSURE: 110 MMHG

## 2019-02-08 DIAGNOSIS — F33.0 MILD EPISODE OF RECURRENT MAJOR DEPRESSIVE DISORDER (H): ICD-10-CM

## 2019-02-08 DIAGNOSIS — D59.4 MICROANGIOPATHIC HEMOLYTIC ANEMIA (H): ICD-10-CM

## 2019-02-08 DIAGNOSIS — O14.20 HEMOLYSIS, ELEVATED LIVER ENZYMES, AND LOW PLATELET (HELLP) SYNDROME DURING PREGNANCY, ANTEPARTUM: ICD-10-CM

## 2019-02-08 DIAGNOSIS — E03.8 OTHER SPECIFIED HYPOTHYROIDISM: ICD-10-CM

## 2019-02-08 DIAGNOSIS — O09.93 HIGH-RISK PREGNANCY IN THIRD TRIMESTER: Primary | Chronic | ICD-10-CM

## 2019-02-08 DIAGNOSIS — F41.1 GAD (GENERALIZED ANXIETY DISORDER): ICD-10-CM

## 2019-02-08 DIAGNOSIS — F41.1 GAD (GENERALIZED ANXIETY DISORDER): Primary | ICD-10-CM

## 2019-02-08 DIAGNOSIS — F43.10 PTSD (POST-TRAUMATIC STRESS DISORDER): ICD-10-CM

## 2019-02-08 DIAGNOSIS — O34.219 DECLINES VBAC (VAGINAL BIRTH AFTER CESAREAN) TRIAL: ICD-10-CM

## 2019-02-08 LAB
ALBUMIN SERPL-MCNC: 2.8 G/DL (ref 3.4–5)
ALBUMIN UR-MCNC: NEGATIVE MG/DL
ALP SERPL-CCNC: 72 U/L (ref 40–150)
ALT SERPL W P-5'-P-CCNC: 18 U/L (ref 0–50)
ANION GAP SERPL CALCULATED.3IONS-SCNC: 7 MMOL/L (ref 3–14)
AST SERPL W P-5'-P-CCNC: 16 U/L (ref 0–45)
BILIRUB SERPL-MCNC: 0.4 MG/DL (ref 0.2–1.3)
BUN SERPL-MCNC: 9 MG/DL (ref 7–30)
CALCIUM SERPL-MCNC: 8.5 MG/DL (ref 8.5–10.1)
CHLORIDE SERPL-SCNC: 106 MMOL/L (ref 94–109)
CO2 SERPL-SCNC: 25 MMOL/L (ref 20–32)
CREAT SERPL-MCNC: 0.66 MG/DL (ref 0.52–1.04)
CREAT UR-MCNC: 15 MG/DL
ERYTHROCYTE [DISTWIDTH] IN BLOOD BY AUTOMATED COUNT: 13.1 % (ref 10–15)
GFR SERPL CREATININE-BSD FRML MDRD: >90 ML/MIN/{1.73_M2}
GLUCOSE SERPL-MCNC: 83 MG/DL (ref 70–99)
HCT VFR BLD AUTO: 36.3 % (ref 35–47)
HGB BLD-MCNC: 11.9 G/DL (ref 11.7–15.7)
MCH RBC QN AUTO: 32.3 PG (ref 26.5–33)
MCHC RBC AUTO-ENTMCNC: 32.8 G/DL (ref 31.5–36.5)
MCV RBC AUTO: 99 FL (ref 78–100)
PLATELET # BLD AUTO: 254 10E9/L (ref 150–450)
POTASSIUM SERPL-SCNC: 4 MMOL/L (ref 3.4–5.3)
PROT SERPL-MCNC: 6.7 G/DL (ref 6.8–8.8)
PROT UR-MCNC: <0.05 G/L
PROT/CREAT 24H UR: NORMAL G/G CR (ref 0–0.2)
RBC # BLD AUTO: 3.68 10E12/L (ref 3.8–5.2)
SODIUM SERPL-SCNC: 138 MMOL/L (ref 133–144)
WBC # BLD AUTO: 10.4 10E9/L (ref 4–11)

## 2019-02-08 PROCEDURE — 87653 STREP B DNA AMP PROBE: CPT | Performed by: OBSTETRICS & GYNECOLOGY

## 2019-02-08 PROCEDURE — 99207 ZZC PRENATAL VISIT: CPT | Performed by: OBSTETRICS & GYNECOLOGY

## 2019-02-08 PROCEDURE — 85027 COMPLETE CBC AUTOMATED: CPT | Performed by: OBSTETRICS & GYNECOLOGY

## 2019-02-08 PROCEDURE — 81003 URINALYSIS AUTO W/O SCOPE: CPT | Performed by: OBSTETRICS & GYNECOLOGY

## 2019-02-08 PROCEDURE — 80053 COMPREHEN METABOLIC PANEL: CPT | Performed by: OBSTETRICS & GYNECOLOGY

## 2019-02-08 PROCEDURE — 36415 COLL VENOUS BLD VENIPUNCTURE: CPT | Performed by: OBSTETRICS & GYNECOLOGY

## 2019-02-08 PROCEDURE — 90834 PSYTX W PT 45 MINUTES: CPT | Performed by: MARRIAGE & FAMILY THERAPIST

## 2019-02-08 PROCEDURE — 84156 ASSAY OF PROTEIN URINE: CPT | Performed by: OBSTETRICS & GYNECOLOGY

## 2019-02-08 NOTE — PROGRESS NOTES
33 year old  at 37w0d weeks presents to the clinic for a routine prenatal visit.  History of HELLP=no signs and symptoms thus far.  Will check U/A today.  No headache or vision changes.  No vomiting.  Some nausea.  No RUQ or epigastric pain.   Complains of feeling more pressure.  No vaginal bleeding, leakage of fluid, or contractions   Fundal height=37cm  WAVn=359  CX=Cl/Th/-3  Narrow pelvic arch.  We discussed this in detail  Discussed labor precautions  RTC 1 week  RCS scheduled for  if patient does not go into labor  Hypothyroidism=TSH==1.32  Anxiety=counseling session prior to this appointment.  Patient tearful.  She states she had an anxiety attack at the hospital tour  GBS=today    Harriet Rascon

## 2019-02-08 NOTE — PROGRESS NOTES
Progress Note    Client Name: Queta Cortez  Date: 2/8/2019       Service Type: Individual   Video visit: No     Session Start Time: 1:10pm  Session End Time: 1:55pm      Session Length: 45 minutes     Session #: 5     Attendees: Client attended alone      PHQ-9 / JOAO-7 : declined     DATA  Interactive Complexity: No  Crisis: No          Progress Since Last Session (Related to Symptoms / Goals / Homework):   Symptoms: Worsening with closer date to childbirth.     Homework: Achieved / completed to satisfaction      Episode of Care Goals: Satisfactory progress - ACTION (Actively working towards change); Intervened by reinforcing change plan / affirming steps taken     Current / Ongoing Stressors and Concerns:   medical:pregnant, marital, financial     Treatment Objective(s) Addressed in This Session:   identify at least 2-3 triggers for anxiety  Improve connection with  and share thoughts/feelings/wants       Intervention:   CBT, solution focused   Client reports she has been actively working on refuting negative thoughts, and when her what if's come into place she has been challenging herself by asking what is true/what are the facts. Reports while touring [facility] this week she had a panic and is starting to get nervous about upcoming birth. Encouraged client to consider visualizing a successful delivery and post partum without complication, challenge the anxious thoughts, and continue to remember precautions in place to screen her health and keep her as a healthy as possible. Client and  have actively been working to improve physical and emotional intimacy. Discussed attachment theory and how this contributes to some of the communication difficulties and opening up between her and . Client reports increased tiredness at end of pregnancy and feeling guilty about allowing son to have more than a half hour of screen time and not playing as  much. Discussed that this is very normal at the end of pregnancy and to refute the negative parenting thoughts. Encouraged client to consider reading Raymundo Steven's book Imperfect Parenting. Discussed briefly some issues  was having with his parents.      ASSESSMENT: Current Emotional / Mental Status (status of significant symptoms):   Risk status (Self / Other harm or suicidal ideation)   Client denies current fears or concerns for personal safety.   Client denies current or recent suicidal ideation or behaviors.   Client denies current or recent homicidal ideation or behaviors.   Client denies current or recent self injurious behavior or ideation.   Client denies other safety concerns.   Client Client reports there has been no change in risk factors since their last session.     Client Client reports there has been no change in protective factors since their last session.     A safety and risk management plan has not been developed at this time, however client was given the after-hours number / 911 should there be a change in any of these risk factors.     Appearance:   Appropriate    Eye Contact:   Good    Psychomotor Behavior: Normal    Attitude:   Cooperative    Orientation:   All   Speech    Rate / Production: Normal     Volume:  Normal    Mood:    Anxious    Affect:    Appropriate    Thought Content:  Clear    Thought Form:  Coherent  Logical    Insight:    Fair      Medication Review:   No current psychiatric medications prescribed     Medication Compliance:   NA     Changes in Health Issues:   None reported     Chemical Use Review:   Substance Use: Chemical use reviewed, no active concerns identified      Tobacco Use: No current tobacco use.         Diagnoses: 296.31 Major Depressive Disorder, Recurrent Episode, Mild With peripartum onset    300.02 (F41.1) Generalized Anxiety Disorder    309.81 (F43.10) Posttraumatic Stress Disorder (includes Posttraumatic Stress Disorder for Children 6 Years and  Younger)   Without dissociative symptoms        Collateral Reports Completed:   Not Applicable    PLAN: (Client Tasks / Therapist Tasks / Other)  Encouraged client and  to discuss what  intimacy and closeness looks like to each of them and work on improving the frequency of the actions partner suggests. Also discussed making positive intentional time together. Refute negative thoughts, and focus on what is true. Consider reading Imperfect Parenting. Focus on positive thoughts and visualizing a successful birth without issues.         Bella Styles                                                         Treatment Plan     Client's Name: Queta Cortez                      YOB: 1985     Date: 2019        Diagnoses:Diagnoses:  296.31 Major Depressive Disorder, Recurrent Episode, Mild With peripartum onset  300.02 (F41.1) Generalized Anxiety Disorder  309.81 (F43.10) Posttraumatic Stress Disorder (includes Posttraumatic Stress Disorder for Children 6 Years and Younger)  Without dissociative symptoms   Medical dx: 26 weeks pregnat, hx of HELLP syndrome, hx of HUS  Psychosocial & Contextual Factors: medical:pregnant, marital, financial  WHODAS 2.0 (12 item) 17     Referral / Collaboration:  Collaboration was initiated with PCP MD.     Anticipated number of session or this episode of care: 12-15        MeasurableTreatment Goal(s) related to diagnosis / functional impairment(s)  Goal 1: Client will decrease depressed mood and anxiety as evidenced by PHQ9 and GAD7 scores 4 and Under within the next 9 months. Initial scorin2018: GAD7:14    I will know I've met my goal when I'm better able to manage my anxiety and feel better about myself, and am more confident.       Objective #A (Client Action)   Continued - Date(s):2019  Client will identify 1-2 initial signs or symptoms of anxiety and utilize anxiety reduction techniques daily to decrease anxiety over the few months.Current  Baseline is sporadic use. Client will ID triggers for anxiety and work towards decreasing reactivity to or eliminating stressor if possible. Client will develop more effective coping skills to manage anxiety symptoms, will develop healthy cognitive patterns and beliefs,and will increase ability to function adaptively Client will improve communication with others and be able to work through anxiety rather than shutting down. Client will experience improve self esteem and self worth.  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 Client will continue to process grief related to medical issues and loss of niece.  Intervention(s)   Therapist will teach client how to ID body cues for anxiety, anxiety reduction techniques, how to ID triggers for anxiety- decrease reactivity/eliminate, lifestyle changes to reduce anxiety, communication skills, explore cognitive beliefs and help client to develop healthy cognitive patterns and beliefs.    Client has reviewed and agreed to the above plan.

## 2019-02-12 LAB
GP B STREP DNA SPEC QL NAA+PROBE: NEGATIVE
SPECIMEN SOURCE: NORMAL

## 2019-02-12 NOTE — TELEPHONE ENCOUNTER
No PA required for CPT 64832 with [employer] [place]- [facility] will need to submit inpatient notification

## 2019-02-12 NOTE — TELEPHONE ENCOUNTER
I called patient back and we discussed her concerns.  All questions answered and patient verbalizes understanding.    Harriet Rascon

## 2019-02-15 ENCOUNTER — TELEPHONE (OUTPATIENT)
Dept: OBGYN | Facility: OTHER | Age: 34
End: 2019-02-15

## 2019-02-15 ENCOUNTER — PRENATAL OFFICE VISIT (OUTPATIENT)
Dept: OBGYN | Facility: OTHER | Age: 34
End: 2019-02-15
Payer: COMMERCIAL

## 2019-02-15 VITALS
HEART RATE: 84 BPM | DIASTOLIC BLOOD PRESSURE: 82 MMHG | SYSTOLIC BLOOD PRESSURE: 140 MMHG | BODY MASS INDEX: 27.02 KG/M2 | WEIGHT: 190 LBS

## 2019-02-15 DIAGNOSIS — O14.20 HEMOLYSIS, ELEVATED LIVER ENZYMES, AND LOW PLATELET (HELLP) SYNDROME DURING PREGNANCY, ANTEPARTUM: ICD-10-CM

## 2019-02-15 DIAGNOSIS — O09.93 HIGH-RISK PREGNANCY IN THIRD TRIMESTER: Primary | Chronic | ICD-10-CM

## 2019-02-15 DIAGNOSIS — F41.1 GAD (GENERALIZED ANXIETY DISORDER): ICD-10-CM

## 2019-02-15 PROCEDURE — 99207 ZZC PRENATAL VISIT: CPT | Performed by: OBSTETRICS & GYNECOLOGY

## 2019-02-15 NOTE — PROGRESS NOTES
"33 year old  at 38w0d weeks presents to the clinic for a routine prenatal visit.  Blood pressure=140/80, repeat was 140/82. Patient denies any headache or vision changes.  No N/V.  No RUQ or epigastric pain Complains of feeling more pressure.  No vaginal bleeding, leakage of fluid, or contractions.  She has been taking her blood pressures at home and they haven't been this high but she has had \"higher\" readings.  She says she feels different.  History of HELLP  Fundal height=38cm  DJDr=650's  CX=3/50/-2  Discussed labor precautions  RCS= if no labor prior  Anxiety=stable  Hypothyroidism=stable-TSH in   GBS=Negative  I recommended she go to L&D for evaluation.  She verbalizes understanding.  I spoke with OB on call, Dr. Cai.      Harriet Rascon    "

## 2019-02-15 NOTE — TELEPHONE ENCOUNTER
Phone called placed to pt. Pt advised per huddle with Dr. Rascon, pt needs to go to [facility] for labs, not to [place].  Pt upset and states will drive to [place]. Mago Chase RN on 2/15/2019 at 3:55 PM

## 2019-02-15 NOTE — TELEPHONE ENCOUNTER
Reason for Call:  Other call back    Detailed comments: Patient just seen Dr. Rascon. She was supposed to have lab orders in chart, but there are no orders from [facility]. Patient is currently at [facility] and is being told that there are no orders and she has to go to [facility] for labs. Please call patient back right away.     Phone Number Patient can be reached at: Home number on file [phone] (home)    Best Time: ---    Can we leave a detailed message on this number? YES    Call taken on 2/15/2019 at 3:30 PM by Tobias Salmeron

## 2019-02-16 ENCOUNTER — MYC MEDICAL ADVICE (OUTPATIENT)
Dept: OBGYN | Facility: CLINIC | Age: 34
End: 2019-02-16

## 2019-02-19 ENCOUNTER — PRENATAL OFFICE VISIT (OUTPATIENT)
Dept: OBGYN | Facility: CLINIC | Age: 34
End: 2019-02-19
Payer: COMMERCIAL

## 2019-02-19 VITALS
OXYGEN SATURATION: 99 % | SYSTOLIC BLOOD PRESSURE: 127 MMHG | TEMPERATURE: 97.6 F | BODY MASS INDEX: 27.29 KG/M2 | WEIGHT: 191.9 LBS | DIASTOLIC BLOOD PRESSURE: 81 MMHG | HEART RATE: 98 BPM

## 2019-02-19 DIAGNOSIS — O14.23 HEMOLYSIS, ELEVATED LIVER ENZYMES, AND LOW PLATELET (HELLP) SYNDROME DURING PREGNANCY IN THIRD TRIMESTER: ICD-10-CM

## 2019-02-19 DIAGNOSIS — O09.93 HIGH-RISK PREGNANCY IN THIRD TRIMESTER: Primary | Chronic | ICD-10-CM

## 2019-02-19 DIAGNOSIS — F33.0 MILD EPISODE OF RECURRENT MAJOR DEPRESSIVE DISORDER (H): ICD-10-CM

## 2019-02-19 DIAGNOSIS — F41.1 GAD (GENERALIZED ANXIETY DISORDER): ICD-10-CM

## 2019-02-19 DIAGNOSIS — E03.8 OTHER SPECIFIED HYPOTHYROIDISM: ICD-10-CM

## 2019-02-19 LAB — ALBUMIN UR-MCNC: NEGATIVE MG/DL

## 2019-02-19 PROCEDURE — 99207 ZZC PRENATAL VISIT: CPT | Performed by: OBSTETRICS & GYNECOLOGY

## 2019-02-19 PROCEDURE — 81003 URINALYSIS AUTO W/O SCOPE: CPT | Performed by: OBSTETRICS & GYNECOLOGY

## 2019-02-19 NOTE — TELEPHONE ENCOUNTER
Patient has decided to do a V-guzman instead. Surgery has already been canceled, Dr. [facility]iod[facility] Aguilar [facility]  Women's Health

## 2019-02-19 NOTE — PATIENT INSTRUCTIONS
aWhat to watch out for are: regular contractions every 5 min, vaginal bleeding, decreased fetal movement, or leakage of fluid.  Please call the office or go to L&D if you develop any of these signs and symptoms.      I will see you for your follow up appointment.  Please feel free to call if you have any questions or concerns.      Thanks,  Harriet Rascon, DO

## 2019-02-19 NOTE — PROGRESS NOTES
33 year old  at 38w4d weeks presents to the clinic for a routine prenatal visit.  Patient has noticed a small amount of blood this morning with wiping  Blood pressure at home=120/83.  No headache or vision changes.  No N/V.  No epigastric or RUQ pain.  History of HELLP=protein today  Complains of feeling more pressure.  No leakage of fluid or contractions.    Fundal height=38cm  DEMt=589's  CX=4-5/50/-2  Discussed labor precautions  RCS=.  Patient wants to cancel this and do a TOLAC now that she is dilating and making cervical change  She does not want to be induced and wants to see how things go this week. She is aware of the recommendations for induction at 39 weeks given her history of HELLP however she wants to wait and see what happens.  She will allow an induction on Monday the  if she is still pregnant.  We discussed this is unlikely given her dilation today.  Labor precautions discussed  Anxiety=stable  Hypothyroidism=TSH=1.32 on   GBS=negative    Harriet Rascon

## 2019-02-20 ENCOUNTER — MYC MEDICAL ADVICE (OUTPATIENT)
Dept: OBGYN | Facility: CLINIC | Age: 34
End: 2019-02-20

## 2019-02-21 ENCOUNTER — MYC MEDICAL ADVICE (OUTPATIENT)
Dept: OBGYN | Facility: CLINIC | Age: 34
End: 2019-02-21

## 2019-02-21 NOTE — TELEPHONE ENCOUNTER
Routing to Dr. Rascon as an FYI form patient. No action needed at this time    Lauren Dennis  Women's Health

## 2019-02-22 ENCOUNTER — TELEPHONE (OUTPATIENT)
Dept: OBGYN | Facility: OTHER | Age: 34
End: 2019-02-22

## 2019-02-22 ENCOUNTER — MYC MEDICAL ADVICE (OUTPATIENT)
Dept: OBGYN | Facility: CLINIC | Age: 34
End: 2019-02-22

## 2019-02-25 ENCOUNTER — MYC MEDICAL ADVICE (OUTPATIENT)
Dept: OBGYN | Facility: CLINIC | Age: 34
End: 2019-02-25

## 2019-02-28 ENCOUNTER — OFFICE VISIT (OUTPATIENT)
Dept: OBGYN | Facility: OTHER | Age: 34
End: 2019-02-28
Payer: COMMERCIAL

## 2019-02-28 ENCOUNTER — TELEPHONE (OUTPATIENT)
Dept: FAMILY MEDICINE | Facility: OTHER | Age: 34
End: 2019-02-28

## 2019-02-28 VITALS
SYSTOLIC BLOOD PRESSURE: 134 MMHG | DIASTOLIC BLOOD PRESSURE: 96 MMHG | HEART RATE: 80 BPM | BODY MASS INDEX: 24.53 KG/M2 | WEIGHT: 172.5 LBS

## 2019-02-28 DIAGNOSIS — O09.90 SUPERVISION OF HIGH RISK PREGNANCY, ANTEPARTUM: ICD-10-CM

## 2019-02-28 DIAGNOSIS — N30.01 ACUTE CYSTITIS WITH HEMATURIA: Primary | ICD-10-CM

## 2019-02-28 LAB
ALBUMIN SERPL-MCNC: 2.9 G/DL (ref 3.4–5)
ALP SERPL-CCNC: 76 U/L (ref 40–150)
ALT SERPL W P-5'-P-CCNC: 17 U/L (ref 0–50)
ANION GAP SERPL CALCULATED.3IONS-SCNC: 6 MMOL/L (ref 3–14)
AST SERPL W P-5'-P-CCNC: 16 U/L (ref 0–45)
BILIRUB SERPL-MCNC: 0.5 MG/DL (ref 0.2–1.3)
BUN SERPL-MCNC: 12 MG/DL (ref 7–30)
CALCIUM SERPL-MCNC: 8.7 MG/DL (ref 8.5–10.1)
CHLORIDE SERPL-SCNC: 106 MMOL/L (ref 94–109)
CO2 SERPL-SCNC: 29 MMOL/L (ref 20–32)
CREAT SERPL-MCNC: 0.72 MG/DL (ref 0.52–1.04)
ERYTHROCYTE [DISTWIDTH] IN BLOOD BY AUTOMATED COUNT: 12.6 % (ref 10–15)
GFR SERPL CREATININE-BSD FRML MDRD: >90 ML/MIN/{1.73_M2}
GLUCOSE SERPL-MCNC: 70 MG/DL (ref 70–99)
HCT VFR BLD AUTO: 41.2 % (ref 35–47)
HGB BLD-MCNC: 13.3 G/DL (ref 11.7–15.7)
MCH RBC QN AUTO: 31.8 PG (ref 26.5–33)
MCHC RBC AUTO-ENTMCNC: 32.3 G/DL (ref 31.5–36.5)
MCV RBC AUTO: 99 FL (ref 78–100)
PLATELET # BLD AUTO: 334 10E9/L (ref 150–450)
POTASSIUM SERPL-SCNC: 4.2 MMOL/L (ref 3.4–5.3)
PROT SERPL-MCNC: 7 G/DL (ref 6.8–8.8)
RBC # BLD AUTO: 4.18 10E12/L (ref 3.8–5.2)
SODIUM SERPL-SCNC: 141 MMOL/L (ref 133–144)
WBC # BLD AUTO: 10.7 10E9/L (ref 4–11)

## 2019-02-28 PROCEDURE — 99024 POSTOP FOLLOW-UP VISIT: CPT | Performed by: ADVANCED PRACTICE MIDWIFE

## 2019-02-28 PROCEDURE — 80053 COMPREHEN METABOLIC PANEL: CPT | Performed by: ADVANCED PRACTICE MIDWIFE

## 2019-02-28 PROCEDURE — 36415 COLL VENOUS BLD VENIPUNCTURE: CPT | Performed by: ADVANCED PRACTICE MIDWIFE

## 2019-02-28 PROCEDURE — 85027 COMPLETE CBC AUTOMATED: CPT | Performed by: ADVANCED PRACTICE MIDWIFE

## 2019-02-28 RX ORDER — AMPICILLIN TRIHYDRATE 500 MG
500 CAPSULE ORAL 4 TIMES DAILY
Qty: 20 CAPSULE | Refills: 0 | Status: SHIPPED | OUTPATIENT
Start: 2019-02-28 | End: 2019-03-29

## 2019-02-28 RX ORDER — NITROFURANTOIN 25; 75 MG/1; MG/1
100 CAPSULE ORAL
COMMUNITY
Start: 2019-02-28 | End: 2019-04-05

## 2019-02-28 RX ORDER — ACETAMINOPHEN 325 MG/1
325-650 TABLET ORAL
COMMUNITY
Start: 2019-02-25 | End: 2019-12-24

## 2019-02-28 NOTE — TELEPHONE ENCOUNTER
I spoke with patient.   She was seen at [facility] in [place] last night for a UTI.   They placed her on Macrobid and Pyridium and she had one dose of each at the ED.   She has a 5 day old daughter.   Huddled with Dr. Cheney - Macrobid should not be used with children that age.   Follow up appointment was made with [facility] for this afternoon for close postpardum follow up (history of severe HELP syndrome with previous pregnancy) - the records from [facility] cannot be obtained until she signs the authorization.   Dr. Cheney states that new antibiotics can be sent by [facility] this afternoon.   There is no evidence per UpToDate if Pyridium is safe, but it is a Category B in pregnancy.   Patient was informed of this information and very grateful.     Nadeen Hewitt, RN, BSN

## 2019-02-28 NOTE — TELEPHONE ENCOUNTER
Will close this encounter and will use mychart that is already open for provider to review    Lauren Dennis  Women's Health

## 2019-02-28 NOTE — PROGRESS NOTES
Here for follow up from vaginal birth.  Was seen at [facility] last night and treated for a UTI.  Given macrobid.  Took one before she discovered that she shouldn't take it.   Will change to ampicillin for 5 days and okayed to take pyridium.   Some anxiety but feels better after knowing that a plan is developed for her care going forward.   Will do labs today. And return to clinic on Monday to see lactation and Dr Rascon  Has a mild GLEASON but attributes this to not sleeping.   Will follow up based on labs.   ANCELMO Celestin, CNM

## 2019-02-28 NOTE — TELEPHONE ENCOUNTER
Patient called in this morning to check the status of her request. Will sent route to provider out of office pool to review with Dr. Rascon out of office today and tomorrow.    Lauren Dennis  Women's Health

## 2019-02-28 NOTE — TELEPHONE ENCOUNTER
Reason for call:  Patient reporting a symptom    Symptom or request: bladder infection and is breast feeding and has a lot of concerns.  Patient my charted Dr. Rascon on Monday and received no response.    Duration (how long have symptoms been present): 5 days    Have you been treated for this before? No    Additional comments: none    Phone Number patient can be reached at:  Home number on file [phone] (home)    Best Time:  anytime    Can we leave a detailed message on this number:  YES    Call taken on 2/28/2019 at 7:35 AM by Lane Whyte

## 2019-03-04 ENCOUNTER — OFFICE VISIT (OUTPATIENT)
Dept: OBGYN | Facility: OTHER | Age: 34
End: 2019-03-04
Payer: COMMERCIAL

## 2019-03-04 VITALS
BODY MASS INDEX: 24.67 KG/M2 | SYSTOLIC BLOOD PRESSURE: 116 MMHG | DIASTOLIC BLOOD PRESSURE: 84 MMHG | HEART RATE: 84 BPM | WEIGHT: 173.5 LBS

## 2019-03-04 DIAGNOSIS — O14.20 HEMOLYSIS, ELEVATED LIVER ENZYMES, AND LOW PLATELET (HELLP) SYNDROME DURING PREGNANCY, ANTEPARTUM: Primary | ICD-10-CM

## 2019-03-04 PROBLEM — O34.219 DECLINES VBAC (VAGINAL BIRTH AFTER CESAREAN) TRIAL: Status: RESOLVED | Noted: 2018-10-15 | Resolved: 2019-03-04

## 2019-03-04 PROBLEM — O09.93 HIGH-RISK PREGNANCY IN THIRD TRIMESTER: Chronic | Status: RESOLVED | Noted: 2018-07-16 | Resolved: 2019-03-04

## 2019-03-04 PROCEDURE — 99207 ZZC POST PARTUM EXAM: CPT | Performed by: OBSTETRICS & GYNECOLOGY

## 2019-03-04 NOTE — PROGRESS NOTES
Subjective  33 year old non-pregnant female presents today for a postpartum visit.  Patient was seen in the ED last week for a UTI.  She has a history of pp HELLP.  Patient had a  on 19.  The suprapubic pain she had last week is better.  Vaginal bleeding is stable.  Burning with urination has improved.  Patient is on Ampicillin.  Normal bowel movements.  Patient is breast feeding.  She is going to see Peds after this appointment to get help with it. No signs and symptoms of ppd.  Patient scored a 2 on the PHQ-9 and a 1 on the JOAO-7.  No thoughts of suicide or infanticide.  No headache or vision changes.  No N/V.  No RUQ or epigastric pain.  No back pain.        ROS: 10 point ROS neg other than the symptoms noted above in the HPI.  Past Medical History:   Diagnosis Date     NO ACTIVE PROBLEMS      Past Surgical History:   Procedure Laterality Date     APPENDECTOMY  2014      SECTION N/A 10/29/2016    Procedure:  SECTION;  Surgeon: Adonis Dooley MD;  Location: [facility]     Family History   Problem Relation Age of Onset     Hypertension Mother      Osteoporosis Maternal Grandmother      Unknown/Adopted Father      Unknown/Adopted Paternal Grandmother      Unknown/Adopted Paternal Grandfather      Social History     Tobacco Use     Smoking status: Never Smoker     Smokeless tobacco: Never Used   Substance Use Topics     Alcohol use: No         Objective  Vitals: /84   Pulse 84   Wt 78.7 kg (173 lb 8 oz)   LMP 2018 (Approximate)   Breastfeeding? Yes   BMI 24.67 kg/m    BMI= Body mass index is 24.67 kg/m .         Abd=soft, slightly tender suprapubic, nondistended, incision=healed well  Ext=no CVA      Assessment  1.)  Post partum visit  2.)  S/p  onm   3.)  Recent UTI  4.)  History of PP HELLP in the past  5.)  Anxiety/depression  6.)  Hypothyroidism       Plan  1.)  Continue Ampicillin  2.)  Follow-up for routine post partum visit      Harriet Rascon

## 2019-03-04 NOTE — PATIENT INSTRUCTIONS
If you have any questions regarding your visit, Please contact your care team.    Women s Health CLINIC HOURS TELEPHONE NUMBER   Harriet Rascon M.D.    Reyna Arceo -         Monday-[facility]  7:00 am - 5 pm Monday's Tuesday- [facility]  8:00am- 5 pm  Wednesday- Off  Thursday- Offf Friday-Am [place], and [facility]  [place] 8:00-11:30 AM  [place] 1:00-5:00 PM [facility]  [address] [place] [address]  [phone] ask for [facility]    Imaging [phone]    [facility]  [address]  [place], [place] [address] [phone]  Imaging [phone]    [facility]  [address] [place], [place] [id number] [phone]  Imaging Scheduling - [phone]     Urgent Care locations:    [facility] Saturday and Sunday   9 am - 5 pm    Monday-Friday   12 pm - 8 pm  Saturday and Sunday   9 am - 5 pm   [phone]       If you need a medication refill, please contact your pharmacy. Please allow 3 business days for your refill to be completed.  As always, Thank you for trusting us with your healthcare needs!

## 2019-03-29 ENCOUNTER — PRENATAL OFFICE VISIT (OUTPATIENT)
Dept: OBGYN | Facility: OTHER | Age: 34
End: 2019-03-29
Payer: COMMERCIAL

## 2019-03-29 VITALS
DIASTOLIC BLOOD PRESSURE: 70 MMHG | HEART RATE: 92 BPM | BODY MASS INDEX: 23.18 KG/M2 | WEIGHT: 163 LBS | SYSTOLIC BLOOD PRESSURE: 98 MMHG

## 2019-03-29 DIAGNOSIS — E03.8 OTHER SPECIFIED HYPOTHYROIDISM: ICD-10-CM

## 2019-03-29 PROCEDURE — 99207 ZZC POST PARTUM EXAM: CPT | Performed by: OBSTETRICS & GYNECOLOGY

## 2019-03-29 ASSESSMENT — PATIENT HEALTH QUESTIONNAIRE - PHQ9: SUM OF ALL RESPONSES TO PHQ QUESTIONS 1-9: 2

## 2019-03-29 NOTE — PROGRESS NOTES
Subjective  33 year old non-pregnant female presents today for a postpartum visit.  She has a history of pp HELLP.  Patient had a  on 19.  Vaginal bleeding is minimal.  Burning with urination has improved.  Normal bowel movements.  Patient is breast feeding.  No signs and symptoms of ppd.  Patient scored a 2 on the PHQ-9.  No thoughts of suicide or infanticide.  No headache or vision changes.  No N/V.  No RUQ or epigastric pain.  No back pain.  Pt is wanting to use condoms for now.  Patient is not due for a pap smear today.        ROS: 10 point ROS neg other than the symptoms noted above in the HPI.  Past Medical History:   Diagnosis Date     NO ACTIVE PROBLEMS      Past Surgical History:   Procedure Laterality Date     APPENDECTOMY  2014      SECTION N/A 10/29/2016    Procedure:  SECTION;  Surgeon: Adonis Dooley MD;  Location: [facility]     Family History   Problem Relation Age of Onset     Hypertension Mother      Osteoporosis Maternal Grandmother      Unknown/Adopted Father      Unknown/Adopted Paternal Grandmother      Unknown/Adopted Paternal Grandfather      Social History     Tobacco Use     Smoking status: Never Smoker     Smokeless tobacco: Never Used   Substance Use Topics     Alcohol use: No         Objective  Vitals: BP 98/70   Pulse 92   Wt 73.9 kg (163 lb)   LMP 2018 (Approximate)   Breastfeeding? Yes   BMI 23.18 kg/m    BMI= Body mass index is 23.18 kg/m .         Assessment  1.)  Post partum visit  2.)  S/p  on 19  3.)  Birth control        Plan  1.)  Follow-up for annual exam      Harriet Rascon

## 2019-03-29 NOTE — PATIENT INSTRUCTIONS
If you have any questions regarding your visit, Please contact your care team.    Women s Health CLINIC HOURS TELEPHONE NUMBER   Harriet Rascon M.D.    Reyna Arceo -         Monday-[facility]  7:00 am - 5 pm Monday's Tuesday- [facility]  8:00am- 5 pm  Wednesday- Off  Thursday- Offf Friday-Am [place], and [facility]  [place] 8:00-11:30 AM  [place] 1:00-5:00 PM [facility]  [address] [place] [address]  [phone] ask for [facility]    Imaging [phone]    [facility]  [address]  [place], [place] [address] [phone]  Imaging [phone]    [facility]  [address] [place], [place] [id number] [phone]  Imaging Scheduling - [phone]     Urgent Care locations:    [facility] Saturday and Sunday   9 am - 5 pm    Monday-Friday   12 pm - 8 pm  Saturday and Sunday   9 am - 5 pm   [phone]       If you need a medication refill, please contact your pharmacy. Please allow 3 business days for your refill to be completed.  As always, Thank you for trusting us with your healthcare needs!

## 2019-04-04 ENCOUNTER — OFFICE VISIT (OUTPATIENT)
Dept: PSYCHOLOGY | Facility: CLINIC | Age: 34
End: 2019-04-04
Payer: COMMERCIAL

## 2019-04-04 DIAGNOSIS — F43.10 PTSD (POST-TRAUMATIC STRESS DISORDER): ICD-10-CM

## 2019-04-04 DIAGNOSIS — F33.0 MILD EPISODE OF RECURRENT MAJOR DEPRESSIVE DISORDER (H): ICD-10-CM

## 2019-04-04 DIAGNOSIS — F41.1 GAD (GENERALIZED ANXIETY DISORDER): Primary | ICD-10-CM

## 2019-04-04 PROCEDURE — 90834 PSYTX W PT 45 MINUTES: CPT | Performed by: MARRIAGE & FAMILY THERAPIST

## 2019-04-04 NOTE — PROGRESS NOTES
Discharge Summary  Multiple Sessions    Client Name: Queta Cortez MRN#: [id number] YOB: 1985    Discharge Date:   2019      Service Type: Individual      Session Start Time: 10:30am  Session End Time: 11:20am      Session Length: 45 - 50     Session #: 6     Attendees: Client and infant daughter    Focus of Treatment Objective(s):  Client's presenting concerns included: Depressed Mood - postpartum depression  Anxiety - JOAO, PTSD  Adjustment Difficulties related to: becoming a mother  Stage of Change at time of Discharge: MAINTENANCE (Working to maintain change, with risk of relapse)  Summary of today's visit: Client reports she had a successful  with daughter which went well. Reports they are working on getting into a routine with two kids, and client has asked  to help out more. Discussed making special time for son so he does not act out. Discussed progress in counseling, and that also her insurnace is ending at the end of the month. Client is satisfied with progress made, and discussed that if needed she could always return to [facility], and discussed provider suggestions.   Medication Adherence:  NA   Current Outpatient Medications   Medication     acetaminophen (TYLENOL) 325 MG tablet     ASPIRIN PO     levothyroxine (SYNTHROID/LEVOTHROID) 25 MCG tablet     Prenatal Vit-Min-FA-Fish Oil ([employer] PRENATAL GUMMY) 0.4-113.5 MG CHEW     No current facility-administered medications for this visit.          Chemical Use:  No    Assessment: Current Emotional / Mental Status (status of significant symptoms):    Risk status (Self / Other harm or suicidal ideation)  Client denies current fears or concerns for personal safety.  Client denies current or recent suicidal ideation or behaviors.  Client denies current or recent homicidal ideation or behaviors.  Client denies current or recent self injurious behavior or ideation.  Client denies other safety concerns.  A safety and risk  management plan has not been developed at this time, however client was given the after-hours number should there be a change in any of these risk factors.    Appearance:   Appropriate   Eye Contact:   Good   Psychomotor Behavior: Normal   Attitude:   Cooperative   Orientation:   All  Speech   Rate / Production: Normal    Volume:  Normal   Mood:    Normal  Affect:    Appropriate   Thought Content:  Clear   Thought Form:  Coherent  Logical   Insight:   Good     DSM5 Diagnoses: (Sustained by DSM5 Criteria Listed Above)  Diagnoses: Rule out 296.31 Major Depressive Disorder, Recurrent Episode, Mild With peripartum onset  300.02 (F41.1) Generalized Anxiety Disorder  309.81 (F43.10) Posttraumatic Stress Disorder (includes Posttraumatic Stress Disorder for Children 6 Years and Younger)  Without dissociative symptoms   Medical dx: 26 weeks pregnat, hx of HELLP syndrome, hx of HUS  Psychosocial & Contextual Factors: medical:pregnant, marital, financial  WHODAS 2.0 (12 item) 17    Reason for Discharge:  Client is satisfied with progress      Aftercare Plan:  Client may resume counseling services at any time in the future by calling the [facility] Intake Office, [phone].      Bella Styles

## 2019-04-05 ENCOUNTER — OFFICE VISIT (OUTPATIENT)
Dept: FAMILY MEDICINE | Facility: OTHER | Age: 34
End: 2019-04-05
Payer: COMMERCIAL

## 2019-04-05 VITALS
BODY MASS INDEX: 23.91 KG/M2 | WEIGHT: 167 LBS | DIASTOLIC BLOOD PRESSURE: 74 MMHG | OXYGEN SATURATION: 98 % | HEIGHT: 70 IN | HEART RATE: 76 BPM | SYSTOLIC BLOOD PRESSURE: 104 MMHG | RESPIRATION RATE: 16 BRPM | TEMPERATURE: 97.9 F

## 2019-04-05 DIAGNOSIS — E03.8 OTHER SPECIFIED HYPOTHYROIDISM: ICD-10-CM

## 2019-04-05 DIAGNOSIS — Z00.01 ENCOUNTER FOR ROUTINE ADULT MEDICAL EXAM WITH ABNORMAL FINDINGS: Primary | ICD-10-CM

## 2019-04-05 DIAGNOSIS — F33.0 MILD EPISODE OF RECURRENT MAJOR DEPRESSIVE DISORDER (H): ICD-10-CM

## 2019-04-05 DIAGNOSIS — Z86.2 HISTORY OF TTP (THROMBOTIC THROMBOCYTOPENIC PURPURA): ICD-10-CM

## 2019-04-05 PROCEDURE — 99395 PREV VISIT EST AGE 18-39: CPT | Performed by: FAMILY MEDICINE

## 2019-04-05 ASSESSMENT — ENCOUNTER SYMPTOMS
HEARTBURN: 0
ABDOMINAL PAIN: 0
CHILLS: 0
NERVOUS/ANXIOUS: 0
SORE THROAT: 0
CONSTIPATION: 0
HEMATURIA: 0
HEMATOCHEZIA: 0
EYE PAIN: 0
PALPITATIONS: 0
NAUSEA: 0
DIARRHEA: 0
SHORTNESS OF BREATH: 0
WEAKNESS: 0
HEADACHES: 0
MYALGIAS: 0
DIZZINESS: 0
PARESTHESIAS: 0
FEVER: 0
DYSURIA: 0
ARTHRALGIAS: 0
BREAST MASS: 0
JOINT SWELLING: 0
COUGH: 0
FREQUENCY: 0

## 2019-04-05 ASSESSMENT — MIFFLIN-ST. JEOR: SCORE: 1552.52

## 2019-04-05 ASSESSMENT — PAIN SCALES - GENERAL: PAINLEVEL: NO PAIN (0)

## 2019-07-05 ENCOUNTER — MYC REFILL (OUTPATIENT)
Dept: OBGYN | Facility: OTHER | Age: 34
End: 2019-07-05

## 2019-07-05 DIAGNOSIS — E03.9 HYPOTHYROIDISM, UNSPECIFIED TYPE: ICD-10-CM

## 2019-07-08 RX ORDER — LEVOTHYROXINE SODIUM 25 UG/1
25 TABLET ORAL DAILY
Qty: 60 TABLET | Refills: 0 | Status: SHIPPED | OUTPATIENT
Start: 2019-07-08 | End: 2019-08-16

## 2019-07-08 NOTE — TELEPHONE ENCOUNTER
Patient returning message below. Advised patient of the refill and she will call  Back to schedule appointment with Dr Lundberg in the next month and have labs done then.

## 2019-07-08 NOTE — TELEPHONE ENCOUNTER
Please let patient know I refilled her thyroid medication however she should come in for a thyroid lab test in the next month to see if her dose needs to be adjusted.  Thanks.    Harriet Rascon

## 2019-07-08 NOTE — TELEPHONE ENCOUNTER
Patient called back and was given msg. Patient will schedule lab to correlate with other appt with FP    Lauren Dennis  Women's Health

## 2019-08-07 NOTE — PROGRESS NOTES
Subjective     Queta Cortez is a 34 year old female who presents to clinic today for the following health issues:      Patient also stopped seeing her Hematologist and would like a blood panel completed.      History of Present Illness        Hypothyroidism:     Since last visit, patient describes the following symptoms::  None    She eats 2-3 servings of fruits and vegetables daily.She consumes 1 sweetened beverage(s) daily.  She is taking medications regularly.       Answers for HPI/ROS submitted by the patient on 2019   Chronic problems general questions HPI Form  If you checked off any problems, how difficult have these problems made it for you to do your work, take care of things at home, or get along with other people?: Not difficult at all  PHQ9 TOTAL SCORE: 0  JOAO 7 TOTAL SCORE: 0    WART(S)  Onset:     Description:   Location: Bottom of both feet  Number of warts: 3  Painful: no    Accompanying Signs & Symptoms:  Signs of infection: no    History:   History of trauma: no  Prior warts: YES  Therapies Tried and outcome: liquid nitrogen      -------------------------------------    Patient Active Problem List   Diagnosis     HELLP syndrome     History of TTP (thrombotic thrombocytopenic purpura)     TTP (thrombotic thrombocytopenic purpura) (H)     SOB (shortness of breath)     Edema, unspecified type     JOAO (generalized anxiety disorder)     PTSD (post-traumatic stress disorder)     Mild episode of recurrent major depressive disorder (H)     Other specified hypothyroidism     Plantar warts     Contraception     Past Surgical History:   Procedure Laterality Date     APPENDECTOMY  2014      SECTION N/A 10/29/2016    Procedure:  SECTION;  Surgeon: Adonis Dooley MD;  Location:  L+D       Social History     Tobacco Use     Smoking status: Never Smoker     Smokeless tobacco: Never Used   Substance Use Topics     Alcohol use: No     Family History   Problem Relation Age of Onset      "Hypertension Mother      Osteoporosis Maternal Grandmother      Unknown/Adopted Father      Unknown/Adopted Paternal Grandmother      Unknown/Adopted Paternal Grandfather          Current Outpatient Medications   Medication Sig Dispense Refill     acetaminophen (TYLENOL) 325 MG tablet Take 325-650 mg by mouth       ASPIRIN PO Take 81 mg by mouth       levothyroxine (SYNTHROID/LEVOTHROID) 25 MCG tablet Take 1 tablet (25 mcg) by mouth daily 60 tablet 0     Prenatal Vit-Min-FA-Fish Oil ([employer] PRENATAL GUMMY) 0.4-113.5 MG CHEW Reported on 4/19/2017       Allergies   Allergen Reactions     Hydralazine Shortness Of Breath, Anxiety, Other (See Comments), Swelling and Rash     40-50 minutes after IV administration had swelling to the arm where hydralazine was given, had swelling to lips, flushing to face, generalized anxiety which lead to shortness of breath. Symptoms resolved with dose of 25 mg IV benadryl.      BP Readings from Last 3 Encounters:   08/13/19 110/72   04/05/19 104/74   03/29/19 98/70    Wt Readings from Last 3 Encounters:   08/13/19 72.6 kg (160 lb)   04/05/19 75.8 kg (167 lb)   03/29/19 73.9 kg (163 lb)                    Reviewed and updated as needed this visit by Provider         Review of Systems   ROS COMP: Constitutional, HEENT, cardiovascular, pulmonary, gi and gu systems are negative, except as otherwise noted.      Objective    /72 (BP Location: Left arm, Patient Position: Chair, Cuff Size: Adult Regular)   Pulse 70   Resp 16   Ht 1.786 m (5' 10.3\")   Wt 72.6 kg (160 lb)   SpO2 98%   BMI 22.76 kg/m    Body mass index is 22.76 kg/m .  Physical Exam   Constitutional: She appears well-developed and well-nourished.   HENT:   Head: Normocephalic and atraumatic.   Right Ear: External ear normal.   Left Ear: External ear normal.   Eyes: Pupils are equal, round, and reactive to light. EOM are normal.   Neck: Neck supple.   Cardiovascular: Normal rate, regular rhythm, normal heart sounds and " intact distal pulses. Exam reveals no gallop and no friction rub.   No murmur heard.  Pulmonary/Chest: Effort normal and breath sounds normal.   Skin:   Plantar warts noted 2 on the right foot and 1 on the left foot which were treated as below   Psychiatric: She has a normal mood and affect. Her behavior is normal. Judgment and thought content normal.          Diagnostic Test Results:  Labs reviewed in Epic        Assessment & Plan   Problem List Items Addressed This Visit     History of TTP (thrombotic thrombocytopenic purpura)     In remission but needs follow up q6m   Recheck labs.         Relevant Orders    CBC with platelets and differential (Completed)    Comprehensive metabolic panel (Completed)    TTP (thrombotic thrombocytopenic purpura) (H)    Other specified hypothyroidism     Recheck TSH. If stable , continue the same dose of levothyroxine.          Plantar warts     Site- left and right plantar foot.    Each  Wart is  identified. The canister is held in an upright position with the cryotip nozzle approximately 1 cm away from eash  lesion before the trigger is pressed to activate flow of Liquid Nitrogen.  3-4 freeze-thaw cycles were given with duration of 4-5 sec each till a frost rim of 1mm is noticed around the lesion wart was frozen easily three times with liquid nitrogen.     Each wart was pared with a #15 blade. A total of 3  warts are treated today.     The etiology of common warts were discussed. Warm soapy water soaks and sanding also recommended. The patient is to return every two weeks until all warts have been removed.           Contraception     Currently breast feeding and wishes to be on birth control  Get pregnancy test. If negative, will start on mini pill.         Relevant Orders    HCG Qual, Urine ([id number]) (Completed)      Other Visit Diagnoses     Hypothyroidism, unspecified type    -  Primary    Relevant Orders    TSH with free T4 reflex (Completed)    CBC with platelets and  differential (Completed)    Comprehensive metabolic panel (Completed)               See Patient Instructions  Return in about 3 months (around 11/13/2019).    Alanis Lundberg MD  [facility]

## 2019-08-13 ENCOUNTER — OFFICE VISIT (OUTPATIENT)
Dept: FAMILY MEDICINE | Facility: OTHER | Age: 34
End: 2019-08-13
Payer: COMMERCIAL

## 2019-08-13 VITALS
BODY MASS INDEX: 22.9 KG/M2 | WEIGHT: 160 LBS | HEART RATE: 70 BPM | DIASTOLIC BLOOD PRESSURE: 72 MMHG | HEIGHT: 70 IN | OXYGEN SATURATION: 98 % | SYSTOLIC BLOOD PRESSURE: 110 MMHG | RESPIRATION RATE: 16 BRPM

## 2019-08-13 DIAGNOSIS — E03.9 HYPOTHYROIDISM, UNSPECIFIED TYPE: Primary | ICD-10-CM

## 2019-08-13 DIAGNOSIS — Z86.2 HISTORY OF TTP (THROMBOTIC THROMBOCYTOPENIC PURPURA): ICD-10-CM

## 2019-08-13 DIAGNOSIS — B07.0 PLANTAR WARTS: ICD-10-CM

## 2019-08-13 DIAGNOSIS — E03.8 OTHER SPECIFIED HYPOTHYROIDISM: ICD-10-CM

## 2019-08-13 DIAGNOSIS — M31.19 TTP (THROMBOTIC THROMBOCYTOPENIC PURPURA) (H): ICD-10-CM

## 2019-08-13 LAB
ALBUMIN SERPL-MCNC: 4.3 G/DL (ref 3.4–5)
ALP SERPL-CCNC: 61 U/L (ref 40–150)
ALT SERPL W P-5'-P-CCNC: 26 U/L (ref 0–50)
ANION GAP SERPL CALCULATED.3IONS-SCNC: 4 MMOL/L (ref 3–14)
AST SERPL W P-5'-P-CCNC: 19 U/L (ref 0–45)
BASOPHILS # BLD AUTO: 0 10E9/L (ref 0–0.2)
BASOPHILS NFR BLD AUTO: 0.8 %
BILIRUB SERPL-MCNC: 0.5 MG/DL (ref 0.2–1.3)
BUN SERPL-MCNC: 18 MG/DL (ref 7–30)
CALCIUM SERPL-MCNC: 9.2 MG/DL (ref 8.5–10.1)
CHLORIDE SERPL-SCNC: 105 MMOL/L (ref 94–109)
CO2 SERPL-SCNC: 30 MMOL/L (ref 20–32)
CREAT SERPL-MCNC: 1.08 MG/DL (ref 0.52–1.04)
DIFFERENTIAL METHOD BLD: NORMAL
EOSINOPHIL # BLD AUTO: 0 10E9/L (ref 0–0.7)
EOSINOPHIL NFR BLD AUTO: 0.8 %
ERYTHROCYTE [DISTWIDTH] IN BLOOD BY AUTOMATED COUNT: 12.2 % (ref 10–15)
GFR SERPL CREATININE-BSD FRML MDRD: 67 ML/MIN/{1.73_M2}
GLUCOSE SERPL-MCNC: 82 MG/DL (ref 70–99)
HCG UR QL: NEGATIVE
HCT VFR BLD AUTO: 39.4 % (ref 35–47)
HGB BLD-MCNC: 13 G/DL (ref 11.7–15.7)
LYMPHOCYTES # BLD AUTO: 1.3 10E9/L (ref 0.8–5.3)
LYMPHOCYTES NFR BLD AUTO: 25.5 %
MCH RBC QN AUTO: 31 PG (ref 26.5–33)
MCHC RBC AUTO-ENTMCNC: 33 G/DL (ref 31.5–36.5)
MCV RBC AUTO: 94 FL (ref 78–100)
MONOCYTES # BLD AUTO: 0.3 10E9/L (ref 0–1.3)
MONOCYTES NFR BLD AUTO: 6.9 %
NEUTROPHILS # BLD AUTO: 3.2 10E9/L (ref 1.6–8.3)
NEUTROPHILS NFR BLD AUTO: 66 %
PLATELET # BLD AUTO: 237 10E9/L (ref 150–450)
POTASSIUM SERPL-SCNC: 4.1 MMOL/L (ref 3.4–5.3)
PROT SERPL-MCNC: 7.9 G/DL (ref 6.8–8.8)
RBC # BLD AUTO: 4.19 10E12/L (ref 3.8–5.2)
SODIUM SERPL-SCNC: 139 MMOL/L (ref 133–144)
T4 FREE SERPL-MCNC: 0.65 NG/DL (ref 0.76–1.46)
TSH SERPL DL<=0.005 MIU/L-ACNC: 23.88 MU/L (ref 0.4–4)
WBC # BLD AUTO: 4.9 10E9/L (ref 4–11)

## 2019-08-13 PROCEDURE — 84443 ASSAY THYROID STIM HORMONE: CPT | Performed by: FAMILY MEDICINE

## 2019-08-13 PROCEDURE — 99214 OFFICE O/P EST MOD 30 MIN: CPT | Mod: 25 | Performed by: FAMILY MEDICINE

## 2019-08-13 PROCEDURE — 81025 URINE PREGNANCY TEST: CPT | Performed by: FAMILY MEDICINE

## 2019-08-13 PROCEDURE — 80053 COMPREHEN METABOLIC PANEL: CPT | Performed by: FAMILY MEDICINE

## 2019-08-13 PROCEDURE — 36415 COLL VENOUS BLD VENIPUNCTURE: CPT | Performed by: FAMILY MEDICINE

## 2019-08-13 PROCEDURE — 17110 DESTRUCTION B9 LES UP TO 14: CPT | Performed by: FAMILY MEDICINE

## 2019-08-13 PROCEDURE — 85025 COMPLETE CBC W/AUTO DIFF WBC: CPT | Performed by: FAMILY MEDICINE

## 2019-08-13 PROCEDURE — 84439 ASSAY OF FREE THYROXINE: CPT | Performed by: FAMILY MEDICINE

## 2019-08-13 RX ORDER — ACETAMINOPHEN AND CODEINE PHOSPHATE 120; 12 MG/5ML; MG/5ML
0.35 SOLUTION ORAL DAILY
Qty: 90 TABLET | Refills: 3 | Status: SHIPPED | OUTPATIENT
Start: 2019-08-13 | End: 2019-12-24

## 2019-08-13 ASSESSMENT — ANXIETY QUESTIONNAIRES
GAD7 TOTAL SCORE: 0
GAD7 TOTAL SCORE: 0
2. NOT BEING ABLE TO STOP OR CONTROL WORRYING: NOT AT ALL
4. TROUBLE RELAXING: NOT AT ALL
6. BECOMING EASILY ANNOYED OR IRRITABLE: NOT AT ALL
1. FEELING NERVOUS, ANXIOUS, OR ON EDGE: NOT AT ALL
7. FEELING AFRAID AS IF SOMETHING AWFUL MIGHT HAPPEN: NOT AT ALL
3. WORRYING TOO MUCH ABOUT DIFFERENT THINGS: NOT AT ALL
GAD7 TOTAL SCORE: 0
5. BEING SO RESTLESS THAT IT IS HARD TO SIT STILL: NOT AT ALL
7. FEELING AFRAID AS IF SOMETHING AWFUL MIGHT HAPPEN: NOT AT ALL

## 2019-08-13 ASSESSMENT — PAIN SCALES - GENERAL: PAINLEVEL: NO PAIN (0)

## 2019-08-13 ASSESSMENT — PATIENT HEALTH QUESTIONNAIRE - PHQ9
SUM OF ALL RESPONSES TO PHQ QUESTIONS 1-9: 0
SUM OF ALL RESPONSES TO PHQ QUESTIONS 1-9: 0
10. IF YOU CHECKED OFF ANY PROBLEMS, HOW DIFFICULT HAVE THESE PROBLEMS MADE IT FOR YOU TO DO YOUR WORK, TAKE CARE OF THINGS AT HOME, OR GET ALONG WITH OTHER PEOPLE: NOT DIFFICULT AT ALL

## 2019-08-13 ASSESSMENT — MIFFLIN-ST. JEOR: SCORE: 1515.77

## 2019-08-13 NOTE — ASSESSMENT & PLAN NOTE
Site- left and right plantar foot.    Each  Wart is  identified. The canister is held in an upright position with the cryotip nozzle approximately 1 cm away from eash  lesion before the trigger is pressed to activate flow of Liquid Nitrogen.  3-4 freeze-thaw cycles were given with duration of 4-5 sec each till a frost rim of 1mm is noticed around the lesion wart was frozen easily three times with liquid nitrogen.     Each wart was pared with a #15 blade. A total of 3  warts are treated today.     The etiology of common warts were discussed. Warm soapy water soaks and sanding also recommended. The patient is to return every two weeks until all warts have been removed.

## 2019-08-13 NOTE — ASSESSMENT & PLAN NOTE
Currently breast feeding and wishes to be on birth control  Get pregnancy test. If negative, will start on mini pill.

## 2019-08-14 ASSESSMENT — ANXIETY QUESTIONNAIRES: GAD7 TOTAL SCORE: 0

## 2019-08-14 ASSESSMENT — PATIENT HEALTH QUESTIONNAIRE - PHQ9: SUM OF ALL RESPONSES TO PHQ QUESTIONS 1-9: 0

## 2019-08-16 ENCOUNTER — MYC MEDICAL ADVICE (OUTPATIENT)
Dept: FAMILY MEDICINE | Facility: OTHER | Age: 34
End: 2019-08-16

## 2019-08-16 DIAGNOSIS — R79.89 ELEVATED SERUM CREATININE: Primary | ICD-10-CM

## 2019-08-16 RX ORDER — LEVOTHYROXINE SODIUM 75 UG/1
75 TABLET ORAL DAILY
Qty: 60 TABLET | Refills: 0 | Status: SHIPPED | OUTPATIENT
Start: 2019-08-16 | End: 2019-09-30

## 2019-08-16 NOTE — TELEPHONE ENCOUNTER
The creatinine is only marginally elevated butThe gross kidney function seems to be in the normal range but I would recommend good hydration and avoid OTC aspirin, ibuprofe and alleve to prevent kidney damage. We could repeat levels in2-4 wks for close follow up

## 2019-09-25 ENCOUNTER — MYC MEDICAL ADVICE (OUTPATIENT)
Dept: FAMILY MEDICINE | Facility: OTHER | Age: 34
End: 2019-09-25

## 2019-09-26 NOTE — TELEPHONE ENCOUNTER
Will route to RK to review/advise. Did you want to see patient again given her symptoms? Or do you want her to stay on the same dose and just do the lab work?  Emilee Bedoya, CMA

## 2019-09-26 NOTE — TELEPHONE ENCOUNTER
She has future orders in place . Please have her complete them and we could do a e-visit to discuss med changes if any are needed

## 2019-09-27 DIAGNOSIS — E03.8 OTHER SPECIFIED HYPOTHYROIDISM: ICD-10-CM

## 2019-09-27 DIAGNOSIS — R79.89 ELEVATED SERUM CREATININE: ICD-10-CM

## 2019-09-27 LAB
ANION GAP SERPL CALCULATED.3IONS-SCNC: 12 MMOL/L (ref 3–14)
BUN SERPL-MCNC: 13 MG/DL (ref 7–30)
CALCIUM SERPL-MCNC: 8.4 MG/DL (ref 8.5–10.1)
CHLORIDE SERPL-SCNC: 108 MMOL/L (ref 94–109)
CO2 SERPL-SCNC: 24 MMOL/L (ref 20–32)
CREAT SERPL-MCNC: 0.8 MG/DL (ref 0.52–1.04)
GFR SERPL CREATININE-BSD FRML MDRD: >90 ML/MIN/{1.73_M2}
GLUCOSE SERPL-MCNC: 121 MG/DL (ref 70–99)
POTASSIUM SERPL-SCNC: 4.1 MMOL/L (ref 3.4–5.3)
SODIUM SERPL-SCNC: 144 MMOL/L (ref 133–144)
TSH SERPL DL<=0.005 MIU/L-ACNC: 1.63 MU/L (ref 0.4–4)

## 2019-09-27 PROCEDURE — 84443 ASSAY THYROID STIM HORMONE: CPT | Performed by: FAMILY MEDICINE

## 2019-09-27 PROCEDURE — 36415 COLL VENOUS BLD VENIPUNCTURE: CPT | Performed by: FAMILY MEDICINE

## 2019-09-27 PROCEDURE — 80048 BASIC METABOLIC PNL TOTAL CA: CPT | Performed by: FAMILY MEDICINE

## 2019-09-30 DIAGNOSIS — E03.8 OTHER SPECIFIED HYPOTHYROIDISM: ICD-10-CM

## 2019-09-30 DIAGNOSIS — E03.9 HYPOTHYROIDISM, UNSPECIFIED TYPE: ICD-10-CM

## 2019-09-30 RX ORDER — LEVOTHYROXINE SODIUM 50 UG/1
50 TABLET ORAL DAILY
Qty: 30 TABLET | Refills: 0 | Status: SHIPPED | OUTPATIENT
Start: 2019-09-30 | End: 2019-10-29

## 2019-10-01 PROBLEM — Z78.9 USES BIRTH CONTROL: Status: ACTIVE | Noted: 2019-08-13

## 2019-10-28 ENCOUNTER — TELEPHONE (OUTPATIENT)
Dept: FAMILY MEDICINE | Facility: OTHER | Age: 34
End: 2019-10-28

## 2019-10-28 DIAGNOSIS — E03.9 HYPOTHYROIDISM, UNSPECIFIED TYPE: ICD-10-CM

## 2019-10-28 DIAGNOSIS — E03.8 OTHER SPECIFIED HYPOTHYROIDISM: ICD-10-CM

## 2019-10-28 DIAGNOSIS — E03.9 HYPOTHYROIDISM, UNSPECIFIED TYPE: Primary | ICD-10-CM

## 2019-10-28 LAB — TSH SERPL DL<=0.005 MIU/L-ACNC: 1.74 MU/L (ref 0.4–4)

## 2019-10-28 PROCEDURE — 36415 COLL VENOUS BLD VENIPUNCTURE: CPT | Performed by: FAMILY MEDICINE

## 2019-10-28 PROCEDURE — 84443 ASSAY THYROID STIM HORMONE: CPT | Performed by: FAMILY MEDICINE

## 2019-10-28 NOTE — TELEPHONE ENCOUNTER
Reason for Call: Request for an order or referral:    Order or referral being requested: orders    Date needed: as soon as possible    Has the patient been seen by the PCP for this problem? YES    Additional comments: would like orders to test thyroid    Phone number Patient can be reached at:  Home number on file [phone] (home)    Best Time:  any    Can we leave a detailed message on this number?  YES    Call taken on 10/28/2019 at 8:47 AM by Tory Cross

## 2019-10-28 NOTE — TELEPHONE ENCOUNTER
Patient had her thyroid check on 9/27/19 and was told to come back in 1 month. Can you place orders and then will let patient know.

## 2019-10-29 RX ORDER — LEVOTHYROXINE SODIUM 50 UG/1
50 TABLET ORAL DAILY
Qty: 90 TABLET | Refills: 1 | Status: SHIPPED | OUTPATIENT
Start: 2019-10-29 | End: 2019-12-24

## 2019-11-05 ENCOUNTER — IMMUNIZATION (OUTPATIENT)
Dept: FAMILY MEDICINE | Facility: OTHER | Age: 34
End: 2019-11-05
Payer: COMMERCIAL

## 2019-11-05 PROCEDURE — 90471 IMMUNIZATION ADMIN: CPT

## 2019-11-05 PROCEDURE — 90686 IIV4 VACC NO PRSV 0.5 ML IM: CPT

## 2019-12-16 ENCOUNTER — MYC MEDICAL ADVICE (OUTPATIENT)
Dept: FAMILY MEDICINE | Facility: OTHER | Age: 34
End: 2019-12-16

## 2019-12-23 NOTE — PROGRESS NOTES
Subjective     Queta Cortez is a 34 year old female who presents to clinic today for the following health issues:    History of Present Illness        Hypothyroidism:     Since last visit, patient describes the following symptoms::  Anxiety, Dry skin, Fatigue and Loose stools    She eats 2-3 servings of fruits and vegetables daily.She consumes 1 sweetened beverage(s) daily.  She is taking medications regularly.     Hypothyroidism Follow-up      Since last visit, patient describes the following symptoms: loose stools, anxiety and fatigue    Answers for HPI/ROS submitted by the patient on 2019   Chronic problems general questions HPI Form  If you checked off any problems, how difficult have these problems made it for you to do your work, take care of things at home, or get along with other people?: Somewhat difficult  PHQ9 TOTAL SCORE: 7  JOAO 7 TOTAL SCORE: 7        -------------------------------------    Patient Active Problem List   Diagnosis     HELLP syndrome     History of TTP (thrombotic thrombocytopenic purpura)     TTP (thrombotic thrombocytopenic purpura) (H)     SOB (shortness of breath)     Edema, unspecified type     JOAO (generalized anxiety disorder)     PTSD (post-traumatic stress disorder)     Mild episode of recurrent major depressive disorder (H)     Other specified hypothyroidism     Plantar warts     Contraception     Past Surgical History:   Procedure Laterality Date     APPENDECTOMY  2014      SECTION N/A 10/29/2016    Procedure:  SECTION;  Surgeon: Adonis Dooley MD;  Location:  L[facility]D       Social History     Tobacco Use     Smoking status: Never Smoker     Smokeless tobacco: Never Used   Substance Use Topics     Alcohol use: No     Family History   Problem Relation Age of Onset     Hypertension Mother      Osteoporosis Maternal Grandmother      Unknown/Adopted Father      Unknown/Adopted Paternal Grandmother      Unknown/Adopted Paternal Grandfather          Current  "Outpatient Medications   Medication Sig Dispense Refill     levothyroxine (SYNTHROID/LEVOTHROID) 25 MCG tablet Take 1 tablet (25 mcg) by mouth daily 90 tablet 0     Prenatal Vit-Min-FA-Fish Oil ([facility] PRENATAL GUMMY) 0.4-113.5 MG CHEW Reported on 4/19/2017       Recent Labs   Lab Test 10/28/19  1637 09/27/19  1324 08/13/19  1016 02/28/19  1333 02/08/19  1500   ALT  --   --  26 17 18   CR  --  0.80 1.08* 0.72 0.66   GFRESTIMATED  --  >90 67 >90 >90   GFRESTBLACK  --  >90 78 >90 >90   POTASSIUM  --  4.1 4.1 4.2 4.0   TSH 1.74 1.63 23.88*  --   --       BP Readings from Last 3 Encounters:   12/24/19 112/80   08/13/19 110/72   04/05/19 104/74    Wt Readings from Last 3 Encounters:   12/24/19 69.4 kg (153 lb)   08/13/19 72.6 kg (160 lb)   04/05/19 75.8 kg (167 lb)                    Reviewed and updated as needed this visit by Provider  Tobacco  Allergies  Meds  Problems  Med Hx  Surg Hx  Fam Hx         Review of Systems   ROS COMP: Constitutional, HEENT, cardiovascular, pulmonary, GI, , musculoskeletal, neuro, skin, endocrine and psych systems are negative, except as otherwise noted.      Objective    /80   Pulse 89   Temp 97.9  F (36.6  C) (Temporal)   Resp 14   Ht 1.757 m (5' 9.17\")   Wt 69.4 kg (153 lb)   LMP 12/08/2019 (Exact Date)   SpO2 98%   BMI 22.48 kg/m    Body mass index is 22.48 kg/m .  Physical Exam  Constitutional:       Appearance: Normal appearance.   HENT:      Head: Normocephalic and atraumatic.      Nose: Nose normal.   Eyes:      Extraocular Movements: Extraocular movements intact.      Pupils: Pupils are equal, round, and reactive to light.      Comments: No thyromegaly.   Neck:      Musculoskeletal: Normal range of motion and neck supple.   Cardiovascular:      Rate and Rhythm: Normal rate and regular rhythm.   Pulmonary:      Effort: Pulmonary effort is normal.      Breath sounds: Normal breath sounds.   Neurological:      Mental Status: She is alert.            Diagnostic " Test Results:  Labs reviewed in Epic        Assessment & Plan   Problem List Items Addressed This Visit     Other specified hypothyroidism     Signs of excess supplementation. Trial reduced dose of levothyroxine . Recheck in 2 months.         Relevant Medications    levothyroxine (SYNTHROID/LEVOTHROID) 25 MCG tablet      Other Visit Diagnoses     Hypothyroidism, unspecified type        Relevant Medications    levothyroxine (SYNTHROID/LEVOTHROID) 25 MCG tablet    Other Relevant Orders    TSH (Completed)    T4 free (Completed)               See Patient Instructions  Return in about 3 months (around 3/24/2020).    Alanis Lundberg MD  [facility]

## 2019-12-24 ENCOUNTER — OFFICE VISIT (OUTPATIENT)
Dept: FAMILY MEDICINE | Facility: OTHER | Age: 34
End: 2019-12-24
Payer: COMMERCIAL

## 2019-12-24 VITALS
DIASTOLIC BLOOD PRESSURE: 80 MMHG | TEMPERATURE: 97.9 F | RESPIRATION RATE: 14 BRPM | HEIGHT: 69 IN | BODY MASS INDEX: 22.66 KG/M2 | WEIGHT: 153 LBS | HEART RATE: 89 BPM | SYSTOLIC BLOOD PRESSURE: 112 MMHG | OXYGEN SATURATION: 98 %

## 2019-12-24 DIAGNOSIS — E03.8 OTHER SPECIFIED HYPOTHYROIDISM: ICD-10-CM

## 2019-12-24 DIAGNOSIS — E03.9 HYPOTHYROIDISM, UNSPECIFIED TYPE: ICD-10-CM

## 2019-12-24 LAB
T4 FREE SERPL-MCNC: 0.96 NG/DL (ref 0.76–1.46)
TSH SERPL DL<=0.005 MIU/L-ACNC: 1.38 MU/L (ref 0.4–4)

## 2019-12-24 PROCEDURE — 84443 ASSAY THYROID STIM HORMONE: CPT | Performed by: FAMILY MEDICINE

## 2019-12-24 PROCEDURE — 84439 ASSAY OF FREE THYROXINE: CPT | Performed by: FAMILY MEDICINE

## 2019-12-24 PROCEDURE — 99213 OFFICE O/P EST LOW 20 MIN: CPT | Performed by: FAMILY MEDICINE

## 2019-12-24 PROCEDURE — 36415 COLL VENOUS BLD VENIPUNCTURE: CPT | Performed by: FAMILY MEDICINE

## 2019-12-24 RX ORDER — LEVOTHYROXINE SODIUM 25 UG/1
25 TABLET ORAL DAILY
Qty: 90 TABLET | Refills: 0 | Status: SHIPPED | OUTPATIENT
Start: 2019-12-24 | End: 2020-03-31

## 2019-12-24 ASSESSMENT — ANXIETY QUESTIONNAIRES
4. TROUBLE RELAXING: SEVERAL DAYS
6. BECOMING EASILY ANNOYED OR IRRITABLE: MORE THAN HALF THE DAYS
5. BEING SO RESTLESS THAT IT IS HARD TO SIT STILL: NOT AT ALL
2. NOT BEING ABLE TO STOP OR CONTROL WORRYING: SEVERAL DAYS
7. FEELING AFRAID AS IF SOMETHING AWFUL MIGHT HAPPEN: SEVERAL DAYS
3. WORRYING TOO MUCH ABOUT DIFFERENT THINGS: SEVERAL DAYS
1. FEELING NERVOUS, ANXIOUS, OR ON EDGE: SEVERAL DAYS
GAD7 TOTAL SCORE: 7
GAD7 TOTAL SCORE: 7
7. FEELING AFRAID AS IF SOMETHING AWFUL MIGHT HAPPEN: SEVERAL DAYS
GAD7 TOTAL SCORE: 7

## 2019-12-24 ASSESSMENT — PATIENT HEALTH QUESTIONNAIRE - PHQ9
SUM OF ALL RESPONSES TO PHQ QUESTIONS 1-9: 7
10. IF YOU CHECKED OFF ANY PROBLEMS, HOW DIFFICULT HAVE THESE PROBLEMS MADE IT FOR YOU TO DO YOUR WORK, TAKE CARE OF THINGS AT HOME, OR GET ALONG WITH OTHER PEOPLE: SOMEWHAT DIFFICULT
SUM OF ALL RESPONSES TO PHQ QUESTIONS 1-9: 7

## 2019-12-24 ASSESSMENT — MIFFLIN-ST. JEOR: SCORE: 1466.12

## 2019-12-25 ASSESSMENT — ANXIETY QUESTIONNAIRES: GAD7 TOTAL SCORE: 7

## 2019-12-25 ASSESSMENT — PATIENT HEALTH QUESTIONNAIRE - PHQ9: SUM OF ALL RESPONSES TO PHQ QUESTIONS 1-9: 7

## 2020-03-05 ENCOUNTER — TELEPHONE (OUTPATIENT)
Dept: ENDOCRINOLOGY | Facility: CLINIC | Age: 35
End: 2020-03-05

## 2020-03-05 NOTE — TELEPHONE ENCOUNTER
Diagnosis of patient=Hypothyroid. Patient is on Levothyroxine.     OK for 5/11/2020 appointment.    Maxine Diehl LPN  Diabetes Clinic Coordinator   Adult Endocrinology and Pediatric Specialty Clinics  [facility]

## 2020-03-05 NOTE — TELEPHONE ENCOUNTER
[facility] Call Center    Phone Message    May a detailed message be left on voicemail: yes     Reason for Call: Other: pt has hyperthyroidism and is schedule for May 11     Action Taken: Message routed to:  Adult Clinics: Endocrinology p [id number]    Travel Screening: Not Applicable

## 2020-03-31 ENCOUNTER — MYC REFILL (OUTPATIENT)
Dept: FAMILY MEDICINE | Facility: OTHER | Age: 35
End: 2020-03-31

## 2020-03-31 DIAGNOSIS — E03.8 OTHER SPECIFIED HYPOTHYROIDISM: ICD-10-CM

## 2020-03-31 DIAGNOSIS — E03.9 HYPOTHYROIDISM, UNSPECIFIED TYPE: ICD-10-CM

## 2020-04-01 RX ORDER — LEVOTHYROXINE SODIUM 25 UG/1
25 TABLET ORAL DAILY
Qty: 90 TABLET | Refills: 2 | Status: SHIPPED | OUTPATIENT
Start: 2020-04-01 | End: 2021-04-06

## 2020-04-01 NOTE — TELEPHONE ENCOUNTER
Prescription approved per [facility] Refill Protocol.  Yohannes Zamora, RN, BSN

## 2020-04-03 ENCOUNTER — MYC REFILL (OUTPATIENT)
Dept: FAMILY MEDICINE | Facility: OTHER | Age: 35
End: 2020-04-03

## 2020-04-03 DIAGNOSIS — E03.8 OTHER SPECIFIED HYPOTHYROIDISM: ICD-10-CM

## 2020-04-03 DIAGNOSIS — E03.9 HYPOTHYROIDISM, UNSPECIFIED TYPE: ICD-10-CM

## 2020-04-03 RX ORDER — LEVOTHYROXINE SODIUM 25 UG/1
25 TABLET ORAL DAILY
Qty: 90 TABLET | Refills: 2 | Status: CANCELLED | OUTPATIENT
Start: 2020-04-03

## 2020-05-05 ENCOUNTER — MYC MEDICAL ADVICE (OUTPATIENT)
Dept: FAMILY MEDICINE | Facility: OTHER | Age: 35
End: 2020-05-05

## 2020-05-06 ENCOUNTER — MYC MEDICAL ADVICE (OUTPATIENT)
Dept: FAMILY MEDICINE | Facility: OTHER | Age: 35
End: 2020-05-06

## 2020-05-06 DIAGNOSIS — M31.19 TTP (THROMBOTIC THROMBOCYTOPENIC PURPURA) (H): Primary | ICD-10-CM

## 2020-05-06 DIAGNOSIS — E03.8 OTHER SPECIFIED HYPOTHYROIDISM: ICD-10-CM

## 2020-05-07 NOTE — TELEPHONE ENCOUNTER
Will forward to RK. Have already offered a visit and she is declining right now.   Leonarda Tyson MA

## 2020-06-15 DIAGNOSIS — E06.5 HYPOTHYROIDISM DUE TO FIBROUS INVASIVE THYROIDITIS: ICD-10-CM

## 2020-06-15 DIAGNOSIS — M31.19: ICD-10-CM

## 2020-06-15 DIAGNOSIS — E03.8 HYPOTHYROIDISM DUE TO FIBROUS INVASIVE THYROIDITIS: ICD-10-CM

## 2020-06-15 LAB
ANION GAP SERPL CALCULATED.3IONS-SCNC: 6 MMOL/L (ref 3–14)
BUN SERPL-MCNC: 10 MG/DL (ref 7–30)
CALCIUM SERPL-MCNC: 8.3 MG/DL (ref 8.5–10.1)
CHLORIDE SERPL-SCNC: 107 MMOL/L (ref 94–109)
CO2 SERPL-SCNC: 27 MMOL/L (ref 20–32)
CREAT SERPL-MCNC: 0.78 MG/DL (ref 0.52–1.04)
ERYTHROCYTE [DISTWIDTH] IN BLOOD BY AUTOMATED COUNT: 12.2 % (ref 10–15)
GFR SERPL CREATININE-BSD FRML MDRD: >90 ML/MIN/{1.73_M2}
GLUCOSE SERPL-MCNC: 85 MG/DL (ref 70–99)
HCT VFR BLD AUTO: 38.7 % (ref 35–47)
HGB BLD-MCNC: 12.5 G/DL (ref 11.7–15.7)
MCH RBC QN AUTO: 30.7 PG (ref 26.5–33)
MCHC RBC AUTO-ENTMCNC: 32.3 G/DL (ref 31.5–36.5)
MCV RBC AUTO: 95 FL (ref 78–100)
PLATELET # BLD AUTO: 252 10E9/L (ref 150–450)
POTASSIUM SERPL-SCNC: 4.6 MMOL/L (ref 3.4–5.3)
RBC # BLD AUTO: 4.07 10E12/L (ref 3.8–5.2)
SODIUM SERPL-SCNC: 140 MMOL/L (ref 133–144)
TSH SERPL DL<=0.005 MIU/L-ACNC: 1.37 MU/L (ref 0.4–4)
WBC # BLD AUTO: 5.2 10E9/L (ref 4–11)

## 2020-06-15 PROCEDURE — 36415 COLL VENOUS BLD VENIPUNCTURE: CPT | Performed by: FAMILY MEDICINE

## 2020-06-15 PROCEDURE — 84443 ASSAY THYROID STIM HORMONE: CPT | Performed by: FAMILY MEDICINE

## 2020-06-15 PROCEDURE — 80048 BASIC METABOLIC PNL TOTAL CA: CPT | Performed by: FAMILY MEDICINE

## 2020-06-15 PROCEDURE — 85027 COMPLETE CBC AUTOMATED: CPT | Performed by: FAMILY MEDICINE

## 2020-06-15 ASSESSMENT — ANXIETY QUESTIONNAIRES
6. BECOMING EASILY ANNOYED OR IRRITABLE: MORE THAN HALF THE DAYS
GAD7 TOTAL SCORE: 8
GAD7 TOTAL SCORE: 8
2. NOT BEING ABLE TO STOP OR CONTROL WORRYING: SEVERAL DAYS
7. FEELING AFRAID AS IF SOMETHING AWFUL MIGHT HAPPEN: SEVERAL DAYS
4. TROUBLE RELAXING: SEVERAL DAYS
5. BEING SO RESTLESS THAT IT IS HARD TO SIT STILL: SEVERAL DAYS
1. FEELING NERVOUS, ANXIOUS, OR ON EDGE: SEVERAL DAYS
7. FEELING AFRAID AS IF SOMETHING AWFUL MIGHT HAPPEN: SEVERAL DAYS
3. WORRYING TOO MUCH ABOUT DIFFERENT THINGS: SEVERAL DAYS
GAD7 TOTAL SCORE: 8

## 2020-06-15 ASSESSMENT — PATIENT HEALTH QUESTIONNAIRE - PHQ9
SUM OF ALL RESPONSES TO PHQ QUESTIONS 1-9: 4
SUM OF ALL RESPONSES TO PHQ QUESTIONS 1-9: 4
10. IF YOU CHECKED OFF ANY PROBLEMS, HOW DIFFICULT HAVE THESE PROBLEMS MADE IT FOR YOU TO DO YOUR WORK, TAKE CARE OF THINGS AT HOME, OR GET ALONG WITH OTHER PEOPLE: SOMEWHAT DIFFICULT

## 2020-06-16 ASSESSMENT — ANXIETY QUESTIONNAIRES: GAD7 TOTAL SCORE: 8

## 2020-06-16 ASSESSMENT — PATIENT HEALTH QUESTIONNAIRE - PHQ9: SUM OF ALL RESPONSES TO PHQ QUESTIONS 1-9: 4

## 2020-10-07 ENCOUNTER — TELEPHONE (OUTPATIENT)
Dept: OBGYN | Facility: CLINIC | Age: 35
End: 2020-10-07

## 2020-10-07 NOTE — TELEPHONE ENCOUNTER
Patient is a 35 year old, , who was last seen in clinic on 3/29/2019 for postpartum care. She has a history of pp HELLP.  Patient had a  on 19 by Dr. Caro.     Last menstrual period 9/3/2020 (approximate within days), 4 weeks and 6 days.   Patient assisted with scheduling a New Prenatal with Dr. Rascon on 2020 and will mychart with any concerns or questions before that.     Alyssa Willard RN on 10/7/2020 at 1:57 PM

## 2020-10-07 NOTE — TELEPHONE ENCOUNTER
Patient recently had a positive pregnancy test and she stated she would like to know if she should be seeing Dr Rascon sooner since she is high risk.  Please call to advise when she should set up an appointment.

## 2020-10-13 ENCOUNTER — NURSE TRIAGE (OUTPATIENT)
Dept: OBGYN | Facility: CLINIC | Age: 35
End: 2020-10-13

## 2020-10-13 NOTE — TELEPHONE ENCOUNTER
Spoke to patient.     5 almost 6 weeks pregnant.   Woke up at 2 in the morning and started to cramp.   Got up and walked around.   Felt like bad diarrhea cramps.   No BMs.   Laid on the floor, severe cramping, sweating, nauseated.   Dry heaved a couple times.   Started to subside.   A little tenderness now.   10/10 at one point.   Abdominal pain lasted 20 to 30 minutes.   Did not treat.   Just breathed through the pain.   No fever.   States maybe a little tender in abdomen this morning.   Pain was across the whole abdomen.     Patient wanted to be seen today. RN advised no OB appointments in ER or Rg with patient's preferred providers. Patient states she will go to [facility] in [place].     Katerina Denise RN BSN      Additional Information    Negative: Passed out (i.e., fainted, collapsed and was not responding)    Negative: Shock suspected (e.g., cold/pale/clammy skin, too weak to stand, low BP, rapid pulse)    Negative: Difficult to awaken or acting confused (e.g., disoriented, slurred speech)    Negative: Sounds like a life-threatening emergency to the triager    Negative: Abdominal pain and pregnant > 20 weeks    Negative: MODERATE-SEVERE abdominal pain    Negative: Vaginal bleeding present 2 or more hours and soaking 2 pads or tampons per hour    Negative: Vaginal bleeding present > 6 hours and soaking 1 pad or tampon per hour    Negative: Passed tissue (e.g., gray-white)    Negative: Vomiting and contains red blood or black ('coffee ground') material    Negative: Shoulder pain    MILD constant abdominal pain (e.g., doesn't interfere with normal activities) and present > 2 hours    Protocols used: PREGNANCY - ABDOMINAL PAIN LESS THAN 20 WEEKS EGA-A-OH

## 2020-10-16 ENCOUNTER — TELEPHONE (OUTPATIENT)
Dept: FAMILY MEDICINE | Facility: OTHER | Age: 35
End: 2020-10-16

## 2020-10-16 NOTE — TELEPHONE ENCOUNTER
RN called and spoke to patient. She is feeling better, no cramping noted.     RN rescheduled her with Gloria Solorzano, for earlier New Prenatal appointment on 10/29/2020.     Patient verbalized understanding and agreed to plan.     Alyssa Willard RN on 10/16/2020 at 8:49 AM

## 2020-10-16 NOTE — TELEPHONE ENCOUNTER
Queta was suppose to see Dr. Rascon today 10/16/20 but provider not in.. she is about 6 weeks pregnant and earlier in the week she was having some cramping. It has subside but would like to be seen sooner then later... Can you please triage and help schedule her an appointment.

## 2020-10-20 ENCOUNTER — PRENATAL OFFICE VISIT (OUTPATIENT)
Dept: OBGYN | Facility: CLINIC | Age: 35
End: 2020-10-20
Payer: COMMERCIAL

## 2020-10-20 VITALS
WEIGHT: 159 LBS | BODY MASS INDEX: 22.76 KG/M2 | HEIGHT: 70 IN | TEMPERATURE: 97.6 F | HEART RATE: 100 BPM | DIASTOLIC BLOOD PRESSURE: 84 MMHG | SYSTOLIC BLOOD PRESSURE: 126 MMHG

## 2020-10-20 DIAGNOSIS — E03.8 OTHER SPECIFIED HYPOTHYROIDISM: ICD-10-CM

## 2020-10-20 DIAGNOSIS — O34.219 PATIENT DESIRES VAGINAL BIRTH AFTER CESAREAN SECTION (VBAC): ICD-10-CM

## 2020-10-20 DIAGNOSIS — Z34.81 ENCOUNTER FOR SUPERVISION OF OTHER NORMAL PREGNANCY, FIRST TRIMESTER: Primary | ICD-10-CM

## 2020-10-20 DIAGNOSIS — Z98.891 HISTORY OF C-SECTION: ICD-10-CM

## 2020-10-20 DIAGNOSIS — Z23 NEED FOR TDAP VACCINATION: ICD-10-CM

## 2020-10-20 PROBLEM — Z78.9 USES BIRTH CONTROL: Status: RESOLVED | Noted: 2019-08-13 | Resolved: 2020-10-20

## 2020-10-20 LAB
ABO + RH BLD: NORMAL
ABO + RH BLD: NORMAL
ALBUMIN UR-MCNC: NEGATIVE MG/DL
APPEARANCE UR: CLEAR
BILIRUB UR QL STRIP: NEGATIVE
BLD GP AB SCN SERPL QL: NORMAL
BLOOD BANK CMNT PATIENT-IMP: NORMAL
COLOR UR AUTO: NORMAL
ERYTHROCYTE [DISTWIDTH] IN BLOOD BY AUTOMATED COUNT: 11.5 % (ref 10–15)
FERRITIN SERPL-MCNC: 58 NG/ML (ref 12–150)
GLUCOSE UR STRIP-MCNC: NEGATIVE MG/DL
HBV SURFACE AG SERPL QL IA: NONREACTIVE
HCT VFR BLD AUTO: 41.3 % (ref 35–47)
HGB BLD-MCNC: 13.5 G/DL (ref 11.7–15.7)
HGB UR QL STRIP: NEGATIVE
HIV 1+2 AB+HIV1 P24 AG SERPL QL IA: NONREACTIVE
KETONES UR STRIP-MCNC: NEGATIVE MG/DL
LEUKOCYTE ESTERASE UR QL STRIP: NEGATIVE
MCH RBC QN AUTO: 30.8 PG (ref 26.5–33)
MCHC RBC AUTO-ENTMCNC: 32.7 G/DL (ref 31.5–36.5)
MCV RBC AUTO: 94 FL (ref 78–100)
NITRATE UR QL: NEGATIVE
PH UR STRIP: 6 PH (ref 5–7)
PLATELET # BLD AUTO: 264 10E9/L (ref 150–450)
RBC # BLD AUTO: 4.39 10E12/L (ref 3.8–5.2)
SOURCE: NORMAL
SP GR UR STRIP: 1.01 (ref 1–1.03)
SPECIMEN EXP DATE BLD: NORMAL
TSH SERPL DL<=0.005 MIU/L-ACNC: 5.47 MU/L (ref 0.4–4)
UROBILINOGEN UR STRIP-MCNC: NORMAL MG/DL (ref 0–2)
WBC # BLD AUTO: 8.7 10E9/L (ref 4–11)

## 2020-10-20 PROCEDURE — 86901 BLOOD TYPING SEROLOGIC RH(D): CPT | Performed by: ADVANCED PRACTICE MIDWIFE

## 2020-10-20 PROCEDURE — 81003 URINALYSIS AUTO W/O SCOPE: CPT | Performed by: ADVANCED PRACTICE MIDWIFE

## 2020-10-20 PROCEDURE — 86762 RUBELLA ANTIBODY: CPT | Performed by: ADVANCED PRACTICE MIDWIFE

## 2020-10-20 PROCEDURE — 36415 COLL VENOUS BLD VENIPUNCTURE: CPT | Performed by: ADVANCED PRACTICE MIDWIFE

## 2020-10-20 PROCEDURE — 87340 HEPATITIS B SURFACE AG IA: CPT | Performed by: ADVANCED PRACTICE MIDWIFE

## 2020-10-20 PROCEDURE — 86780 TREPONEMA PALLIDUM: CPT | Mod: 90 | Performed by: ADVANCED PRACTICE MIDWIFE

## 2020-10-20 PROCEDURE — 87389 HIV-1 AG W/HIV-1&-2 AB AG IA: CPT | Performed by: ADVANCED PRACTICE MIDWIFE

## 2020-10-20 PROCEDURE — 86850 RBC ANTIBODY SCREEN: CPT | Performed by: ADVANCED PRACTICE MIDWIFE

## 2020-10-20 PROCEDURE — 85027 COMPLETE CBC AUTOMATED: CPT | Performed by: ADVANCED PRACTICE MIDWIFE

## 2020-10-20 PROCEDURE — 99213 OFFICE O/P EST LOW 20 MIN: CPT | Performed by: ADVANCED PRACTICE MIDWIFE

## 2020-10-20 PROCEDURE — 84443 ASSAY THYROID STIM HORMONE: CPT | Performed by: ADVANCED PRACTICE MIDWIFE

## 2020-10-20 PROCEDURE — 86900 BLOOD TYPING SEROLOGIC ABO: CPT | Performed by: ADVANCED PRACTICE MIDWIFE

## 2020-10-20 PROCEDURE — 99000 SPECIMEN HANDLING OFFICE-LAB: CPT | Performed by: ADVANCED PRACTICE MIDWIFE

## 2020-10-20 PROCEDURE — 82728 ASSAY OF FERRITIN: CPT | Performed by: ADVANCED PRACTICE MIDWIFE

## 2020-10-20 PROCEDURE — 87086 URINE CULTURE/COLONY COUNT: CPT | Performed by: ADVANCED PRACTICE MIDWIFE

## 2020-10-20 RX ORDER — PRENATAL VIT/IRON FUM/FOLIC AC 27MG-0.8MG
1 TABLET ORAL DAILY
COMMUNITY

## 2020-10-20 RX ORDER — LEVOTHYROXINE SODIUM 25 UG/1
37.5 TABLET ORAL DAILY
Qty: 90 TABLET | Refills: 3 | Status: SHIPPED | OUTPATIENT
Start: 2020-10-20 | End: 2021-04-06

## 2020-10-20 ASSESSMENT — ANXIETY QUESTIONNAIRES
7. FEELING AFRAID AS IF SOMETHING AWFUL MIGHT HAPPEN: NOT AT ALL
3. WORRYING TOO MUCH ABOUT DIFFERENT THINGS: NOT AT ALL
6. BECOMING EASILY ANNOYED OR IRRITABLE: NOT AT ALL
2. NOT BEING ABLE TO STOP OR CONTROL WORRYING: NOT AT ALL
1. FEELING NERVOUS, ANXIOUS, OR ON EDGE: NOT AT ALL
GAD7 TOTAL SCORE: 0
5. BEING SO RESTLESS THAT IT IS HARD TO SIT STILL: NOT AT ALL

## 2020-10-20 ASSESSMENT — PATIENT HEALTH QUESTIONNAIRE - PHQ9
5. POOR APPETITE OR OVEREATING: NOT AT ALL
SUM OF ALL RESPONSES TO PHQ QUESTIONS 1-9: 0

## 2020-10-20 ASSESSMENT — MIFFLIN-ST. JEOR: SCORE: 1501.47

## 2020-10-20 NOTE — PATIENT INSTRUCTIONS
Thank for choosing our clinic for your health care needs. Our goal is to provided you with excellent care. One way that we continue to improve our care is by listening to our patients. Please take a few minutes to complete the written survey that you may receive in the mail after your visit.     You may reach your care team by calling the following number:    [place]- [phone]  [place] [phone]  For clinic hours at [facility]  please visit the [facility] web site [email or web address]    Notification of your lab results:  Normal or non-critical lab and imaging results will be communicated to you by Mychart, letter, or phone within 7 days. If you do not hear from us within 10 days, please contact us through [facility] or phone. If you have a critical or abnormal lab result, we will notify you by phone as soon as possible.  Pap smears often take a bit longer.     After Hours nurse advice:  [facility] Advisors:  [phone]     Medication Refills:  Please contact your pharmacy and they will forward the refill to us. Please allow 3 business days for your refills to be completed.     Secure access to your medical record:  Use LastRoom (secure email communication and access to your chart) to send your primary care provider a message or make an appointment. Ask someone on your Team how to sign up for LastRoom. To log on to [facility] or for more information in LastRoom please visit the website at [email or web address].            How to prevent CMV or cytomegalovirus infection while you are pregnant:    Thoroughly wash your hands with soap and warm water especially after doing things such as:  Diaper changes  Feeding or bathing a child  Wiping a child's runny nose or drool  Handling a child's toys    Do not share cups, plates, utensils, toothbrushes or food with your children  Do not kiss young children on the mouth or cheek. Instead, kiss them on the head or give them a hug.  Do not share towels or  washcloths with young children.  Clean toy, counter tops, and other surfaces that come in contact with urine or saliva.        LISTERIA  Individuals can reduce the risk for listeria contamination through proper food selection, handling, and storage, such as:    Rinsing raw produce (fuits and vegetables) before eating, cutting, or cooking;    Keeping refrigerators at 40 degrees F or lower;    Buying soft cheeses only if their labels state that they are made with pasteurized milk.  Also avoiding cheese that has not been initially wrapped in plastic.  These cheeses include brie, camembert, blue and the soft Mexican cheeses like con queso.    Heating all food that can be heated but especially hot dogs, luncheon meats, and cold cuts to an internal temperature of 165 degrees F or until steaming hot before serving them; and    Washing your hands for at least 20 seconds with warm water and soap before and after handling cantaloupes or other melons.    Watch for food recalls for listeria and contact us if you believe you have been exposed.               First line remedies for nausea in pregnancy    Eat small frequent meals every 2-3 hours if possible.   Avoid food at extremes of temparture and drinks with carbination.  Eat foods that appeal to you, avoiding fats and spicy foods.  Bread, pasta, crackers, potatoes, and rice tend to be tolerated the best.  Don't worry about what you eat in the first 3 months, it is more important that you can eat and keep it down.   Try flat ginger ale.  Ginger is a herbal remedy for nausea and you can use it in any form.  There are ginger tablets you can purchase.  The dose is 500 to 1000 mg a day.   You may also try doxylamine 12.5 mg three times a day which is a sleeping medication along with Vitamin B6 25 mg three times a day.  This combination takes up to a week to work so give it some time.  Be sure to take both the doxylamine and B6  Doxylamine is sometimes called Unisom and it comes in  25 mg tablets so you will have to break it in half and take half a tablet.   It is important to take the Unisom and B6 together or it won't be effective.  If you begin to vomit more than 5 or 6 times a day and feel that you are unable to keep anything down, call the clinic for an appointment to be seen.                Fruits and vegetables high in pesticide contamination  Strawberries  Spinach  Kale  Nectarines  Peaches  Apples  Grapes  Cherries  Pears  Tomatoes  Celery  Potatoes  Hot Peppers.     Consider extra washing of these fruits and vegetables, peeling if possible or purchasing organics.     Fruits and vegetables lowest if pesticide contramination:  Avocado  Sweet corn  Pineapple  Cabbage   Onions   Frozen peas  Papaya  Asparagus  Mushrooms  Eggplant  Honeydew  Kiwi  Cantaloupe  Cauliflower  Broccoli      Consider eliminating or reducing the following additives in personal care products and make up:  Triclosan  Paraben  Phthalates  Phenols  Products that do not contain these additives are often found in the organic or green sections of stores.

## 2020-10-20 NOTE — PROGRESS NOTES
Queta Cortez is a 35 year old  who presents to the clinic for an new ob visit.   Estimated Date of Delivery: Colton 10, 2021 is calculated from Patient's last menstrual period was 2020.       She has not had bleeding since her LMP.   She has had mild nausea. Weigh loss has not occurred.     HISTORY  updated and reviewed  Past Medical History:   Diagnosis Date     NO ACTIVE PROBLEMS      Past Surgical History:   Procedure Laterality Date     APPENDECTOMY  2014      SECTION N/A 10/29/2016    Procedure:  SECTION;  Surgeon: Adonis Dooley MD;  Location: [facility]     Social History     Socioeconomic History     Marital status:      Spouse name: Not on file     Number of children: Not on file     Years of education: Not on file     Highest education level: Not on file   Occupational History     Not on file   Social Needs     Financial resource strain: Not on file     Food insecurity     Worry: Not on file     Inability: Not on file     Transportation needs     Medical: Not on file     Non-medical: Not on file   Tobacco Use     Smoking status: Never Smoker     Smokeless tobacco: Never Used   Substance and Sexual Activity     Alcohol use: No     Drug use: No     Sexual activity: Yes     Partners: Male     Birth control/protection: None   Lifestyle     Physical activity     Days per week: Not on file     Minutes per session: Not on file     Stress: Not on file   Relationships     Social connections     Talks on phone: Not on file     Gets together: Not on file     Attends Hindu service: Not on file     Active member of club or organization: Not on file     Attends meetings of clubs or organizations: Not on file     Relationship status: Not on file     Intimate partner violence     Fear of current or ex partner: Not on file     Emotionally abused: Not on file     Physically abused: Not on file     Forced sexual activity: Not on file   Other Topics Concern     Parent/sibling w/ CABG, MI or  "angioplasty before 65F 55M? 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  She works as a  and does childcare.   Mikey works in a factory.   Neither of them smokes.   Has two indoor cats.  Advised about toxoplasmosis precautions.  No concerns about domestic violence.     Health Maintenance   Topic Date Due     DEPRESSION ACTION PLAN  1986     PREVENTIVE CARE VISIT  04/05/2020     INFLUENZA VACCINE (1) 09/01/2020     HPV TEST  08/25/2020     PAP  08/25/2020     PHQ-9  12/15/2020     REPEAT ANTIBODY SCREEN (OB)  03/18/2021     DTAP/TDAP/TD IMMUNIZATION (5 - Td) 12/03/2028     HIV SCREENING  Completed     Pneumococcal Vaccine: Pediatrics (0 to 5 Years) and At-Risk Patients (6 to 64 Years)  Aged Out     IPV IMMUNIZATION  Aged Out     MENINGITIS IMMUNIZATION  Aged Out     HEPATITIS B IMMUNIZATION  Aged Out     Family History   Problem Relation Age of Onset     Hypertension Mother      Osteoporosis Maternal Grandmother      Unknown/Adopted Father      Unknown/Adopted Paternal Grandmother      Unknown/Adopted Paternal Grandfather             ROS: 10 point ROS neg other than the symptoms noted above in the HPI.      PHYSICAL EXAM  /84 (BP Location: Right arm, Patient Position: Sitting, Cuff Size: Adult Regular)   Pulse 100   Temp 97.6  F (36.4  C) (Oral)   Ht 1.778 m (5' 10\")   Wt 72.1 kg (159 lb)   LMP 09/03/2020   BMI 22.81 kg/m    GENERAL:  Pleasant pregnant female, alert, cooperative  and well groomed.  SKIN:  Warm and dry, without lesions or rashes  HEAD: Symmetrical " features.  EYES:  PERRLA.  EARS: Tympanic membranes gray, translucent and intact.  MOUTH:  Buccal mucosa pink, moist without lesions.      NECK:  Thyroid without enlargement and nodules.  Lymph nodes not palpable.   LUNGS:  Clear to auscultation.  BREAST:  Symmetrical.  No dominant, fixed or suspicious masses are noted.  No skin or nipple changes or axillary nodes.    Nipples everted.      HEART:  RRR with  out murmur.  ABDOMEN: Soft without masses , tenderness or organomegaly.  No CVA tenderness.  MUSCULOSKELETAL:  Full range of motion  EXTREMITIES:  No edema. No significant varicosities.    ASSESSMENT:  Intrauterine pregnancy of 6w5d  (Z34.81) Encounter for supervision of other normal pregnancy, first trimester  (primary encounter diagnosis)  Comment:   Plan: UA without Microscopic, Urine Culture Aerobic         Bacterial, CBC with platelets, HIV Antigen         Antibody Combo, Rubella Antibody IgG         Quantitative, Hepatitis B surface antigen,         Treponema Abs w Reflex to RPR and Titer, ABO/Rh        type and screen, US OB < 14 Weeks Single,         Ferritin, TSH              PLAN:  Options for  testing for chromosomal and birth defects were discussed with the patient. Diagnostic tests include CVS and Amniocentesis. We discussed that these tests are definitive but invasive and do carry a risk of fetal loss.   Screening tests include nuchal translucency/blood marker testing in the first trimester and quad screening in the second trimester. We discussed that these are screening tests and not diagnostic tests and that false positives and negatives are a distinct possibility.  Declines  testing.    Instructed on best evidence for: weight gain for her weight and height for pregnancy; healthy diet and foods to avoid; exercise and activity during pregnancy;avoiding exposure to toxoplasmosis; and maintenance of a generally healthy lifestyle.     Int[facility] hospital for [facility] is [facility].    Reviewed  transmission of and avoidance strategies for CMV.    Discussed travel cautions.            Wants to .    Will plan to start low dose ASA as she did in her previous pregnancy.     Didn't increase her levothyroxine with her last pregnancy but her TSH today is elevated so will increase and check again in 4-6 weeks.   Still has some concerns about trama she experienced after her first birth.  Has had therapy and feels that she is doing well with it.   Gloria Solorzano, ANCELMO, CNM

## 2020-10-21 LAB
BACTERIA SPEC CULT: NORMAL
Lab: NORMAL
RUBV IGG SERPL IA-ACNC: 10 IU/ML
SPECIMEN SOURCE: NORMAL
T PALLIDUM AB SER QL: NONREACTIVE

## 2020-10-21 ASSESSMENT — ANXIETY QUESTIONNAIRES: GAD7 TOTAL SCORE: 0

## 2020-10-23 ENCOUNTER — ANCILLARY PROCEDURE (OUTPATIENT)
Dept: ULTRASOUND IMAGING | Facility: OTHER | Age: 35
End: 2020-10-23
Attending: ADVANCED PRACTICE MIDWIFE
Payer: MEDICAID

## 2020-10-23 DIAGNOSIS — Z34.81 ENCOUNTER FOR SUPERVISION OF OTHER NORMAL PREGNANCY, FIRST TRIMESTER: ICD-10-CM

## 2020-10-27 PROBLEM — O20.8 SUBCHORIONIC HEMORRHAGE OF PLACENTA IN FIRST TRIMESTER: Status: ACTIVE | Noted: 2020-10-27

## 2020-11-06 ENCOUNTER — APPOINTMENT (OUTPATIENT)
Dept: FAMILY MEDICINE | Facility: OTHER | Age: 35
End: 2020-11-06
Payer: MEDICAID

## 2020-11-30 ENCOUNTER — PRENATAL OFFICE VISIT (OUTPATIENT)
Dept: OBGYN | Facility: OTHER | Age: 35
End: 2020-11-30
Payer: COMMERCIAL

## 2020-11-30 VITALS
BODY MASS INDEX: 22.41 KG/M2 | SYSTOLIC BLOOD PRESSURE: 120 MMHG | DIASTOLIC BLOOD PRESSURE: 82 MMHG | HEIGHT: 70 IN | WEIGHT: 156.5 LBS

## 2020-11-30 DIAGNOSIS — E03.8 OTHER SPECIFIED HYPOTHYROIDISM: ICD-10-CM

## 2020-11-30 DIAGNOSIS — O09.291 H/O PRE-ECLAMPSIA IN PRIOR PREGNANCY, CURRENTLY PREGNANT, FIRST TRIMESTER: ICD-10-CM

## 2020-11-30 DIAGNOSIS — O14.20 HEMOLYSIS, ELEVATED LIVER ENZYMES, AND LOW PLATELET (HELLP) SYNDROME DURING PREGNANCY, ANTEPARTUM: Primary | ICD-10-CM

## 2020-11-30 LAB
ALBUMIN SERPL-MCNC: 3.7 G/DL (ref 3.4–5)
ALP SERPL-CCNC: 37 U/L (ref 40–150)
ALT SERPL W P-5'-P-CCNC: 17 U/L (ref 0–50)
ANION GAP SERPL CALCULATED.3IONS-SCNC: 4 MMOL/L (ref 3–14)
AST SERPL W P-5'-P-CCNC: 10 U/L (ref 0–45)
BILIRUB SERPL-MCNC: 0.4 MG/DL (ref 0.2–1.3)
BUN SERPL-MCNC: 8 MG/DL (ref 7–30)
CALCIUM SERPL-MCNC: 9.1 MG/DL (ref 8.5–10.1)
CHLORIDE SERPL-SCNC: 104 MMOL/L (ref 94–109)
CO2 SERPL-SCNC: 27 MMOL/L (ref 20–32)
CREAT SERPL-MCNC: 0.75 MG/DL (ref 0.52–1.04)
CREAT UR-MCNC: 30 MG/DL
GFR SERPL CREATININE-BSD FRML MDRD: >90 ML/MIN/{1.73_M2}
GLUCOSE SERPL-MCNC: 77 MG/DL (ref 70–99)
POTASSIUM SERPL-SCNC: 3.6 MMOL/L (ref 3.4–5.3)
PROT SERPL-MCNC: 7.4 G/DL (ref 6.8–8.8)
PROT UR-MCNC: <0.05 G/L
PROT/CREAT 24H UR: NORMAL G/G CR (ref 0–0.2)
SODIUM SERPL-SCNC: 135 MMOL/L (ref 133–144)
TSH SERPL DL<=0.005 MIU/L-ACNC: 3.14 MU/L (ref 0.4–4)

## 2020-11-30 PROCEDURE — 84443 ASSAY THYROID STIM HORMONE: CPT | Performed by: ADVANCED PRACTICE MIDWIFE

## 2020-11-30 PROCEDURE — 84156 ASSAY OF PROTEIN URINE: CPT | Performed by: OBSTETRICS & GYNECOLOGY

## 2020-11-30 PROCEDURE — 99212 OFFICE O/P EST SF 10 MIN: CPT | Performed by: OBSTETRICS & GYNECOLOGY

## 2020-11-30 PROCEDURE — 36415 COLL VENOUS BLD VENIPUNCTURE: CPT | Performed by: OBSTETRICS & GYNECOLOGY

## 2020-11-30 PROCEDURE — 80053 COMPREHEN METABOLIC PANEL: CPT | Performed by: OBSTETRICS & GYNECOLOGY

## 2020-11-30 ASSESSMENT — MIFFLIN-ST. JEOR: SCORE: 1490.13

## 2020-11-30 NOTE — PROGRESS NOTES
35 year old  at 12w4d weeks presents to the clinic for a routine prenatal visit.  Feeling well.  No vaginal bleeding, leakage of fluid, or contractions   Fundal height=s=d  FHTs=present by BSUS  History of severe pre-E with HELLP vs HUS.  Will check baseline labs today and every trimester.  Recommended patient start the ASA at 12 weeks.  Referral placed for MFM  Patient has been monitoring her blood pressures at home which have been normal  Discussed quad screen and she declines  Will schedule anatomy ultrasound with MFM  Hypothyroidism=TSH=5.47 on 10/20  Anxiety/depression=stable  RTC 4 weeks.    Harriet Rascon DO

## 2020-11-30 NOTE — PATIENT INSTRUCTIONS
Please call if you any questions.    [address]   [address]  [phone]        Harriet Rascon,

## 2020-12-01 RX ORDER — LEVOTHYROXINE SODIUM 25 UG/1
37.5 TABLET ORAL
COMMUNITY
Start: 2020-12-01 | End: 2021-04-19

## 2020-12-27 ENCOUNTER — HEALTH MAINTENANCE LETTER (OUTPATIENT)
Age: 35
End: 2020-12-27

## 2021-01-05 ENCOUNTER — PRENATAL OFFICE VISIT (OUTPATIENT)
Dept: OBGYN | Facility: CLINIC | Age: 36
End: 2021-01-05
Payer: COMMERCIAL

## 2021-01-05 ENCOUNTER — MYC MEDICAL ADVICE (OUTPATIENT)
Dept: OBGYN | Facility: CLINIC | Age: 36
End: 2021-01-05

## 2021-01-05 VITALS
HEART RATE: 84 BPM | OXYGEN SATURATION: 98 % | BODY MASS INDEX: 23.22 KG/M2 | WEIGHT: 161.8 LBS | DIASTOLIC BLOOD PRESSURE: 71 MMHG | SYSTOLIC BLOOD PRESSURE: 108 MMHG

## 2021-01-05 DIAGNOSIS — E03.8 OTHER SPECIFIED HYPOTHYROIDISM: Primary | ICD-10-CM

## 2021-01-05 DIAGNOSIS — O34.219 PATIENT DESIRES VAGINAL BIRTH AFTER CESAREAN SECTION (VBAC): ICD-10-CM

## 2021-01-05 DIAGNOSIS — D64.9 ANEMIA, UNSPECIFIED TYPE: Primary | ICD-10-CM

## 2021-01-05 PROBLEM — O09.92 HIGH-RISK PREGNANCY, SECOND TRIMESTER: Status: ACTIVE | Noted: 2018-07-16

## 2021-01-05 LAB
ALBUMIN SERPL-MCNC: 3.3 G/DL (ref 3.4–5)
ALP SERPL-CCNC: 33 U/L (ref 40–150)
ALT SERPL W P-5'-P-CCNC: 20 U/L (ref 0–50)
ANION GAP SERPL CALCULATED.3IONS-SCNC: 3 MMOL/L (ref 3–14)
AST SERPL W P-5'-P-CCNC: 11 U/L (ref 0–45)
BILIRUB SERPL-MCNC: 0.3 MG/DL (ref 0.2–1.3)
BUN SERPL-MCNC: 9 MG/DL (ref 7–30)
CALCIUM SERPL-MCNC: 8.8 MG/DL (ref 8.5–10.1)
CHLORIDE SERPL-SCNC: 107 MMOL/L (ref 94–109)
CO2 SERPL-SCNC: 27 MMOL/L (ref 20–32)
CREAT SERPL-MCNC: 0.76 MG/DL (ref 0.52–1.04)
CREAT UR-MCNC: 22 MG/DL
ERYTHROCYTE [DISTWIDTH] IN BLOOD BY AUTOMATED COUNT: 12.2 % (ref 10–15)
GFR SERPL CREATININE-BSD FRML MDRD: >90 ML/MIN/{1.73_M2}
GLUCOSE SERPL-MCNC: 75 MG/DL (ref 70–99)
HCT VFR BLD AUTO: 34.9 % (ref 35–47)
HGB BLD-MCNC: 11.7 G/DL (ref 11.7–15.7)
MCH RBC QN AUTO: 31.5 PG (ref 26.5–33)
MCHC RBC AUTO-ENTMCNC: 33.5 G/DL (ref 31.5–36.5)
MCV RBC AUTO: 94 FL (ref 78–100)
PLATELET # BLD AUTO: 231 10E9/L (ref 150–450)
POTASSIUM SERPL-SCNC: 4.1 MMOL/L (ref 3.4–5.3)
PROT SERPL-MCNC: 7 G/DL (ref 6.8–8.8)
PROT UR-MCNC: <0.05 G/L
PROT/CREAT 24H UR: NORMAL G/G CR (ref 0–0.2)
RBC # BLD AUTO: 3.71 10E12/L (ref 3.8–5.2)
SODIUM SERPL-SCNC: 137 MMOL/L (ref 133–144)
TSH SERPL DL<=0.005 MIU/L-ACNC: 2.25 MU/L (ref 0.4–4)
WBC # BLD AUTO: 9.9 10E9/L (ref 4–11)

## 2021-01-05 PROCEDURE — 85027 COMPLETE CBC AUTOMATED: CPT | Performed by: OBSTETRICS & GYNECOLOGY

## 2021-01-05 PROCEDURE — 36415 COLL VENOUS BLD VENIPUNCTURE: CPT | Performed by: OBSTETRICS & GYNECOLOGY

## 2021-01-05 PROCEDURE — 84443 ASSAY THYROID STIM HORMONE: CPT | Performed by: OBSTETRICS & GYNECOLOGY

## 2021-01-05 PROCEDURE — 99207 PR PRENATAL VISIT: CPT | Performed by: OBSTETRICS & GYNECOLOGY

## 2021-01-05 PROCEDURE — 84156 ASSAY OF PROTEIN URINE: CPT | Performed by: OBSTETRICS & GYNECOLOGY

## 2021-01-05 PROCEDURE — 80053 COMPREHEN METABOLIC PANEL: CPT | Performed by: OBSTETRICS & GYNECOLOGY

## 2021-01-05 ASSESSMENT — ANXIETY QUESTIONNAIRES
2. NOT BEING ABLE TO STOP OR CONTROL WORRYING: NOT AT ALL
5. BEING SO RESTLESS THAT IT IS HARD TO SIT STILL: NOT AT ALL
6. BECOMING EASILY ANNOYED OR IRRITABLE: NOT AT ALL
7. FEELING AFRAID AS IF SOMETHING AWFUL MIGHT HAPPEN: NOT AT ALL
IF YOU CHECKED OFF ANY PROBLEMS ON THIS QUESTIONNAIRE, HOW DIFFICULT HAVE THESE PROBLEMS MADE IT FOR YOU TO DO YOUR WORK, TAKE CARE OF THINGS AT HOME, OR GET ALONG WITH OTHER PEOPLE: NOT DIFFICULT AT ALL
1. FEELING NERVOUS, ANXIOUS, OR ON EDGE: NOT AT ALL
GAD7 TOTAL SCORE: 0
3. WORRYING TOO MUCH ABOUT DIFFERENT THINGS: NOT AT ALL

## 2021-01-05 ASSESSMENT — PATIENT HEALTH QUESTIONNAIRE - PHQ9: 5. POOR APPETITE OR OVEREATING: NOT AT ALL

## 2021-01-05 NOTE — PROGRESS NOTES
35 year old  at 17w5d weeks presents to the clinic for a routine prenatal visit.  Feeling well.  No vaginal bleeding, leakage of fluid, or contractions   History of HELLP vs HUS=MFM ultrasound scheduled on .  Patient is taking ASA.  Labs ordered today.  BP=pt is taking them at home.  100/60's.  Some headaches which go away on their own.  No vision changes.   Fundal height=s=d  PICi=[id number]  Desires   Anxiety/depression=stable  Hypothyroidism=TSH==3.14  Discussed quad screen and she declines  RTC 4 weeks.    Harriet Rascon, DO

## 2021-01-06 ASSESSMENT — ANXIETY QUESTIONNAIRES: GAD7 TOTAL SCORE: 0

## 2021-01-18 ENCOUNTER — TRANSFERRED RECORDS (OUTPATIENT)
Dept: HEALTH INFORMATION MANAGEMENT | Facility: CLINIC | Age: 36
End: 2021-01-18

## 2021-01-26 ENCOUNTER — VIRTUAL VISIT (OUTPATIENT)
Dept: NUTRITION | Facility: CLINIC | Age: 36
End: 2021-01-26
Attending: OBSTETRICS & GYNECOLOGY
Payer: COMMERCIAL

## 2021-01-26 DIAGNOSIS — D64.9 ANEMIA, UNSPECIFIED TYPE: Primary | ICD-10-CM

## 2021-01-26 PROCEDURE — 98968 PH1 ASSMT&MGMT NQHP 21-30: CPT | Mod: 95 | Performed by: DIETITIAN, REGISTERED

## 2021-01-26 NOTE — PATIENT INSTRUCTIONS
NUTRITION INTERVENTION: CORE FOOD PLAN     Long Term Goals:   Goal: Decrease digestive issues and recommend at least 1 bristol stool type 4 BM per day    Short Term Goals:    1. Start having breakfast daily - ex (smoothie with dairy free alternative -ex. unsweetened almond milk, plant based protein powder, healthy fat - ex ground flax, aaliyah or hemp seeds and or avocado plus 1 serving of fruit and veggie - ex: spinach, kale      see recipes provided   2.   Start having snack 1-2x daily with healthy fats (ex: guac, nuts, hummus) and add a fruit/veggie for fiber- see ideas discussed and provided   3.  Try instant pot recipes - borrow moms pot find a find some paleo recipes on [facility] to give you confidence with cooking with more healthy options.     Mediterranean Approach: Eat whole, unprocessed real foods in their unprocessed forms such as fruits, vegetables, whole grains (prefer gluten free), nuts, legumes, extra virgin olive oil, spices, modest amounts of poultry, and fish.      Avoid inflammatory foods: Cut back gluten found in wheat, barley, rye, oats, kamut, and spelt for at least 3 weeks to identify any hidden reaction. Gluten sensitivity or allergy can cause many different types of symptoms form migraines to fatigue to weight gain.  If symptoms go away this is a clue you may be reactive to gluten.  Dairy can also be inflammatory consider eliminating for 3 weeks as well. The proteins like casein and whey in dairy can irritate and inflame your gut. Also the sugar lactase in dairy can cause digestive issues in addition to blood sugar spikes.     Food plan - 1,800-2,200 calories per day     Protein 10-12 servings per day - include at each meal to stabilize blood sugars   (Choose 3oz or 21g per meal and aim for 1oz of 7g for snacks)   - Strive for 1-2 servings of fish per week especially of higher omega-3 fatty acid containing fish such as salmon.     Legumes 2 serving per day     Dairy alternatives 2-3 serving  per day     Nuts and seeds 3-4 servings per day - great to incorporate as snacks    Fats and oils 4 servings per day     Non starchy vegetables 8-10 servings per day     Starchy vegetable limit 1 serving per day as they tend to impact blood sugar (they are moderate-GI).     Fruits 2 servings per day - best to couple with a little bit of protein or fat to offset a rise in blood sugars (they are low-moderate-GI foods).     Whole grains 2 serving per day - try gluten free whole grains instead      Incorporate protein powder daily:    Plant based hemp (recommended brands: [place], Nutiva, Just Hemp Protein, ApniCure [place])      Plant based pea (recommended brands: Naked Pea, Now Sports). If you want to try a combo of pea and hemp the brand Vinson in vanilla or chocolate is a great option.     Try Bone broth protein powder or collagen peptides in liquid bone broth, vegetable broth or 12 oz of water as snack. The bone broth powder and collagen can be used for soups as well. This can help provide essential amino acids and minerals that heal your gut as well as balance blood sugars. A great option if you have a hard time tolerating solids.     Choose Low Glycemic (GI) foods: Regulate your sugar levels by eating foods that do not spike blood sugars.  Eat low -GI foods so only small fluctuations in blood glucose and insulin levels are produced.      Examples of low-GI foods: nuts, seeds, GF oats, most vegetables especially non-starchy and fruits.     Medium or high-GI foods should be eaten with a protein or fat, both of which blunt the glycemic effect of these foods. This reduces the overall glycemic impact of a meal.   Ex: Most grains and starchy veggies are medium/high GI.     Avoid foods containing refined sugars, artificial sweeteners, and refined grains they are considered high-GI because they lead to sharp increases in blood sugars levels, which increase insulin sensitivity causing increased TG, and low good  cholesterol (HDL).   Ex: cakes, cookies, pies, bread, sodas, fruit drinks, presweetened tea, coffee drinks, energy or sport drinks, flavored milk and other processed foods.     Choose foods high in fiber: Aim for at least 5 grams of fiber per serving of food or a total of 25-35 grams fiber per day. Remember, when looking at the label, you can take the fiber away from the total carbs. Ex:15g of total carbs - 4g of fiber = 11g net carbs     Insoluble fiber acts like a bulky  inner broom,  sweeping out debris from the intestine and creating more motility and movement.      Soluble fiber attracts water and swells, creating a gel that slows digestion.  Also, slows the release of glucose from foods into the blood which stops spikes in blood sugar levels.  Soluble fiber traps toxins in the gut, helping to carry them to excretion and provides healthy bacteria in the digestive tract.     Choose High Quality Fats: Adding anti-inflammatory fats into your diet such as fish (salmon, herring, mackerel, and sardines), omega 3 eggs, aaliyah seeds, ground flax seeds/milk, hemp seeds/milk, walnuts and some other certain leafy greens will increase omega-3 fats to omeaga-6 fats ratio.     Therapeutic fats both monounsaturated and polyunsaturated to include daily: ground flaxseeds, unsalted mixed nuts, avocados, olives, extra-virgin olive oil.     Emphasize high-quality oils and fats in the diet daily such as avocado oil, coconut oil, flaxseed, olive, sesame. Ex: 1 tsp to 1 tbsp of MCT oil from coconuts can be added into coffee, smoothies, and salad dressings per day.     Avoid trans fats found in processed foods     Drink more water. Hydration is critical, so drink at least six to eight glasses of water a day. Drink more water between meals and less at meals.     Try adding herbal teas (sugar free) or lemon/lime/cucumber/fruit to water for flavor. Avoid artificial sweetener packets to flavor your water.     Cut back on coffee switch to  green tea. Avoid adding sugar and milk to coffee instead use dairy alternatives such as almond, flax, coconut milk.     Focus on high quality micro-nutrients.     Continue prenatal Multivitamin     Rebuild the friendly bacteria in your microbime. Start by taking a probiotic supplement, they will help rebuild the healthy bacteria so essential to good gut health.   you can help establish healthy gut microflora by consuming foods that contain live active cultures ( probiotics ) such as kimchi,     Increase physical activity. Moving our body helps move our bowels and speeds up your metabolism.     Exercise 15-60 minutes daily--whether that looks like burst training, yoga, or vigorous walking.    Manage your stress. Stress can negatively impact the way you digest foods and absorb nutrients leading to more digestive issues (constipation, diarrhea, indigestion, nausea etc), imbalanced blood sugars and weight imbalances. Try to focus on the following relaxation techniques:     Regular exercise such as walking     Yoga    Meditation     Breathing techniques     Time management     Track what you eat. Writing down or tracking through an ivania what you eat as well as how you feel acn help you identify patterns in your symptoms. This can help you become more aware and creat a diet that is right for you.       Through the [employer] ivania, you can focus more on calories and macronutrient's of foods to balance blood sugars or monitor weight. You can track blood sugars in the notes section along with symptoms, bowl movements, medications, stress, exercise, water intake and sleep.   Note: You don't have to journal forever and it is more important that you have consistent meals as well as snacks while focusing on adding in healthy foods.    NUTRITION RESOURCES:  1. Purchase Why do I have thyroid symptoms if my labs are normal and mauricio nugent thyroid connection book/cookbook.    2. IFM Core Food Plan Packet     Functional Nutrition  Fundamentals     Comprehensive Guide    Meal plan with recipes   3. Suggestions and exercises for stress mgmt

## 2021-02-01 ENCOUNTER — PRENATAL OFFICE VISIT (OUTPATIENT)
Dept: OBGYN | Facility: OTHER | Age: 36
End: 2021-02-01
Payer: COMMERCIAL

## 2021-02-01 VITALS — SYSTOLIC BLOOD PRESSURE: 116 MMHG | DIASTOLIC BLOOD PRESSURE: 80 MMHG | BODY MASS INDEX: 24.89 KG/M2 | WEIGHT: 173.5 LBS

## 2021-02-01 DIAGNOSIS — F33.0 MILD EPISODE OF RECURRENT MAJOR DEPRESSIVE DISORDER (H): ICD-10-CM

## 2021-02-01 DIAGNOSIS — Z86.2 HISTORY OF TTP (THROMBOTIC THROMBOCYTOPENIC PURPURA): ICD-10-CM

## 2021-02-01 DIAGNOSIS — O09.92 HIGH-RISK PREGNANCY, SECOND TRIMESTER: Primary | ICD-10-CM

## 2021-02-01 DIAGNOSIS — E03.8 OTHER SPECIFIED HYPOTHYROIDISM: ICD-10-CM

## 2021-02-01 DIAGNOSIS — Z98.891 HISTORY OF C-SECTION: ICD-10-CM

## 2021-02-01 DIAGNOSIS — O34.219 PATIENT DESIRES VAGINAL BIRTH AFTER CESAREAN SECTION (VBAC): ICD-10-CM

## 2021-02-01 PROCEDURE — 99207 PR PRENATAL VISIT: CPT | Performed by: OBSTETRICS & GYNECOLOGY

## 2021-02-01 NOTE — PROGRESS NOTES
35 year old  at 21w4d weeks presents to the clinic for a routine prenatal visit.  No concerns.  No leakage of fluid, vaginal bleeding, or contractions   Good fetal movement.  Fundal height: 22cm  FHTs: 154  History of c section and history of successful   BP at home 110/60's at home.    Anxiety/depression=stable  Hypothyroidism=TSH=1/5=2.25  Normal anatomy ultrasound with MFM  RTC 4 weeks    Harriet Rascon DO

## 2021-03-01 ENCOUNTER — MYC MEDICAL ADVICE (OUTPATIENT)
Dept: OBGYN | Facility: CLINIC | Age: 36
End: 2021-03-01

## 2021-03-01 ENCOUNTER — PRENATAL OFFICE VISIT (OUTPATIENT)
Dept: OBGYN | Facility: OTHER | Age: 36
End: 2021-03-01
Payer: COMMERCIAL

## 2021-03-01 VITALS — WEIGHT: 182 LBS | SYSTOLIC BLOOD PRESSURE: 120 MMHG | BODY MASS INDEX: 26.11 KG/M2 | DIASTOLIC BLOOD PRESSURE: 70 MMHG

## 2021-03-01 DIAGNOSIS — F41.1 GAD (GENERALIZED ANXIETY DISORDER): ICD-10-CM

## 2021-03-01 DIAGNOSIS — Z86.2 HISTORY OF TTP (THROMBOTIC THROMBOCYTOPENIC PURPURA): ICD-10-CM

## 2021-03-01 DIAGNOSIS — O09.92 HIGH-RISK PREGNANCY, SECOND TRIMESTER: Primary | ICD-10-CM

## 2021-03-01 DIAGNOSIS — E03.8 OTHER SPECIFIED HYPOTHYROIDISM: ICD-10-CM

## 2021-03-01 DIAGNOSIS — O34.219 PATIENT DESIRES VAGINAL BIRTH AFTER CESAREAN SECTION (VBAC): ICD-10-CM

## 2021-03-01 PROBLEM — O20.8 SUBCHORIONIC HEMORRHAGE OF PLACENTA IN FIRST TRIMESTER: Status: RESOLVED | Noted: 2020-10-27 | Resolved: 2021-03-01

## 2021-03-01 LAB
GLUCOSE 1H P 50 G GLC PO SERPL-MCNC: 72 MG/DL (ref 60–129)
HGB BLD-MCNC: 10.9 G/DL (ref 11.7–15.7)
TSH SERPL DL<=0.005 MIU/L-ACNC: 1.24 MU/L (ref 0.4–4)

## 2021-03-01 PROCEDURE — 84443 ASSAY THYROID STIM HORMONE: CPT | Performed by: OBSTETRICS & GYNECOLOGY

## 2021-03-01 PROCEDURE — 82950 GLUCOSE TEST: CPT | Performed by: OBSTETRICS & GYNECOLOGY

## 2021-03-01 PROCEDURE — 99N1025 PR STATISTIC OBHBG - HEMOGLOBIN: Performed by: OBSTETRICS & GYNECOLOGY

## 2021-03-01 PROCEDURE — 36415 COLL VENOUS BLD VENIPUNCTURE: CPT | Performed by: OBSTETRICS & GYNECOLOGY

## 2021-03-01 PROCEDURE — 99207 PR PRENATAL VISIT: CPT | Performed by: OBSTETRICS & GYNECOLOGY

## 2021-03-01 NOTE — PROGRESS NOTES
35 year old  at 25w4d weeks presents to the clinic for a routine prenatal visit.  No concerns.  No vaginal bleeding, leakage of fluid, or contractions   Good fetal movement.  Fundal height=26cm  FHTs= 132  History of HELLP=normal blood pressure at home.  110's/70's.  Asymptomatic   Anxiety/depression=stable  History of successful   Normal anatomy ultrasound with MFM  RTC 4 weeks  GCT today  Hypothyroidism=TSH today  Blood type:A+    Harriet Rascon DO

## 2021-03-01 NOTE — TELEPHONE ENCOUNTER
Pt last seen today, currently 25w4d, Hgb today was 10.9.    Pt states feeling increased fatigue interfering with daily life, feeling cold, and some shortness or breath. Pt asking if she should be taking an iron supplement in addition to her prenatal vitamins.    RN routing to provider for advisement.    Rose Heck RN on 3/1/2021 at 3:31 PM

## 2021-03-01 NOTE — TELEPHONE ENCOUNTER
Patient can take additional iron supplements.  Her hemoglobin was above 10.5 today which is what my threshold is but if she feels better taking them, she should.     Harriet Racson, DO

## 2021-03-02 ENCOUNTER — MYC MEDICAL ADVICE (OUTPATIENT)
Dept: OBGYN | Facility: CLINIC | Age: 36
End: 2021-03-02

## 2021-03-02 DIAGNOSIS — O46.90 VAGINAL BLEEDING IN PREGNANCY: Primary | ICD-10-CM

## 2021-03-02 NOTE — TELEPHONE ENCOUNTER
Pt seen yesterday, currently 25w5d.    Pt having blood tinge mucus noticing every time she wipes. Denies recent intercourse, cramping, vaginal itching, burning or foul smelling discharge.    Last US done 1/18/2021 with MFM did not show previa.    RN routing to provider for advisement.    Rose Heck RN on 3/2/2021 at 3:21 PM

## 2021-03-03 ENCOUNTER — ANCILLARY PROCEDURE (OUTPATIENT)
Dept: ULTRASOUND IMAGING | Facility: CLINIC | Age: 36
End: 2021-03-03
Attending: OBSTETRICS & GYNECOLOGY
Payer: COMMERCIAL

## 2021-03-03 DIAGNOSIS — O46.90 VAGINAL BLEEDING IN PREGNANCY: ICD-10-CM

## 2021-03-03 PROCEDURE — 76816 OB US FOLLOW-UP PER FETUS: CPT

## 2021-03-03 NOTE — PROGRESS NOTES
I would like patient to have an ultrasound.  The order is placed.  She should be seen on L&D or in the office if the signs and symptoms persist.  Thanks.    Harriet Rascon DO

## 2021-03-04 ENCOUNTER — MYC MEDICAL ADVICE (OUTPATIENT)
Dept: OBGYN | Facility: CLINIC | Age: 36
End: 2021-03-04

## 2021-03-22 ENCOUNTER — TELEPHONE (OUTPATIENT)
Dept: OBGYN | Facility: CLINIC | Age: 36
End: 2021-03-22

## 2021-03-22 NOTE — TELEPHONE ENCOUNTER
I called and spoke to patient.   All questions answered and patient verbalizes understanding.    Harriet Rascon, DO

## 2021-04-06 ENCOUNTER — PRENATAL OFFICE VISIT (OUTPATIENT)
Dept: OBGYN | Facility: CLINIC | Age: 36
End: 2021-04-06
Payer: COMMERCIAL

## 2021-04-06 VITALS
HEART RATE: 86 BPM | OXYGEN SATURATION: 99 % | SYSTOLIC BLOOD PRESSURE: 112 MMHG | WEIGHT: 191 LBS | BODY MASS INDEX: 27.41 KG/M2 | DIASTOLIC BLOOD PRESSURE: 74 MMHG

## 2021-04-06 DIAGNOSIS — F33.0 MILD EPISODE OF RECURRENT MAJOR DEPRESSIVE DISORDER (H): ICD-10-CM

## 2021-04-06 DIAGNOSIS — F41.1 GAD (GENERALIZED ANXIETY DISORDER): ICD-10-CM

## 2021-04-06 DIAGNOSIS — Z86.2 HISTORY OF TTP (THROMBOTIC THROMBOCYTOPENIC PURPURA): ICD-10-CM

## 2021-04-06 DIAGNOSIS — O34.219 PATIENT DESIRES VAGINAL BIRTH AFTER CESAREAN SECTION (VBAC): ICD-10-CM

## 2021-04-06 DIAGNOSIS — O09.93 HIGH-RISK PREGNANCY, THIRD TRIMESTER: Primary | ICD-10-CM

## 2021-04-06 DIAGNOSIS — Z23 NEED FOR TDAP VACCINATION: ICD-10-CM

## 2021-04-06 DIAGNOSIS — E03.8 OTHER SPECIFIED HYPOTHYROIDISM: ICD-10-CM

## 2021-04-06 LAB
ALBUMIN SERPL-MCNC: 3 G/DL (ref 3.4–5)
ALP SERPL-CCNC: 53 U/L (ref 40–150)
ALT SERPL W P-5'-P-CCNC: 18 U/L (ref 0–50)
ANION GAP SERPL CALCULATED.3IONS-SCNC: 5 MMOL/L (ref 3–14)
AST SERPL W P-5'-P-CCNC: 11 U/L (ref 0–45)
BILIRUB SERPL-MCNC: 0.3 MG/DL (ref 0.2–1.3)
BUN SERPL-MCNC: 11 MG/DL (ref 7–30)
CALCIUM SERPL-MCNC: 8.4 MG/DL (ref 8.5–10.1)
CHLORIDE SERPL-SCNC: 105 MMOL/L (ref 94–109)
CO2 SERPL-SCNC: 27 MMOL/L (ref 20–32)
CREAT SERPL-MCNC: 0.75 MG/DL (ref 0.52–1.04)
CREAT UR-MCNC: 13 MG/DL
ERYTHROCYTE [DISTWIDTH] IN BLOOD BY AUTOMATED COUNT: 12.3 % (ref 10–15)
GFR SERPL CREATININE-BSD FRML MDRD: >90 ML/MIN/{1.73_M2}
GLUCOSE SERPL-MCNC: 97 MG/DL (ref 70–99)
HCT VFR BLD AUTO: 33.7 % (ref 35–47)
HGB BLD-MCNC: 11.1 G/DL (ref 11.7–15.7)
MCH RBC QN AUTO: 31.6 PG (ref 26.5–33)
MCHC RBC AUTO-ENTMCNC: 32.9 G/DL (ref 31.5–36.5)
MCV RBC AUTO: 96 FL (ref 78–100)
PLATELET # BLD AUTO: 220 10E9/L (ref 150–450)
POTASSIUM SERPL-SCNC: 3.8 MMOL/L (ref 3.4–5.3)
PROT SERPL-MCNC: 6.6 G/DL (ref 6.8–8.8)
PROT UR-MCNC: <0.05 G/L
PROT/CREAT 24H UR: NORMAL G/G CR (ref 0–0.2)
RBC # BLD AUTO: 3.51 10E12/L (ref 3.8–5.2)
SODIUM SERPL-SCNC: 137 MMOL/L (ref 133–144)
TSH SERPL DL<=0.005 MIU/L-ACNC: 1.41 MU/L (ref 0.4–4)
WBC # BLD AUTO: 9.6 10E9/L (ref 4–11)

## 2021-04-06 PROCEDURE — 80053 COMPREHEN METABOLIC PANEL: CPT | Performed by: OBSTETRICS & GYNECOLOGY

## 2021-04-06 PROCEDURE — 84156 ASSAY OF PROTEIN URINE: CPT | Performed by: OBSTETRICS & GYNECOLOGY

## 2021-04-06 PROCEDURE — 36415 COLL VENOUS BLD VENIPUNCTURE: CPT | Performed by: OBSTETRICS & GYNECOLOGY

## 2021-04-06 PROCEDURE — 85027 COMPLETE CBC AUTOMATED: CPT | Performed by: OBSTETRICS & GYNECOLOGY

## 2021-04-06 PROCEDURE — 99207 PR PRENATAL VISIT: CPT | Performed by: OBSTETRICS & GYNECOLOGY

## 2021-04-06 PROCEDURE — 84443 ASSAY THYROID STIM HORMONE: CPT | Performed by: OBSTETRICS & GYNECOLOGY

## 2021-04-06 NOTE — PROGRESS NOTES
35 year old  at 30w5d weeks presents to the clinic for a routine prenatal visit.  No concerns. Patient denies any vaginal bleeding, leakage of fluid, or contractions   Good fetal movement.    Fundal height=32cm  DTVf=225  GDS=72  Hgb=10.9  History of HELLP=normal blood pressures at home=110's/70's.  Will check PIH labs today.    Wants to try for a  again  Anxiety/depression=stable  Hypothyroidism=last TSH=3/1=1.24.  Will check TSH today  RTC 2 weeks    Harriet Rascon DO

## 2021-04-19 ENCOUNTER — MYC REFILL (OUTPATIENT)
Dept: FAMILY MEDICINE | Facility: OTHER | Age: 36
End: 2021-04-19

## 2021-04-19 DIAGNOSIS — E03.9 HYPOTHYROIDISM, UNSPECIFIED TYPE: Primary | ICD-10-CM

## 2021-04-20 NOTE — TELEPHONE ENCOUNTER
Pending Prescriptions:                       Disp   Refills    levothyroxine (SYNTHROID/LEVOTHROID) 25 MC*                Sig: Take 1.5 tablets (37.5 mcg) by mouth four times a           week And 2 tablets or 50 mcg three times a week.      Routing refill request to provider for review/approval because:  Medication is reported/historical  Pregnancy on file    Zayra Salgado RN on 4/20/2021 at 2:02 PM

## 2021-04-21 RX ORDER — LEVOTHYROXINE SODIUM 25 UG/1
37.5 TABLET ORAL
Qty: 90 TABLET | Refills: 0 | Status: SHIPPED | OUTPATIENT
Start: 2021-04-22 | End: 2021-06-20

## 2021-04-26 ENCOUNTER — PRENATAL OFFICE VISIT (OUTPATIENT)
Dept: OBGYN | Facility: OTHER | Age: 36
End: 2021-04-26
Payer: COMMERCIAL

## 2021-04-26 VITALS — WEIGHT: 193 LBS | DIASTOLIC BLOOD PRESSURE: 76 MMHG | BODY MASS INDEX: 27.69 KG/M2 | SYSTOLIC BLOOD PRESSURE: 120 MMHG

## 2021-04-26 DIAGNOSIS — O34.219 PATIENT DESIRES VAGINAL BIRTH AFTER CESAREAN SECTION (VBAC): ICD-10-CM

## 2021-04-26 DIAGNOSIS — Z23 NEED FOR TDAP VACCINATION: ICD-10-CM

## 2021-04-26 DIAGNOSIS — E03.8 OTHER SPECIFIED HYPOTHYROIDISM: ICD-10-CM

## 2021-04-26 DIAGNOSIS — O09.93 HIGH-RISK PREGNANCY, THIRD TRIMESTER: Primary | ICD-10-CM

## 2021-04-26 DIAGNOSIS — O14.23 HEMOLYSIS, ELEVATED LIVER ENZYMES, AND LOW PLATELET (HELLP) SYNDROME DURING PREGNANCY IN THIRD TRIMESTER: ICD-10-CM

## 2021-04-26 DIAGNOSIS — Z98.891 HISTORY OF C-SECTION: ICD-10-CM

## 2021-04-26 DIAGNOSIS — F33.0 MILD EPISODE OF RECURRENT MAJOR DEPRESSIVE DISORDER (H): ICD-10-CM

## 2021-04-26 DIAGNOSIS — Z86.2 HISTORY OF TTP (THROMBOTIC THROMBOCYTOPENIC PURPURA): ICD-10-CM

## 2021-04-26 PROCEDURE — 99207 PR PRENATAL VISIT: CPT | Performed by: OBSTETRICS & GYNECOLOGY

## 2021-04-26 NOTE — PROGRESS NOTES
35 year old  at 33w4d weeks presents to the clinic for a routine prenatal visit.    No concerns.  Patient denies any vaginal bleeding, leakage of fluid, or contractions     Good fetal movement  Fundal height=33cm  JMBi=076 obvious fetal movement  History of HELLP=normal labs last visit on   Blood pressure at home have been normal.  History of .  Desires TOLAC.  Depression/anxiety=stable  Hypothyroidism=TSH==1.14  Discussed labor precautions.  RTC 2 weeks    Harriet Rascon DO

## 2021-05-10 ENCOUNTER — PRENATAL OFFICE VISIT (OUTPATIENT)
Dept: OBGYN | Facility: OTHER | Age: 36
End: 2021-05-10
Payer: COMMERCIAL

## 2021-05-10 VITALS — DIASTOLIC BLOOD PRESSURE: 66 MMHG | BODY MASS INDEX: 28.41 KG/M2 | SYSTOLIC BLOOD PRESSURE: 122 MMHG | WEIGHT: 198 LBS

## 2021-05-10 DIAGNOSIS — F41.1 GAD (GENERALIZED ANXIETY DISORDER): ICD-10-CM

## 2021-05-10 DIAGNOSIS — E03.8 OTHER SPECIFIED HYPOTHYROIDISM: ICD-10-CM

## 2021-05-10 DIAGNOSIS — F33.0 MILD EPISODE OF RECURRENT MAJOR DEPRESSIVE DISORDER (H): ICD-10-CM

## 2021-05-10 DIAGNOSIS — Z86.2 HISTORY OF TTP (THROMBOTIC THROMBOCYTOPENIC PURPURA): ICD-10-CM

## 2021-05-10 DIAGNOSIS — Z23 NEED FOR TDAP VACCINATION: ICD-10-CM

## 2021-05-10 DIAGNOSIS — O09.93 HIGH-RISK PREGNANCY, THIRD TRIMESTER: Primary | ICD-10-CM

## 2021-05-10 DIAGNOSIS — O34.219 PATIENT DESIRES VAGINAL BIRTH AFTER CESAREAN SECTION (VBAC): ICD-10-CM

## 2021-05-10 PROCEDURE — 90715 TDAP VACCINE 7 YRS/> IM: CPT | Performed by: OBSTETRICS & GYNECOLOGY

## 2021-05-10 PROCEDURE — 99207 PR PRENATAL VISIT: CPT | Performed by: OBSTETRICS & GYNECOLOGY

## 2021-05-10 PROCEDURE — 90471 IMMUNIZATION ADMIN: CPT | Performed by: OBSTETRICS & GYNECOLOGY

## 2021-05-10 NOTE — NURSING NOTE
Prior to immunization administration, verified patients identity using patient s name and date of birth. Please see Immunization Activity for additional information.     Screening Questionnaire for Adult Immunization    Are you sick today?   No   Do you have allergies to medications, food, a vaccine component or latex?   No   Have you ever had a serious reaction after receiving a vaccination?   No   Do you have a long-term health problem with heart, lung, kidney, or metabolic disease (e.g., diabetes), asthma, a blood disorder, no spleen, complement component deficiency, a cochlear implant, or a spinal fluid leak?  Are you on long-term aspirin therapy?   No   Do you have cancer, leukemia, HIV/AIDS, or any other immune system problem?   No   Do you have a parent, brother, or sister with an immune system problem?   No   In the past 3 months, have you taken medications that affect  your immune system, such as prednisone, other steroids, or anticancer drugs; drugs for the treatment of rheumatoid arthritis, Crohn s disease, or psoriasis; or have you had radiation treatments?   No   Have you had a seizure, or a brain or other nervous system problem?   No   During the past year, have you received a transfusion of blood or blood    products, or been given immune (gamma) globulin or antiviral drug?   No   For women: Are you pregnant or is there a chance you could become       pregnant during the next month?   No   Have you received any vaccinations in the past 4 weeks?   No     Immunization questionnaire answers were all negative.        Per orders of Dr. Rascon, injection of TDaP given by Gabriela Anderson CMA. Patient instructed to remain in clinic for 15 minutes afterwards, and to report any adverse reaction to me immediately.       Screening performed by Gabriela Anderson CMA on 5/10/2021 at 10:39 AM.

## 2021-05-10 NOTE — PROGRESS NOTES
35 year old  at 35w4d weeks presents to the clinic for a routine prenatal visit.    No concerns.  Patient denies any vaginal bleeding, leakage of fluid, or contractions     Good fetal movement  Fundal height=37cm  EYLa=591  History of HELLP=normal labs last visit on   Blood pressure at home have been normal.  History of .  Desires TOLAC.  Depression/anxiety=stable  Hypothyroidism=TSH==1.14  Discussed labor precautions.  TDAP today  RTC 1 week    Harriet Rascon DO

## 2021-05-17 ENCOUNTER — PRENATAL OFFICE VISIT (OUTPATIENT)
Dept: OBGYN | Facility: OTHER | Age: 36
End: 2021-05-17
Payer: COMMERCIAL

## 2021-05-17 VITALS — BODY MASS INDEX: 28.27 KG/M2 | SYSTOLIC BLOOD PRESSURE: 124 MMHG | DIASTOLIC BLOOD PRESSURE: 76 MMHG | WEIGHT: 197 LBS

## 2021-05-17 DIAGNOSIS — F33.0 MILD EPISODE OF RECURRENT MAJOR DEPRESSIVE DISORDER (H): ICD-10-CM

## 2021-05-17 DIAGNOSIS — Z98.891 HISTORY OF C-SECTION: ICD-10-CM

## 2021-05-17 DIAGNOSIS — O34.219 PATIENT DESIRES VAGINAL BIRTH AFTER CESAREAN SECTION (VBAC): ICD-10-CM

## 2021-05-17 DIAGNOSIS — O09.93 HIGH-RISK PREGNANCY, THIRD TRIMESTER: Primary | ICD-10-CM

## 2021-05-17 DIAGNOSIS — F41.1 GAD (GENERALIZED ANXIETY DISORDER): ICD-10-CM

## 2021-05-17 PROCEDURE — 99207 PR PRENATAL VISIT: CPT | Performed by: OBSTETRICS & GYNECOLOGY

## 2021-05-17 PROCEDURE — 87653 STREP B DNA AMP PROBE: CPT | Performed by: OBSTETRICS & GYNECOLOGY

## 2021-05-17 ASSESSMENT — PATIENT HEALTH QUESTIONNAIRE - PHQ9
SUM OF ALL RESPONSES TO PHQ QUESTIONS 1-9: 2
SUM OF ALL RESPONSES TO PHQ QUESTIONS 1-9: 2
10. IF YOU CHECKED OFF ANY PROBLEMS, HOW DIFFICULT HAVE THESE PROBLEMS MADE IT FOR YOU TO DO YOUR WORK, TAKE CARE OF THINGS AT HOME, OR GET ALONG WITH OTHER PEOPLE: NOT DIFFICULT AT ALL

## 2021-05-17 NOTE — PROGRESS NOTES
35 year old  at 36w4d weeks presents to the clinic for a routine prenatal visit.    No concerns.  Patient denies any vaginal bleeding, leakage of fluid, or contractions     Good fetal movement  Fundal height=37cm  ZHPi=842  Cx=/-3  Vertex by BSUS  GBS=today  History of HELLP=normal labs last visit on   Blood pressure at home have been normal.  History of .  Desires TOLAC.  Depression/anxiety=stable  Hypothyroidism=TSH==1.14  Discussed labor precautions.  RTC 1 week     Harriet Rascon DO

## 2021-05-18 LAB
GP B STREP DNA SPEC QL NAA+PROBE: NEGATIVE
SPECIMEN SOURCE: NORMAL

## 2021-05-18 ASSESSMENT — PATIENT HEALTH QUESTIONNAIRE - PHQ9: SUM OF ALL RESPONSES TO PHQ QUESTIONS 1-9: 2

## 2021-05-25 ENCOUNTER — PRENATAL OFFICE VISIT (OUTPATIENT)
Dept: OBGYN | Facility: OTHER | Age: 36
End: 2021-05-25
Payer: COMMERCIAL

## 2021-05-25 VITALS — WEIGHT: 199 LBS | BODY MASS INDEX: 28.55 KG/M2 | DIASTOLIC BLOOD PRESSURE: 72 MMHG | SYSTOLIC BLOOD PRESSURE: 108 MMHG

## 2021-05-25 DIAGNOSIS — M31.19 TTP (THROMBOTIC THROMBOCYTOPENIC PURPURA) (H): ICD-10-CM

## 2021-05-25 DIAGNOSIS — O34.219 PATIENT DESIRES VAGINAL BIRTH AFTER CESAREAN SECTION (VBAC): ICD-10-CM

## 2021-05-25 DIAGNOSIS — E03.8 OTHER SPECIFIED HYPOTHYROIDISM: ICD-10-CM

## 2021-05-25 DIAGNOSIS — F33.0 MILD EPISODE OF RECURRENT MAJOR DEPRESSIVE DISORDER (H): ICD-10-CM

## 2021-05-25 DIAGNOSIS — F41.1 GAD (GENERALIZED ANXIETY DISORDER): ICD-10-CM

## 2021-05-25 DIAGNOSIS — O09.93 HIGH-RISK PREGNANCY, THIRD TRIMESTER: Primary | ICD-10-CM

## 2021-05-25 PROCEDURE — 59426 ANTEPARTUM CARE ONLY: CPT | Performed by: OBSTETRICS & GYNECOLOGY

## 2021-05-25 PROCEDURE — 99207 PR PRENATAL VISIT: CPT | Performed by: OBSTETRICS & GYNECOLOGY

## 2021-05-25 NOTE — PROGRESS NOTES
35 year old  at 37w5d weeks presents to the clinic for a routine prenatal visit.    No concerns.  Patient denies any vaginal bleeding, leakage of fluid, or contractions     Good fetal movement  Fundal height=38cm  FHTs=160  Cx=3-4/50/-1  GBS=negative  History of HELLP=normal labs last visit on   Blood pressure at home have been normal.  History of .  Desires TOLAC.  Depression/anxiety=stable  Hypothyroidism=TSH==1.14  Discussed labor precautions.  RTC 1 week     Harriet Rascon DO

## 2021-06-01 ENCOUNTER — PRENATAL OFFICE VISIT (OUTPATIENT)
Dept: OBGYN | Facility: CLINIC | Age: 36
End: 2021-06-01
Payer: COMMERCIAL

## 2021-06-01 ENCOUNTER — NURSE TRIAGE (OUTPATIENT)
Dept: NURSING | Facility: CLINIC | Age: 36
End: 2021-06-01

## 2021-06-01 VITALS
OXYGEN SATURATION: 100 % | WEIGHT: 204 LBS | BODY MASS INDEX: 29.27 KG/M2 | HEART RATE: 85 BPM | SYSTOLIC BLOOD PRESSURE: 125 MMHG | DIASTOLIC BLOOD PRESSURE: 84 MMHG

## 2021-06-01 DIAGNOSIS — F41.1 GAD (GENERALIZED ANXIETY DISORDER): ICD-10-CM

## 2021-06-01 DIAGNOSIS — Z98.891 HISTORY OF C-SECTION: ICD-10-CM

## 2021-06-01 DIAGNOSIS — Z86.2 HISTORY OF TTP (THROMBOTIC THROMBOCYTOPENIC PURPURA): ICD-10-CM

## 2021-06-01 DIAGNOSIS — O34.219 PATIENT DESIRES VAGINAL BIRTH AFTER CESAREAN SECTION (VBAC): ICD-10-CM

## 2021-06-01 DIAGNOSIS — E03.8 OTHER SPECIFIED HYPOTHYROIDISM: ICD-10-CM

## 2021-06-01 DIAGNOSIS — O09.93 HIGH-RISK PREGNANCY, THIRD TRIMESTER: Primary | ICD-10-CM

## 2021-06-01 LAB
ALT SERPL W P-5'-P-CCNC: 17 U/L (ref 0–50)
AST SERPL W P-5'-P-CCNC: 17 U/L (ref 0–45)
CREAT SERPL-MCNC: 0.74 MG/DL (ref 0.52–1.04)
CREAT UR-MCNC: 19 MG/DL
ERYTHROCYTE [DISTWIDTH] IN BLOOD BY AUTOMATED COUNT: 12.6 % (ref 10–15)
GFR SERPL CREATININE-BSD FRML MDRD: >90 ML/MIN/{1.73_M2}
HCT VFR BLD AUTO: 35 % (ref 35–47)
HGB BLD-MCNC: 11.7 G/DL (ref 11.7–15.7)
MCH RBC QN AUTO: 32.1 PG (ref 26.5–33)
MCHC RBC AUTO-ENTMCNC: 33.4 G/DL (ref 31.5–36.5)
MCV RBC AUTO: 96 FL (ref 78–100)
PLATELET # BLD AUTO: 245 10E9/L (ref 150–450)
PROT UR-MCNC: <0.05 G/L
PROT/CREAT 24H UR: NORMAL G/G CR (ref 0–0.2)
RBC # BLD AUTO: 3.65 10E12/L (ref 3.8–5.2)
URATE SERPL-MCNC: 3.2 MG/DL (ref 2.6–6)
WBC # BLD AUTO: 11.3 10E9/L (ref 4–11)

## 2021-06-01 PROCEDURE — 99207 PR PRENATAL VISIT: CPT | Performed by: OBSTETRICS & GYNECOLOGY

## 2021-06-01 PROCEDURE — 84460 ALANINE AMINO (ALT) (SGPT): CPT | Performed by: OBSTETRICS & GYNECOLOGY

## 2021-06-01 PROCEDURE — 36415 COLL VENOUS BLD VENIPUNCTURE: CPT | Performed by: OBSTETRICS & GYNECOLOGY

## 2021-06-01 PROCEDURE — 85027 COMPLETE CBC AUTOMATED: CPT | Performed by: OBSTETRICS & GYNECOLOGY

## 2021-06-01 PROCEDURE — 84156 ASSAY OF PROTEIN URINE: CPT | Performed by: OBSTETRICS & GYNECOLOGY

## 2021-06-01 PROCEDURE — 84450 TRANSFERASE (AST) (SGOT): CPT | Performed by: OBSTETRICS & GYNECOLOGY

## 2021-06-01 PROCEDURE — 84550 ASSAY OF BLOOD/URIC ACID: CPT | Performed by: OBSTETRICS & GYNECOLOGY

## 2021-06-01 PROCEDURE — 82565 ASSAY OF CREATININE: CPT | Performed by: OBSTETRICS & GYNECOLOGY

## 2021-06-01 NOTE — PROGRESS NOTES
35 year old  at 38 5/7 weeks presents to the clinic for a routine prenatal visit.    No concerns.  Patient denies any vaginal bleeding, leakage of fluid, or contractions     Good fetal movement  Fundal height=38cm  JQWd=529's  Cx=5/50/-1  GBS=negative  History of HELLP=normal labs last visit on .  Will recheck labs today.  Blood pressure at home have been normal.  History of .  Desires TOLAC.  Depression/anxiety=stable  Hypothyroidism=TSH==1.14  Discussed labor precautions.      Discussed with Qeuta the risks, benefits and alternatives to induction.  We discussed cervical ripening for those patients with a hernández score of less than 6 (for multiparous) and 8 (for nulliparous).  Discussed the concerns related to uterine hyperstimulation and adverse fetal effects that can occur with a ripening agent or pitocin. Discussed amniotomy and the rationale for it with induction.  I discussed the expected timeline for her based on her presentation, but she understands that this is just an approximate.  She is given the opportunity to ask questions and have them answered.  She does wish to proceed    Induction scheduled for [place] 3.    Harriet Rascon,

## 2021-06-02 NOTE — TELEPHONE ENCOUNTER
OB Triage Call    Reason for call: labor  The symptom started 5:30 today.  Now the 20 sec frequency every 3-5 min for the past 1 hours.  Pt VIOLETA is 6/10/2021.  Pt states she will go the ED and hung up the phone.       Assessment:    Plan:    Patient plans to deliver at   Patient's primary OB Provider is  Patient's primary OB provider is a       38w5d  Estimated Date of Delivery: [place] 10, 2021        OB History    Para Term  AB Living   3 2 2 0 0 2   SAB TAB Ectopic Multiple Live Births   0 0 0 0 2      # Outcome Date GA Lbr Randy/2nd Weight Sex Delivery Anes PTL Lv   3 Current            2 Term 19 39w1d  3.175 kg (7 lb) F    [facility]      Name: Carmella Romero Term 10/29/16 39w0d / 04:14 3.629 kg (8 lb) M CS-LTranv EPI N [facility]      Birth Comments: Time of birth at 1759  Mom:  29 y/o , GBS: Negative, Hep B Ag: Negative, Blood type:  A positive  TCB 9.0 at 26 hours, high risk zone, s/p phototherapy 1.5 days, off 11/1 am   hearing screen: Passed  [place] oximetry: Passed   metabolic screening: Results Normal/negative (2016)  Hepatitis B # 1 given in nursery: YES - Date: 10/29/16      Name: FLAKITO MORGAN      Apgar1: 9  Apgar5: 9      Obstetric Comments   FOB is Thom   It's a BOY!       Lab Results   Component Value Date    GBS Negative 2021          Renan Mcmillan RN 21 7:20 PM  [facility] Nurse Advisor

## 2021-06-14 ENCOUNTER — MEDICAL CORRESPONDENCE (OUTPATIENT)
Dept: HEALTH INFORMATION MANAGEMENT | Facility: CLINIC | Age: 36
End: 2021-06-14

## 2021-06-14 ENCOUNTER — PRENATAL OFFICE VISIT (OUTPATIENT)
Dept: OBGYN | Facility: OTHER | Age: 36
End: 2021-06-14
Payer: COMMERCIAL

## 2021-06-14 VITALS
BODY MASS INDEX: 26.44 KG/M2 | WEIGHT: 184.25 LBS | HEART RATE: 78 BPM | DIASTOLIC BLOOD PRESSURE: 86 MMHG | SYSTOLIC BLOOD PRESSURE: 122 MMHG

## 2021-06-14 DIAGNOSIS — O34.219 VBAC (VAGINAL BIRTH AFTER CESAREAN): ICD-10-CM

## 2021-06-14 DIAGNOSIS — F41.1 GAD (GENERALIZED ANXIETY DISORDER): ICD-10-CM

## 2021-06-14 PROBLEM — O09.93 HIGH-RISK PREGNANCY, THIRD TRIMESTER: Status: RESOLVED | Noted: 2018-07-16 | Resolved: 2021-06-14

## 2021-06-14 PROCEDURE — 99207 PR POST PARTUM EXAM: CPT | Performed by: OBSTETRICS & GYNECOLOGY

## 2021-06-14 ASSESSMENT — ANXIETY QUESTIONNAIRES
7. FEELING AFRAID AS IF SOMETHING AWFUL MIGHT HAPPEN: SEVERAL DAYS
7. FEELING AFRAID AS IF SOMETHING AWFUL MIGHT HAPPEN: SEVERAL DAYS
GAD7 TOTAL SCORE: 6
4. TROUBLE RELAXING: SEVERAL DAYS
GAD7 TOTAL SCORE: 6
3. WORRYING TOO MUCH ABOUT DIFFERENT THINGS: SEVERAL DAYS
GAD7 TOTAL SCORE: 6
6. BECOMING EASILY ANNOYED OR IRRITABLE: SEVERAL DAYS
5. BEING SO RESTLESS THAT IT IS HARD TO SIT STILL: NOT AT ALL
1. FEELING NERVOUS, ANXIOUS, OR ON EDGE: SEVERAL DAYS
2. NOT BEING ABLE TO STOP OR CONTROL WORRYING: SEVERAL DAYS

## 2021-06-14 ASSESSMENT — PATIENT HEALTH QUESTIONNAIRE - PHQ9
SUM OF ALL RESPONSES TO PHQ QUESTIONS 1-9: 0
10. IF YOU CHECKED OFF ANY PROBLEMS, HOW DIFFICULT HAVE THESE PROBLEMS MADE IT FOR YOU TO DO YOUR WORK, TAKE CARE OF THINGS AT HOME, OR GET ALONG WITH OTHER PEOPLE: NOT DIFFICULT AT ALL
SUM OF ALL RESPONSES TO PHQ QUESTIONS 1-9: 0

## 2021-06-14 NOTE — PROGRESS NOTES
Subjective  36 year old non-pregnant female presents today for a postpartum visit.  Patient had a  on 2021.  No pain.  No vaginal bleeding.  No problems urinating.  Normal bowel movements.  Patient is breast feeding.  No signs and symptoms of ppd.  Patient scored a 0 on the PHQ-9 and a 6 on the JOAO-7.  No thoughts of suicide or infanticide.  Patient is due for a pap smear today.  Patient is wanting to do an IUD for birth control.  We discussed doing the pap smear at the time of the IUD insertion.  Pamphlets given for both kinds.  She will think about it.        ROS: 10 point ROS neg other than the symptoms noted above in the HPI.  Past Medical History:   Diagnosis Date     NO ACTIVE PROBLEMS      Past Surgical History:   Procedure Laterality Date     APPENDECTOMY  2014      SECTION N/A 10/29/2016    Procedure:  SECTION;  Surgeon: Adonis Dooley MD;  Location: [facility]     Family History   Problem Relation Age of Onset     Hypertension Mother      Osteoporosis Maternal Grandmother      Unknown/Adopted Father      Unknown/Adopted Paternal Grandmother      Unknown/Adopted Paternal Grandfather      Social History     Tobacco Use     Smoking status: Never Smoker     Smokeless tobacco: Never Used   Substance Use Topics     Alcohol use: No         Objective  Vitals: /86 (BP Location: Right arm, Cuff Size: Adult Regular)   Pulse 78   Wt 83.6 kg (184 lb 4 oz)   LMP 2020   Breastfeeding Yes   BMI 26.44 kg/m    BMI= Body mass index is 26.44 kg/m .         Assessment  1.)  Post partum visit  2.)  S/p  on 2021  3.)  Birth control   4.)  Due for pap smear  5.)  Anxiety       Plan  1.)  Schedule IUD insertion when ready  2.)  Pap smear at the time of IUD insertion      Harriet Rascon

## 2021-06-14 NOTE — PATIENT INSTRUCTIONS
Please call if you any questions.    [address]   [address]  [phone]        Harriet Rascon,

## 2021-06-15 ASSESSMENT — ANXIETY QUESTIONNAIRES: GAD7 TOTAL SCORE: 6

## 2021-06-20 ENCOUNTER — MYC REFILL (OUTPATIENT)
Dept: FAMILY MEDICINE | Facility: OTHER | Age: 36
End: 2021-06-20

## 2021-06-20 DIAGNOSIS — E03.9 HYPOTHYROIDISM, UNSPECIFIED TYPE: ICD-10-CM

## 2021-06-21 RX ORDER — LEVOTHYROXINE SODIUM 25 UG/1
37.5 TABLET ORAL
Qty: 90 TABLET | Refills: 1 | Status: SHIPPED | OUTPATIENT
Start: 2021-06-22 | End: 2021-10-15

## 2021-06-29 ENCOUNTER — NURSE TRIAGE (OUTPATIENT)
Dept: FAMILY MEDICINE | Facility: OTHER | Age: 36
End: 2021-06-29

## 2021-06-29 ENCOUNTER — OFFICE VISIT (OUTPATIENT)
Dept: FAMILY MEDICINE | Facility: OTHER | Age: 36
End: 2021-06-29
Payer: COMMERCIAL

## 2021-06-29 VITALS
DIASTOLIC BLOOD PRESSURE: 74 MMHG | TEMPERATURE: 97.8 F | HEART RATE: 84 BPM | OXYGEN SATURATION: 98 % | RESPIRATION RATE: 12 BRPM | SYSTOLIC BLOOD PRESSURE: 122 MMHG

## 2021-06-29 DIAGNOSIS — N61.0 ACUTE MASTITIS OF LEFT BREAST: Primary | ICD-10-CM

## 2021-06-29 PROCEDURE — 99213 OFFICE O/P EST LOW 20 MIN: CPT | Performed by: PHYSICIAN ASSISTANT

## 2021-06-29 RX ORDER — SULFAMETHOXAZOLE/TRIMETHOPRIM 800-160 MG
1 TABLET ORAL 2 TIMES DAILY
Qty: 20 TABLET | Refills: 0 | Status: SHIPPED | OUTPATIENT
Start: 2021-06-29 | End: 2022-01-13

## 2021-06-29 NOTE — PATIENT INSTRUCTIONS
Patient Education     What Are Benign Breast Conditions?  Most breast conditions are not cancer (benign), causing no serious harm to you. But all women are at some risk for breast cancer. Your risk increases as you grow older.   Common benign breast changes are covered here. Having one of these conditions does not put you at a higher risk for breast cancer. But all breast changes should be checked by a healthcare provider to know what the change is and whether it should be treated.    Breast infection  Infections of the breast tissue can cause skin redness, warmth, and pain or tenderness. The most common infection is mastitis. This inflammation of the mammary glands can happen during breastfeeding. Mastitis and other breast infections are often treated with antibiotics.   Nipple discharge  Many women can squeeze a tiny amount of a clear or milky discharge from one or both nipples. This discharge may be normal. Other kinds of discharge may be symptoms of a breast condition. A dark or bloody discharge can be caused by a benign growth in a duct near the nipple (an intraductal papilloma). It should be checked by your healthcare provider.   Fibrocystic changes  These benign changes may cause a thickening and firmness in the breasts. Breasts may be tender or painful. They may feel more dense in some areas than in others. Sometimes solid or fluid-filled lumps (cysts) may also form. Cysts may be smooth, soft or firm, and tender. They may become larger and more tender right before your period. An ultrasound may be done to make sure that a lump is a fluid-filled cyst and not cancer.     Benign breast lumps  Benign breast lumps come in all shapes, textures, and sizes. A lump of fibrous tissue (fibroadenoma) may be smooth, firm, and rubbery. This type of lump is usually painless and movable. Have any lump checked by your healthcare provider.     Brigitte last reviewed this educational content on 6/1/2020 2000-2021 [employer]. All rights reserved. This information is not intended as a substitute for professional medical care. Always follow your healthcare professional's instructions.           Patient Education     Mastitis  Mastitis occurs when breast tissue becomes swollen and inflamed. This is almost always due to infection. Mastitis most often affects breastfeeding women during the first 6 weeks after childbirth. For this reason, it s also known as lactation mastitis. Infection may happen after a duct becomes clogged, causing milk to back up in the breast. Mastitis may also occur if bacteria enter the breast through small cracks in the nipple. (Less often, mastitis occurs in women who aren t breastfeeding. If you have mastitis that is not due to breastfeeding, your healthcare provider will give you more information as needed. Treatment may include some of the same home care measures listed below.)  Mastitis may cause flu-like symptoms such as fever, aches, and fatigue. The affected breast may feel painful, warm, tender, firm, or swollen. The skin over the breast may be red (often in a wedge-shaped pattern). You may feel a burning a sensation when breastfeeding.  In most cases, mastitis can be treated with antibiotics. This should clear the infection. If treatment is delayed, a pocket of pus (abscess) can form in the breast tissue. A procedure may then be needed to drain the pus. In severe cases of infection, other treatments may be needed.  Home care  Breastfeeding    It s very important to keep the milk flowing from the infected breast. Continue breastfeeding from both breasts as usual. This will not hurt the baby. If this is too painful, use a breast pump to remove milk from the infected side. This can be fed to your baby or discarded. Note: If you don't continue to breastfeed or pump your breast, bacteria can grow in the milk that is left in your breast. This can make your infection worse.    Tell your  healthcare provider if you have problems with breastfeeding. He or she may suggest changes to your technique, if needed. You may also be referred to a lactation nurse or consultant for support with breastfeeding.  General care    Take any medicines you re prescribed as directed. If you re taking antibiotics, be sure to complete all of the medicine even if you start to feel better. Over-the-counter pain medicines may also be recommended. Don t use breast creams or other products or medicines without talking to your healthcare provider first. Note: If you re concerned about taking medicines while breastfeeding, talk to your healthcare provider.    Rest as often as needed. Also be sure to drink plenty of fluids.    To help relieve pain and swelling, heat or ice may be used. Apply as often as directed by your provider.  ? Heat: Place a warm compress on the breast. Use a towel soaked in hot water, a heating pad, or a hot water bottle.  ? Cold: Place a cold compress on the breast. Use an ice pack or bag of ice wrapped in a thin towel. Never place a cold source directly on the skin.    Follow-up care  Follow up with your healthcare provider as advised.  When to seek medical advice  Call your healthcare provider right away if any of these occur:    Fever of 100.4 F (38 C) or higher, or as directed by your provider    Shaking chills    Worsening symptoms or symptoms that don't improve within 48 to 72 hours of starting treatment    New symptoms develop  Brigitte last reviewed this educational content on 6/1/2018 2000-2021 [employer]. All rights reserved. This information is not intended as a substitute for professional medical care. Always follow your healthcare professional's instructions.

## 2021-06-29 NOTE — PROGRESS NOTES
Assessment & Plan     Acute mastitis of left breast  Erythema firmness and tenderness to the upper outer quadrant of the left breast is noted today.  She thinks she may have had a fever last night.  Vital signs are stable today.  Prescription given for medication as noted below and typical etiology discussed with the patient.  If not improved in the next 48 to 72 hours do recommend further evaluation with ultrasound and consideration of culturing of breastmilk.  - sulfamethoxazole-trimethoprim (BACTRIM DS) 800-160 MG tablet; Take 1 tablet by mouth 2 times daily  No follow-ups on file.    Dmitry Kapoor PA-C  [facility]     Queta Cortez is a 35 year old female who presents to clinic today for the following health issues accompanied by her :    HPI     RN triage note -     Left breast is red  Pain for 3 days. Pain is a 4 out of ten.  Appointment advised for today.     Zayra Salgado RN on 6/29/2021 at 8:27 AM        Reason for Disposition    Breast looks infected (area of redness, feels hot to touch) and no fever    Additional Information    Negative: Sounds like a life-threatening emergency to the triager    Negative: Breastfeeding questions about baby    Negative: Breastfeeding questions about mother (breast symptoms or feeling sick)    Negative: Breastfeeding questions about mother's medicines and diet    Negative: SEVERE breast pain and fever > 103 F (39.4 C)    Negative: Patient sounds very sick or weak to the triager    Negative: Fever > 100.4 F (38.0 C)    Protocols used: POSTPARTUM - BREAST PAIN AND [id number]    Review of Systems   Constitutional, HEENT, cardiovascular, pulmonary, GI, , musculoskeletal, neuro, skin, endocrine and psych systems are negative, except as otherwise noted.      Objective    /74   Pulse 84   Temp 97.8  F (36.6  C) (Temporal)   Resp 12   LMP 09/03/2020 (Exact Date)   SpO2 98%   There is no height or weight on file to  calculate BMI.  Physical Exam   GENERAL: healthy, alert and no distress  BREAST: erythema left upper outer quadrant, tenderness left upper outer quadrant, warmth left and had symptoms consistent with left mastitis upper outer quadrant  MS: no gross musculoskeletal defects noted, no edema  SKIN: no suspicious lesions or rashes to related visible skin today.  NEURO: Normal strength and tone, mentation intact and speech normal  PSYCH: mentation appears normal, affect normal/bright

## 2021-06-29 NOTE — TELEPHONE ENCOUNTER
Left breast is red  Pain for 3 days. Pain is a 4 out of ten.  Appointment advised for today.    Zayra Salgado RN on 6/29/2021 at 8:27 AM      Reason for Disposition    Breast looks infected (area of redness, feels hot to touch) and no fever    Additional Information    Negative: Sounds like a life-threatening emergency to the triager    Negative: Breastfeeding questions about baby    Negative: Breastfeeding questions about mother (breast symptoms or feeling sick)    Negative: Breastfeeding questions about mother's medicines and diet    Negative: SEVERE breast pain and fever > 103 F (39.4 C)    Negative: Patient sounds very sick or weak to the triager    Negative: Fever > 100.4 F (38.0 C)    Protocols used: POSTPARTUM - BREAST PAIN AND [id number]

## 2021-07-08 ENCOUNTER — TELEPHONE (OUTPATIENT)
Dept: OBGYN | Facility: CLINIC | Age: 36
End: 2021-07-08

## 2021-07-08 NOTE — TELEPHONE ENCOUNTER
RN called pt and assisted in scheduling appt. Pt desires Mirena IUD. Is currently breast feeding.    Patient verbalized understanding and agreed to plan.     Patient was given the opportunity to ask additional questions and have them answered. Patient had no further questions.     Rose Heck RN on 7/8/2021 at 3:59 PM

## 2021-07-08 NOTE — TELEPHONE ENCOUNTER
Reason for Call:  Other appointment    Detailed comments: Pt is calling for an appt for a physical with IUD placement the beginning of Sept.  Pls call pt to schedule.    Phone Number Patient can be reached at: Home number on file [phone] (home)    Best Time: any    Can we leave a detailed message on this number? YES    Call taken on 7/8/2021 at 2:50 PM by Brittanie Rahman

## 2021-08-03 ENCOUNTER — TELEPHONE (OUTPATIENT)
Dept: FAMILY MEDICINE | Facility: OTHER | Age: 36
End: 2021-08-03

## 2021-08-03 ENCOUNTER — MEDICAL CORRESPONDENCE (OUTPATIENT)
Dept: HEALTH INFORMATION MANAGEMENT | Facility: CLINIC | Age: 36
End: 2021-08-03

## 2021-08-03 NOTE — TELEPHONE ENCOUNTER
EPDS was done in clinic today during child's 2 month visit and score was 13 with negative psychosis and negative screen for suicidal ideation.   Queta has no previous history of depression.   Copy of EPDS was given to Queta today as well as educational material about postpartum depression.     Plan:   Follow up within 1-2 weeks with OB/primary care provider was recommended.  Behavioral health referral was not placed.         Elly Chavarria, CMA

## 2021-10-04 ENCOUNTER — HEALTH MAINTENANCE LETTER (OUTPATIENT)
Age: 36
End: 2021-10-04

## 2021-10-05 ENCOUNTER — MEDICAL CORRESPONDENCE (OUTPATIENT)
Dept: HEALTH INFORMATION MANAGEMENT | Facility: CLINIC | Age: 36
End: 2021-10-05

## 2021-10-05 ENCOUNTER — TELEPHONE (OUTPATIENT)
Dept: PEDIATRICS | Facility: OTHER | Age: 36
End: 2021-10-05

## 2021-10-05 NOTE — TELEPHONE ENCOUNTER
EPDS was done in clinic today during child's 4 month visit and score was 12 with negative psychosis and negative screen for suicidal ideation.   Queta has some anxiety, depression.   Copy of EPDS was given to Queta today as well as educational material about postpartum depression.     Plan:   Follow up this week with Behavioral Health provider.  Behavioral health referral was not placed.

## 2021-10-15 DIAGNOSIS — E03.9 HYPOTHYROIDISM, UNSPECIFIED TYPE: ICD-10-CM

## 2021-10-15 RX ORDER — LEVOTHYROXINE SODIUM 25 UG/1
TABLET ORAL
Qty: 90 TABLET | Refills: 1 | Status: SHIPPED | OUTPATIENT
Start: 2021-10-15 | End: 2024-02-10

## 2021-12-07 ENCOUNTER — TELEPHONE (OUTPATIENT)
Dept: PEDIATRICS | Facility: OTHER | Age: 36
End: 2021-12-07
Payer: MEDICAID

## 2021-12-07 ENCOUNTER — MEDICAL CORRESPONDENCE (OUTPATIENT)
Dept: HEALTH INFORMATION MANAGEMENT | Facility: CLINIC | Age: 36
End: 2021-12-07
Payer: MEDICAID

## 2021-12-07 NOTE — TELEPHONE ENCOUNTER
EPDS was done in clinic today during child's 4 month visit and score was 15 with negative psychosis and negative screen for suicidal ideation.   Queta has no previous history of depression.   Copy of EPDS was given to Queta today as well as educational material about postpartum depression.     Plan:   Follow up within 1-2 weeks with OB/primary care provider was recommended.  Behavioral health referral was not placed.

## 2022-01-10 ENCOUNTER — MYC MEDICAL ADVICE (OUTPATIENT)
Dept: FAMILY MEDICINE | Facility: OTHER | Age: 37
End: 2022-01-10
Payer: COMMERCIAL

## 2022-01-10 ENCOUNTER — LAB (OUTPATIENT)
Dept: LAB | Facility: OTHER | Age: 37
End: 2022-01-10
Payer: COMMERCIAL

## 2022-01-10 DIAGNOSIS — E03.9 HYPOTHYROIDISM, UNSPECIFIED TYPE: ICD-10-CM

## 2022-01-10 DIAGNOSIS — E03.9 HYPOTHYROIDISM, UNSPECIFIED TYPE: Primary | ICD-10-CM

## 2022-01-10 LAB
T4 FREE SERPL-MCNC: 0.69 NG/DL (ref 0.76–1.46)
TSH SERPL DL<=0.005 MIU/L-ACNC: 4.91 MU/L (ref 0.4–4)

## 2022-01-10 PROCEDURE — 84443 ASSAY THYROID STIM HORMONE: CPT

## 2022-01-10 PROCEDURE — 36415 COLL VENOUS BLD VENIPUNCTURE: CPT

## 2022-01-10 PROCEDURE — 84439 ASSAY OF FREE THYROXINE: CPT

## 2022-01-11 ENCOUNTER — MYC MEDICAL ADVICE (OUTPATIENT)
Dept: FAMILY MEDICINE | Facility: OTHER | Age: 37
End: 2022-01-11
Payer: COMMERCIAL

## 2022-01-11 ENCOUNTER — E-VISIT (OUTPATIENT)
Dept: FAMILY MEDICINE | Facility: OTHER | Age: 37
End: 2022-01-11
Payer: COMMERCIAL

## 2022-01-11 DIAGNOSIS — E03.9 HYPOTHYROIDISM, UNSPECIFIED TYPE: ICD-10-CM

## 2022-01-11 PROCEDURE — 99421 OL DIG E/M SVC 5-10 MIN: CPT | Performed by: FAMILY MEDICINE

## 2022-01-11 NOTE — RESULT ENCOUNTER NOTE
Ms. Cortez    Your recent test results are attached.  Labs show signs of low functioning thyroid.  I would recommend visit to discuss medication adjustment.  We could schedule a virtual visit through [facility] and we could discuss further.    If you have any questions or concerns please contact me via My Chart or call the clinic at [phone]     Thank You  Alanis Lundberg MD.

## 2022-01-13 RX ORDER — LEVOTHYROXINE SODIUM 75 UG/1
75 TABLET ORAL DAILY
Qty: 30 TABLET | Refills: 0 | Status: SHIPPED | OUTPATIENT
Start: 2022-01-13 | End: 2022-03-01

## 2022-02-21 ENCOUNTER — OFFICE VISIT (OUTPATIENT)
Dept: OBGYN | Facility: OTHER | Age: 37
End: 2022-02-21
Payer: COMMERCIAL

## 2022-02-21 VITALS
WEIGHT: 175 LBS | SYSTOLIC BLOOD PRESSURE: 122 MMHG | BODY MASS INDEX: 25.05 KG/M2 | HEIGHT: 70 IN | DIASTOLIC BLOOD PRESSURE: 76 MMHG

## 2022-02-21 DIAGNOSIS — Z00.00 ANNUAL PHYSICAL EXAM: Primary | ICD-10-CM

## 2022-02-21 DIAGNOSIS — E03.8 OTHER SPECIFIED HYPOTHYROIDISM: ICD-10-CM

## 2022-02-21 PROCEDURE — 99395 PREV VISIT EST AGE 18-39: CPT | Performed by: OBSTETRICS & GYNECOLOGY

## 2022-02-21 PROCEDURE — G0145 SCR C/V CYTO,THINLAYER,RESCR: HCPCS | Performed by: OBSTETRICS & GYNECOLOGY

## 2022-02-21 PROCEDURE — 87624 HPV HI-RISK TYP POOLED RSLT: CPT | Performed by: OBSTETRICS & GYNECOLOGY

## 2022-02-21 NOTE — PROGRESS NOTES
Chief Complaint   Patient presents with     Physical       Subjective  Queta Cortez is a 36 year old female who presents for her annual exam.  Her menses are somewhat irregular but she is breastfeeding her 9 month old.  No problems urinating. Normal bowel movements.  Patient is sexually active.  No dyspareunia.  No vaginal spotting after.  3 children.  1 c section and 2 VBACs.  Patient already received her flu and COVID vaccines.  Using pullout method.  Thinking of another pregnancy soon.    Most recent pap: 2017  History of abnormal Pap smear:  No  History of STI's:   No  History of PID:   No    Family history of uterine cancer:  No  Family history of ovarian cancer: No  Family history of colon cancer:  No  Family history of breast cancer:  No    ROS  ROS: 10 point ROS neg other than the symptoms noted above in the HPI.    Past Medical History:   Diagnosis Date     NO ACTIVE PROBLEMS      Past Surgical History:   Procedure Laterality Date     APPENDECTOMY  2014      SECTION N/A 10/29/2016    Procedure:  SECTION;  Surgeon: Adonis Dooley MD;  Location:  L[facility]     Family History   Problem Relation Age of Onset     Hypertension Mother      Osteoporosis Maternal Grandmother      Unknown/Adopted Father      Unknown/Adopted Paternal Grandmother      Unknown/Adopted Paternal Grandfather      Social History     Tobacco Use     Smoking status: Never Smoker     Smokeless tobacco: Never Used   Substance Use Topics     Alcohol use: No       Tobacco abuse:  No  Do you get at least three servings of calcium containing foods daily (dairy, green leafy vegetables, etc.)? yes   Outside of work or daily activities, how many days per week do you exercise for 30 minutes or longer? 3-4x per week  The patient does not drink >3 drinks per day nor >7 drinks per week.   Have you had an eye exam in the past two years? yes   Do you see a dentist twice per year? yes   Today's PHQ-2 Score:   Abuse: Current or Past(Physical,  "Sexual or Emotional)- No   Do you feel safe in your environment - Yes  Objective  Vitals: /76 (BP Location: Right arm, Cuff Size: Adult Regular)   Ht 1.778 m (5' 10\")   Wt 79.4 kg (175 lb)   LMP 01/23/2022   Breastfeeding Yes   BMI 25.11 kg/m    BMI= Body mass index is 25.11 kg/m .    General appearance=well developed, well-nourished female  Gait=normal  Psych=mood is stable, alert and oriented x3  HEENT=mucous membranes moist  Skin=no rashes or lesions seen,normal turgor   Breast:  Benign exam, no masses palpated.  No skin changes, no axillary lymphadenopathy, no nipple discharge.  Axilla feel completely normal, no lymph node enlargement and non-tender.  Neck=overall appearance is normal  Heart=RRR, no murmurs, no swelling noted  Thyroid=normal, no masses, no TTP, no enlargement  Lungs=non-labored breathing, no use of accessory muscles, clear to ausculation bilaterally  Abd=soft, Nontender/nondistended, +bowel sounds x4, no masses, no signs of hernias, no evidence of hepatosplenomegaly  PELVIC:    External genitalia: normal without lesions or masses  Urethral meatus: no lesions or prolapse noted, normal size  Urethra: no masses, non tender  Bladder: non tender, no fullness  Vagina: normal mucosa and rugae, no discharge.  Cervix: normal without lesion, no cervical motion tenderness, healthy, multiparous, pap smear obtained  Uterus: small, mobile, nontender.  Adnexa: non tender, without masses  Rectal: deferred  Ext=no clubbing or cyanosis, no swelling      Last lipid profile: Unknown  Regular self breast exam:  No  Most recent mammogram:  N/A  History of abnormal mammogram:  N/A  Fit testing:  N/A  DEXA:  N/A        Assessment/Plan  1.)  Annual/well woman=pap smear, CBC, CMP, lipids  2.)  Hypothyoidism=TSH  3.)  Anxiety/depression=stable  4.)  History of TTP      The following topics were discussed or recommended   Discussed seat belt, helmet and sunscreen use  Vision screening   Calcium/Vitamin D " supplement=Recommended 1000 mg of calcium daily and 800 IU of vitamin D.    30 minutes were spent on the date of the encounter doing chart review, history and exam, documentation, and further activities as noted above.        Harriet Rascon DO

## 2022-02-23 LAB
BKR LAB AP GYN ADEQUACY: NORMAL
BKR LAB AP GYN INTERPRETATION: NORMAL
BKR LAB AP HPV REFLEX: NORMAL
BKR LAB AP LMP: NORMAL
BKR LAB AP PREVIOUS ABNORMAL: NORMAL
PATH REPORT.COMMENTS IMP SPEC: NORMAL
PATH REPORT.COMMENTS IMP SPEC: NORMAL
PATH REPORT.RELEVANT HX SPEC: NORMAL

## 2022-02-25 LAB
HUMAN PAPILLOMA VIRUS 16 DNA: NEGATIVE
HUMAN PAPILLOMA VIRUS 18 DNA: NEGATIVE
HUMAN PAPILLOMA VIRUS FINAL DIAGNOSIS: NORMAL
HUMAN PAPILLOMA VIRUS OTHER HR: NEGATIVE

## 2022-03-01 ENCOUNTER — MYC REFILL (OUTPATIENT)
Dept: FAMILY MEDICINE | Facility: OTHER | Age: 37
End: 2022-03-01

## 2022-03-01 ENCOUNTER — LAB (OUTPATIENT)
Dept: LAB | Facility: OTHER | Age: 37
End: 2022-03-01
Payer: COMMERCIAL

## 2022-03-01 DIAGNOSIS — Z00.00 ANNUAL PHYSICAL EXAM: ICD-10-CM

## 2022-03-01 DIAGNOSIS — E03.8 OTHER SPECIFIED HYPOTHYROIDISM: ICD-10-CM

## 2022-03-01 DIAGNOSIS — E03.9 HYPOTHYROIDISM, UNSPECIFIED TYPE: ICD-10-CM

## 2022-03-01 LAB
ALBUMIN SERPL-MCNC: 4 G/DL (ref 3.4–5)
ALP SERPL-CCNC: 53 U/L (ref 40–150)
ALT SERPL W P-5'-P-CCNC: 25 U/L (ref 0–50)
ANION GAP SERPL CALCULATED.3IONS-SCNC: 3 MMOL/L (ref 3–14)
AST SERPL W P-5'-P-CCNC: 14 U/L (ref 0–45)
BILIRUB SERPL-MCNC: 0.4 MG/DL (ref 0.2–1.3)
BUN SERPL-MCNC: 13 MG/DL (ref 7–30)
CALCIUM SERPL-MCNC: 8.8 MG/DL (ref 8.5–10.1)
CHLORIDE BLD-SCNC: 109 MMOL/L (ref 94–109)
CHOLEST SERPL-MCNC: 194 MG/DL
CO2 SERPL-SCNC: 28 MMOL/L (ref 20–32)
CREAT SERPL-MCNC: 0.77 MG/DL (ref 0.52–1.04)
ERYTHROCYTE [DISTWIDTH] IN BLOOD BY AUTOMATED COUNT: 12.1 % (ref 10–15)
FASTING STATUS PATIENT QL REPORTED: YES
GFR SERPL CREATININE-BSD FRML MDRD: >90 ML/MIN/1.73M2
GLUCOSE BLD-MCNC: 88 MG/DL (ref 70–99)
HCT VFR BLD AUTO: 40.4 % (ref 35–47)
HDLC SERPL-MCNC: 61 MG/DL
HGB BLD-MCNC: 12.9 G/DL (ref 11.7–15.7)
LDLC SERPL CALC-MCNC: 120 MG/DL
MCH RBC QN AUTO: 30.5 PG (ref 26.5–33)
MCHC RBC AUTO-ENTMCNC: 31.9 G/DL (ref 31.5–36.5)
MCV RBC AUTO: 96 FL (ref 78–100)
NONHDLC SERPL-MCNC: 133 MG/DL
PLATELET # BLD AUTO: 335 10E3/UL (ref 150–450)
POTASSIUM BLD-SCNC: 4.2 MMOL/L (ref 3.4–5.3)
PROT SERPL-MCNC: 7.6 G/DL (ref 6.8–8.8)
RBC # BLD AUTO: 4.23 10E6/UL (ref 3.8–5.2)
SODIUM SERPL-SCNC: 140 MMOL/L (ref 133–144)
TRIGL SERPL-MCNC: 65 MG/DL
TSH SERPL DL<=0.005 MIU/L-ACNC: 1.63 MU/L (ref 0.4–4)
WBC # BLD AUTO: 6.2 10E3/UL (ref 4–11)

## 2022-03-01 PROCEDURE — 80053 COMPREHEN METABOLIC PANEL: CPT

## 2022-03-01 PROCEDURE — 36415 COLL VENOUS BLD VENIPUNCTURE: CPT

## 2022-03-01 PROCEDURE — 80061 LIPID PANEL: CPT

## 2022-03-01 PROCEDURE — 84443 ASSAY THYROID STIM HORMONE: CPT

## 2022-03-01 PROCEDURE — 85027 COMPLETE CBC AUTOMATED: CPT

## 2022-03-03 RX ORDER — LEVOTHYROXINE SODIUM 75 UG/1
75 TABLET ORAL DAILY
Qty: 30 TABLET | Refills: 0 | Status: SHIPPED | OUTPATIENT
Start: 2022-03-03 | End: 2022-03-31

## 2022-03-31 ENCOUNTER — MYC REFILL (OUTPATIENT)
Dept: FAMILY MEDICINE | Facility: OTHER | Age: 37
End: 2022-03-31
Payer: COMMERCIAL

## 2022-03-31 DIAGNOSIS — E03.9 HYPOTHYROIDISM, UNSPECIFIED TYPE: ICD-10-CM

## 2022-03-31 RX ORDER — LEVOTHYROXINE SODIUM 75 UG/1
75 TABLET ORAL DAILY
Qty: 30 TABLET | Refills: 5 | Status: SHIPPED | OUTPATIENT
Start: 2022-03-31 | End: 2022-09-26

## 2022-04-06 ENCOUNTER — OFFICE VISIT (OUTPATIENT)
Dept: FAMILY MEDICINE | Facility: OTHER | Age: 37
End: 2022-04-06
Payer: COMMERCIAL

## 2022-04-06 VITALS
OXYGEN SATURATION: 97 % | HEART RATE: 89 BPM | TEMPERATURE: 100 F | WEIGHT: 167 LBS | SYSTOLIC BLOOD PRESSURE: 126 MMHG | DIASTOLIC BLOOD PRESSURE: 76 MMHG | RESPIRATION RATE: 20 BRPM | BODY MASS INDEX: 23.91 KG/M2 | HEIGHT: 70 IN

## 2022-04-06 DIAGNOSIS — H69.93 DYSFUNCTION OF BOTH EUSTACHIAN TUBES: ICD-10-CM

## 2022-04-06 DIAGNOSIS — J01.90 ACUTE NON-RECURRENT SINUSITIS, UNSPECIFIED LOCATION: Primary | ICD-10-CM

## 2022-04-06 PROCEDURE — 99213 OFFICE O/P EST LOW 20 MIN: CPT | Performed by: PHYSICIAN ASSISTANT

## 2022-04-06 RX ORDER — CEFDINIR 300 MG/1
300 CAPSULE ORAL 2 TIMES DAILY
Qty: 14 CAPSULE | Refills: 0 | Status: SHIPPED | OUTPATIENT
Start: 2022-04-06 | End: 2022-04-13

## 2022-04-06 ASSESSMENT — PAIN SCALES - GENERAL: PAINLEVEL: EXTREME PAIN (8)

## 2022-04-06 NOTE — PATIENT INSTRUCTIONS
"1. Antibiotic:I sent over Cefdinir if the symptoms aren't improving so you can start taking it.   Take the full course of the antibiotic.  Can consider adding in a probiotic or yogurt if GI upset is a concern. Follow-up if any problems with the antibiotic.    2. Nasal Saline: At the pharmacy you can find a \"Nettipot\" or NeilMed Nasal Rinse.  This is a salt solution that you mix with warm water.  I want you to perform nasal saline washes at least twice daily while you are sick.  You can do this anytime you feel like you are developing nasal congestion.  To properly utilize be sure you are leaning forward as far as you can and either tilt or squeeze slowly.  In the beginning this can be difficult to get to work properly but as the congestion is improved it will work easier.  If you don't use these properly you can feel as if you're drowning.     3. Nasal Steroid: Fluticasone/Flonase or Nasacort, These are OTC medications for allergies.  They are steroid sprays that are localized to the nose so no systemic effects. Two sprays each nostril once daily - allergist noted it is most effective if used before bed.  Ok to continue to use nasal saline as well.  When you spray it in the nose it should be up and out towards the eye on the side you are spraying the medication.  You should not taste the medication and it should not drip out the nose. This will decrease inflammation and also nasal mucous production.  You can also use this anytime you are developing nasal congestion.  Ok in the future to start these as soon as you feel you are developing nasal congestion, sinus pressure, increased nasal drainage.    4. Heat packs on sinuses    5. Ibuprofen as needed      Follow-up if symptoms worsen or do not resolve.  Feel free to call with any questions or concerns.      "

## 2022-04-06 NOTE — PROGRESS NOTES
Assessment & Plan     Acute non-recurrent sinusitis, unspecified location  Dysfunction of both eustachian tubes  Suspect eustachian tube dysfunction is the cause of the ear pain, no signs of acute bacterial infection of the ear at this time.  More concerned that she is bordering timeline for bacterial sinusitis.  At this point we are trying to avoid anything that will be excreted in breastmilk more and her son who is breastfeeding is already on Augmentin.  We will start with nasal steroid spray, nasal saline rinses, valsalva maneuvers, heat, ibuprofen, neck massage to see if this resolves the pain, if it isn't then start the Cefdinir.  Encouraged probiotics as needed.    - cefdinir (OMNICEF) 300 MG capsule; Take 1 capsule (300 mg) by mouth 2 times daily for 7 days      Return in about 1 week (around 4/13/2022) for If not improving, sooner if worse or new concerns.     Options for treatment and follow-up care were reviewed with the patient and/or guardian. Patient and/or guardian engaged in the decision making process and verbalized understanding of the options discussed and agreed with the final plan.     Romelia Parnell PA-C  [facility]    Ena Birch is a 36 year old who presents for the following health issues     History of Present Illness       Reason for visit:  Ear infection  Symptom onset:  3-7 days ago  Symptoms include:  Ear ache  Symptom intensity:  Severe  Symptom progression:  Worsening  Had these symptoms before:  No    She eats 4 or more servings of fruits and vegetables daily.She consumes 1 sweetened beverage(s) daily.She exercises with enough effort to increase her heart rate 9 or less minutes per day.  She exercises with enough effort to increase her heart rate 3 or less days per week.   She is taking medications regularly.       Acute Illness  Acute illness concerns: cold symptoms.   Onset/Duration: 9 days  Symptoms:  Fever: no  Chills/Sweats: no  Headache  "(location?): no  Sinus Pressure: no  Conjunctivitis:  no  Ear Pain: YES: Was on left but went away but now just the right.  No ear drainage.   Rhinorrhea: YES  Congestion: YES  Sore Throat: Yes right side with the ear  Cough: YES - dry tickle cough that is resolving.   Wheeze: no  Decreased Appetite: no  Nausea: no  Vomiting: no  Diarrhea: no  Sick/Strep Exposure: no  Therapies tried and outcome: ibuprofen, cough drops.     - No history of ear issues.   - Son has double ear infection. Her other child is sick as well and they are being assessed today.   - Currently breastfeeding so avoiding taking much OTC.   - Her cold symptoms for the most part are improving but the ear pain is getting worse.     Review of Systems   Constitutional, HEENT, cardiovascular, pulmonary, gi and gu systems are negative, except as otherwise noted.      Objective    /76   Pulse 89   Temp 100  F (37.8  C) (Temporal)   Resp 20   Ht 1.778 m (5' 10\")   Wt 75.8 kg (167 lb)   LMP 03/23/2022   SpO2 97%   Breastfeeding Yes   BMI 23.96 kg/m    Body mass index is 23.96 kg/m .  Physical Exam   GENERAL: healthy, alert and no distress  EYES: Eyes grossly normal to inspection, PERRL and conjunctivae and sclerae normal  HENT: normal cephalic/atraumatic, right ear: EAC patent, TM is retracted, no erythema, slightly dull but no effusion noted, left ear:  EAC patent, TM is retracted, no erythema, slightly dull but no effusion noted, nasal mucosa edematous , rhinorrhea white, oropharynx clear and oral mucous membranes moist  NECK: no adenopathy, no asymmetry, masses, or scars and thyroid normal to palpation  RESP: lungs clear to auscultation - no rales, rhonchi or wheezes  CV: regular rate and rhythm, normal S1 S2, no S3 or S4, no murmur, click or rub, no peripheral edema and peripheral pulses strong  MS: no gross musculoskeletal defects noted, no edema            "

## 2022-09-11 ENCOUNTER — HEALTH MAINTENANCE LETTER (OUTPATIENT)
Age: 37
End: 2022-09-11

## 2022-09-22 DIAGNOSIS — E03.9 HYPOTHYROIDISM, UNSPECIFIED TYPE: ICD-10-CM

## 2022-09-26 RX ORDER — LEVOTHYROXINE SODIUM 75 UG/1
TABLET ORAL
Qty: 30 TABLET | Refills: 5 | Status: SHIPPED | OUTPATIENT
Start: 2022-09-26 | End: 2023-03-25

## 2022-10-24 ENCOUNTER — OFFICE VISIT (OUTPATIENT)
Dept: OBGYN | Facility: OTHER | Age: 37
End: 2022-10-24
Payer: COMMERCIAL

## 2022-10-24 VITALS — WEIGHT: 166 LBS | BODY MASS INDEX: 23.82 KG/M2 | SYSTOLIC BLOOD PRESSURE: 127 MMHG | DIASTOLIC BLOOD PRESSURE: 93 MMHG

## 2022-10-24 DIAGNOSIS — E03.8 OTHER SPECIFIED HYPOTHYROIDISM: ICD-10-CM

## 2022-10-24 DIAGNOSIS — Z32.01 PREGNANCY EXAMINATION OR TEST, POSITIVE RESULT: Primary | ICD-10-CM

## 2022-10-24 DIAGNOSIS — N92.1 MENORRHAGIA WITH IRREGULAR CYCLE: ICD-10-CM

## 2022-10-24 LAB
B-HCG SERPL-ACNC: 880 IU/L (ref 0–5)
TSH SERPL DL<=0.005 MIU/L-ACNC: 2.22 MU/L (ref 0.4–4)

## 2022-10-24 PROCEDURE — 84443 ASSAY THYROID STIM HORMONE: CPT | Performed by: OBSTETRICS & GYNECOLOGY

## 2022-10-24 PROCEDURE — 84702 CHORIONIC GONADOTROPIN TEST: CPT | Performed by: OBSTETRICS & GYNECOLOGY

## 2022-10-24 PROCEDURE — 99214 OFFICE O/P EST MOD 30 MIN: CPT | Performed by: OBSTETRICS & GYNECOLOGY

## 2022-10-24 PROCEDURE — 36415 COLL VENOUS BLD VENIPUNCTURE: CPT | Performed by: OBSTETRICS & GYNECOLOGY

## 2022-10-24 NOTE — PATIENT INSTRUCTIONS
Please call if you any questions.    [address]   [id number]  [phone]        Harriet Rascon,

## 2022-10-24 NOTE — PROGRESS NOTES
CC: Absence of menses/amenorrhea  HPI: Patient is here today because she has not had a menses since 2022.  She took 1 home pregnancy tests and it was positive.  Patient is on a prenatal vitamin.  All questions answered.   at 5w0d.  Patient has been very cold lately and is wondering if her thyroid is off.  History of thyroid issues.  She is still breastfeeding.  Patient originally scheduled this appointment due to irregular menses.    ROS: 10 point ROS neg other than the symptoms noted above in the HPI.    BP (!) 127/93 (BP Location: Right arm, Cuff Size: Adult Regular)   Wt 75.3 kg (166 lb)   LMP 2022   Breastfeeding Yes   BMI 23.82 kg/m      Assessment/Plan:  1.)  Amenorrhea=quant HcG  2.)  Schedule initial ob visit  3.)  HTN= check blood pressures at home  4.)  History of anxiety/depression=stable  5.)  History of HELLP syndrome with first child  6.)  Hypothyroidism= check TSH    30 minutes was spent face to face with the patient today discussing her history, diagnosis, and follow-up plan as noted above.  Over 50% of the visit was spent in counseling and coordination of care.    Total Visit Time: 30 minutes      Harriet Rascon DO

## 2022-10-25 ENCOUNTER — MYC MEDICAL ADVICE (OUTPATIENT)
Dept: OBGYN | Facility: OTHER | Age: 37
End: 2022-10-25

## 2022-10-31 ENCOUNTER — MYC MEDICAL ADVICE (OUTPATIENT)
Dept: OBGYN | Facility: OTHER | Age: 37
End: 2022-10-31

## 2022-10-31 DIAGNOSIS — O26.859 SPOTTING IN PREGNANCY: Primary | ICD-10-CM

## 2022-10-31 NOTE — TELEPHONE ENCOUNTER
LMP 9/19/2022, currently 6w1d.    Pt having concerns for spotting since Friday, last had intercourse last Wednesday. Pt denies cramping, vaginal itching, burning or abnormal discharge.     Pt states spotting has increased this AM, asking if she can be seen or other recommendation.    RN routing to provider if able to work pt in or HCG quant orders.    Rose Heck RN on 11/1/2022 at 7:21 AM

## 2022-11-01 ENCOUNTER — LAB (OUTPATIENT)
Dept: LAB | Facility: OTHER | Age: 37
End: 2022-11-01
Payer: COMMERCIAL

## 2022-11-01 DIAGNOSIS — O26.859 SPOTTING IN PREGNANCY: ICD-10-CM

## 2022-11-01 LAB — B-HCG SERPL-ACNC: 1570 IU/L (ref 0–5)

## 2022-11-01 PROCEDURE — 36415 COLL VENOUS BLD VENIPUNCTURE: CPT

## 2022-11-01 PROCEDURE — 84702 CHORIONIC GONADOTROPIN TEST: CPT

## 2022-11-01 NOTE — TELEPHONE ENCOUNTER
Please call patient and let her know she should do the serial quant's.  I would like them to be 48 hours apart.  When the number is high enough, we can get an ultrasound to see what is going on.  Please give her bleeding precautions.  Thanks.    Harriet Rascon, DO

## 2022-11-02 ENCOUNTER — MYC MEDICAL ADVICE (OUTPATIENT)
Dept: OBGYN | Facility: OTHER | Age: 37
End: 2022-11-02

## 2022-11-03 ENCOUNTER — LAB (OUTPATIENT)
Dept: LAB | Facility: OTHER | Age: 37
End: 2022-11-03
Payer: COMMERCIAL

## 2022-11-03 ENCOUNTER — MYC MEDICAL ADVICE (OUTPATIENT)
Dept: OBGYN | Facility: OTHER | Age: 37
End: 2022-11-03

## 2022-11-03 DIAGNOSIS — O26.859 SPOTTING IN PREGNANCY: ICD-10-CM

## 2022-11-03 LAB — B-HCG SERPL-ACNC: 1287 IU/L (ref 0–5)

## 2022-11-03 PROCEDURE — 36415 COLL VENOUS BLD VENIPUNCTURE: CPT

## 2022-11-03 PROCEDURE — 84702 CHORIONIC GONADOTROPIN TEST: CPT

## 2022-11-03 NOTE — TELEPHONE ENCOUNTER
Pt last seen 10/24/2022 for pregnancy test, HCG quants rising slowly and pt having increased bleeding last night. Pt believes she is miscarrying.    Pt denies fever, bleeding similar to period, not soaking a pad, cramping has been manageable needing to take anything OTC.    HCG quants from today still in process. Pt wanting to know best next steps when they come back.    ED bleeding precautions relayed.    Rose Heck RN on 11/3/2022 at 12:58 PM

## 2022-11-10 ENCOUNTER — LAB (OUTPATIENT)
Dept: LAB | Facility: OTHER | Age: 37
End: 2022-11-10
Payer: COMMERCIAL

## 2022-11-10 DIAGNOSIS — O26.859 SPOTTING IN PREGNANCY: ICD-10-CM

## 2022-11-10 LAB — B-HCG SERPL-ACNC: 637 IU/L (ref 0–5)

## 2022-11-10 PROCEDURE — 84702 CHORIONIC GONADOTROPIN TEST: CPT

## 2022-11-10 PROCEDURE — 36415 COLL VENOUS BLD VENIPUNCTURE: CPT

## 2022-11-11 ENCOUNTER — MYC MEDICAL ADVICE (OUTPATIENT)
Dept: OBGYN | Facility: OTHER | Age: 37
End: 2022-11-11

## 2022-11-11 NOTE — TELEPHONE ENCOUNTER
Please let patient know I usually recommend TSH's be around 2-3 when actively trying to get pregnant.  Her last one was 2.2 like she said.  I think that is ok.  We can always recheck it after her quant is normalized before she actively starts trying again.    Harriet Rascon, DO

## 2022-11-16 ENCOUNTER — LAB (OUTPATIENT)
Dept: LAB | Facility: OTHER | Age: 37
End: 2022-11-16
Payer: COMMERCIAL

## 2022-11-16 ENCOUNTER — DOCUMENTATION ONLY (OUTPATIENT)
Dept: OBGYN | Facility: CLINIC | Age: 37
End: 2022-11-16

## 2022-11-16 DIAGNOSIS — O26.859 SPOTTING IN PREGNANCY: Primary | ICD-10-CM

## 2022-11-16 DIAGNOSIS — O26.859 SPOTTING IN PREGNANCY: ICD-10-CM

## 2022-11-16 LAB — B-HCG SERPL-ACNC: 352 IU/L (ref 0–5)

## 2022-11-16 PROCEDURE — 84702 CHORIONIC GONADOTROPIN TEST: CPT

## 2022-11-16 PROCEDURE — 36415 COLL VENOUS BLD VENIPUNCTURE: CPT

## 2022-11-16 NOTE — PROGRESS NOTES
This patient has an appointment for lab work but does not have any orders. Please place some future orders as needed.    Thank You,  Vi Mazariegos MLT (ASCP)

## 2022-11-16 NOTE — PROGRESS NOTES
Review of mychart and labs results. Patient is to repeat HCG Quant weekly or every other week until <5 which is normal range.    Lab only placed today.    Yohannes Zamora, RADHAN, RN, PHN  [facility] ~ Registered Nurse  Clinic Triage ~ [place] [facility] [place]  November 16, 2022

## 2022-11-25 ENCOUNTER — MYC MEDICAL ADVICE (OUTPATIENT)
Dept: OBGYN | Facility: OTHER | Age: 37
End: 2022-11-25

## 2022-11-25 DIAGNOSIS — O02.1 MISSED ABORTION: Primary | ICD-10-CM

## 2022-11-25 NOTE — TELEPHONE ENCOUNTER
Per HCG results 11/16/22, repeat in 1 week requested.    Nadeen Alvaardo RN on 11/25/2022 at 10:17 AM

## 2022-12-02 ENCOUNTER — LAB (OUTPATIENT)
Dept: LAB | Facility: OTHER | Age: 37
End: 2022-12-02
Payer: COMMERCIAL

## 2022-12-02 DIAGNOSIS — O02.1 MISSED ABORTION: ICD-10-CM

## 2022-12-02 LAB — B-HCG SERPL-ACNC: 5 IU/L (ref 0–5)

## 2022-12-02 PROCEDURE — 36415 COLL VENOUS BLD VENIPUNCTURE: CPT

## 2022-12-02 PROCEDURE — 84702 CHORIONIC GONADOTROPIN TEST: CPT

## 2023-01-26 ENCOUNTER — MYC MEDICAL ADVICE (OUTPATIENT)
Dept: OBGYN | Facility: OTHER | Age: 38
End: 2023-01-26
Payer: COMMERCIAL

## 2023-01-26 DIAGNOSIS — Z32.01 PREGNANCY TEST POSITIVE: Primary | ICD-10-CM

## 2023-01-26 NOTE — TELEPHONE ENCOUNTER
LMP 12/26/2022, currently 4w3d. Pt has hx of miscarriage 11/2022.    Pt has remaining HCG quant orders, RN routing to provider if OK for pt to proceed with these and timing of first US/New Prenatal appt given hx.    Rose Heck RN on 1/26/2023 at 3:40 PM

## 2023-01-27 NOTE — TELEPHONE ENCOUNTER
Please let patient know I recommend she have serial quant's done.  When the number is high enough, I will let her know and she can have the ultrasound done.  Thanks.    Harriet Rascon,

## 2023-02-07 ENCOUNTER — LAB (OUTPATIENT)
Dept: LAB | Facility: OTHER | Age: 38
End: 2023-02-07
Payer: COMMERCIAL

## 2023-02-07 DIAGNOSIS — Z32.01 PREGNANCY TEST POSITIVE: ICD-10-CM

## 2023-02-07 LAB — HCG INTACT+B SERPL-ACNC: ABNORMAL MIU/ML

## 2023-02-07 PROCEDURE — 84702 CHORIONIC GONADOTROPIN TEST: CPT

## 2023-02-07 PROCEDURE — 36415 COLL VENOUS BLD VENIPUNCTURE: CPT

## 2023-02-10 ENCOUNTER — LAB (OUTPATIENT)
Dept: LAB | Facility: OTHER | Age: 38
End: 2023-02-10
Payer: COMMERCIAL

## 2023-02-10 DIAGNOSIS — Z32.01 PREGNANCY TEST POSITIVE: ICD-10-CM

## 2023-02-10 LAB — HCG INTACT+B SERPL-ACNC: ABNORMAL MIU/ML

## 2023-02-10 PROCEDURE — 84702 CHORIONIC GONADOTROPIN TEST: CPT

## 2023-02-10 PROCEDURE — 36415 COLL VENOUS BLD VENIPUNCTURE: CPT

## 2023-02-16 ENCOUNTER — ANCILLARY PROCEDURE (OUTPATIENT)
Dept: ULTRASOUND IMAGING | Facility: OTHER | Age: 38
End: 2023-02-16
Attending: OBSTETRICS & GYNECOLOGY
Payer: COMMERCIAL

## 2023-02-16 DIAGNOSIS — Z32.01 PREGNANCY TEST POSITIVE: ICD-10-CM

## 2023-02-16 PROCEDURE — 76801 OB US < 14 WKS SINGLE FETUS: CPT | Mod: TC | Performed by: RADIOLOGY

## 2023-02-20 ENCOUNTER — MYC MEDICAL ADVICE (OUTPATIENT)
Dept: OBGYN | Facility: OTHER | Age: 38
End: 2023-02-20
Payer: COMMERCIAL

## 2023-02-21 NOTE — TELEPHONE ENCOUNTER
Doxylamine succinate is expected to be found in breast milk although actual concentrations are not known (Sweta 2013).  Adverse events (unusual excitement, irritability, sedation) may be expected in a breastfeeding infant. Infants with apnea or other respiratory conditions may be more vulnerable. In general, if a breastfeeding infant is exposed to a first generation antihistamine via breast milk, they should be monitored for irritability or drowsiness (Elisabeth 2014).    Please let patient know the Up To Date recommendations.    Harriet Rascon, DO

## 2023-03-06 LAB
ABO/RH(D): NORMAL
ANTIBODY SCREEN: NEGATIVE
SPECIMEN EXPIRATION DATE: NORMAL

## 2023-03-07 ENCOUNTER — PRENATAL OFFICE VISIT (OUTPATIENT)
Dept: OBGYN | Facility: CLINIC | Age: 38
End: 2023-03-07
Payer: COMMERCIAL

## 2023-03-07 ENCOUNTER — TRANSCRIBE ORDERS (OUTPATIENT)
Dept: MATERNAL FETAL MEDICINE | Facility: CLINIC | Age: 38
End: 2023-03-07

## 2023-03-07 VITALS
HEIGHT: 70 IN | BODY MASS INDEX: 24.2 KG/M2 | WEIGHT: 169 LBS | SYSTOLIC BLOOD PRESSURE: 120 MMHG | DIASTOLIC BLOOD PRESSURE: 72 MMHG

## 2023-03-07 DIAGNOSIS — O14.20 HEMOLYSIS, ELEVATED LIVER ENZYMES, AND LOW PLATELET (HELLP) SYNDROME DURING PREGNANCY, ANTEPARTUM: ICD-10-CM

## 2023-03-07 DIAGNOSIS — O34.219 DESIRES VBAC (VAGINAL BIRTH AFTER CESAREAN) TRIAL: ICD-10-CM

## 2023-03-07 DIAGNOSIS — F33.0 MILD EPISODE OF RECURRENT MAJOR DEPRESSIVE DISORDER (H): ICD-10-CM

## 2023-03-07 DIAGNOSIS — F41.1 GAD (GENERALIZED ANXIETY DISORDER): ICD-10-CM

## 2023-03-07 DIAGNOSIS — N39.0 URINARY TRACT INFECTION WITHOUT HEMATURIA, SITE UNSPECIFIED: Primary | ICD-10-CM

## 2023-03-07 DIAGNOSIS — O26.90 PREGNANCY RELATED CONDITION, ANTEPARTUM: Primary | ICD-10-CM

## 2023-03-07 DIAGNOSIS — O09.521 MULTIGRAVIDA OF ADVANCED MATERNAL AGE IN FIRST TRIMESTER: Primary | ICD-10-CM

## 2023-03-07 PROBLEM — Z32.01 PREGNANCY EXAMINATION OR TEST, POSITIVE RESULT: Status: RESOLVED | Noted: 2022-10-24 | Resolved: 2023-03-07

## 2023-03-07 LAB
ALBUMIN MFR UR ELPH: 9 MG/DL (ref 1–14)
ALBUMIN SERPL-MCNC: 3.8 G/DL (ref 3.4–5)
ALBUMIN UR-MCNC: NEGATIVE MG/DL
ALP SERPL-CCNC: 37 U/L (ref 40–150)
ALT SERPL W P-5'-P-CCNC: 21 U/L (ref 0–50)
ANION GAP SERPL CALCULATED.3IONS-SCNC: 4 MMOL/L (ref 3–14)
APPEARANCE UR: CLEAR
AST SERPL W P-5'-P-CCNC: 12 U/L (ref 0–45)
BACTERIA #/AREA URNS HPF: ABNORMAL /HPF
BASOPHILS # BLD AUTO: 0 10E3/UL (ref 0–0.2)
BASOPHILS NFR BLD AUTO: 0 %
BILIRUB SERPL-MCNC: 0.3 MG/DL (ref 0.2–1.3)
BILIRUB UR QL STRIP: NEGATIVE
BUN SERPL-MCNC: 10 MG/DL (ref 7–30)
C TRACH DNA SPEC QL NAA+PROBE: NEGATIVE
CALCIUM SERPL-MCNC: 9 MG/DL (ref 8.5–10.1)
CHLORIDE BLD-SCNC: 105 MMOL/L (ref 94–109)
CO2 SERPL-SCNC: 27 MMOL/L (ref 20–32)
COLOR UR AUTO: ABNORMAL
CREAT SERPL-MCNC: 0.67 MG/DL (ref 0.52–1.04)
CREAT UR-MCNC: 37.2 MG/DL
EOSINOPHIL # BLD AUTO: 0 10E3/UL (ref 0–0.7)
EOSINOPHIL NFR BLD AUTO: 0 %
ERYTHROCYTE [DISTWIDTH] IN BLOOD BY AUTOMATED COUNT: 12.3 % (ref 10–15)
GFR SERPL CREATININE-BSD FRML MDRD: >90 ML/MIN/1.73M2
GLUCOSE BLD-MCNC: 78 MG/DL (ref 70–99)
GLUCOSE UR STRIP-MCNC: NEGATIVE MG/DL
HCT VFR BLD AUTO: 36.7 % (ref 35–47)
HGB BLD-MCNC: 12.4 G/DL (ref 11.7–15.7)
HGB UR QL STRIP: NEGATIVE
IMM GRANULOCYTES # BLD: 0 10E3/UL
IMM GRANULOCYTES NFR BLD: 0 %
KETONES UR STRIP-MCNC: NEGATIVE MG/DL
LEUKOCYTE ESTERASE UR QL STRIP: NEGATIVE
LYMPHOCYTES # BLD AUTO: 1.1 10E3/UL (ref 0.8–5.3)
LYMPHOCYTES NFR BLD AUTO: 12 %
MCH RBC QN AUTO: 31.3 PG (ref 26.5–33)
MCHC RBC AUTO-ENTMCNC: 33.8 G/DL (ref 31.5–36.5)
MCV RBC AUTO: 93 FL (ref 78–100)
MONOCYTES # BLD AUTO: 0.4 10E3/UL (ref 0–1.3)
MONOCYTES NFR BLD AUTO: 4 %
N GONORRHOEA DNA SPEC QL NAA+PROBE: NEGATIVE
NEUTROPHILS # BLD AUTO: 8.2 10E3/UL (ref 1.6–8.3)
NEUTROPHILS NFR BLD AUTO: 84 %
NITRATE UR QL: POSITIVE
NRBC # BLD AUTO: 0 10E3/UL
NRBC BLD AUTO-RTO: 0 /100
PH UR STRIP: 6.5 [PH] (ref 5–7)
PLATELET # BLD AUTO: 270 10E3/UL (ref 150–450)
POTASSIUM BLD-SCNC: 3.7 MMOL/L (ref 3.4–5.3)
PROT SERPL-MCNC: 7.4 G/DL (ref 6.8–8.8)
PROT/CREAT 24H UR: 0.24 MG/MG CR (ref 0–0.2)
RBC # BLD AUTO: 3.96 10E6/UL (ref 3.8–5.2)
RBC #/AREA URNS AUTO: ABNORMAL /HPF
SODIUM SERPL-SCNC: 136 MMOL/L (ref 133–144)
SP GR UR STRIP: 1.01 (ref 1–1.03)
TSH SERPL DL<=0.005 MIU/L-ACNC: 1.49 MU/L (ref 0.4–4)
UROBILINOGEN UR STRIP-MCNC: NORMAL MG/DL
WBC # BLD AUTO: 9.9 10E3/UL (ref 4–11)
WBC #/AREA URNS AUTO: ABNORMAL /HPF

## 2023-03-07 PROCEDURE — 87389 HIV-1 AG W/HIV-1&-2 AB AG IA: CPT | Performed by: OBSTETRICS & GYNECOLOGY

## 2023-03-07 PROCEDURE — 80050 GENERAL HEALTH PANEL: CPT | Performed by: OBSTETRICS & GYNECOLOGY

## 2023-03-07 PROCEDURE — 87491 CHLMYD TRACH DNA AMP PROBE: CPT | Performed by: OBSTETRICS & GYNECOLOGY

## 2023-03-07 PROCEDURE — 81001 URINALYSIS AUTO W/SCOPE: CPT | Performed by: OBSTETRICS & GYNECOLOGY

## 2023-03-07 PROCEDURE — 87591 N.GONORRHOEAE DNA AMP PROB: CPT | Performed by: OBSTETRICS & GYNECOLOGY

## 2023-03-07 PROCEDURE — 99207 PR FIRST OB VISIT: CPT | Performed by: OBSTETRICS & GYNECOLOGY

## 2023-03-07 PROCEDURE — 86901 BLOOD TYPING SEROLOGIC RH(D): CPT | Performed by: OBSTETRICS & GYNECOLOGY

## 2023-03-07 PROCEDURE — 84156 ASSAY OF PROTEIN URINE: CPT | Performed by: OBSTETRICS & GYNECOLOGY

## 2023-03-07 PROCEDURE — 36415 COLL VENOUS BLD VENIPUNCTURE: CPT | Performed by: OBSTETRICS & GYNECOLOGY

## 2023-03-07 PROCEDURE — 86900 BLOOD TYPING SEROLOGIC ABO: CPT | Performed by: OBSTETRICS & GYNECOLOGY

## 2023-03-07 PROCEDURE — 87340 HEPATITIS B SURFACE AG IA: CPT | Performed by: OBSTETRICS & GYNECOLOGY

## 2023-03-07 PROCEDURE — 87086 URINE CULTURE/COLONY COUNT: CPT | Performed by: OBSTETRICS & GYNECOLOGY

## 2023-03-07 PROCEDURE — 86850 RBC ANTIBODY SCREEN: CPT | Performed by: OBSTETRICS & GYNECOLOGY

## 2023-03-07 PROCEDURE — 86762 RUBELLA ANTIBODY: CPT | Performed by: OBSTETRICS & GYNECOLOGY

## 2023-03-07 PROCEDURE — 87186 SC STD MICRODIL/AGAR DIL: CPT | Performed by: OBSTETRICS & GYNECOLOGY

## 2023-03-07 PROCEDURE — 86803 HEPATITIS C AB TEST: CPT | Performed by: OBSTETRICS & GYNECOLOGY

## 2023-03-07 PROCEDURE — 86780 TREPONEMA PALLIDUM: CPT | Performed by: OBSTETRICS & GYNECOLOGY

## 2023-03-07 RX ORDER — CEPHALEXIN 500 MG/1
500 CAPSULE ORAL 4 TIMES DAILY
Qty: 20 CAPSULE | Refills: 0 | Status: SHIPPED | OUTPATIENT
Start: 2023-03-07 | End: 2023-04-18

## 2023-03-07 NOTE — PATIENT INSTRUCTIONS
Please call if you any questions.    [facility]  [address] [place]   [address]  [phone]        Harriet Rascon,

## 2023-03-07 NOTE — PROGRESS NOTES
HPI:    Queta is a 37 year old yo  at 10 2/7 weeks by LMP and early ultrasound who presents today for her initial OB visit.  Patient is not due for pap smear today.    Issues: Patient is still breastfeeding.  She is weaning down=6 mins of nursing total a day.      Past OB Hx: History of HELLP.  1 c section.  2 VBACs.  1 SAB.     Father of baby: No health issues       Past Medical History:   Diagnosis Date     Depressive disorder 16    anxiety     History of blood transfusion 16     Hypertension 16     NO ACTIVE PROBLEMS              Past Surgical History:   Procedure Laterality Date     APPENDECTOMY  2014      SECTION N/A 10/29/2016    Procedure:  SECTION;  Surgeon: Adonis Dooley MD;  Location: [facility]        Family History   Problem Relation Age of Onset     Hypertension Mother      Osteoporosis Maternal Grandmother      Unknown/Adopted Father      Unknown/Adopted Paternal Grandmother      Unknown/Adopted Paternal Grandfather         Social History     Socioeconomic History     Marital status:      Spouse name: Not on file     Number of children: Not on file     Years of education: Not on file     Highest education level: Not on file   Occupational History     Not on file   Tobacco Use     Smoking status: Never     Smokeless tobacco: Never   Vaping Use     Vaping Use: Never used   Substance and Sexual Activity     Alcohol use: No     Drug use: No     Sexual activity: Yes     Partners: Male     Birth control/protection: None   Other Topics Concern     Parent/sibling w/ CABG, MI or angioplasty before 65F 55M? Not Asked      Service No     Blood Transfusions No     Caffeine Concern No     Occupational Exposure Yes     Comment: childcare and      Hobby Hazards No     Sleep Concern No     Stress Concern No     Weight Concern No     Special Diet No     Back Care No     Exercise No     Comment: Walking      Bike Helmet Not Asked     Seat  Belt Yes     Self-Exams Not Asked   Social History Narrative    Lives in [place] with , Mikey.  She works as a  and does childcare.   Mikey works in a factory.   Neither of them smokes.   Has two indoor cats.  Advised about toxoplasmosis precautions.  No concerns about domestic violence.     Social Determinants of Health     Financial Resource Strain: Not on file   Food Insecurity: Not on file   Transportation Needs: Not on file   Physical Activity: Not on file   Stress: Not on file   Social Connections: Not on file   Intimate Partner Violence: Not on file   Housing Stability: Not on file         Current Outpatient Medications:      levothyroxine (SYNTHROID/LEVOTHROID) 75 MCG tablet, TAKE ONE TABLET BY MOUTH ONCE DAILY, Disp: 30 tablet, Rfl: 5     Prenatal Vit-Fe Fumarate-FA (PRENATAL MULTIVITAMIN W/IRON) 27-0.8 MG tablet, Take 1 tablet by mouth daily, Disp: , Rfl:      levothyroxine (SYNTHROID/LEVOTHROID) 25 MCG tablet, TAKE ONE AND ONE-HALF TABLETS BY MOUTH EVERY DAY FOUR TIMES A WEEK AND TAKE TWO TABLETS BY MOUTH EVERY DAY THREE TIMES A WEEK (Patient not taking: Reported on 4/6/2022), Disp: 90 tablet, Rfl: 1      Objective:  Physical Exam:   Breast:  Benign exam, no masses palpated.  No skin changes, no axillary lymphadenopathy, no nipple discharge.  Axilla feel completely normal, no lymph node enlargement and non-tender.  Heart=RRR, no murmurs  Thyroid=normal, no masses, no TTP  Lungs=Clear to ascultation bilateral, non-labored breathing  Abd=soft, Nontender/nondistended, +bowel sounds x4  PELVIC:    External genitalia: normal without lesion  Vagina: normal mucosa and rugae, no discharge.  Cervix: normal without lesion, healthy, multiparous,  GC and Chlamydia obtained  Uterus: small, mobile, nontender.  Adnexa: non tender, without masses  Rectal: deffered  Ext=no clubbing or cyanosis  FHTs=present on BSUS    Assessment:   1.  IUP at 10 2/7 weeks here for her initial OB  visit    Plan:    1.  PNV  2.  NOB Labs   3.  Discussed routine prenatal care, quad screen, GCT, anatomy scan at ~19 weeks, frequency of visits.  4.  Discussed first trimester screen and she wants this done.  Referral placed.  5.  [place] [facility]  6.  RTC 4 weeks.  7.  History of HEELP.  MFM for Level II ultrasound.  ASA  8.  History of C section, with successful VBACs  9.  Hypothyroidism=TSH today  10. Anxiety/depression=stable      Discussed physician coverage, tertiary support, diet, exercise, weight gain, schedule of visits, routine and indicated ultrasounds, and childbirth education.    Options for  testing for chromosomal and birth defects were discussed with the patient. Diagnostic tests include CVS and Amniocentesis. We discussed that these tests are definitive but invasive and do carry a risk of fetal loss.   Screening tests include nuchal translucency/blood marker testing in the first trimester and quad screening in the second trimester. We discussed that these are screening tests and not diagnostic tests and that false positives and negatives are a distinct possibility.     30 minutes was spent face to face with the patient today discussing her history, diagnosis, and follow-up plan as noted above.  Over 50% of the visit was spent in counseling and coordination of care.    Discussed aspirin use in pregnancy.  Low-dose aspirin prophylaxis can be beneficial in women at high risk of developing preeclampsia.  I generally recommend we begin aspirin at about 12-13 weeks gestation and continue until at least 36 weeks.    Women with at least one of the following conditions are considered high risk for developing preeclampsia: Previous pregnancy with preeclampsia,  multifetal-gestation, chronic hypertension, diabetes mellitus, chronic kidney disease, autoimmune disease, and IVF.    Women with more than 1 of the following conditions may also consider low-dose aspirin prophylaxis in pregnancy:  Nulliparity, BMI greater than 30, family history of preeclampsia (mother or sister), AMA, socio-demographic characteristics, personal risk factors.      Patient does meet the above criteria.  Discussed risks and benefits of low dose Asprin therapy and she elects to proceed.     Total Visit Time: 30 minutes    Patient has received her COVID vaccine.  We discussed the importance of this and I strongly encouraged her to get it now.  She declines.      Harriet Rascon, DO

## 2023-03-08 LAB
BACTERIA UR CULT: ABNORMAL
HBV SURFACE AG SERPL QL IA: NONREACTIVE
HCV AB SERPL QL IA: NONREACTIVE
HIV 1+2 AB+HIV1 P24 AG SERPL QL IA: NONREACTIVE
RUBV IGG SERPL QL IA: 1.84 INDEX
RUBV IGG SERPL QL IA: POSITIVE
T PALLIDUM AB SER QL: NONREACTIVE

## 2023-03-15 ENCOUNTER — PRE VISIT (OUTPATIENT)
Dept: MATERNAL FETAL MEDICINE | Facility: HOSPITAL | Age: 38
End: 2023-03-15
Payer: COMMERCIAL

## 2023-03-17 ENCOUNTER — MYC MEDICAL ADVICE (OUTPATIENT)
Dept: OBGYN | Facility: CLINIC | Age: 38
End: 2023-03-17
Payer: COMMERCIAL

## 2023-03-17 NOTE — TELEPHONE ENCOUNTER
Per prenatal with Greil 3/7: Plan History of HEELP.  MFM for Level II ultrasound.  ASA. Discussed first trimester screen and she wants this done.  Referral placed.    Pt wanting to know if MFM referral was indicated as medically necessary regarding insurance purposes.    Nadeen Alvarado RN on 3/17/2023 at 4:11 PM

## 2023-03-23 ENCOUNTER — ANCILLARY PROCEDURE (OUTPATIENT)
Dept: ULTRASOUND IMAGING | Facility: HOSPITAL | Age: 38
End: 2023-03-23
Attending: OBSTETRICS & GYNECOLOGY
Payer: COMMERCIAL

## 2023-03-23 ENCOUNTER — MEDICAL CORRESPONDENCE (OUTPATIENT)
Dept: HEALTH INFORMATION MANAGEMENT | Facility: CLINIC | Age: 38
End: 2023-03-23

## 2023-03-23 ENCOUNTER — OFFICE VISIT (OUTPATIENT)
Dept: MATERNAL FETAL MEDICINE | Facility: HOSPITAL | Age: 38
End: 2023-03-23
Attending: OBSTETRICS & GYNECOLOGY
Payer: COMMERCIAL

## 2023-03-23 ENCOUNTER — LAB (OUTPATIENT)
Dept: LAB | Facility: HOSPITAL | Age: 38
End: 2023-03-23
Attending: OBSTETRICS & GYNECOLOGY
Payer: COMMERCIAL

## 2023-03-23 DIAGNOSIS — O26.90 PREGNANCY RELATED CONDITION, ANTEPARTUM: ICD-10-CM

## 2023-03-23 DIAGNOSIS — Z36.82 ENCOUNTER FOR ANTENATAL SCREENING FOR NUCHAL TRANSLUCENCY: ICD-10-CM

## 2023-03-23 DIAGNOSIS — O09.521 MULTIGRAVIDA OF ADVANCED MATERNAL AGE IN FIRST TRIMESTER: Primary | ICD-10-CM

## 2023-03-23 DIAGNOSIS — Z36.9 ANTENATAL SCREENING ENCOUNTER: ICD-10-CM

## 2023-03-23 DIAGNOSIS — O09.521 MULTIGRAVIDA OF ADVANCED MATERNAL AGE IN FIRST TRIMESTER: ICD-10-CM

## 2023-03-23 PROCEDURE — 99207 PR NO CHARGE LOS: CPT | Performed by: OBSTETRICS & GYNECOLOGY

## 2023-03-23 PROCEDURE — 76813 OB US NUCHAL MEAS 1 GEST: CPT | Mod: 26 | Performed by: OBSTETRICS & GYNECOLOGY

## 2023-03-23 PROCEDURE — 96040 HC GENETIC COUNSELING, EACH 30 MINUTES: CPT

## 2023-03-23 PROCEDURE — 76813 OB US NUCHAL MEAS 1 GEST: CPT

## 2023-03-23 PROCEDURE — 36415 COLL VENOUS BLD VENIPUNCTURE: CPT

## 2023-03-23 NOTE — PROGRESS NOTES
[facility]  Genetic Counseling Consult    Patient:  Queta Cortez YOB: 1985   Date of Service:  3/23/23   MRN: [id number]    Queta was seen at the [facility] for genetic consultation. The indication for genetic counseling is advanced maternal age. The patient was accompanied to this visit by their partner Thom. The patient and their accompanied individual is wearing a mask due to current [facility] policies.      The session was conducted in English.        IMPRESSION/ PLAN   1. Queta has not had genetic screening in this pregnancy but elected to have screening today.  Following today's MFM visit, Queta had a blood draw for Core NIPS through [employer]. The Core NIPS includes screening for trisomy 21, 18, and 13 and the patient opted to screen for sex chromosome aneuploidies, including reported fetal sex. Results are expected in 7-10 days. The patient will be called with results and if they do not answer they requested a vague message with callback information. Patient was informed that results, including fetal sex, will be available in [employer].     2. Queta had a nuchal translucency ultrasound today. Please see the ultrasound report for further details.    3. Further recommendations include a level II ultrasound with MFM and maternal serum AFP only screening for neural tube defects, if desired (quad screen should NOT be performed).    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 Queta was seen by Dr. Brooks for MFM consultation regarding this history on 2017 to discuss management recommendations. Please see corresponding documentation for details.   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 Given that no second variant was identified, this finding does not provide a definitive explanation for her symptoms. Queta had a genetic counseling consultation with [facility] on 2017 to discuss this finding in detail. Please see his documentation for additional details.     2019: Term , female    2021: Term , male    2023: SAB    CURRENT PREGNANCY   Current Age: 37 year old   Age at Delivery: 38 year old  VIOLETA: 10/1/2023, by Last Menstrual Period                                   Gestational Age: 12w4d  This pregnancy is a single gestation.   This pregnancy was conceived spontaneously.    MEDICAL HISTORY   Queta s reported medical history is not expected to impact pregnancy management or risks to fetal development.       FAMILY HISTORY   A three-generation pedigree was obtained previously by a genetic counselor on 2017 and updated today. The original and updated version is scanned under the \"Media\" tab in Epic.  The family history was reported by Queta and their partner.    The following significant updates were reported today:     Lilian have 3 children, ages 6, 4, and 1, all of whom are healthy and typically developing.     Otherwise, the reported family history is unremarkable for multiple miscarriages, stillbirths, birth defects, intellectual disabilities, known genetic conditions, and consanguinity.       CARRIER SCREENING   Expanded carrier screening is available to screen for autosomal recessive conditions and X-linked conditions in a large list of genes. Autosomal recessive conditions happen when a mutation has been inherited from the egg and sperm and include conditions like cystic fibrosis, thalassemia, hearing loss, spinal muscular atrophy, and more. X-linked conditions happen when a mutation has been inherited from the egg and include conditions like fragile X syndrome.  screening was also reviewed. About [place]  Screening    The " patient has declined the carrier screening options today. They are aware the option will remain, and they can contact us if they would like to pursue screening.       RISK ASSESSMENT FOR CHROMOSOME CONDITIONS   We explained that the risk for fetal chromosome abnormalities increases with maternal age. We discussed specific features of common chromosome abnormalities, including Down syndrome, trisomy 13, trisomy 18, and sex chromosome trisomies.      At age 38 at midtrimester, the risk to have a baby with Down syndrome is 1 in 168.     At age 38 at midtrimester, the risk to have a baby with any chromosome abnormality is 1 in 82.     We discussed that current ACMG guidelines also recommend that screening for 22q11.2 deletion syndrome be offered to all pregnant patients. 22q11.2 deletion syndrome has an estimated prevalence of 1 in 990 to 1 in 2148 (0.05-0.1%). Risk is not thought to increase with maternal age. Clinical features are variable but include congenital heart defects, cleft palate, developmental delays, immune system deficiencies, and hearing loss. Approximately 90% of cases are de suraj (a sporadic new change in a pregnancy) and 10% are inherited. Cell-free DNA screening for 22q11.2 deletion syndrome is available (Expanded NIPS through "[employer] We discussed the limitations of cell-free DNA screening in detecting microdeletions and the possiblity of false positives and false negatives. Expanded NIPS also allows for reflex to include other microdeletion conditions and rare autosomal trisomies if an indication would arise later in the pregnancy.     Queta has not had genetic screening in this pregnancy but elected to have screening today. Screening for 22q11.2 deletion syndrome was declined today.     GENETIC TESTING OPTIONS   Genetic testing during a pregnancy includes screening and diagnostic procedures.      Screening tests are non-invasive which means no risk to the pregnancy and includes  ultrasounds and blood work. The benefits and limitations of screening were reviewed. Screening tests provide a risk assessment (chance) specific to the pregnancy for certain fetal chromosome abnormalities but cannot definitively diagnose or exclude a fetal chromosome abnormality. Follow-up genetic counseling and consideration of diagnostic testing is recommended with any abnormal screening result. Diagnostic testing during a pregnancy is more certain and can test for more conditions. However, the tests do have a risk of miscarriage that requires careful consideration. These tests can detect fetal chromosome abnormalities with greater than 99% certainty. Results can be compromised by maternal cell contamination or mosaicism and are limited by the resolution of current genetic testing technology.     There is no screening or diagnostic test that detects all forms of birth defects or intellectual disability.     We discussed the following screening options:   Non-invasive prenatal testing (NIPT)    Also called cell-free DNA screening because it detects chromosomes from the placenta in the pregnant person's blood    Can be done any time after 10 weeks gestation    Screens for trisomy 21, trisomy 18, trisomy 13, and sex chromosome aneuploidies    Cannot screen for open neural tube defects, maternal serum AFP after 15 weeks is recommended    We discussed that [facility] will perform a benefits investigation to determine coverage, but that it is the patient's responsibility to select billing through insurance or self-pay ($99 for Core NIPS, $349 for Expanded NIPS). The patient was instructed to expect an email or text from [facility] to set up a patient portal in order to make the selection. If no contact from [facility] is received, of if she is very concerned about cost, the patient should contact [employer] [facility]'s billing office at the number provided today. We reviewed that if no selection is made before results return, no cost can be  guaranteed. The patient expressed her understanding.     AFP only    Blood work from arm for levels of AFP (alpha-fetoprotein)    Can be done between 14w1d and 23w6d gestation    Screens for open neural tube defects like spina bifida    Recommended for those that do not want genetic testing (for chromosome conditions), those who did screening other than the quad screen, and those with a personal or family history of neural tube defects.    We discussed the following ultrasound options:  Nuchal translucency (NT) ultrasound    Ultrasound between [id number] that includes nuchal translucency measurement and nasal bone assessments    Nuchal translucency refers to the space at the back of the neck where fluid builds up. All babies at this stage have fluid and there is only concern if there is too much fluid    Nasal bone refers to the small bone in the nose. There is concern for conditions like Down syndrome if the bone cannot be seen at all    This ultrasound can be done as part of first trimester screening, at the same time as another screen (NIPT), at the same time as a CVS, or if the patients does not want genetic screening.    Markers on ultrasound detects about 70% of pregnancies with aneuploidy    Abnormalities on NT ultrasound can also increase the risk for a birth defect, like a heart defect    Comprehensive level II ultrasound (Fetal Anatomy Ultrasound)    Ultrasound done between 18-20 weeks gestation    Screens for major birth defects and markers for aneuploidy (like trisomy 21 and trisomy 18)    Includes looking at the fetus/baby's growth, heart, organs (stomach, kidneys), placenta, and amniotic fluid    We discussed the following diagnostic options:   Chorionic villus sampling (CVS)    Invasive diagnostic procedure done between 10w0d and 13w6d    The procedure collects a small sample from the placenta for the purpose of chromosomal testing and/or other genetic testing    Diagnostic result; more than 99%  sensitivity for fetal chromosome abnormalities    Cannot screen for open neural tube defects, maternal serum AFP after 15 weeks is recommended    Amniocentesis    Invasive diagnostic procedure done after 15 weeks gestation    The procedure collects a small sample of amniotic fluid for the purpose of chromosomal testing and/or other genetic testing    Diagnostic result; more than 99% sensitivity for fetal chromosome abnormalities    Testing for AFP in the amniotic fluid can test for open neural tube defects    It was a pleasure to be involved with Queta kebede care. Face-to-face time of the meeting was 30 minutes.    Mariposa Elizondo [facility], [facility]  Certified and [place] Licensed Genetic Counselor  [facility]  Maternal Fetal Medicine  Office: [phone]  [facility]: [phone]   Fax: [phone]  [facility]

## 2023-03-24 DIAGNOSIS — E03.9 HYPOTHYROIDISM, UNSPECIFIED TYPE: ICD-10-CM

## 2023-03-24 NOTE — TELEPHONE ENCOUNTER
Routing refill request to provider for review/approval because:    Requested Prescriptions   Pending Prescriptions Disp Refills    levothyroxine (SYNTHROID/LEVOTHROID) 75 MCG tablet [Pharmacy Med Name: LEVOTHYROXINE SODIUM 75MCG TABS] 30 tablet 5     Sig: TAKE ONE TABLET BY MOUTH ONCE DAILY       Thyroid Protocol Failed - 3/24/2023 12:20 PM        Failed - No active pregnancy on record     If patient is pregnant or has had a positive pregnancy test, please check TSH.

## 2023-03-25 RX ORDER — LEVOTHYROXINE SODIUM 75 UG/1
TABLET ORAL
Qty: 30 TABLET | Refills: 5 | Status: SHIPPED | OUTPATIENT
Start: 2023-03-25 | End: 2023-09-27

## 2023-03-28 ENCOUNTER — TELEPHONE (OUTPATIENT)
Dept: MATERNAL FETAL MEDICINE | Facility: CLINIC | Age: 38
End: 2023-03-28
Payer: COMMERCIAL

## 2023-03-28 LAB — SCANNED LAB RESULT: NORMAL

## 2023-03-28 NOTE — TELEPHONE ENCOUNTER
March 28, 2023    I spoke with Queta regarding her NIPT results.     Results indicate NO ANEUPLOIDY DETECTED for chromosomes 21, 18, 13, or sex chromosomes. Fetal sex was NOT disclosed over the phone per patient request. Queta will plan to view this on [employer] with her .     This puts her current pregnancy at low risk for Down syndrome, trisomy 18, trisomy 13 and sex chromosome abnormalities. This test is reported to have the following sensitivities: Down syndrome: 99.99%, trisomy 18: 99.99%, and trisomy 13: 99.99%. Although these results are reassuring, this does not replace a standard chromosome analysis from a chorionic villus sampling or amniocentesis.     Level II ultrasound is recommended, given AMA. This is scheduled for 5/5/2023.     MSAFP is the appropriate second trimester screening test for open neural tube defects; the maternal quad screen is not recommended.    Her results are available in her Epic chart for her primary OB to review.      Mariposa Elizondo, [facility], [facility]  Licensed Genetic Counselor  [facility]  Maternal Fetal Medicine  Phone: [phone]  [email or web address]

## 2023-03-30 ENCOUNTER — MYC MEDICAL ADVICE (OUTPATIENT)
Dept: OBGYN | Facility: CLINIC | Age: 38
End: 2023-03-30
Payer: COMMERCIAL

## 2023-04-18 ENCOUNTER — PRENATAL OFFICE VISIT (OUTPATIENT)
Dept: OBGYN | Facility: CLINIC | Age: 38
End: 2023-04-18
Payer: COMMERCIAL

## 2023-04-18 VITALS — BODY MASS INDEX: 24.52 KG/M2 | WEIGHT: 170.9 LBS | SYSTOLIC BLOOD PRESSURE: 107 MMHG | DIASTOLIC BLOOD PRESSURE: 70 MMHG

## 2023-04-18 DIAGNOSIS — F33.0 MILD EPISODE OF RECURRENT MAJOR DEPRESSIVE DISORDER (H): ICD-10-CM

## 2023-04-18 DIAGNOSIS — E03.8 OTHER SPECIFIED HYPOTHYROIDISM: ICD-10-CM

## 2023-04-18 DIAGNOSIS — O09.522 MULTIGRAVIDA OF ADVANCED MATERNAL AGE IN SECOND TRIMESTER: Primary | ICD-10-CM

## 2023-04-18 DIAGNOSIS — R30.0 DYSURIA: ICD-10-CM

## 2023-04-18 DIAGNOSIS — O34.219 DESIRES VBAC (VAGINAL BIRTH AFTER CESAREAN) TRIAL: ICD-10-CM

## 2023-04-18 DIAGNOSIS — O14.22 HEMOLYSIS, ELEVATED LIVER ENZYMES, AND LOW PLATELET COUNT (HELLP) SYNDROME IN SECOND TRIMESTER: ICD-10-CM

## 2023-04-18 LAB
ALBUMIN UR-MCNC: NEGATIVE MG/DL
APPEARANCE UR: CLEAR
BILIRUB UR QL STRIP: NEGATIVE
COLOR UR AUTO: COLORLESS
GLUCOSE UR STRIP-MCNC: NEGATIVE MG/DL
HGB UR QL STRIP: NEGATIVE
KETONES UR STRIP-MCNC: NEGATIVE MG/DL
LEUKOCYTE ESTERASE UR QL STRIP: NEGATIVE
NITRATE UR QL: NEGATIVE
PH UR STRIP: 6.5 [PH] (ref 5–7)
RBC #/AREA URNS AUTO: ABNORMAL /HPF
SP GR UR STRIP: 1 (ref 1–1.03)
SQUAMOUS #/AREA URNS AUTO: ABNORMAL /LPF
UROBILINOGEN UR STRIP-MCNC: NORMAL MG/DL
WBC #/AREA URNS AUTO: ABNORMAL /HPF

## 2023-04-18 PROCEDURE — 81001 URINALYSIS AUTO W/SCOPE: CPT | Performed by: OBSTETRICS & GYNECOLOGY

## 2023-04-18 PROCEDURE — 99207 PR PRENATAL VISIT: CPT | Performed by: OBSTETRICS & GYNECOLOGY

## 2023-04-18 RX ORDER — ASPIRIN 81 MG/1
TABLET, CHEWABLE ORAL
COMMUNITY
End: 2024-03-13

## 2023-04-18 NOTE — PROGRESS NOTES
37 year old  at 16w2d weeks presents to the clinic for a routine prenatal visit.  Feeling well.  No vaginal bleeding, leakage of fluid, or contractions   Fundal height=s=d  TMAh=730  Anxiety/depression=stable  Hypothyroidism=TSH=3/7=1.49  Discussed quad screen and she declines  UTI in early pregnancy=incidental.  Repeat UA today.  No signs and symptoms.    Anatomy ultrasound with MFM already scheduled=  RTC 4 weeks.    Harriet Rascon DO

## 2023-04-30 ENCOUNTER — HEALTH MAINTENANCE LETTER (OUTPATIENT)
Age: 38
End: 2023-04-30

## 2023-05-12 ENCOUNTER — OFFICE VISIT (OUTPATIENT)
Dept: MATERNAL FETAL MEDICINE | Facility: HOSPITAL | Age: 38
End: 2023-05-12
Attending: OBSTETRICS & GYNECOLOGY
Payer: COMMERCIAL

## 2023-05-12 ENCOUNTER — ANCILLARY PROCEDURE (OUTPATIENT)
Dept: ULTRASOUND IMAGING | Facility: HOSPITAL | Age: 38
End: 2023-05-12
Attending: OBSTETRICS & GYNECOLOGY
Payer: COMMERCIAL

## 2023-05-12 DIAGNOSIS — O09.522 AMA (ADVANCED MATERNAL AGE) MULTIGRAVIDA 35+, SECOND TRIMESTER: Primary | ICD-10-CM

## 2023-05-12 DIAGNOSIS — O09.521 MULTIGRAVIDA OF ADVANCED MATERNAL AGE IN FIRST TRIMESTER: ICD-10-CM

## 2023-05-12 PROCEDURE — 99207 PR NO CHARGE LOS: CPT | Performed by: OBSTETRICS & GYNECOLOGY

## 2023-05-12 PROCEDURE — 76811 OB US DETAILED SNGL FETUS: CPT | Mod: 26 | Performed by: OBSTETRICS & GYNECOLOGY

## 2023-05-12 PROCEDURE — 76811 OB US DETAILED SNGL FETUS: CPT

## 2023-05-12 NOTE — PROGRESS NOTES
Queta Cortez is a  at 19w5d who presents to [facility] for L2 ultrasound. Pt reports positive fetal movement. Pt denies bldg/lof/change in discharge, contractions, headache, vision changes, chest pain/SOB or edema. SBAR given to Dr. Mendoza, see note in Epic.

## 2023-05-18 NOTE — PATIENT INSTRUCTIONS
Understanding Round Ligament Pain in Pregnancy   Round ligament pain is a common problem in pregnancy. Ligaments are strong tissues that connect bones, muscles, and organs. There are 2 round ligaments. There is 1 on each side of the uterus. The top part of each ligament attaches to the upper side of the uterus. The bottom of each ligament attaches down in the pubic area. These ligaments help keep the uterus in place as you move around.     What causes round ligament pain in pregnancy?   As your uterus grows during pregnancy, the round ligaments are stretched and work harder when you move around. They may stretch too quickly when you stand up or bend or laugh. Nearby nerves may be irritated, or the ligaments may have a painful spasm.   Symptoms of ligament pain in pregnancy  The symptoms are sharp pains that last a few seconds. The pain may happen most often on the right side of the belly. It may happen in the hip, the lower belly, or even deep down in your pubic area. The pain may happen when you:     Move suddenly    Stand up    Walk    Roll over in bed    Laugh    Cough    Sneeze  Diagnosing round ligament pain in pregnancy   Your healthcare provider will ask about your symptoms and give you a physical exam. He or she may give you tests to check for other problems that can cause pain, such as an ovarian cyst or enlarged vein (varicocele). He or she will also check for signs of  labor or other pregnancy problems.   Treatment for round ligament pain in pregnancy   To help prevent pain:    Move slowly when you stand up, roll over, turn, or bend.    Don t stand for long periods of time.    Don t lift heavy objects.    Do gentle daily stretches of your hip joints.  When to call your healthcare provider  Call your healthcare provider right away if you have any of these:     Fever of 100.4 F (38 C) or higher    Pains that last more than a few minutes    Pain that gets worse    Bleeding, nausea, vomiting, or  other new symptoms  [employer] last reviewed this educational content on 3/1/2020    0495-1758 [employer]. All rights reserved. This information is not intended as a substitute for professional medical care. Always follow your healthcare professional's instructions.          Relieving Back Pain During Pregnancy: Wall Stretch, Body Bend   Before trying these exercises, talk to your healthcare provider to make sure they are safe for you. Ask your healthcare provider how many times to do each exercise.   Wall stretch  This strengthens and loosens the muscles in your upper back:   1. Lean against a wall with a firm pillow or rolled towel under your shoulder blades. Your feet should be about 12 inches from the wall and shoulder-width apart. Point your chin down.  2. Breathe in. Push your shoulders, neck, and head against the wall. You will feel a stretch in your shoulders.  3. Hold for 5 counts. Then breathe out, and relax your shoulders and neck.   Body bend  This strengthens your back and buttocks muscles:   1. Stand with your legs shoulder-width apart. Put your hands on your upper thighs and bend your knees slightly.  2. Slowly bend forward at the hips. Push your hips back and keep your shoulders up. Make sure your back is straight. You ll feel a stretch in your upper thighs. You ll also feel your back muscles holding you in position.  3. Hold for 5 counts, then straighten.   [employer] last reviewed this educational content on 7/1/2021 2000-2022 [employer]. All rights reserved. This information is not intended as a substitute for professional medical care. Always follow your healthcare professional's instructions.          Pregnancy: Planning Your Exercise Routine  While you re pregnant, an exercise routine helps both your mind and your body feel good. It tones your muscles and makes them stronger. It also gives you and your baby more oxygen.   The right exercise for you    Overall  conditioning is best for you and your baby. Try walking, swimming, or riding a stationary bike. Always warm up, cool down, and drink enough fluids. Keep a snack close by in case your blood sugar gets low. Discuss exercise choices with your healthcare provider. Talk about the following:     If you already exercise, find out how to adapt your routine while you re pregnant. Keep the intensity of the exercise moderate. As your pregnancy progresses, your center of gravity will change. Be careful to keep your balance.    Ask if there are any local prenatal exercise classes, such as yoga or water aerobics. Find out which prenatal exercise videos are good choices.    If you were not exercising before your pregnancy, find out the best way to start. Now is not the time to begin a new workout on your own. Start slowly. Listen to your body.    Ask which forms of exercise you should avoid. These may include risky activities like hot yoga, horseback riding, scuba diving, skiing, skating, and contact sports.  Pelvic tilts  These help strengthen your stomach muscles and low back. You can do pelvic tilts instead of sit-ups.     Do this exercise on your hands and knees.    Relax the back of your neck. Pull your stomach in until your low back flattens.    Hold for 30 seconds. Release. Repeat 10 times. Do this twice a day.  Kegel exercises  Kegel exercises strengthen the pelvic muscles. Doing Kegels daily helps prepare these muscles for delivery. Kegels also help ease your recovery. You exercise these muscles by tightening, holding, then relaxing them. To do 1 type of Kegel exercise, contract as if you were stopping your urine stream (but do it when you re not urinating). Hold for 10 seconds, then repeat 10 times, a few times a day.   Tips to add activity  Here are some tips to follow:    Park the car farther from a store and walk.    If you can, do errands on foot instead of driving.    Walk across the office to talk to someone in  person instead of calling.    While waiting for appointments, go up and down stairs or around the block.  Tips to stay active  Here are some tips to follow:    Maintain your routine. But exercise less intensely if you feel tired.    Base your workout on how you feel, not your heart rate. Heart rates aren t a good way to measure effort during pregnancy.    Don't exercise on your back after week 16.  What are the warning signs that I should stop exercising?  Stop exercising and call your healthcare provider if you have any of these symptoms:    Vaginal bleeding     Dizziness or feeling faint     Increased shortness of breath     Chest pain     Headache     Muscle weakness     Calf (back of the leg) pain or swelling      Uterine contractions or  labor     Decreased fetal movement     Fluid leaking (or gushing) from your vagina  [employer] last reviewed this educational content on 2021-2022 [employer]. All rights reserved. This information is not intended as a substitute for professional medical care. Always follow your healthcare professional's instructions.

## 2023-05-23 ENCOUNTER — PRENATAL OFFICE VISIT (OUTPATIENT)
Dept: OBGYN | Facility: CLINIC | Age: 38
End: 2023-05-23
Payer: COMMERCIAL

## 2023-05-23 VITALS — SYSTOLIC BLOOD PRESSURE: 111 MMHG | DIASTOLIC BLOOD PRESSURE: 68 MMHG | WEIGHT: 178.7 LBS | BODY MASS INDEX: 25.64 KG/M2

## 2023-05-23 DIAGNOSIS — F33.0 MILD EPISODE OF RECURRENT MAJOR DEPRESSIVE DISORDER (H): ICD-10-CM

## 2023-05-23 DIAGNOSIS — O34.219 DESIRES VBAC (VAGINAL BIRTH AFTER CESAREAN) TRIAL: ICD-10-CM

## 2023-05-23 DIAGNOSIS — O14.22 HEMOLYSIS, ELEVATED LIVER ENZYMES, AND LOW PLATELET COUNT (HELLP) SYNDROME IN SECOND TRIMESTER: ICD-10-CM

## 2023-05-23 DIAGNOSIS — O09.522 MULTIGRAVIDA OF ADVANCED MATERNAL AGE IN SECOND TRIMESTER: Primary | ICD-10-CM

## 2023-05-23 DIAGNOSIS — E03.8 OTHER SPECIFIED HYPOTHYROIDISM: ICD-10-CM

## 2023-05-23 DIAGNOSIS — F41.1 GAD (GENERALIZED ANXIETY DISORDER): ICD-10-CM

## 2023-05-23 PROCEDURE — 99207 PR PRENATAL VISIT: CPT | Performed by: OBSTETRICS & GYNECOLOGY

## 2023-05-23 NOTE — PROGRESS NOTES
37 year old  at 21w2d weeks presents to the clinic for a routine prenatal visit.  No concerns.  No leakage of fluid, vaginal bleeding, or contractions   Good fetal movement.  Fundal height:  20cm  FHTs: 141  History of HELLP=ASA.  Recheck PIH labs at next visit.  Patient has been checking her blood pressures at home and they have good  AMA=ASA  Hypothyroidism=TSH next visit  Anxiety/depression=stable  Normal anatomy ultrasound  RTC 4 weeks    Harriet Rascon DO

## 2023-06-19 NOTE — PATIENT INSTRUCTIONS
If you have labs or imaging done, the results will automatically release in [employer]rate without an interpretation.  Your health care professional will review those results and send an interpretation with recommendations as soon as possible, but this may be 1-3 business days.    If you have any questions regarding your visit, please contact your care team.     [employer]: [phone]  [facility] CLINIC HOURS TELEPHONE NUMBER     Harriet DO Gabriela Rascon - Certified Medical Assistant    Rose Maxwell -   Kristen -     Monday- [place]  8:00 a.m - 5:00 p.m    Tuesday- [place]  7:00 a.m - 5:00 p.m    Friday- [place]  9:00 a.m - 5:00 p.m.    Typical Surgery Day: Thursday [facility]  [address]  [place] [address]  Phone: [phone]   Fax: [phone]   Imaging Scheduling- [phone]    [facility] Labor and Delivery  [address] [place] [address] [place] [address]  [phone]    [address] [address]  Phone: [phone]   Fax: [phone]   Imaging Scheduling- [phone]       **Surgeries** Our Surgery Schedulers will contact you to schedule. If you do not receive a call within 3 business days, please call [phone].    Urgent Care locations:  [facility] Monday-Friday  10 am - 8 pm  Saturday and Sunday   9 am - 5 pm  Monday-Friday   10 am - 8 pm  Saturday and Sunday   9 am - 5 pm   [phone]     If you need a medication refill, please contact your pharmacy. Please allow 3 business days for your refill to be completed.  As always, Thank you for trusting us with your healthcare needs!    see additional instructions from your care team below

## 2023-06-21 ENCOUNTER — MYC MEDICAL ADVICE (OUTPATIENT)
Dept: OBGYN | Facility: OTHER | Age: 38
End: 2023-06-21
Payer: COMMERCIAL

## 2023-06-26 ENCOUNTER — PRENATAL OFFICE VISIT (OUTPATIENT)
Dept: OBGYN | Facility: OTHER | Age: 38
End: 2023-06-26
Payer: COMMERCIAL

## 2023-06-26 VITALS — BODY MASS INDEX: 26.16 KG/M2 | SYSTOLIC BLOOD PRESSURE: 112 MMHG | DIASTOLIC BLOOD PRESSURE: 70 MMHG | WEIGHT: 182.3 LBS

## 2023-06-26 DIAGNOSIS — O34.219 DESIRES VBAC (VAGINAL BIRTH AFTER CESAREAN) TRIAL: ICD-10-CM

## 2023-06-26 DIAGNOSIS — F41.1 GAD (GENERALIZED ANXIETY DISORDER): ICD-10-CM

## 2023-06-26 DIAGNOSIS — E03.8 OTHER SPECIFIED HYPOTHYROIDISM: ICD-10-CM

## 2023-06-26 DIAGNOSIS — Z98.891 HISTORY OF C-SECTION: ICD-10-CM

## 2023-06-26 DIAGNOSIS — D50.9 IRON DEFICIENCY ANEMIA, UNSPECIFIED IRON DEFICIENCY ANEMIA TYPE: Primary | ICD-10-CM

## 2023-06-26 DIAGNOSIS — H10.9 BACTERIAL CONJUNCTIVITIS: ICD-10-CM

## 2023-06-26 DIAGNOSIS — F33.0 MILD EPISODE OF RECURRENT MAJOR DEPRESSIVE DISORDER (H): ICD-10-CM

## 2023-06-26 DIAGNOSIS — Z86.2 HISTORY OF TTP (THROMBOTIC THROMBOCYTOPENIC PURPURA): ICD-10-CM

## 2023-06-26 DIAGNOSIS — O09.522 MULTIGRAVIDA OF ADVANCED MATERNAL AGE IN SECOND TRIMESTER: Primary | ICD-10-CM

## 2023-06-26 LAB
ALBUMIN MFR UR ELPH: 4 MG/DL
ALBUMIN SERPL BCG-MCNC: 3.5 G/DL (ref 3.5–5.2)
ALP SERPL-CCNC: 40 U/L (ref 35–104)
ALT SERPL W P-5'-P-CCNC: 13 U/L (ref 0–50)
ANION GAP SERPL CALCULATED.3IONS-SCNC: 9 MMOL/L (ref 7–15)
AST SERPL W P-5'-P-CCNC: 12 U/L (ref 0–45)
BILIRUB SERPL-MCNC: 0.3 MG/DL
BUN SERPL-MCNC: 5.4 MG/DL (ref 6–20)
CALCIUM SERPL-MCNC: 8.4 MG/DL (ref 8.6–10)
CHLORIDE SERPL-SCNC: 105 MMOL/L (ref 98–107)
CREAT SERPL-MCNC: 0.65 MG/DL (ref 0.51–0.95)
CREAT UR-MCNC: 42.3 MG/DL
DEPRECATED HCO3 PLAS-SCNC: 23 MMOL/L (ref 22–29)
ERYTHROCYTE [DISTWIDTH] IN BLOOD BY AUTOMATED COUNT: 12.1 % (ref 10–15)
GFR SERPL CREATININE-BSD FRML MDRD: >90 ML/MIN/1.73M2
GLUCOSE 1H P 50 G GLC PO SERPL-MCNC: 109 MG/DL (ref 70–129)
GLUCOSE SERPL-MCNC: 107 MG/DL (ref 70–99)
HCT VFR BLD AUTO: 32.1 % (ref 35–47)
HGB BLD-MCNC: 10.3 G/DL (ref 11.7–15.7)
MCH RBC QN AUTO: 31.5 PG (ref 26.5–33)
MCHC RBC AUTO-ENTMCNC: 32.1 G/DL (ref 31.5–36.5)
MCV RBC AUTO: 98 FL (ref 78–100)
PLATELET # BLD AUTO: 198 10E3/UL (ref 150–450)
POTASSIUM SERPL-SCNC: 3.9 MMOL/L (ref 3.4–5.3)
PROT SERPL-MCNC: 5.9 G/DL (ref 6.4–8.3)
PROT/CREAT 24H UR: 0.09 MG/MG CR (ref 0–0.2)
RBC # BLD AUTO: 3.27 10E6/UL (ref 3.8–5.2)
SODIUM SERPL-SCNC: 137 MMOL/L (ref 136–145)
TSH SERPL DL<=0.005 MIU/L-ACNC: 0.67 UIU/ML (ref 0.3–4.2)
WBC # BLD AUTO: 9 10E3/UL (ref 4–11)

## 2023-06-26 PROCEDURE — 82950 GLUCOSE TEST: CPT | Performed by: OBSTETRICS & GYNECOLOGY

## 2023-06-26 PROCEDURE — 99207 PR PRENATAL VISIT: CPT | Performed by: OBSTETRICS & GYNECOLOGY

## 2023-06-26 PROCEDURE — 84443 ASSAY THYROID STIM HORMONE: CPT | Performed by: OBSTETRICS & GYNECOLOGY

## 2023-06-26 PROCEDURE — 84156 ASSAY OF PROTEIN URINE: CPT | Performed by: OBSTETRICS & GYNECOLOGY

## 2023-06-26 PROCEDURE — 36415 COLL VENOUS BLD VENIPUNCTURE: CPT | Performed by: OBSTETRICS & GYNECOLOGY

## 2023-06-26 PROCEDURE — 85027 COMPLETE CBC AUTOMATED: CPT | Performed by: OBSTETRICS & GYNECOLOGY

## 2023-06-26 PROCEDURE — 80053 COMPREHEN METABOLIC PANEL: CPT | Performed by: OBSTETRICS & GYNECOLOGY

## 2023-06-26 RX ORDER — POLYMYXIN B SULFATE AND TRIMETHOPRIM 1; 10000 MG/ML; [USP'U]/ML
1-2 SOLUTION OPHTHALMIC EVERY 4 HOURS
Qty: 10 ML | Refills: 0 | Status: SHIPPED | OUTPATIENT
Start: 2023-06-26 | End: 2024-02-10

## 2023-06-26 RX ORDER — FERROUS SULFATE 325(65) MG
325 TABLET ORAL 2 TIMES DAILY
Qty: 90 TABLET | Refills: 1 | Status: SHIPPED | OUTPATIENT
Start: 2023-06-26 | End: 2024-03-13

## 2023-06-26 NOTE — PROGRESS NOTES
37 year old  at 26w1d weeks presents to the clinic for a routine prenatal visit.  No concerns.  No vaginal bleeding, leakage of fluid, or contractions   Good fetal movement.  Fundal height=28cm  VOJf=720  Right eye itching and watery.  Patient is unsure what it is from.  I suspect pink eye.  Normal anatomy ultrasound  RTC 4 weeks  GCT today  History of c section=successful VBACs  Anxiety/depression=stable  Hypothyroidism=TSH=3/7/2023.  Will check it again today.  History of HELLP vs HUS=ASA.  PIH labs today.  Normal blood pressures at home.  Blood type:A+    Harriet Rascon, DO

## 2023-07-11 NOTE — PATIENT INSTRUCTIONS
Birth Control Methods  Birth control methods are used to help prevent pregnancy. There are many different methods to choose from. Talk with your healthcare provider about which method is right for you. Be sure to ask your provider how well each one works. Also ask about the benefits, risks, and side effects of each method.   Hormones  Some birth control methods work by releasing hormones such as progestin and estrogen. These methods include hormone implants, hormone shots, the vaginal ring, the patch, and birth control pills. They all work by stopping the release of the egg from the ovary (ovulation). All of these methods work well and can be stopped at any time.     The implant is a small device that needs to be placed in the upper arm by a trained healthcare provider. It works for up to 3 years.    Hormone injections must be repeated every 3 months.    The vaginal ring must be replaced monthly. It can be removed during the fourth week of each cycle.    The patch must be replaced weekly. It's not worn during the fourth week of each cycle.    Birth control pills must be taken every day.  Intrauterine device (IUD)  An IUD is a small, T-shaped device. It must be placed in the uterus by a trained healthcare provider. There are different types of IUDs available. They work by causing changes in the uterus that make it harder for sperm to reach the egg. Depending on the type of IUD you have, it may work for several years or longer. The IUD is a reversible birth control method. This means it can be removed at any time.   Condom  A condom is a sheath that forms a thin barrier between the penis and the vagina. It helps prevent pregnancy by keeping sperm from entering the vagina. When latex condoms are used, they have the added benefit of protecting against most STIs (sexually transmitted infections). Condoms are used each time there is sexual intercourse and should be discarded after each use. Ask your healthcare  provider about the different types of condoms available. These include both the male condom and female condom.   Spermicide  Spermicides come as foams, jellies, creams, suppositories, and tablets.  They help prevent pregnancy by killing sperm. When used alone they are not that reliable. They work best when combined with other birth control methods such as diaphragms and cervical caps.   Sponge, diaphragm, and cervical cap   All of these methods help prevent pregnancy by covering the opening of the uterus (cervix). This prevents sperm from passing through.   The sponge contains spermicide. It can be bought over the counter. The sponge must be left in place for at least 6 hours after the last time you have sex. However, it should not stay in place for more than 24 hours. Discard after use.   The diaphragm and cervical cap must be fitted and prescribed by your healthcare provider. Both are used with spermicide. The diaphragm must be left in place for at least 6 hours after sex. However, it should not stay in place for more than 24 hours. It can be washed and reused. The cervical cap must be left in place for at least 6 hours after sex. However, it should not stay in place for more than 48 hours. It can be washed and reused.   Withdrawal method  This is when the man pulls his penis out of the vagina just before ejaculation ( coming ). This lowers the amount of sperm entering the vagina. Be aware that fluids released just before ejaculation often still contain some sperm, so this method is not as reliable as certain other methods.   Rhythm method   This method is also call natural family planning or fertility awareness. It requires that you know when in your menstrual cycle you are likely to become pregnant. Then you not have sex during those days. This requires careful planning and good discipline. Your healthcare provider can explain more about how this works.   Tubal ligation and vasectomy  These are surgical methods  to prevent pregnancy. Tubal ligation is an option for women. The fallopian tubes are blocked or cut (ligated). This keeps the egg from passing into the uterus or sperm from reaching the egg. Vasectomy is an option for men. The tubes that normally carry sperm to the penis are either closed or blocked. Both tubal ligation and vasectomy are permanent birth control methods. This means reversal is either not possible or unlikely to work. They are good choices for women and men who know that they don't want to have children in the future.   [employer] last reviewed this educational content on 7/1/2020 2000-2022 [employer]. All rights reserved. This information is not intended as a substitute for professional medical care. Always follow your healthcare professional's instructions.

## 2023-07-17 ENCOUNTER — PRENATAL OFFICE VISIT (OUTPATIENT)
Dept: OBGYN | Facility: OTHER | Age: 38
End: 2023-07-17
Payer: COMMERCIAL

## 2023-07-17 VITALS — DIASTOLIC BLOOD PRESSURE: 71 MMHG | BODY MASS INDEX: 26.7 KG/M2 | WEIGHT: 186.1 LBS | SYSTOLIC BLOOD PRESSURE: 118 MMHG

## 2023-07-17 DIAGNOSIS — D50.9 IRON DEFICIENCY ANEMIA, UNSPECIFIED IRON DEFICIENCY ANEMIA TYPE: ICD-10-CM

## 2023-07-17 DIAGNOSIS — F41.1 GAD (GENERALIZED ANXIETY DISORDER): ICD-10-CM

## 2023-07-17 DIAGNOSIS — O34.219 DESIRES VBAC (VAGINAL BIRTH AFTER CESAREAN) TRIAL: ICD-10-CM

## 2023-07-17 DIAGNOSIS — Z23 NEED FOR TDAP VACCINATION: ICD-10-CM

## 2023-07-17 DIAGNOSIS — F33.0 MILD EPISODE OF RECURRENT MAJOR DEPRESSIVE DISORDER (H): ICD-10-CM

## 2023-07-17 DIAGNOSIS — E03.8 OTHER SPECIFIED HYPOTHYROIDISM: ICD-10-CM

## 2023-07-17 DIAGNOSIS — O09.523 MULTIGRAVIDA OF ADVANCED MATERNAL AGE IN THIRD TRIMESTER: Primary | ICD-10-CM

## 2023-07-17 DIAGNOSIS — Z86.2 HISTORY OF TTP (THROMBOTIC THROMBOCYTOPENIC PURPURA): ICD-10-CM

## 2023-07-17 DIAGNOSIS — Z98.891 HISTORY OF C-SECTION: ICD-10-CM

## 2023-07-17 PROCEDURE — 90715 TDAP VACCINE 7 YRS/> IM: CPT | Performed by: OBSTETRICS & GYNECOLOGY

## 2023-07-17 PROCEDURE — 90471 IMMUNIZATION ADMIN: CPT | Performed by: OBSTETRICS & GYNECOLOGY

## 2023-07-17 PROCEDURE — 99207 PR PRENATAL VISIT: CPT | Performed by: OBSTETRICS & GYNECOLOGY

## 2023-07-17 NOTE — PROGRESS NOTES
37 year old  at 29w1d weeks presents to the clinic for a routine prenatal visit.  No concerns.  No vaginal bleeding, leakage of fluid, or contractions   Good fetal movement.  Fundal height=30cm  WGRg=952  YNK=576  Hgb=10.3  Patient is taking additional iron supplements  Rh A+  AMA=ASA  History of c section and successful VBACs  Anxiety/depression=stable  History of HELLP=ASA.  Patient is taking blood pressures at home and they have been stable  Hypothyroidism=TSH=0.67 on   RTC 2 weeks.  TDAP today      Harriet Rascon DO

## 2023-07-27 NOTE — CONFIDENTIAL NOTE
Patient Instructions     If you have labs or imaging done, the results will automatically release in [employer] without an interpretation.  Your health care professional will review those results and send an interpretation with recommendations as soon as possible, but this may be 1-3 business days.    If you have any questions regarding your visit, please contact your care team.     [employer]: [phone]  [facility] CLINIC HOURS TELEPHONE NUMBER     Harriet DO Gabriela Rascon - Certified Medical Assistant    Rose Maxwell -   Kristen -     Monday- [place]  8:00 a.m - 5:00 p.m    Tuesday- [place]  7:00 a.m - 5:00 p.m    Friday- [place]  9:00 a.m - 5:00 p.m.    Typical Surgery Day: Thursday [facility]  [address] [place] [address]  Phone: [phone]   Fax: [phone]   Imaging Scheduling- [phone]    [facility] Labor and Delivery  [address] [place] [address] [place] [address]  [phone]    [address] [address]  Phone: [phone]   Fax: [phone]   Imaging Scheduling- [phone]       **Surgeries** Our Surgery Schedulers will contact you to schedule. If you do not receive a call within 3 business days, please call [phone].    Urgent Care locations:  [facility] Monday-Friday  10 am - 8 pm  Saturday and Sunday   9 am - 5 pm  Monday-Friday   10 am - 8 pm  Saturday and Sunday   9 am - 5 pm   [phone]     If you need a medication refill, please contact your pharmacy. Please allow 3 business days for your refill to be completed.  As always, Thank you for trusting us with your healthcare needs!    see additional instructions from your care team below

## 2023-07-27 NOTE — PATIENT INSTRUCTIONS
If you have labs or imaging done, the results will automatically release in [employer] without an interpretation.  Your health care professional will review those results and send an interpretation with recommendations as soon as possible, but this may be 1-3 business days.    If you have any questions regarding your visit, please contact your care team.     [employer] [facility]: [phone]  [facility] CLINIC HOURS TELEPHONE NUMBER     Harriet DO Gabriela Rascon - Certified Medical Assistant    Rose Maxwell -   Kristen -     Monday- [place]  8:00 a.m - 5:00 p.m    Tuesday- [place]  7:00 a.m - 5:00 p.m    Friday- [place]  9:00 a.m - 5:00 p.m.    Typical Surgery Day: Thursday [facility]  [address]  [place] [address]  Phone: [phone]   Fax: [phone]   Imaging Scheduling- [phone]    [facility] Labor and Delivery  [address] [place] [address] [place] [address]  [phone]    [address] [address]  Phone: [phone]   Fax: [phone]   Imaging Scheduling- [phone]       **Surgeries** Our Surgery Schedulers will contact you to schedule. If you do not receive a call within 3 business days, please call [phone].    Urgent Care locations:  [facility] Monday-Friday  10 am - 8 pm  Saturday and Sunday   9 am - 5 pm  Monday-Friday   10 am - 8 pm  Saturday and Sunday   9 am - 5 pm   [phone]     If you need a medication refill, please contact your pharmacy. Please allow 3 business days for your refill to be completed.  As always, Thank you for trusting us with your healthcare needs!    see additional instructions from your care team below

## 2023-07-31 ENCOUNTER — PRENATAL OFFICE VISIT (OUTPATIENT)
Dept: OBGYN | Facility: OTHER | Age: 38
End: 2023-07-31
Payer: COMMERCIAL

## 2023-07-31 ENCOUNTER — MYC MEDICAL ADVICE (OUTPATIENT)
Dept: OBGYN | Facility: CLINIC | Age: 38
End: 2023-07-31

## 2023-07-31 VITALS — WEIGHT: 187.5 LBS | BODY MASS INDEX: 26.9 KG/M2 | DIASTOLIC BLOOD PRESSURE: 71 MMHG | SYSTOLIC BLOOD PRESSURE: 104 MMHG

## 2023-07-31 DIAGNOSIS — F41.1 GAD (GENERALIZED ANXIETY DISORDER): ICD-10-CM

## 2023-07-31 DIAGNOSIS — O14.22 HEMOLYSIS, ELEVATED LIVER ENZYMES, AND LOW PLATELET COUNT (HELLP) SYNDROME IN SECOND TRIMESTER: ICD-10-CM

## 2023-07-31 DIAGNOSIS — O09.523 MULTIGRAVIDA OF ADVANCED MATERNAL AGE IN THIRD TRIMESTER: Primary | ICD-10-CM

## 2023-07-31 DIAGNOSIS — Z98.891 HISTORY OF C-SECTION: ICD-10-CM

## 2023-07-31 DIAGNOSIS — Z86.2 HISTORY OF TTP (THROMBOTIC THROMBOCYTOPENIC PURPURA): ICD-10-CM

## 2023-07-31 DIAGNOSIS — O34.219 DESIRES VBAC (VAGINAL BIRTH AFTER CESAREAN) TRIAL: ICD-10-CM

## 2023-07-31 PROCEDURE — 99207 PR PRENATAL VISIT: CPT | Performed by: OBSTETRICS & GYNECOLOGY

## 2023-07-31 NOTE — PROGRESS NOTES
38 year old  at 31w1d weeks presents to the clinic for a routine prenatal visit.  No concerns. Patient denies any vaginal bleeding, leakage of fluid, or contractions   Good fetal movement.    Fundal height=32cm  MECq=832  APY=543  Hgb=10.3.  patient is taking additional supplements  History of HELLP=ASA.  Normal blood pressures at home.  Will do an ultrasound soon and repeat PIH labs next visit  AMA=ASA  Hypothyroidism=TSH==0.67  History of 2 c sections with successful VBACs  RTC 2 weeks    Harriet Rascon DO

## 2023-08-02 ENCOUNTER — ANCILLARY PROCEDURE (OUTPATIENT)
Dept: ULTRASOUND IMAGING | Facility: OTHER | Age: 38
End: 2023-08-02
Attending: OBSTETRICS & GYNECOLOGY
Payer: COMMERCIAL

## 2023-08-02 DIAGNOSIS — O14.22 HEMOLYSIS, ELEVATED LIVER ENZYMES, AND LOW PLATELET COUNT (HELLP) SYNDROME IN SECOND TRIMESTER: ICD-10-CM

## 2023-08-02 PROCEDURE — 76816 OB US FOLLOW-UP PER FETUS: CPT | Mod: TC | Performed by: RADIOLOGY

## 2023-08-14 ENCOUNTER — PRENATAL OFFICE VISIT (OUTPATIENT)
Dept: OBGYN | Facility: OTHER | Age: 38
End: 2023-08-14
Payer: COMMERCIAL

## 2023-08-14 VITALS — WEIGHT: 192 LBS | BODY MASS INDEX: 27.55 KG/M2 | SYSTOLIC BLOOD PRESSURE: 123 MMHG | DIASTOLIC BLOOD PRESSURE: 77 MMHG

## 2023-08-14 DIAGNOSIS — E03.8 OTHER SPECIFIED HYPOTHYROIDISM: ICD-10-CM

## 2023-08-14 DIAGNOSIS — F33.0 MILD EPISODE OF RECURRENT MAJOR DEPRESSIVE DISORDER (H): ICD-10-CM

## 2023-08-14 DIAGNOSIS — O14.23 HEMOLYSIS, ELEVATED LIVER ENZYMES, AND LOW PLATELET (HELLP) SYNDROME DURING PREGNANCY IN THIRD TRIMESTER: ICD-10-CM

## 2023-08-14 DIAGNOSIS — F41.1 GAD (GENERALIZED ANXIETY DISORDER): ICD-10-CM

## 2023-08-14 DIAGNOSIS — Z98.891 HISTORY OF C-SECTION: ICD-10-CM

## 2023-08-14 DIAGNOSIS — O09.523 MULTIGRAVIDA OF ADVANCED MATERNAL AGE IN THIRD TRIMESTER: Primary | ICD-10-CM

## 2023-08-14 DIAGNOSIS — D50.9 IRON DEFICIENCY ANEMIA, UNSPECIFIED IRON DEFICIENCY ANEMIA TYPE: ICD-10-CM

## 2023-08-14 LAB
ALBUMIN MFR UR ELPH: 4.2 MG/DL
ALBUMIN SERPL BCG-MCNC: 3.6 G/DL (ref 3.5–5.2)
ALP SERPL-CCNC: 53 U/L (ref 35–104)
ALT SERPL W P-5'-P-CCNC: 10 U/L (ref 0–50)
ANION GAP SERPL CALCULATED.3IONS-SCNC: 10 MMOL/L (ref 7–15)
AST SERPL W P-5'-P-CCNC: 16 U/L (ref 0–45)
BILIRUB SERPL-MCNC: 0.7 MG/DL
BUN SERPL-MCNC: 7.2 MG/DL (ref 6–20)
CALCIUM SERPL-MCNC: 8.9 MG/DL (ref 8.6–10)
CHLORIDE SERPL-SCNC: 102 MMOL/L (ref 98–107)
CREAT SERPL-MCNC: 0.68 MG/DL (ref 0.51–0.95)
CREAT UR-MCNC: 45.4 MG/DL
DEPRECATED HCO3 PLAS-SCNC: 24 MMOL/L (ref 22–29)
ERYTHROCYTE [DISTWIDTH] IN BLOOD BY AUTOMATED COUNT: 13 % (ref 10–15)
GFR SERPL CREATININE-BSD FRML MDRD: >90 ML/MIN/1.73M2
GLUCOSE SERPL-MCNC: 78 MG/DL (ref 70–99)
HCT VFR BLD AUTO: 32.3 % (ref 35–47)
HGB BLD-MCNC: 10.7 G/DL (ref 11.7–15.7)
MCH RBC QN AUTO: 32.4 PG (ref 26.5–33)
MCHC RBC AUTO-ENTMCNC: 33.1 G/DL (ref 31.5–36.5)
MCV RBC AUTO: 98 FL (ref 78–100)
PLATELET # BLD AUTO: 199 10E3/UL (ref 150–450)
POTASSIUM SERPL-SCNC: 4.3 MMOL/L (ref 3.4–5.3)
PROT SERPL-MCNC: 6.3 G/DL (ref 6.4–8.3)
PROT/CREAT 24H UR: 0.09 MG/MG CR (ref 0–0.2)
RBC # BLD AUTO: 3.3 10E6/UL (ref 3.8–5.2)
SODIUM SERPL-SCNC: 136 MMOL/L (ref 136–145)
TSH SERPL DL<=0.005 MIU/L-ACNC: 1.14 UIU/ML (ref 0.3–4.2)
WBC # BLD AUTO: 8.7 10E3/UL (ref 4–11)

## 2023-08-14 PROCEDURE — 80053 COMPREHEN METABOLIC PANEL: CPT | Performed by: OBSTETRICS & GYNECOLOGY

## 2023-08-14 PROCEDURE — 99207 PR PRENATAL VISIT: CPT | Performed by: OBSTETRICS & GYNECOLOGY

## 2023-08-14 PROCEDURE — 84443 ASSAY THYROID STIM HORMONE: CPT | Performed by: OBSTETRICS & GYNECOLOGY

## 2023-08-14 PROCEDURE — 36415 COLL VENOUS BLD VENIPUNCTURE: CPT | Performed by: OBSTETRICS & GYNECOLOGY

## 2023-08-14 PROCEDURE — 85027 COMPLETE CBC AUTOMATED: CPT | Performed by: OBSTETRICS & GYNECOLOGY

## 2023-08-14 PROCEDURE — 84156 ASSAY OF PROTEIN URINE: CPT | Performed by: OBSTETRICS & GYNECOLOGY

## 2023-08-14 NOTE — PROGRESS NOTES
38 year old  at 33w1d weeks presents to the clinic for a routine prenatal visit.    No concerns.  Patient denies any vaginal bleeding, leakage of fluid, or contractions     Good fetal movement  Fundal height=35cm  54% at 32 weeks  YOJv=384  Anemia=patient is taking additional iron supplements  History of HELLP=ASA.  Will check labs today.  Patient did have labs done earlier this month which were normal  AMA=ASA  History of c section with 2 successful VBACs  Hypothyroidism=TSH=0.67=  Anxiety/depression=stable  Discussed labor precautions.  RTC 2 weeks    Harriet Rascon DO

## 2023-08-25 ENCOUNTER — PRENATAL OFFICE VISIT (OUTPATIENT)
Dept: OBGYN | Facility: OTHER | Age: 38
End: 2023-08-25
Payer: COMMERCIAL

## 2023-08-25 VITALS — SYSTOLIC BLOOD PRESSURE: 121 MMHG | WEIGHT: 193.1 LBS | BODY MASS INDEX: 27.71 KG/M2 | DIASTOLIC BLOOD PRESSURE: 79 MMHG

## 2023-08-25 DIAGNOSIS — O14.22 HEMOLYSIS, ELEVATED LIVER ENZYMES, AND LOW PLATELET COUNT (HELLP) SYNDROME IN SECOND TRIMESTER: ICD-10-CM

## 2023-08-25 DIAGNOSIS — F33.0 MILD EPISODE OF RECURRENT MAJOR DEPRESSIVE DISORDER (H): ICD-10-CM

## 2023-08-25 DIAGNOSIS — O09.523 MULTIGRAVIDA OF ADVANCED MATERNAL AGE IN THIRD TRIMESTER: Primary | ICD-10-CM

## 2023-08-25 DIAGNOSIS — O34.219 DESIRES VBAC (VAGINAL BIRTH AFTER CESAREAN) TRIAL: ICD-10-CM

## 2023-08-25 DIAGNOSIS — Z98.891 HISTORY OF C-SECTION: ICD-10-CM

## 2023-08-25 DIAGNOSIS — D50.9 IRON DEFICIENCY ANEMIA, UNSPECIFIED IRON DEFICIENCY ANEMIA TYPE: ICD-10-CM

## 2023-08-25 DIAGNOSIS — F41.1 GAD (GENERALIZED ANXIETY DISORDER): ICD-10-CM

## 2023-08-25 DIAGNOSIS — E03.8 OTHER SPECIFIED HYPOTHYROIDISM: ICD-10-CM

## 2023-08-25 PROCEDURE — 99207 PR PRENATAL VISIT: CPT | Performed by: OBSTETRICS & GYNECOLOGY

## 2023-08-25 NOTE — PROGRESS NOTES
38 year old  at 34w5d weeks presents to the clinic for a routine prenatal visit.    No concerns.  Patient denies any vaginal bleeding, leakage of fluid, or contractions     Good fetal movement  Fundal height=36cm  QETv=428  Obvious movement  History of HELLP=normal labs earlier this month.  Patient is taking her blood pressures at home and they have been stable.   History of c sections=successful VBACs  Anxiety/depression=stable  Hypothyroidism=TSH=1.14 on   Anemia=patient is taking additional iron supplements  Discussed labor precautions.  RTC 2 weeks    Harriet Rascon DO

## 2023-09-08 ENCOUNTER — PRENATAL OFFICE VISIT (OUTPATIENT)
Dept: OBGYN | Facility: OTHER | Age: 38
End: 2023-09-08
Payer: COMMERCIAL

## 2023-09-08 ENCOUNTER — MYC MEDICAL ADVICE (OUTPATIENT)
Dept: OBGYN | Facility: OTHER | Age: 38
End: 2023-09-08

## 2023-09-08 VITALS — BODY MASS INDEX: 28.31 KG/M2 | SYSTOLIC BLOOD PRESSURE: 128 MMHG | WEIGHT: 197.3 LBS | DIASTOLIC BLOOD PRESSURE: 85 MMHG

## 2023-09-08 DIAGNOSIS — Z86.2 HISTORY OF TTP (THROMBOTIC THROMBOCYTOPENIC PURPURA): ICD-10-CM

## 2023-09-08 DIAGNOSIS — F41.1 GAD (GENERALIZED ANXIETY DISORDER): ICD-10-CM

## 2023-09-08 DIAGNOSIS — O09.523 MULTIGRAVIDA OF ADVANCED MATERNAL AGE IN THIRD TRIMESTER: Primary | ICD-10-CM

## 2023-09-08 DIAGNOSIS — E03.8 OTHER SPECIFIED HYPOTHYROIDISM: ICD-10-CM

## 2023-09-08 DIAGNOSIS — Z98.891 HISTORY OF C-SECTION: ICD-10-CM

## 2023-09-08 DIAGNOSIS — D50.9 IRON DEFICIENCY ANEMIA, UNSPECIFIED IRON DEFICIENCY ANEMIA TYPE: ICD-10-CM

## 2023-09-08 DIAGNOSIS — O14.23 HEMOLYSIS, ELEVATED LIVER ENZYMES, AND LOW PLATELET (HELLP) SYNDROME DURING PREGNANCY IN THIRD TRIMESTER: ICD-10-CM

## 2023-09-08 DIAGNOSIS — O34.219 DESIRES VBAC (VAGINAL BIRTH AFTER CESAREAN) TRIAL: ICD-10-CM

## 2023-09-08 DIAGNOSIS — F33.0 MILD EPISODE OF RECURRENT MAJOR DEPRESSIVE DISORDER (H): ICD-10-CM

## 2023-09-08 PROCEDURE — 87653 STREP B DNA AMP PROBE: CPT | Performed by: OBSTETRICS & GYNECOLOGY

## 2023-09-08 PROCEDURE — 99207 PR PRENATAL VISIT: CPT | Performed by: OBSTETRICS & GYNECOLOGY

## 2023-09-08 NOTE — PROGRESS NOTES
38 year old  at 36w5d weeks presents to the clinic for a routine prenatal visit.  No concerns.  No vaginal bleeding, leakage of fluid, or contractions  Fundal height=37cm  INNg=134  CX=3-4/50/-3  GBS done today  History of HELLP.  Normal labs. Normal blood pressures at home.  Anemia=patient is taking iron  Hypothyroidism=TSH=1.14  Anxiety/depression=stable  AMA=ASA  History of c section with successful VBACs  Labor precautions discussed  RTC 1 week    Harriet Rascon DO

## 2023-09-09 LAB — GP B STREP DNA SPEC QL NAA+PROBE: NEGATIVE

## 2023-09-12 ENCOUNTER — HOSPITAL ENCOUNTER (OUTPATIENT)
Facility: CLINIC | Age: 38
End: 2023-09-12
Admitting: FAMILY MEDICINE
Payer: COMMERCIAL

## 2023-09-12 ENCOUNTER — HOSPITAL ENCOUNTER (OUTPATIENT)
Facility: CLINIC | Age: 38
Discharge: HOME | End: 2023-09-12
Attending: FAMILY MEDICINE | Admitting: FAMILY MEDICINE
Payer: COMMERCIAL

## 2023-09-12 VITALS — SYSTOLIC BLOOD PRESSURE: 120 MMHG | DIASTOLIC BLOOD PRESSURE: 86 MMHG | HEART RATE: 86 BPM

## 2023-09-12 PROBLEM — Z36.89 ENCOUNTER FOR TRIAGE IN PREGNANT PATIENT: Status: ACTIVE | Noted: 2023-09-12

## 2023-09-12 PROCEDURE — 59025 FETAL NON-STRESS TEST: CPT

## 2023-09-12 PROCEDURE — G0463 HOSPITAL OUTPT CLINIC VISIT: HCPCS

## 2023-09-12 PROCEDURE — 999N000105 HC STATISTIC NO DOCUMENTATION TO SUPPORT CHARGE

## 2023-09-12 PROCEDURE — G0463 HOSPITAL OUTPT CLINIC VISIT: HCPCS | Mod: 25

## 2023-09-12 RX ORDER — LIDOCAINE 40 MG/G
CREAM TOPICAL
Status: DISCONTINUED | OUTPATIENT
Start: 2023-09-12 | End: 2023-09-12 | Stop reason: HOSPADM

## 2023-09-12 ASSESSMENT — ACTIVITIES OF DAILY LIVING (ADL): ADLS_ACUITY_SCORE: 31

## 2023-09-12 NOTE — TELEPHONE ENCOUNTER
, 37w2d.      Last prenatal visit was on .    Pt is calling in today stating she hasn't felt baby move very much today.  She has been feeling him move but just not as much as usual.  She states she drank apple juice and still only felt 2 movements in one hour.    Denies any other symptoms.    Advised further evaluation in L&D.  Advised that [facility] L&D is at capacity and pt will have to go to a different L&D.  Pt wants to stay within [place] so I suggested [place], [place] or [facility].  Pt said she needs to talk to her  to figure out where they want to go and will call back.    Prachi Monge RN

## 2023-09-13 NOTE — PROGRESS NOTES
"Dr. Dooley updated with patient status. Headache that has resolved, occasional \"floaters\" in vision, and nausea earlier in the day that has resolved.   Last 4 BP's: 135/95, 129/88, 124/87, 120/86.   Plan of care is to discharge patient home with plan to follow up with her primary MD on Friday as scheduled, and to come back to triage for any worsening signs of preeclampsia. Nadeen Rendon RN on 9/12/2023 at 7:49 PM    "

## 2023-09-13 NOTE — PROGRESS NOTES
S:  Discharge from triage.   A:  FHT's 135, Moderate variability, Accels present, no decels noted. Contractions once.  Cervical exam N/A  R:  Written and verbal D/C instruction provided. Pt. Verbalized understanding of D/C instructions and will follow up with primary provider on Friday as previously scheduled.   Verbalizes understanding that she will call or return to [facility] with any further questions or concerns.   Pt. D/C'd via ambulation with self    Nursing Discharge Checklist  Discharge order entered: Yes  Patient care order entered: Yes  Charges entered: Yes  IV start and stop times have been documented in Epic?  NA  NST start and stop times have been documented in Epic Doc F/S? Yes

## 2023-09-13 NOTE — PROGRESS NOTES
S: Triage Arrival; late entry  B: Queta is a 38 y.o.  @ 37w 3d who presents with complaint of decreased fetal movement. Hx  x 2; hx postpartum HELLP syndrome 7 years ago after her first.   A: EFM applied. FHT's 135 with moderate variability, accels present, no decels noted on strip. Client reassured. Contractions; occasional, not feeling. Following BP's.   R: MD notified of admission and elevated pressures. Orders received to watch BP's q15' for a bit longer.

## 2023-09-13 NOTE — DISCHARGE INSTRUCTIONS
OB Triage Discharge Instructions    Diet:  Regular diet as tolerated    Activity:  As tolerated    Other Special Instructions: Return to hospital for worsening signs of pre-eclampsia, or any concerns    Reason for being seen in L&D: decreased fetal movement    Treatment/procedures performed in L&D: NST, blood pressure checks    Call [facility] at [phone] if you have:  5 or more contractions in one hour  Leaking of fluid from your vaginal area  Decreased fetal movement (follow kick count instructions)  Bleeding from your vaginal area  Swelling in your face, or increased swelling in your hands or legs  Headaches or vision problems such as blurring or seeing spots or starts  Nausea or vomiting lasting for more than 24 hours  An increase in weight (5lbs/week)  Epigastric pain (sometimes confused with heartburn that does not go away or a very bad stomach ache)    If you have any questions, please follow up with your doctor.        Patient Signature: ______________________________________________________________  By signing the above I acknowledge that a nurse or my care provider has explained the instruction to me and I have had any questions regarding my care explained to me.        Discharge Nurse Signature: _______________________________ 9/12/2023  7:58 PM    Method of discharge: ambulation    Accompanied by: self  Time of discharge: 2010

## 2023-09-15 ENCOUNTER — PRENATAL OFFICE VISIT (OUTPATIENT)
Dept: OBGYN | Facility: OTHER | Age: 38
End: 2023-09-15
Payer: COMMERCIAL

## 2023-09-15 VITALS — DIASTOLIC BLOOD PRESSURE: 89 MMHG | BODY MASS INDEX: 28.32 KG/M2 | WEIGHT: 197.4 LBS | SYSTOLIC BLOOD PRESSURE: 129 MMHG

## 2023-09-15 DIAGNOSIS — O09.523 MULTIGRAVIDA OF ADVANCED MATERNAL AGE IN THIRD TRIMESTER: Primary | ICD-10-CM

## 2023-09-15 DIAGNOSIS — F41.1 GAD (GENERALIZED ANXIETY DISORDER): ICD-10-CM

## 2023-09-15 DIAGNOSIS — F33.0 MILD EPISODE OF RECURRENT MAJOR DEPRESSIVE DISORDER (H): ICD-10-CM

## 2023-09-15 DIAGNOSIS — Z23 NEED FOR TDAP VACCINATION: ICD-10-CM

## 2023-09-15 DIAGNOSIS — D50.9 IRON DEFICIENCY ANEMIA, UNSPECIFIED IRON DEFICIENCY ANEMIA TYPE: ICD-10-CM

## 2023-09-15 DIAGNOSIS — E03.8 OTHER SPECIFIED HYPOTHYROIDISM: ICD-10-CM

## 2023-09-15 DIAGNOSIS — O34.219 DESIRES VBAC (VAGINAL BIRTH AFTER CESAREAN) TRIAL: ICD-10-CM

## 2023-09-15 DIAGNOSIS — Z98.891 HISTORY OF C-SECTION: ICD-10-CM

## 2023-09-15 DIAGNOSIS — O14.23 HEMOLYSIS, ELEVATED LIVER ENZYMES, AND LOW PLATELET (HELLP) SYNDROME DURING PREGNANCY IN THIRD TRIMESTER: ICD-10-CM

## 2023-09-15 PROCEDURE — 99207 PR PRENATAL VISIT: CPT | Performed by: OBSTETRICS & GYNECOLOGY

## 2023-09-15 NOTE — PROGRESS NOTES
38 year old  at 37w5d weeks presents to the clinic for a routine prenatal visit.  Complains of feeling more pressure.  No vaginal bleeding, leakage of fluid, or contractions   She noticed decreased movement 3 days ago and went to [facility] due to [facility].  She continues to feel movement however somewhat decreased.    Fundal height=38cm  IKWf=237  CX=4/50/-2 posterior  Discussed labor precautions  History of HELLP=blood pressures at home have been under 140 but the diastolic has been around 95.  Mild headaches.  Some vision changes.  No RUQ pain or epigastric pain.    Anemia=patient is taking additional iron supplements  Hypothyroidism=TSH=1.14=8  Desires   Anxiety/depression=stable  GBS=Negative      Discussed with Queta the risks, benefits and alternatives to induction.  We discussed cervical ripening for those patients with a hernández score of less than 6 (for multiparous) and 8 (for nulliparous).  Discussed the concerns related to uterine hyperstimulation and adverse fetal effects that can occur with a ripening agent or pitocin. Discussed amniotomy and the rationale for it with induction.  I discussed the expected timeline for her based on her presentation, but she understands that this is just an approximate.  She is given the opportunity to ask questions and have them answered.  She does wish to proceed  Will check labs tomorrow     Induction scheduled for .    Harriet Rascon,

## 2023-09-17 ENCOUNTER — MEDICAL CORRESPONDENCE (OUTPATIENT)
Dept: HEALTH INFORMATION MANAGEMENT | Facility: CLINIC | Age: 38
End: 2023-09-17

## 2023-09-18 ENCOUNTER — MYC MEDICAL ADVICE (OUTPATIENT)
Dept: OBGYN | Facility: OTHER | Age: 38
End: 2023-09-18
Payer: COMMERCIAL

## 2023-09-18 DIAGNOSIS — Z87.59 HISTORY OF POSTPARTUM HEMORRHAGE: Primary | ICD-10-CM

## 2023-09-19 NOTE — TELEPHONE ENCOUNTER
Please let patient know I ordered a CBC for her.  She can get that done and I will let her know the results.    Harriet Rascon, DO

## 2023-09-19 NOTE — TELEPHONE ENCOUNTER
Pt had  on 2023, pt asking about any recommended labs needed at this time since her delivery.    Pt will be in a clinic tomorrow and can have any needed drawn.    RN routing to provider to advise.    Rose Heck RN on 2023 at 7:43 AM

## 2023-09-20 ENCOUNTER — LAB (OUTPATIENT)
Dept: LAB | Facility: OTHER | Age: 38
End: 2023-09-20
Payer: COMMERCIAL

## 2023-09-20 DIAGNOSIS — Z87.59 HISTORY OF POSTPARTUM HEMORRHAGE: ICD-10-CM

## 2023-09-20 DIAGNOSIS — O14.23 HEMOLYSIS, ELEVATED LIVER ENZYMES, AND LOW PLATELET (HELLP) SYNDROME DURING PREGNANCY IN THIRD TRIMESTER: ICD-10-CM

## 2023-09-20 LAB
ALBUMIN MFR UR ELPH: 6.8 MG/DL
ALBUMIN SERPL BCG-MCNC: 3.6 G/DL (ref 3.5–5.2)
ALP SERPL-CCNC: 64 U/L (ref 35–104)
ALT SERPL W P-5'-P-CCNC: 24 U/L (ref 0–50)
ANION GAP SERPL CALCULATED.3IONS-SCNC: 9 MMOL/L (ref 7–15)
AST SERPL W P-5'-P-CCNC: 27 U/L (ref 0–45)
BILIRUB SERPL-MCNC: 0.2 MG/DL
BUN SERPL-MCNC: 10.6 MG/DL (ref 6–20)
CALCIUM SERPL-MCNC: 8.7 MG/DL (ref 8.6–10)
CHLORIDE SERPL-SCNC: 104 MMOL/L (ref 98–107)
CREAT SERPL-MCNC: 0.78 MG/DL (ref 0.51–0.95)
CREAT UR-MCNC: 66.1 MG/DL
DEPRECATED HCO3 PLAS-SCNC: 26 MMOL/L (ref 22–29)
EGFRCR SERPLBLD CKD-EPI 2021: >90 ML/MIN/1.73M2
ERYTHROCYTE [DISTWIDTH] IN BLOOD BY AUTOMATED COUNT: 12.8 % (ref 10–15)
GLUCOSE SERPL-MCNC: 72 MG/DL (ref 70–99)
HCT VFR BLD AUTO: 26.5 % (ref 35–47)
HGB BLD-MCNC: 8.8 G/DL (ref 11.7–15.7)
MCH RBC QN AUTO: 32.8 PG (ref 26.5–33)
MCHC RBC AUTO-ENTMCNC: 33.2 G/DL (ref 31.5–36.5)
MCV RBC AUTO: 99 FL (ref 78–100)
PLATELET # BLD AUTO: 311 10E3/UL (ref 150–450)
POTASSIUM SERPL-SCNC: 3.9 MMOL/L (ref 3.4–5.3)
PROT SERPL-MCNC: 6.3 G/DL (ref 6.4–8.3)
PROT/CREAT 24H UR: 0.1 MG/MG CR (ref 0–0.2)
RBC # BLD AUTO: 2.68 10E6/UL (ref 3.8–5.2)
SODIUM SERPL-SCNC: 139 MMOL/L (ref 136–145)
WBC # BLD AUTO: 8.5 10E3/UL (ref 4–11)

## 2023-09-20 PROCEDURE — 85027 COMPLETE CBC AUTOMATED: CPT

## 2023-09-20 PROCEDURE — 80053 COMPREHEN METABOLIC PANEL: CPT

## 2023-09-20 PROCEDURE — 36415 COLL VENOUS BLD VENIPUNCTURE: CPT

## 2023-09-20 PROCEDURE — 84156 ASSAY OF PROTEIN URINE: CPT

## 2023-09-22 ENCOUNTER — NURSE TRIAGE (OUTPATIENT)
Dept: NURSING | Facility: CLINIC | Age: 38
End: 2023-09-22
Payer: COMMERCIAL

## 2023-09-23 NOTE — TELEPHONE ENCOUNTER
Nurse Triage SBAR    Is this a 2nd Level Triage? YES, LICENSED PRACTITIONER REVIEW IS REQUIRED    Situation: Reporting elevated blood pressure post partum one week. /107.     Background: Post-partum one week. Monitoring BP Pre-eclampsia.      Assessment: Headache a couple days. /107 headache 5/10 .  Some swelling in feet, denies vision changes,     Protocol Recommended Disposition:   See HCP Within 4 Hours (Or PCP Triage)    Recommendation: Secondary triage recommendation for tonight.     Paged to provider  Paged Dr. Torres via [employer] web.  Recommends ED.    Provider Recommendation Follow Up:   Reached patient/caregiver. Informed of provider's recommendations. Patient verbalized understanding and agrees with the plan.     Brittanie Soto RN  [facility]      Does the patient meet one of the following criteria for ADS visit consideration? 16+ years old, with an [facility] PCP     TIP  Providers, please consider if this condition is appropriate for management at one of our Acute and Diagnostic Services sites.     If patient is a good candidate, please use dotphrase <dot>triageresponse and select Refer to ADS to document.      Reason for Disposition   Systolic BP >= 140 OR Diastolic >= 90    Additional Information   Negative: Difficult to awaken or acting confused (e.g., disoriented, slurred speech)   Negative: SEVERE difficulty breathing (e.g., struggling for each breath, speaks in single words)   Negative: [1] Weakness of the face, arm or leg on one side of the body AND [2] new-onset   Negative: [1] Numbness (i.e., loss of sensation) of the face, arm or leg on one side of the body AND [2] new- onset   Negative: Seizure occurred and has stopped   Negative: Sounds like a life-threatening emergency to the triager   Negative: New hand or face swelling   Negative: Systolic BP >= 160 OR Diastolic >= 110   Negative: Patient sounds very sick or weak to the triager   Negative: Sudden weight gain (e.g.,  more than 3 lbs or 1.4 kg in one week)    Protocols used: Postpartum - High Blood Pressure-A-AH

## 2023-09-26 ENCOUNTER — MYC MEDICAL ADVICE (OUTPATIENT)
Dept: OBGYN | Facility: OTHER | Age: 38
End: 2023-09-26
Payer: COMMERCIAL

## 2023-09-27 DIAGNOSIS — E03.9 HYPOTHYROIDISM, UNSPECIFIED TYPE: ICD-10-CM

## 2023-09-28 RX ORDER — LEVOTHYROXINE SODIUM 75 UG/1
75 TABLET ORAL DAILY
Qty: 30 TABLET | Refills: 0 | Status: SHIPPED | OUTPATIENT
Start: 2023-09-28 | End: 2023-10-24

## 2023-10-17 ENCOUNTER — TELEPHONE (OUTPATIENT)
Dept: PEDIATRICS | Facility: OTHER | Age: 38
End: 2023-10-17
Payer: COMMERCIAL

## 2023-10-17 NOTE — TELEPHONE ENCOUNTER
EPDS was done in clinic today during child's 1 month visit and score was 12 with negative psychosis and negative screen for suicidal ideation.   Queta has  history of anxiety/depression .   Copy of EPDS was given to Queta today as well as educational material about postpartum depression.     Plan:   Follow up within 1-2 weeks with OB/primary care provider was recommended. and Queta thinks things are improving  Behavioral health referral was not placed.

## 2023-10-24 ENCOUNTER — MYC REFILL (OUTPATIENT)
Dept: OBGYN | Facility: CLINIC | Age: 38
End: 2023-10-24
Payer: COMMERCIAL

## 2023-10-24 DIAGNOSIS — E03.9 HYPOTHYROIDISM, UNSPECIFIED TYPE: ICD-10-CM

## 2023-10-24 RX ORDER — LEVOTHYROXINE SODIUM 75 UG/1
75 TABLET ORAL DAILY
Qty: 30 TABLET | Refills: 0 | Status: SHIPPED | OUTPATIENT
Start: 2023-10-24 | End: 2023-11-25

## 2023-10-24 NOTE — TELEPHONE ENCOUNTER
"Requested Prescriptions   Pending Prescriptions Disp Refills    levothyroxine (SYNTHROID/LEVOTHROID) 75 MCG tablet 30 tablet 0     Sig: Take 1 tablet (75 mcg) by mouth daily       Thyroid Protocol Passed - 10/24/2023  2:13 PM        Passed - Patient is 12 years or older        Passed - Recent (12 mo) or future (30 days) visit within the authorizing provider's specialty     Patient has had an office visit with the authorizing provider or a provider within the authorizing providers department within the previous 12 mos or has a future within next 30 days. See \"Patient Info\" tab in inbasket, or \"Choose Columns\" in Meds & Orders section of the refill encounter.              Passed - Medication is active on med list        Passed - Normal TSH on file in past 12 months     Recent Labs   Lab Test 08/14/23  1017   TSH 1.14              Passed - No active pregnancy on record     If patient is pregnant or has had a positive pregnancy test, please check TSH.          Passed - No positive pregnancy test in past 12 months     If patient is pregnant or has had a positive pregnancy test, please check TSH.             Prescription approved per [facility] Refill Protocol.    Prachi Monge RN      "

## 2023-10-30 ENCOUNTER — MEDICAL CORRESPONDENCE (OUTPATIENT)
Dept: HEALTH INFORMATION MANAGEMENT | Facility: CLINIC | Age: 38
End: 2023-10-30

## 2023-10-30 ENCOUNTER — OFFICE VISIT (OUTPATIENT)
Dept: OBGYN | Facility: OTHER | Age: 38
End: 2023-10-30
Payer: COMMERCIAL

## 2023-10-30 VITALS — BODY MASS INDEX: 25.24 KG/M2 | SYSTOLIC BLOOD PRESSURE: 129 MMHG | WEIGHT: 175.9 LBS | DIASTOLIC BLOOD PRESSURE: 90 MMHG

## 2023-10-30 DIAGNOSIS — E03.8 OTHER SPECIFIED HYPOTHYROIDISM: ICD-10-CM

## 2023-10-30 DIAGNOSIS — D50.9 IRON DEFICIENCY ANEMIA, UNSPECIFIED IRON DEFICIENCY ANEMIA TYPE: ICD-10-CM

## 2023-10-30 PROBLEM — O09.523 MULTIGRAVIDA OF ADVANCED MATERNAL AGE IN THIRD TRIMESTER: Status: RESOLVED | Noted: 2023-03-07 | Resolved: 2023-10-30

## 2023-10-30 PROBLEM — Z36.89 ENCOUNTER FOR TRIAGE IN PREGNANT PATIENT: Status: RESOLVED | Noted: 2023-09-12 | Resolved: 2023-10-30

## 2023-10-30 LAB
ERYTHROCYTE [DISTWIDTH] IN BLOOD BY AUTOMATED COUNT: 11.3 % (ref 10–15)
HCT VFR BLD AUTO: 37 % (ref 35–47)
HGB BLD-MCNC: 11.9 G/DL (ref 11.7–15.7)
MCH RBC QN AUTO: 30.4 PG (ref 26.5–33)
MCHC RBC AUTO-ENTMCNC: 32.2 G/DL (ref 31.5–36.5)
MCV RBC AUTO: 95 FL (ref 78–100)
PLATELET # BLD AUTO: 246 10E3/UL (ref 150–450)
RBC # BLD AUTO: 3.91 10E6/UL (ref 3.8–5.2)
TSH SERPL DL<=0.005 MIU/L-ACNC: 0.77 UIU/ML (ref 0.3–4.2)
WBC # BLD AUTO: 4.9 10E3/UL (ref 4–11)

## 2023-10-30 PROCEDURE — 36415 COLL VENOUS BLD VENIPUNCTURE: CPT | Performed by: OBSTETRICS & GYNECOLOGY

## 2023-10-30 PROCEDURE — 99207 PR POST PARTUM EXAM: CPT | Performed by: OBSTETRICS & GYNECOLOGY

## 2023-10-30 PROCEDURE — 84443 ASSAY THYROID STIM HORMONE: CPT | Performed by: OBSTETRICS & GYNECOLOGY

## 2023-10-30 PROCEDURE — 85027 COMPLETE CBC AUTOMATED: CPT | Performed by: OBSTETRICS & GYNECOLOGY

## 2023-10-30 PROCEDURE — 99213 OFFICE O/P EST LOW 20 MIN: CPT | Performed by: OBSTETRICS & GYNECOLOGY

## 2023-10-30 ASSESSMENT — ANXIETY QUESTIONNAIRES
IF YOU CHECKED OFF ANY PROBLEMS ON THIS QUESTIONNAIRE, HOW DIFFICULT HAVE THESE PROBLEMS MADE IT FOR YOU TO DO YOUR WORK, TAKE CARE OF THINGS AT HOME, OR GET ALONG WITH OTHER PEOPLE: NOT DIFFICULT AT ALL
3. WORRYING TOO MUCH ABOUT DIFFERENT THINGS: SEVERAL DAYS
5. BEING SO RESTLESS THAT IT IS HARD TO SIT STILL: NOT AT ALL
7. FEELING AFRAID AS IF SOMETHING AWFUL MIGHT HAPPEN: MORE THAN HALF THE DAYS
GAD7 TOTAL SCORE: 7
2. NOT BEING ABLE TO STOP OR CONTROL WORRYING: SEVERAL DAYS
1. FEELING NERVOUS, ANXIOUS, OR ON EDGE: SEVERAL DAYS
GAD7 TOTAL SCORE: 7
6. BECOMING EASILY ANNOYED OR IRRITABLE: MORE THAN HALF THE DAYS

## 2023-10-30 ASSESSMENT — PATIENT HEALTH QUESTIONNAIRE - PHQ9
SUM OF ALL RESPONSES TO PHQ QUESTIONS 1-9: 3
5. POOR APPETITE OR OVEREATING: NOT AT ALL

## 2023-10-30 NOTE — PROGRESS NOTES
Subjective  38 year old non-pregnant female presents today for a postpartum visit.  Patient had a  on 2023 with a delayed postpartum hemorrhage.  No pain.  No vaginal bleeding.  No problems urinating.  Normal bowel movements.  Patient is breast feeding.  No signs and symptoms of ppd.  Patient scored a 3 on the PHQ-9 and a 7 on the JOAO-7.  No thoughts of suicide or infanticide.  She feels her anxiety is stable as she has a history of this.  Patient is not due for a pap smear today.  Patient is wanting to do natural family planning for birth control.  Patient states  may get a vasectomy.      ROS: 10 point ROS neg other than the symptoms noted above in the HPI.  Past Medical History:   Diagnosis Date    Depressive disorder 17    anxiety    History of blood transfusion 17    Hypertension 17    NO ACTIVE PROBLEMS      Past Surgical History:   Procedure Laterality Date    APPENDECTOMY  2014     SECTION N/A 10/29/2016    Procedure:  SECTION;  Surgeon: Adonis Dooley MD;  Location: [facility]     Family History   Problem Relation Age of Onset    Hypertension Mother     Osteoporosis Maternal Grandmother     Unknown/Adopted Father     Unknown/Adopted Paternal Grandmother     Unknown/Adopted Paternal Grandfather      Social History     Tobacco Use    Smoking status: Never    Smokeless tobacco: Never   Substance Use Topics    Alcohol use: No         Objective  Vitals: BP (!) 129/90   Wt 79.8 kg (175 lb 14.4 oz)   LMP 2022   Breastfeeding Yes   BMI 25.24 kg/m    BMI= Body mass index is 25.24 kg/m .           Assessment  1.)  Post partum visit  2.)  S/p  with delayed postpartum hemorrhage on 2023  3.)  Birth control   4.)  History of anemia  5.)  Anxiety/depression  6.)  Hypothyroidism       Plan  1.)  TSH today  2.)  CBC today    Harriet Rascon

## 2023-11-25 ENCOUNTER — MYC REFILL (OUTPATIENT)
Dept: OBGYN | Facility: CLINIC | Age: 38
End: 2023-11-25
Payer: COMMERCIAL

## 2023-11-25 DIAGNOSIS — E03.9 HYPOTHYROIDISM, UNSPECIFIED TYPE: ICD-10-CM

## 2023-11-27 RX ORDER — LEVOTHYROXINE SODIUM 75 UG/1
75 TABLET ORAL DAILY
Qty: 30 TABLET | Refills: 0 | Status: SHIPPED | OUTPATIENT
Start: 2023-11-27 | End: 2023-12-26

## 2023-11-27 NOTE — TELEPHONE ENCOUNTER
"Requested Prescriptions   Pending Prescriptions Disp Refills    levothyroxine (SYNTHROID/LEVOTHROID) 75 MCG tablet 30 tablet 0     Sig: Take 1 tablet (75 mcg) by mouth daily       Thyroid Protocol Passed - 11/25/2023  7:46 AM        Passed - Patient is 12 years or older        Passed - Recent (12 mo) or future (30 days) visit within the authorizing provider's specialty     Patient has had an office visit with the authorizing provider or a provider within the authorizing providers department within the previous 12 mos or has a future within next 30 days. See \"Patient Info\" tab in inbasket, or \"Choose Columns\" in Meds & Orders section of the refill encounter.              Passed - Medication is active on med list        Passed - Normal TSH on file in past 12 months     Recent Labs   Lab Test 10/30/23  1318   TSH 0.77              Passed - No active pregnancy on record     If patient is pregnant or has had a positive pregnancy test, please check TSH.          Passed - No positive pregnancy test in past 12 months     If patient is pregnant or has had a positive pregnancy test, please check TSH.               Pt last seen 10/30/2023 for PP    Last prescribed 10/24/2023 for 30 tablets with 0 refills    Prescription approved per [facility] Refill Protocol.    Rose Heck RN on 11/27/2023 at 9:41 AM      "

## 2023-11-28 ENCOUNTER — MEDICAL CORRESPONDENCE (OUTPATIENT)
Dept: HEALTH INFORMATION MANAGEMENT | Facility: CLINIC | Age: 38
End: 2023-11-28
Payer: COMMERCIAL

## 2023-12-26 ENCOUNTER — MYC REFILL (OUTPATIENT)
Dept: OBGYN | Facility: CLINIC | Age: 38
End: 2023-12-26
Payer: COMMERCIAL

## 2023-12-26 DIAGNOSIS — E03.9 HYPOTHYROIDISM, UNSPECIFIED TYPE: ICD-10-CM

## 2023-12-26 RX ORDER — LEVOTHYROXINE SODIUM 75 UG/1
75 TABLET ORAL DAILY
Qty: 30 TABLET | Refills: 0 | Status: SHIPPED | OUTPATIENT
Start: 2023-12-26 | End: 2024-01-28

## 2023-12-26 NOTE — TELEPHONE ENCOUNTER
Requested Prescriptions   Pending Prescriptions Disp Refills    levothyroxine (SYNTHROID/LEVOTHROID) 75 MCG tablet 30 tablet 0     Sig: Take 1 tablet (75 mcg) by mouth daily       Thyroid Protocol Passed - 12/26/2023  8:14 AM        Passed - Patient is 12 years or older        Passed - Recent (12 mo) or future (30 days) visit within the authorizing provider's specialty     The patient must have completed an in-person or virtual visit within the past 12 months or has a future visit scheduled within the next 90 days with the authorizing provider s specialty.  Urgent care and e-visits do not quality as an office visit for this protocol.          Passed - Medication is active on med list        Passed - Normal TSH on file in past 12 months     Recent Labs   Lab Test 10/30/23  1318   TSH 0.77              Passed - No active pregnancy on record     If patient is pregnant or has had a positive pregnancy test, please check TSH.          Passed - No positive pregnancy test in past 12 months     If patient is pregnant or has had a positive pregnancy test, please check TSH.             Prescription approved per [facility] Refill Protocol.    Prachi Monge RN     0

## 2024-01-16 ENCOUNTER — TELEPHONE (OUTPATIENT)
Dept: PEDIATRICS | Facility: OTHER | Age: 39
End: 2024-01-16
Payer: COMMERCIAL

## 2024-01-16 NOTE — TELEPHONE ENCOUNTER
EPDS was done in clinic today during child's 4 month visit and score was 13 with negative psychosis and negative screen for suicidal ideation.   Queta has  a behavioral health provider .   Copy of EPDS was given to Queta today as well as educational material about postpartum depression.     Plan:   Follow up this week with Behavioral Health provider.  Behavioral health referral was not placed.

## 2024-01-26 ENCOUNTER — MYC MEDICAL ADVICE (OUTPATIENT)
Dept: OBGYN | Facility: OTHER | Age: 39
End: 2024-01-26
Payer: COMMERCIAL

## 2024-01-26 DIAGNOSIS — E03.8 OTHER SPECIFIED HYPOTHYROIDISM: Primary | ICD-10-CM

## 2024-01-26 NOTE — TELEPHONE ENCOUNTER
"S:Mychart from patient requesting referral from Dr. Rascon to Dr. Sis Downs, Endocrinology [facility] for reported \" noticed my thyroid is a little swollen on one side \".     B:Taking levothyroxine, her mother had a benign thyroid nodule which she believes can be hereditary    A:Last night pt noticed mild swelling on right side of neck. No swelling anywhere else on her body.  No recent illnesses.  Denies all of the following: fever, pain, redness. Area is not warm to the touch.  No visible swelling.     R: OK to continue to monitor & reach out with any new or worsening symptoms. Will route referral request per patient's request      Pt would like provider to know baby and herself are doing great, baby is a great sleeper and she misses seeing staff.        "

## 2024-01-27 NOTE — TELEPHONE ENCOUNTER
Please let patient know I placed the referral for Endocrinology.  We miss seeing her as well!!!    ~Harriet Rascon, DO

## 2024-01-28 ENCOUNTER — MYC REFILL (OUTPATIENT)
Dept: OBGYN | Facility: CLINIC | Age: 39
End: 2024-01-28
Payer: COMMERCIAL

## 2024-01-28 DIAGNOSIS — E03.9 HYPOTHYROIDISM, UNSPECIFIED TYPE: Primary | ICD-10-CM

## 2024-01-29 RX ORDER — LEVOTHYROXINE SODIUM 75 UG/1
75 TABLET ORAL DAILY
Qty: 30 TABLET | Refills: 3 | Status: SHIPPED | OUTPATIENT
Start: 2024-01-29 | End: 2024-03-13

## 2024-01-29 NOTE — TELEPHONE ENCOUNTER
Requested Prescriptions   Pending Prescriptions Disp Refills    levothyroxine (SYNTHROID/LEVOTHROID) 75 MCG tablet 30 tablet 0     Sig: Take 1 tablet (75 mcg) by mouth daily       Thyroid Protocol Passed - 1/28/2024  8:09 AM        Passed - Patient is 12 years or older        Passed - Recent (12 mo) or future (30 days) visit within the authorizing provider's specialty     The patient must have completed an in-person or virtual visit within the past 12 months or has a future visit scheduled within the next 90 days with the authorizing provider s specialty.  Urgent care and e-visits do not quality as an office visit for this protocol.          Passed - Medication is active on med list        Passed - Normal TSH on file in past 12 months     Recent Labs   Lab Test 10/30/23  1318   TSH 0.77              Passed - No active pregnancy on record     If patient is pregnant or has had a positive pregnancy test, please check TSH.          Passed - No positive pregnancy test in past 12 months     If patient is pregnant or has had a positive pregnancy test, please check TSH.             Last seen: 10/30/2023 PP OV  Last refilled: 12/26/2023 for 30 tabs & 0 refills    Estefania NORMAN RN

## 2024-02-09 ENCOUNTER — OFFICE VISIT (OUTPATIENT)
Dept: FAMILY MEDICINE | Facility: OTHER | Age: 39
End: 2024-02-09
Payer: COMMERCIAL

## 2024-02-09 VITALS
OXYGEN SATURATION: 97 % | BODY MASS INDEX: 24.26 KG/M2 | WEIGHT: 169.5 LBS | RESPIRATION RATE: 16 BRPM | TEMPERATURE: 98.1 F | HEART RATE: 76 BPM | SYSTOLIC BLOOD PRESSURE: 116 MMHG | HEIGHT: 70 IN | DIASTOLIC BLOOD PRESSURE: 82 MMHG

## 2024-02-09 DIAGNOSIS — E04.1 THYROID NODULE: Primary | ICD-10-CM

## 2024-02-09 LAB
ANION GAP SERPL CALCULATED.3IONS-SCNC: 11 MMOL/L (ref 7–15)
BUN SERPL-MCNC: 17 MG/DL (ref 6–20)
CALCIUM SERPL-MCNC: 8.9 MG/DL (ref 8.6–10)
CHLORIDE SERPL-SCNC: 103 MMOL/L (ref 98–107)
CREAT SERPL-MCNC: 0.89 MG/DL (ref 0.51–0.95)
DEPRECATED HCO3 PLAS-SCNC: 23 MMOL/L (ref 22–29)
EGFRCR SERPLBLD CKD-EPI 2021: 85 ML/MIN/1.73M2
ERYTHROCYTE [DISTWIDTH] IN BLOOD BY AUTOMATED COUNT: 12.9 % (ref 10–15)
GLUCOSE SERPL-MCNC: 88 MG/DL (ref 70–99)
HCT VFR BLD AUTO: 36.2 % (ref 35–47)
HGB BLD-MCNC: 11.9 G/DL (ref 11.7–15.7)
MCH RBC QN AUTO: 30 PG (ref 26.5–33)
MCHC RBC AUTO-ENTMCNC: 32.9 G/DL (ref 31.5–36.5)
MCV RBC AUTO: 91 FL (ref 78–100)
PLATELET # BLD AUTO: 230 10E3/UL (ref 150–450)
POTASSIUM SERPL-SCNC: 4.3 MMOL/L (ref 3.4–5.3)
RBC # BLD AUTO: 3.97 10E6/UL (ref 3.8–5.2)
SODIUM SERPL-SCNC: 137 MMOL/L (ref 135–145)
TSH SERPL DL<=0.005 MIU/L-ACNC: 3.62 UIU/ML (ref 0.3–4.2)
WBC # BLD AUTO: 5.1 10E3/UL (ref 4–11)

## 2024-02-09 PROCEDURE — 85027 COMPLETE CBC AUTOMATED: CPT | Performed by: PHYSICIAN ASSISTANT

## 2024-02-09 PROCEDURE — 80048 BASIC METABOLIC PNL TOTAL CA: CPT | Performed by: PHYSICIAN ASSISTANT

## 2024-02-09 PROCEDURE — 36415 COLL VENOUS BLD VENIPUNCTURE: CPT | Performed by: PHYSICIAN ASSISTANT

## 2024-02-09 PROCEDURE — 99214 OFFICE O/P EST MOD 30 MIN: CPT | Performed by: PHYSICIAN ASSISTANT

## 2024-02-09 PROCEDURE — 84443 ASSAY THYROID STIM HORMONE: CPT | Performed by: PHYSICIAN ASSISTANT

## 2024-02-09 NOTE — PROGRESS NOTES
Assessment & Plan     Thyroid nodule  Patient is a 38 year old female who presents today with concerns about thyroid nodule. She informs me that she had been on thyroid supplementation during most recent pregnancy this past year. Thyroid levels returned to normal following delivery and has not required levothyroxine. About 2 weeks ago she began to notice a nodule/mass along the right anterior thyroid. This has increased in size and is causing small amount of pressure to be felt within the throat. Denies trauma, redness, swelling or inability to tolerate solids/liquids. Nodule is roughly 1.5in, nontender. Discussed with patient need for further evaluation with ultrasound and likely a FNA. She was receptive to this plan. Updated labs today which fortunately returned within normal range. I will await the results of the US and instruct patient on next steps. Reference material attached to the AVS.   - US Thyroid; Future  - TSH with free T4 reflex; Future  - CBC with platelets; Future  - Basic metabolic panel  (Ca, Cl, CO2, Creat, Gluc, K, Na, BUN); Future  - Basic metabolic panel  (Ca, Cl, CO2, Creat, Gluc, K, Na, BUN)  - CBC with platelets  - TSH with free T4 reflex    Ena Birch is a 38 year old, presenting for the following health issues:  Enlarged gland      2/9/2024     4:23 PM   Additional Questions   Roomed by Cece IYER   Accompanied by self (brought baby with)     History of Present Illness       Reason for visit:  Lump on neck  Symptom onset:  1-2 weeks ago  Symptoms include:  Headaches, hair loss, loose stools, irritability  Symptom intensity:  Mild  Symptom progression:  Staying the same  Had these symptoms before:  No  What makes it worse:  No  What makes it better:  No    She eats 2-3 servings of fruits and vegetables daily.She consumes 2 sweetened beverage(s) daily.She exercises with enough effort to increase her heart rate 20 to 29 minutes per day.  She exercises with enough effort to increase her  "heart rate 3 or less days per week.   She is taking medications regularly.     Possibly related to thyroid.      Review of Systems  Constitutional, neuro, ENT, endocrine, pulmonary, cardiac, gastrointestinal, genitourinary, musculoskeletal, integument and psychiatric systems are negative, except as otherwise noted.      Objective    /82   Pulse 76   Temp 98.1  F (36.7  C) (Temporal)   Resp 16   Ht 1.778 m (5' 10\")   Wt 76.9 kg (169 lb 8 oz)   SpO2 97%   BMI 24.32 kg/m    Body mass index is 24.32 kg/m .  Physical Exam   GENERAL: alert and no distress  NECK: no adenopathy, no asymmetry, masses, or scars, and thyroid nodule right anterior 1.5in  RESP: lungs clear to auscultation - no rales, rhonchi or wheezes  CV: regular rate and rhythm, normal S1 S2, no S3 or S4, no murmur, click or rub, no peripheral edema  MS: no gross musculoskeletal defects noted, no edema  PSYCH: mentation appears normal, affect normal/bright    Results for orders placed or performed in visit on 02/09/24   Basic metabolic panel  (Ca, Cl, CO2, Creat, Gluc, K, Na, BUN)     Status: Normal   Result Value Ref Range    Sodium 137 135 - 145 mmol/L    Potassium 4.3 3.4 - 5.3 mmol/L    Chloride 103 98 - 107 mmol/L    Carbon Dioxide (CO2) 23 22 - 29 mmol/L    Anion Gap 11 7 - 15 mmol/L    Urea Nitrogen 17.0 6.0 - 20.0 mg/dL    Creatinine 0.89 0.51 - 0.95 mg/dL    GFR Estimate 85 >60 mL/min/1.73m2    Calcium 8.9 8.6 - 10.0 mg/dL    Glucose 88 70 - 99 mg/dL   CBC with platelets     Status: Normal   Result Value Ref Range    WBC Count 5.1 4.0 - 11.0 10e3/uL    RBC Count 3.97 3.80 - 5.20 10e6/uL    Hemoglobin 11.9 11.7 - 15.7 g/dL    Hematocrit 36.2 35.0 - 47.0 %    MCV 91 78 - 100 fL    MCH 30.0 26.5 - 33.0 pg    MCHC 32.9 31.5 - 36.5 g/dL    RDW 12.9 10.0 - 15.0 %    Platelet Count 230 150 - 450 10e3/uL   TSH with free T4 reflex     Status: Normal   Result Value Ref Range    TSH 3.62 0.30 - 4.20 uIU/mL           Signed Electronically by: Erik" MARISELA Tong PA-C

## 2024-02-13 ENCOUNTER — ANCILLARY PROCEDURE (OUTPATIENT)
Dept: ULTRASOUND IMAGING | Facility: OTHER | Age: 39
End: 2024-02-13
Attending: PHYSICIAN ASSISTANT
Payer: COMMERCIAL

## 2024-02-13 DIAGNOSIS — E04.1 THYROID NODULE: Primary | ICD-10-CM

## 2024-02-13 DIAGNOSIS — E04.1 THYROID NODULE: ICD-10-CM

## 2024-02-13 PROCEDURE — 76536 US EXAM OF HEAD AND NECK: CPT | Mod: TC | Performed by: STUDENT IN AN ORGANIZED HEALTH CARE EDUCATION/TRAINING PROGRAM

## 2024-03-01 ENCOUNTER — ANCILLARY PROCEDURE (OUTPATIENT)
Dept: ULTRASOUND IMAGING | Facility: CLINIC | Age: 39
End: 2024-03-01
Attending: PHYSICIAN ASSISTANT
Payer: COMMERCIAL

## 2024-03-01 DIAGNOSIS — E04.1 THYROID NODULE: ICD-10-CM

## 2024-03-01 PROCEDURE — 88173 CYTOPATH EVAL FNA REPORT: CPT | Performed by: PATHOLOGY

## 2024-03-01 PROCEDURE — 10005 FNA BX W/US GDN 1ST LES: CPT

## 2024-03-04 LAB
PATH REPORT.COMMENTS IMP SPEC: ABNORMAL
PATH REPORT.COMMENTS IMP SPEC: ABNORMAL
PATH REPORT.COMMENTS IMP SPEC: YES
PATH REPORT.FINAL DX SPEC: ABNORMAL
PATH REPORT.GROSS SPEC: ABNORMAL
PATH REPORT.MICROSCOPIC SPEC OTHER STN: ABNORMAL
PATH REPORT.RELEVANT HX SPEC: ABNORMAL

## 2024-03-09 SDOH — HEALTH STABILITY: PHYSICAL HEALTH: ON AVERAGE, HOW MANY MINUTES DO YOU ENGAGE IN EXERCISE AT THIS LEVEL?: 20 MIN

## 2024-03-09 SDOH — HEALTH STABILITY: PHYSICAL HEALTH: ON AVERAGE, HOW MANY DAYS PER WEEK DO YOU ENGAGE IN MODERATE TO STRENUOUS EXERCISE (LIKE A BRISK WALK)?: 4 DAYS

## 2024-03-09 ASSESSMENT — SOCIAL DETERMINANTS OF HEALTH (SDOH): HOW OFTEN DO YOU GET TOGETHER WITH FRIENDS OR RELATIVES?: ONCE A WEEK

## 2024-03-13 ENCOUNTER — OFFICE VISIT (OUTPATIENT)
Dept: FAMILY MEDICINE | Facility: OTHER | Age: 39
End: 2024-03-13
Payer: COMMERCIAL

## 2024-03-13 VITALS
OXYGEN SATURATION: 98 % | DIASTOLIC BLOOD PRESSURE: 76 MMHG | BODY MASS INDEX: 25.76 KG/M2 | HEIGHT: 68 IN | TEMPERATURE: 97.7 F | HEART RATE: 87 BPM | RESPIRATION RATE: 16 BRPM | WEIGHT: 170 LBS | SYSTOLIC BLOOD PRESSURE: 110 MMHG

## 2024-03-13 DIAGNOSIS — Z00.00 ROUTINE GENERAL MEDICAL EXAMINATION AT A HEALTH CARE FACILITY: Primary | ICD-10-CM

## 2024-03-13 DIAGNOSIS — E03.9 HYPOTHYROIDISM, UNSPECIFIED TYPE: ICD-10-CM

## 2024-03-13 DIAGNOSIS — E04.1 THYROID NODULE: ICD-10-CM

## 2024-03-13 PROCEDURE — 99395 PREV VISIT EST AGE 18-39: CPT | Performed by: FAMILY MEDICINE

## 2024-03-13 PROCEDURE — 99213 OFFICE O/P EST LOW 20 MIN: CPT | Mod: 25 | Performed by: FAMILY MEDICINE

## 2024-03-13 RX ORDER — LEVOTHYROXINE SODIUM 75 UG/1
75 TABLET ORAL DAILY
Qty: 90 TABLET | Refills: 3 | Status: SHIPPED | OUTPATIENT
Start: 2024-03-13

## 2024-03-13 RX ORDER — ACETAMINOPHEN AND CODEINE PHOSPHATE 120; 12 MG/5ML; MG/5ML
0.35 SOLUTION ORAL DAILY
Qty: 84 TABLET | Refills: 4 | Status: SHIPPED | OUTPATIENT
Start: 2024-03-13 | End: 2024-07-30

## 2024-03-13 RX ORDER — AMOXICILLIN 875 MG
875 TABLET ORAL 2 TIMES DAILY
COMMUNITY
Start: 2024-03-10 | End: 2024-03-13

## 2024-03-13 ASSESSMENT — PAIN SCALES - GENERAL: PAINLEVEL: NO PAIN (0)

## 2024-03-13 NOTE — PATIENT INSTRUCTIONS
Preventive Care Advice   This is general advice given by our system to help you stay healthy. However, your care team may have specific advice just for you. Please talk to your care team about your preventive care needs.  Nutrition  Eat 5 or more servings of fruits and vegetables each day.  Try wheat bread, brown rice and whole grain pasta (instead of white bread, rice, and pasta).  Get enough calcium and vitamin D. Check the label on foods and aim for 100% of the RDA (recommended daily allowance).  Lifestyle  Exercise at least 150 minutes each week   (30 minutes a day, 5 days a week).  Do muscle strengthening activities 2 days a week. These help control your weight and prevent disease.  No smoking.  Wear sunscreen to prevent skin cancer.  Have a dental exam and cleaning every 6 months.  Yearly exams  See your health care team every year to talk about:  Any changes in your health.  Any medicines your care team has prescribed.  Preventive care, family planning, and ways to prevent chronic diseases.  Shots (vaccines)   HPV shots (up to age 26), if you've never had them before.  Hepatitis B shots (up to age 59), if you've never had them before.  COVID-19 shot: Get this shot when it's due.  Flu shot: Get a flu shot every year.  Tetanus shot: Get a tetanus shot every 10 years.  Pneumococcal, hepatitis A, and RSV shots: Ask your care team if you need these based on your risk.  Shingles shot (for age 50 and up).  General health tests  Diabetes screening:  Starting at age 35, Get screened for diabetes at least every 3 years.  If you are younger than age 35, ask your care team if you should be screened for diabetes.  Cholesterol test: At age 39, start having a cholesterol test every 5 years, or more often if advised.  Bone density scan (DEXA): At age 50, ask your care team if you should have this scan for osteoporosis (brittle bones).  Hepatitis C: Get tested at least once in your life.  STIs (sexually transmitted  infections)  Before age 24: Ask your care team if you should be screened for STIs.  After age 24: Get screened for STIs if you're at risk. You are at risk for STIs (including HIV) if:  You are sexually active with more than one person.  You don't use condoms every time.  You or a partner was diagnosed with a sexually transmitted infection.  If you are at risk for HIV, ask about PrEP medicine to prevent HIV.  Get tested for HIV at least once in your life, whether you are at risk for HIV or not.  Cancer screening tests  Cervical cancer screening: If you have a cervix, begin getting regular cervical cancer screening tests at age 21. Most people who have regular screenings with normal results can stop after age 65. Talk about this with your provider.  Breast cancer scan (mammogram): If you've ever had breasts, begin having regular mammograms starting at age 40. This is a scan to check for breast cancer.  Colon cancer screening: It is important to start screening for colon cancer at age 45.  Have a colonoscopy test every 10 years (or more often if you're at risk) Or, ask your provider about stool tests like a FIT test every year or Cologuard test every 3 years.  To learn more about your testing options, visit: https://www.[employer]/042186.pdf.  For help making a decision, visit: https://bit.ly/fw93319.  Prostate cancer screening test: If you have a prostate and are age 55 to 69, ask your provider if you would benefit from a yearly prostate cancer screening test.  Lung cancer screening: If you are a current or former smoker age 50 to 80, ask your care team if ongoing lung cancer screenings are right for you.  For informational purposes only. Not to replace the advice of your health care provider. Copyright   2023 [employer]. All rights reserved. Clinically reviewed by the [facility]. Qulsar 140603 - REV 01/24.    Learning About Stress  What is stress?     Stress is your  body's response to a hard situation. Your body can have a physical, emotional, or mental response. Stress is a fact of life for most people, and it affects everyone differently. What causes stress for you may not be stressful for someone else.  A lot of things can cause stress. You may feel stress when you go on a job interview, take a test, or run a race. This kind of short-term stress is normal and even useful. It can help you if you need to work hard or react quickly. For example, stress can help you finish an important job on time.  Long-term stress is caused by ongoing stressful situations or events. Examples of long-term stress include long-term health problems, ongoing problems at work, or conflicts in your family. Long-term stress can harm your health.  How does stress affect your health?  When you are stressed, your body responds as though you are in danger. It makes hormones that speed up your heart, make you breathe faster, and give you a burst of energy. This is called the fight-or-flight stress response. If the stress is over quickly, your body goes back to normal and no harm is done.  But if stress happens too often or lasts too long, it can have bad effects. Long-term stress can make you more likely to get sick, and it can make symptoms of some diseases worse. If you tense up when you are stressed, you may develop neck, shoulder, or low back pain. Stress is linked to high blood pressure and heart disease.  Stress also harms your emotional health. It can make you asencio, tense, or depressed. Your relationships may suffer, and you may not do well at work or school.  What can you do to manage stress?  You can try these things to help manage stress:   Do something active. Exercise or activity can help reduce stress. Walking is a great way to get started. Even everyday activities such as housecleaning or yard work can help.  Try yoga or livia chi. These techniques combine exercise and meditation. You may need  some training at first to learn them.  Do something you enjoy. For example, listen to music or go to a movie. Practice your hobby or do volunteer work.  Meditate. This can help you relax, because you are not worrying about what happened before or what may happen in the future.  Do guided imagery. Imagine yourself in any setting that helps you feel calm. You can use online videos, books, or a teacher to guide you.  Do breathing exercises. For example:  From a standing position, bend forward from the waist with your knees slightly bent. Let your arms dangle close to the floor.  Breathe in slowly and deeply as you return to a standing position. Roll up slowly and lift your head last.  Hold your breath for just a few seconds in the standing position.  Breathe out slowly and bend forward from the waist.  Let your feelings out. Talk, laugh, cry, and express anger when you need to. Talking with supportive friends or family, a counselor, or a benita leader about your feelings is a healthy way to relieve stress. Avoid discussing your feelings with people who make you feel worse.  Write. It may help to write about things that are bothering you. This helps you find out how much stress you feel and what is causing it. When you know this, you can find better ways to cope.  What can you do to prevent stress?  You might try some of these things to help prevent stress:  Manage your time. This helps you find time to do the things you want and need to do.  Get enough sleep. Your body recovers from the stresses of the day while you are sleeping.  Get support. Your family, friends, and community can make a difference in how you experience stress.  Limit your news feed. Avoid or limit time on social media or news that may make you feel stressed.  Do something active. Exercise or activity can help reduce stress. Walking is a great way to get started.  Where can you learn more?  Go to https://www.healthwise.net/patiented  Enter N032 in the  "search box to learn more about \"Learning About Stress.\"  Current as of: February 26, 2023               Content Version: 13.8    8857-7710 [employer].   Care instructions adapted under license by your healthcare professional. If you have questions about a medical condition or this instruction, always ask your healthcare professional. [employer] disclaims any warranty or liability for your use of this information.      "

## 2024-03-13 NOTE — PROGRESS NOTES
"Preventive Care Visit  [facility]  Chelsi Cheney MD, MD, Family Medicine  Mar 13, 2024      Assessment & Plan         ICD-10-CM    1. Routine general medical examination at a health care facility  Z00.00 norethindrone (MICRONOR) 0.35 MG tablet      2. Hypothyroidism, unspecified type  E03.9 levothyroxine (SYNTHROID/LEVOTHROID) 75 MCG tablet      3. Thyroid nodule  E04.1           Discussed the sudden onset of the thyroid nodule as well as its potentially concerning results and options that are available with this.  She is seeing a specialist in the near future and had intended to to wait until after she was done breast-feeding in approximately 6 months to manage this.  We strongly encouraged her to either make a plan for repeat biopsy in 4 to 6 weeks or consider discussion of removal.  It is a high enough indolent malignancy rate that it would not be advised to wait 6 months for treatment.  Also given her health issues it is best to think about birth control planning at this time and due to her concerns of her breastmilk interactions we did discuss Micronor as well as IUDs.  She was most comfortable with birth control pills for now and we will proceed with these.    I spent a total of 23 minutes on the day of the visit.   Time spent by me doing chart review, history and exam, documentation and further activities per the note    Chelsi Cheney MD     Patient has been advised of split billing requirements and indicates understanding: Yes          BMI  Estimated body mass index is 25.73 kg/m  as calculated from the following:    Height as of this encounter: 1.731 m (5' 8.15\").    Weight as of this encounter: 77.1 kg (170 lb).   Weight management plan: Discussed healthy diet and exercise guidelines    Counseling  Appropriate preventive services were discussed with this patient, including applicable screening as appropriate for fall prevention, nutrition, physical activity, Tobacco-use cessation, weight " loss and cognition.  Checklist reviewing preventive services available has been given to the patient.  Reviewed patient's diet, addressing concerns and/or questions.           Ena Birch is a 38 year old, presenting for the following:  Physical        3/13/2024     5:15 PM   Additional Questions   Roomed by slava   Accompanied by sons         3/13/2024     5:15 PM   Patient Reported Additional Medications   Patient reports taking the following new medications none        Health Care Directive  Patient does not have a Health Care Directive or Living Will: Discussed advance care planning with patient; information given to patient to review.    HPI      Patient has been monitoring thyroid nodules that seem to come up fairly suddenly after the birth of her child.  Her thyroid hormone levels have been quite stable but she had a thyroid nodule biopsy recently that was indeterminant and had a 22% chance malignancy        3/9/2024   General Health   How would you rate your overall physical health? (!) FAIR   Feel stress (tense, anxious, or unable to sleep) To some extent   (!) STRESS CONCERN      3/9/2024   Nutrition   Three or more servings of calcium each day? Yes   Diet: Regular (no restrictions)   How many servings of fruit and vegetables per day? (!) 2-3   How many sweetened beverages each day? 0-1         3/9/2024   Exercise   Days per week of moderate/strenous exercise 4 days   Average minutes spent exercising at this level 20 min         3/9/2024   Social Factors   Frequency of gathering with friends or relatives Once a week   Worry food won't last until get money to buy more No   Food not last or not have enough money for food? No   Do you have housing?  Yes   Are you worried about losing your housing? No   Lack of transportation? No   Unable to get utilities (heat,electricity)? No         3/9/2024   Dental   Dentist two times every year? Yes         3/9/2024   TB Screening   Were you born outside of [place]?   "No         Today's PHQ-9 Score:       10/30/2023     1:51 PM   PHQ-9 SCORE   PHQ-9 Total Score 3           3/9/2024   Substance Use   Alcohol more than 3/day or more than 7/wk No   Do you use any other substances recreationally? No     Social History     Tobacco Use    Smoking status: Never    Smokeless tobacco: Never   Vaping Use    Vaping Use: Never used   Substance Use Topics    Alcohol use: No    Drug use: No             3/9/2024   Breast Cancer Screening   Family history of breast, colon, or ovarian cancer? No / Unknown      Mammogram Screening - Patient under 40 years of age: Routine Mammogram Screening not recommended.         3/9/2024   STI Screening   New sexual partner(s) since last STI/HIV test? No     History of abnormal Pap smear: NO - age 30-65 PAP every 5 years with negative HPV co-testing recommended        Latest Ref Rng & Units 2/21/2022    11:20 AM 8/25/2017     2:11 PM 8/25/2017     1:50 PM   PAP / HPV   PAP  Negative for Intraepithelial Lesion or Malignancy (NILM)      PAP (Historical)   NIL     HPV 16 DNA Negative Negative   Negative    HPV 18 DNA Negative Negative   Negative    Other HR HPV Negative Negative   Negative            3/9/2024   Contraception/Family Planning   Questions about contraception or family planning (!) YES is afraid to disrupt breast-feeding and would like to discuss options        Reviewed and updated as needed this visit by Provider   Tobacco  Allergies  Meds  Problems  Med Hx  Surg Hx  Fam Hx                     Objective    Exam  /76   Pulse 87   Temp 97.7  F (36.5  C) (Temporal)   Resp 16   Ht 1.731 m (5' 8.15\")   Wt 77.1 kg (170 lb)   LMP 02/22/2024 (Exact Date)   SpO2 98%   Breastfeeding Yes   BMI 25.73 kg/m     Estimated body mass index is 25.73 kg/m  as calculated from the following:    Height as of this encounter: 1.731 m (5' 8.15\").    Weight as of this encounter: 77.1 kg (170 lb).    Physical Exam  GENERAL: alert and no distress  NECK: " Large right-sided nodule noted over the thyroid  RESP: lungs clear to auscultation - no rales, rhonchi or wheezes  BREAST: normal without masses, tenderness or nipple discharge and no palpable axillary masses or adenopathy  CV: regular rate and rhythm, normal S1 S2, no S3 or S4, no murmur, click or rub, no peripheral edema  ABDOMEN: soft, nontender, no hepatosplenomegaly, no masses and bowel sounds normal  MS: no gross musculoskeletal defects noted, no edema  SKIN: no suspicious lesions or rashes  NEURO: Normal strength and tone, mentation intact and speech normal  PSYCH: mentation appears normal, affect normal/bright        Signed Electronically by: Chelsi Cheney MD, MD

## 2024-03-19 ENCOUNTER — TELEPHONE (OUTPATIENT)
Dept: PEDIATRICS | Facility: OTHER | Age: 39
End: 2024-03-19
Payer: COMMERCIAL

## 2024-03-19 NOTE — TELEPHONE ENCOUNTER
EPDS was done in clinic today during child's 6 month visit and score was 12 with negative psychosis and negative screen for suicidal ideation.   Queta has  history of anxiety/depression .   Copy of EPDS was given to Queta today as well as educational material about postpartum depression.     Plan:   Follow up this week with Behavioral Health provider.  Behavioral health referral was not placed.

## 2024-05-09 ENCOUNTER — OFFICE VISIT (OUTPATIENT)
Dept: FAMILY MEDICINE | Facility: OTHER | Age: 39
End: 2024-05-09
Payer: COMMERCIAL

## 2024-05-09 VITALS
RESPIRATION RATE: 14 BRPM | WEIGHT: 163 LBS | HEIGHT: 68 IN | OXYGEN SATURATION: 98 % | TEMPERATURE: 97.3 F | DIASTOLIC BLOOD PRESSURE: 80 MMHG | BODY MASS INDEX: 24.71 KG/M2 | HEART RATE: 87 BPM | SYSTOLIC BLOOD PRESSURE: 118 MMHG

## 2024-05-09 DIAGNOSIS — N89.8 VAGINAL IRRITATION: ICD-10-CM

## 2024-05-09 DIAGNOSIS — E04.1 THYROID NODULE: Primary | ICD-10-CM

## 2024-05-09 DIAGNOSIS — E03.9 HYPOTHYROIDISM, UNSPECIFIED TYPE: ICD-10-CM

## 2024-05-09 DIAGNOSIS — E55.9 VITAMIN D DEFICIENCY: ICD-10-CM

## 2024-05-09 DIAGNOSIS — D50.9 IRON DEFICIENCY ANEMIA, UNSPECIFIED IRON DEFICIENCY ANEMIA TYPE: ICD-10-CM

## 2024-05-09 DIAGNOSIS — N89.8 VAGINAL IRRITATION: Primary | ICD-10-CM

## 2024-05-09 PROBLEM — E04.9 NODULAR GOITER: Status: ACTIVE | Noted: 2024-03-28

## 2024-05-09 LAB
ALBUMIN UR-MCNC: NEGATIVE MG/DL
APPEARANCE UR: CLEAR
BILIRUB UR QL STRIP: NEGATIVE
CLUE CELLS: ABNORMAL
COLOR UR AUTO: YELLOW
FERRITIN SERPL-MCNC: 41 NG/ML (ref 6–175)
GLUCOSE UR STRIP-MCNC: NEGATIVE MG/DL
HGB UR QL STRIP: NEGATIVE
IRON BINDING CAPACITY (ROCHE): 307 UG/DL (ref 240–430)
IRON SATN MFR SERPL: 38 % (ref 15–46)
IRON SERPL-MCNC: 117 UG/DL (ref 37–145)
KETONES UR STRIP-MCNC: 40 MG/DL
LEUKOCYTE ESTERASE UR QL STRIP: NEGATIVE
NITRATE UR QL: NEGATIVE
PH UR STRIP: 6 [PH] (ref 5–7)
SP GR UR STRIP: >=1.03 (ref 1–1.03)
T4 FREE SERPL-MCNC: 1.21 NG/DL (ref 0.9–1.7)
TRICHOMONAS, WET PREP: ABNORMAL
TSH SERPL DL<=0.005 MIU/L-ACNC: 2 UIU/ML (ref 0.3–4.2)
UROBILINOGEN UR STRIP-ACNC: 0.2 E.U./DL
WBC'S/HIGH POWER FIELD, WET PREP: ABNORMAL
YEAST, WET PREP: ABNORMAL

## 2024-05-09 PROCEDURE — 99214 OFFICE O/P EST MOD 30 MIN: CPT | Performed by: PHYSICIAN ASSISTANT

## 2024-05-09 PROCEDURE — 84443 ASSAY THYROID STIM HORMONE: CPT | Performed by: PHYSICIAN ASSISTANT

## 2024-05-09 PROCEDURE — 81003 URINALYSIS AUTO W/O SCOPE: CPT | Performed by: PHYSICIAN ASSISTANT

## 2024-05-09 PROCEDURE — 36415 COLL VENOUS BLD VENIPUNCTURE: CPT | Performed by: PHYSICIAN ASSISTANT

## 2024-05-09 PROCEDURE — 86376 MICROSOMAL ANTIBODY EACH: CPT | Performed by: PHYSICIAN ASSISTANT

## 2024-05-09 PROCEDURE — 86800 THYROGLOBULIN ANTIBODY: CPT | Performed by: PHYSICIAN ASSISTANT

## 2024-05-09 PROCEDURE — 84481 FREE ASSAY (FT-3): CPT | Performed by: PHYSICIAN ASSISTANT

## 2024-05-09 PROCEDURE — 83540 ASSAY OF IRON: CPT | Performed by: PHYSICIAN ASSISTANT

## 2024-05-09 PROCEDURE — 82308 ASSAY OF CALCITONIN: CPT | Mod: 90 | Performed by: PHYSICIAN ASSISTANT

## 2024-05-09 PROCEDURE — 99000 SPECIMEN HANDLING OFFICE-LAB: CPT | Performed by: PHYSICIAN ASSISTANT

## 2024-05-09 PROCEDURE — 82728 ASSAY OF FERRITIN: CPT | Performed by: PHYSICIAN ASSISTANT

## 2024-05-09 PROCEDURE — 87210 SMEAR WET MOUNT SALINE/INK: CPT | Performed by: PHYSICIAN ASSISTANT

## 2024-05-09 PROCEDURE — 82306 VITAMIN D 25 HYDROXY: CPT | Performed by: PHYSICIAN ASSISTANT

## 2024-05-09 PROCEDURE — 84439 ASSAY OF FREE THYROXINE: CPT | Performed by: PHYSICIAN ASSISTANT

## 2024-05-09 PROCEDURE — 83550 IRON BINDING TEST: CPT | Performed by: PHYSICIAN ASSISTANT

## 2024-05-09 ASSESSMENT — PAIN SCALES - GENERAL: PAINLEVEL: NO PAIN (0)

## 2024-05-09 NOTE — PATIENT INSTRUCTIONS
Sources of iron:  Meat and Eggs: Beef, Ham, Turkey, Chicken, Pork, Liver, Eggs (any style),   Seafood: Shrimp, Clams, Scallops, Tuna, Sardines  Vegetables: Spinach, Sweet potatoes, Peas, Broccoli, String beans, Collards, Kale  Bread and Cereals: White bread (enriched), Whole wheat bread, Enriched pasta, Wheat products, Bran cereals, Corn meal, Oat cereal, Cream of Wheat, Rye bread, Enriched rice  Fruit: Strawberries, Watermelon, Raisins, Dates, Figs, Prunes, Prune juice, Dried apricots, Dried peaches  Beans and Other Foods: Tofu, Beans, Tomato products, Dried peas, Dried beans, Lentils

## 2024-05-09 NOTE — PROGRESS NOTES
Assessment & Plan     Thyroid nodule  Hypothyroidism, unspecified type  Patient was seen by myself in Feb 2024 following finding of thyroid nodule during past pregnancy this winter. She was advised on US evaluation which returned with finding of solid/completely solid nodule 3.7cm on right. FNA was completed on 03/01/24 which returned with atypia of undetermined significance. Patient was advised on repeat biopsy in 1-3 months. Since receiving this recommendation she was able to be seen by [facility] endocrinology who repeated the FNA on 04/19/24 which returned features suggestive of follicular neoplasm. Follicular thyroid cancer is the most common form. Patient has met with endocrinologist who recommended genetic testing and surgical removal. Patient is reluctant to pursue surgical removal at this time and would like to see if radiofrequency ablation is an option. She would like a more thorough lab evaluation today.   - T3, Free; Future  - Thyroid peroxidase antibody; Future  - T4, free; Future  - TSH with free T4 reflex; Future  - Calcitonin; Future  - T3, Free  - Thyroid peroxidase antibody  - T4, free  - Calcitonin  - TSH    Vaginal irritation  Has noticed vaginal irritation/discomfort since recent menses. She denies discharge, swelling or lesions. Will have her complete wet prep and submit UA for further evaluation.     Vitamin D deficiency  Iron deficiency anemia, unspecified iron deficiency anemia type  She admits to having a poor diet over the past several months. Attributes this to stress from navigating the healthcare system in addressing the thyroid nodule as well as caring for multiple children. She is motivated to improve overall health including reducing dairy/gluten and pre-processed foods. She is approaching this gradually as she is concerned about recurrence of past vitamin deficiencies. Will check these today given existing lab work. Reviewed alternative sources for vitamins/nutrients.   -  "Vitamin D Deficiency; Future  - Vitamin D Deficiency  - Ferritin; Future  - Iron and iron binding capacity; Future  - Ferritin  - Iron and iron binding capacity      Subjective   Queta is a 38 year old, presenting for the following health issues:  Thyroid Problem      5/9/2024     8:43 AM   Additional Questions   Roomed by Nori HOROWITZ   Accompanied by Self     History of Present Illness       Hypothyroidism:     Since last visit, patient describes the following symptoms::  Anxiety, Depression, Dry skin, Fatigue, Hair loss and Loose stools    Reason for visit:  Testing thyroid function    She eats 2-3 servings of fruits and vegetables daily.She consumes 0 sweetened beverage(s) daily.She exercises with enough effort to increase her heart rate 10 to 19 minutes per day.  She exercises with enough effort to increase her heart rate 3 or less days per week.   She is taking medications regularly.         Review of Systems  Constitutional, HEENT, cardiovascular, pulmonary, gi and gu systems are negative, except as otherwise noted.      Objective    /80   Pulse 87   Temp 97.3  F (36.3  C) (Temporal)   Resp 14   Ht 5' 8.15\" (1.731 m)   Wt 163 lb (73.9 kg)   LMP 02/22/2024 (Exact Date)   SpO2 98%   BMI 24.68 kg/m    Body mass index is 24.68 kg/m .  Physical Exam   GENERAL: alert and no distress  EYES: Eyes grossly normal to inspection, PERRL and conjunctivae and sclerae normal  NECK: no adenopathy, no asymmetry, masses, or scars, and thyroid nodule right  RESP: lungs clear to auscultation - no rales, rhonchi or wheezes  CV: regular rate and rhythm, normal S1 S2, no S3 or S4, no murmur, click or rub, no peripheral edema  MS: no gross musculoskeletal defects noted, no edema  NEURO: Normal strength and tone, mentation intact and speech normal  PSYCH: mentation appears normal, affect normal/bright    Results for orders placed or performed in visit on 05/09/24   UA Macroscopic with reflex to Microscopic and Culture - Lab " Collect     Status: Abnormal    Specimen: Urine, NOS   Result Value Ref Range    Color Urine Yellow Colorless, Straw, Light Yellow, Yellow    Appearance Urine Clear Clear    Glucose Urine Negative Negative mg/dL    Bilirubin Urine Negative Negative    Ketones Urine 40 (A) Negative mg/dL    Specific Gravity Urine >=1.030 1.003 - 1.035    Blood Urine Negative Negative    pH Urine 6.0 5.0 - 7.0    Protein Albumin Urine Negative Negative mg/dL    Urobilinogen Urine 0.2 0.2, 1.0 E.U./dL    Nitrite Urine Negative Negative    Leukocyte Esterase Urine Negative Negative    Narrative    Microscopic not indicated   Wet prep - lab collect     Status: Abnormal    Specimen: Vagina; Swab   Result Value Ref Range    Trichomonas Absent Absent    Yeast Absent Absent    Clue Cells Absent Absent    WBCs/high power field 4+ (A) None           Signed Electronically by: Erik Tong PA-C

## 2024-05-10 DIAGNOSIS — E04.1 THYROID NODULE: Primary | ICD-10-CM

## 2024-05-10 DIAGNOSIS — E03.9 HYPOTHYROIDISM, UNSPECIFIED TYPE: ICD-10-CM

## 2024-05-10 LAB
T3FREE SERPL-MCNC: 2.3 PG/ML (ref 2–4.4)
THYROPEROXIDASE AB SERPL-ACNC: 54 IU/ML
VIT D+METAB SERPL-MCNC: 31 NG/ML (ref 20–50)

## 2024-05-11 LAB
CALCIT SERPL-MCNC: <2 PG/ML
THYROGLOB AB SERPL IA-ACNC: 124 IU/ML

## 2024-06-13 ENCOUNTER — TELEPHONE (OUTPATIENT)
Dept: ENDOCRINOLOGY | Facility: CLINIC | Age: 39
End: 2024-06-13
Payer: COMMERCIAL

## 2024-06-13 NOTE — TELEPHONE ENCOUNTER
Left Voicemail (1st Attempt) and Sent [employer] (1st Attempt) for the patient to call back and schedule the following:    Appointment type: new endocrine   Provider: see below   Return date: see below   Specialty phone number: [phone]  Additional appointment(s) needed:   Additonal Notes: sooner than Jan 2025   To schedulers : please offer to move forward on schedule with either  Dr De La Cruz, Batool, Stewart Pino Trost, Salim, Herrera, Radulescu, Zekarias, Burmeister, Kohlenberg, Kristan, Bantle, Moheet, Chow, Seaquist using NON-CALL week open new/SHARA (60 minutes) or 2 back to back 30 minute SHARA spaces.       Available:   6/17 Tasma in personMG   6/27 Xiomara virtual   7/8 Nicholas virtual   7/15 Nicholas virtual     Please note that the above appointment(s) will require manual scheduling as they are marked as SHARA and will not appear using auto search. Do not schedule the patient if another patient has already been scheduled in the requested appointment slot.       Rae Rodriguez on 6/13/2024 at 1:01 PM

## 2024-06-17 ENCOUNTER — OFFICE VISIT (OUTPATIENT)
Dept: ENDOCRINOLOGY | Facility: CLINIC | Age: 39
End: 2024-06-17
Attending: PHYSICIAN ASSISTANT
Payer: COMMERCIAL

## 2024-06-17 VITALS
WEIGHT: 162 LBS | HEART RATE: 72 BPM | SYSTOLIC BLOOD PRESSURE: 136 MMHG | BODY MASS INDEX: 24.52 KG/M2 | DIASTOLIC BLOOD PRESSURE: 81 MMHG | OXYGEN SATURATION: 99 %

## 2024-06-17 DIAGNOSIS — E03.9 HYPOTHYROIDISM, UNSPECIFIED TYPE: ICD-10-CM

## 2024-06-17 DIAGNOSIS — E04.1 THYROID NODULE: ICD-10-CM

## 2024-06-17 PROCEDURE — G2211 COMPLEX E/M VISIT ADD ON: HCPCS | Performed by: INTERNAL MEDICINE

## 2024-06-17 PROCEDURE — 99214 OFFICE O/P EST MOD 30 MIN: CPT | Performed by: INTERNAL MEDICINE

## 2024-06-17 NOTE — NURSING NOTE
Queta Cortez's goals for this visit include:   Chief Complaint   Patient presents with    New Patient    Endocrine Problem     Thyroid Nodule    Thyroid Disease     She requests these members of her care team be copied on today's visit information: Yes     PCP: Erik Tong    Referring Provider:  Erik Tong PA-C  [address] [place] [id number]    /81 (BP Location: Left arm, Patient Position: Sitting, Cuff Size: Adult Regular)   Pulse 72   Wt 73.5 kg (162 lb)   LMP 06/01/2024 (Exact Date)   SpO2 99%   BMI 24.52 kg/m      Do you need any medication refills at today's visit? Unknown       Sowmya Torres, JOSE GUADALUPE  Adult Endocrinology   [facility]

## 2024-06-17 NOTE — PROGRESS NOTES
"     Endocrinology Note         Queta is a 38 year old female presents today for thyroid nodules    HPI  Queta is a 38 year old female with hypothyroidism presents today for thyroid nodules    She was found to have lump in her right neck right after her last pregnancy in 2023. She saw her PCP  in February 2024 for further evaluation.     Thyroid ultrasound in February 2024 showed \"In the right lobe, a 3.7- x 2.3- x 2.2-cm solid or almost completely solid nodule was identified. This nodule is either hyperechoic or isoechoic.  TI-RADS 3.\" FNA was recommended. An additional nodule was identified in the left superior lobe. It measured 0.9 x 1.2 x 1.2 cm. This was TI-RADS 1, and no FNA was necessary.     She underwent ultrasound-guided FNA Biopsy of right thyroid nodule showed \"atypia of undetermined significance ([place] III) with presence of focal microfollicular architecture.    Saw Dr.Hossein Rosales, endocrinologist at [facility] in March 2024.    She is not enthusiastic for surgical removal. So the plan was to repeat ultrasound and FNA.  US thyroid re-read at [facility] showed an intermediate suspicion right thyroid nodule measuring up to 3.7 cm. This nodule was sampled at an outside institution on 3/1/2024 with outside pathology yielding atypia of undetermined significance. And an intermediate suspicion left thyroid nodule measuring up to 1.2 cm.     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     She has already had a plan to follow up at [facility] for molecular testing. She is considering for radiofrequency ablation instead of surgery.    Today, she would like to discuss about hypothyroidism and whether she has Hashimoto's thyroiditis or not.   Patient was started on levothyroxine before her second pregnancy due to infertility. It looks like at that time she had subclinical hypothyroidism.  Since then she was started on levothyroxine 75 mcg daily.  She has been doing " well since then.  She had positive TPO antibody and thyroglobulin antibody.  Recent labs on 5/9/2024 showed TSH 2.0, free T4 1.21, free T3 2.3 and Calcitonin less than 2.    She has been doing pretty well.  She feels she has good energy level.  She is still breast-feeding.  She had no difficulty swallowing.  She has visible right thyroid nodule.  She has had some ear pressure on the right side intermittently.  She denies family history of thyroid cancer.  She denies head and neck irradiation.    Past Medical History  Past Medical History:   Diagnosis Date    Depressive disorder 11/14/16    anxiety    History of blood transfusion 11/04/16    Hypertension 11/04/16    NO ACTIVE PROBLEMS        Allergies  Allergies   Allergen Reactions    Hydralazine Shortness Of Breath, Anxiety, Other (See Comments), Swelling and Rash     40-50 minutes after IV administration had swelling to the arm where hydralazine was given, had swelling to lips, flushing to face, generalized anxiety which lead to shortness of breath. Symptoms resolved with dose of 25 mg IV benadryl.      Medications  Current Outpatient Medications   Medication Sig Dispense Refill    levothyroxine (SYNTHROID/LEVOTHROID) 75 MCG tablet Take 1 tablet (75 mcg) by mouth daily 90 tablet 3    norethindrone (MICRONOR) 0.35 MG tablet Take 1 tablet (0.35 mg) by mouth daily (Patient not taking: Reported on 5/9/2024) 84 tablet 4    Prenatal Vit-Fe Fumarate-FA (PRENATAL MULTIVITAMIN W/IRON) 27-0.8 MG tablet Take 1 tablet by mouth daily       Family History  family history includes Hypertension in her mother; Osteoporosis in her maternal grandmother; Unknown/Adopted in her father, paternal grandfather, and paternal grandmother.    Social History  Social History     Tobacco Use    Smoking status: Never    Smokeless tobacco: Never   Vaping Use    Vaping status: Never Used   Substance Use Topics    Alcohol use: No    Drug use: No   . Has 4 children. She is still breastfeeding  7-month-old   Stay at home mother    ROS  10 points ROS were negative otherwise mentioned in HPI    Physical Exam  /81 (BP Location: Left arm, Patient Position: Sitting, Cuff Size: Adult Regular)   Pulse 72   Wt 73.5 kg (162 lb)   LMP 06/01/2024 (Exact Date)   SpO2 99%   BMI 24.52 kg/m    Body mass index is 24.52 kg/m .  Constitutional: no distress, comfortable, pleasant   Eyes: anicteric, normal extra-ocular movements, no lid lag or retraction  Neck: visible right thyroid nodule ~3-4 cm, movable, round shape  Musculoskeletal: no edema   Skin:  no jaundice   Neurological: cranial nerves intact, normal gait, no tremor on outstretched hands bilaterally  Psychological: appropriate mood   Lymphatic: no cervical  lymphadenopathy.      RESULTS  I have personally reviewed labs and images. I also reviewed labs with patient and discussed the result and plan of care.    US thyroid 2/13/2024  RIGHT lobe: 4.9 x 2.9 x 2.8 cm. Homogeneous echotexture.  Isthmus: 3 mm.  LEFT lobe: 3.7 x 3.9 x 1.8 cm. Homogeneous echotexture.     NODULES:   Nodule 1: Right, 3.7 x  2.3 x 2.2 cm  Composition: Solid or almost completely solid, 2 points   Echogenicity: Hyperechoic or isoechoic, 1 point   Shape: Wider-than-tall, 0 points   Margin: Smooth, 0 points   Echogenic Foci: None, or large comet-tail artifacts, 0 points   Point Total: 3 points. TI-RADS 3. If 2.5 cm or larger, recommend FNA;  if 1.5 cm or larger, recommend follow up US at 1, 3, and 5 years.      Nodule 2: Left superior, lobular 0.9 x 1.2 x 1.2 cm  Composition: Solid or almost completely solid, 2 points   Echogenicity: Hyperechoic or isoechoic, 1 point   Shape: Not taller than wide, 0 points   Margin: Smooth, 0 points   Echogenic Foci: None, or large comet-tail artifacts, 0 points   Point Total: 0 points. TI-RADS 1. No FNA.                                                                    IMPRESSION:  Bilateral thyroid nodules. Right 3.7 cm TR3 nodule meets criteria for  FNA.    US thyroid re-read at [facility]   1. Intermediate suspicion right thyroid nodule measuring up to 3.7 cm. This nodule was sampled at an outside institution on 3/1/2024 with outside pathology yielding atypia of undetermined significance.   2. Intermediate suspicion left thyroid nodule measuring up to 1.2 cm.   3. The cervical lymph nodes were not assessed on this examination.     FNA 3/1/2024  Final Diagnosis   Specimen A                 Interpretation:                  Atypia of Undetermined Significance ([place] III)  Other, see comment (abnormal)      The [place] implied risk of malignancy and recommended clinical management:  Atypia of undetermined significance diagnosis has a 22 (13-30)% risk of malignancy. Repeat FNA (least 4-6 weeks from previous aspiration with optimal time being 12 weeks after last aspiration), molecular testing, diagnostic lobectomy, or surveillance is recommended.                 Adequacy:                 Satisfactory for evaluation         Electronically signed by Too Ambrosio III, MD on 3/4/2024 at  3:07 PM   Comment    The specimen is cellular and shows presence of focal microfollicular architecture with some colloid in the background, supporting the diagnosis of AUS with atypia categorized as 'other'          Latest Ref Rng 5/9/2024  9:17 AM   ENDO THYROID LABS-[facility]     Calcitonin 0.0 - 5.1 pg/mL <2.0    TSH 0.40 - 4.00 mU/L    TSH 0.30 - 4.20 uIU/mL 2.00    Free T3 2.0 - 4.4 pg/mL 2.3    FREE T4 0.90 - 1.70 ng/dL 1.21    Thyroglobulin Antibody <40 IU/mL 124 (H)    Thyroid Peroxidase Antibody <35 IU/mL 54 (H)      ASSESSMENT:    Queta is a 38 year old female with hypothyroidism presents today for thyroid nodules    1) Multiple thyroid nodules: found to have thyroid nodules after pregnancy. No compressive symptoms.  Ultrasound showed Right thyroid nodule a 3.7- x 2.3- x 2.2-cm solid nodule This nodule is either hyperechoic or isoechoic. An additional 0.9 x 1.2 x 1.2 cm  nodule in the left superior lobe. She underwent FNA of the right nodule x2 which came back AUS with suggestive of follicular neoplasm. She is not interested in surgery and would like to explore option of RFA. She will follow up with [facility] for molecular testing in July and further plan.    2) Hashimoto's hypothyroidism: has been diagnosed during her previous pregnancy. Has been stable dose of levothyroxine 75 mcg daily. Recent labs in May 2024 was normal. Discussed Hashimoto's thyroidism diagnosis. Discuss the result and management.      PLAN:   - follow up with [facility] in July for thyroid nodules  - patient to continue with levothyroxine 75 mcg daily with annual TFT    Odell Renae MD  Division of Diabetes and Endocrinology  Department of Medicine

## 2024-06-17 NOTE — PATIENT INSTRUCTIONS
Welcome to the [facility] Endocrinology and Diabetes Clinics     Our Endocrinology Clinics are here to provide you with a team-based, collaborative approach in the diagnosis and treatment of patients with diabetes and endocrine disorders. The team is made up of Physicians, Physician Assistants, Certified Diabetes Educators, Registered Nurses, Medical Assistants, Emergency Medical Technicians, and many others, all of whom have the unified goal of providing our patients with high quality care.     Please see below for some helpful tips to best navigate and use the [facility] Endocrinology clinic:     [place] Respect: At [facility], we are committed to a respectful and safe space for all patients, visitors, and staff.  We believe that mutual respect between patients and their care team is the foundation of quality care.  It is our expectation that you will be treated with respect by your care team.  In turn, we ask that all communication with the care team (written and verbal) be respectful and free from profanity, threatening, or abusive language.  Disrespectful communication undermines our therapeutic relationship with you and may result in us being unable to continue to provide your care.    Refills: A provider must see you at least annually to prescribe and refill medications. This is to ensure your safety as well as meet insurance and compliance regulations.    Scheduling: Many of our Providers offer both in-person or video visits. Please call to schedule any needed follow ups as soon as possible because our provider schedules fill up very quickly. Our care team has the right to require an in-person visit when they believe that it is medically necessary. Please remember that for any virtual visits, you must be in the [facility] at the time of the visit, otherwise we are unable to see you and you will need to be rescheduled.    Missed Appointments: If you need to cancel or miss your  scheduled appointment, please call the clinic at [phone] to reschedule.  Please note if you repeatedly miss appointments or repeatedly miss appointments without calling to inform us ahead of time (no-show), the clinic may elect to not allow you to reschedule without speaking to a manager, may require a Partnership In Care Agreement prior to rescheduling, or could result in you no longer being able to receive care from the clinic. Providing the clinic with timely notification if you have to miss an appointment, allows us to better serve the needs of all of our patients.    Primary Care Provider: Our Endocrinologists are Specialists in their field. We expect you to have a Primary Care Provider established to handle any needs outside of your diabetes and endocrine care.  We would be happy to assist you find a Primary Care Provider, if you do not have one.    [employer]: [employer] is a wonderful resource that allows you access to your Care Team via online or the ivania. Please ask a member of the team if you would like help creating an account. Please note that it may take up to 2 business days for a response. [employer] messages are not reviewed on weekends or after business hours.  Emergent or urgent care needs should never be communicated via [employer].  If you experience a medical emergency call 911 or go to the nearest emergency room.    Labs: It is recommended that you stay within the [facility] for labs but you are welcome to obtain ordered labs (with some exceptions) from any location of your choice as long as they are able to complete and process the needed labs. If you need us to fax orders to your preferred lab, please provide us the name and fax number of the lab you would like to go to so we can fax the orders. If your labs are drawn outside of the [facility], please have them fax the results to [phone] ([place]) or [phone] ([place]) or via [facility]. It is your  responsibility to ensure that outside lab results are sent to us.    We look forward to working with you. Please do not hesitate to reach out with any questions.    Thank you,    The Endocrine Team    [facility] Address:   [place] Address:     [address] [place] [address]    Phone: [phone]  Fax: [phone] [address] [place] [address]    Phone: [phone]  Fax: [phone]     [facility] Cost Estimate Phone Number: [phone]    General Lab and Imaging Scheduling Phone Number: [phone]

## 2024-06-17 NOTE — LETTER
"6/17/2024      Queta Cortez  [address] [facility] [address]      Dear Colleague,    Thank you for referring your patient, Queta Cortez, to the [facility]. Please see a copy of my visit note below.         Endocrinology Note         Queta is a 38 year old female presents today for thyroid nodules    HPI  Queta is a 38 year old female with hypothyroidism presents today for thyroid nodules    She was found to have lump in her right neck right after her last pregnancy in 2023. She saw her PCP  in February 2024 for further evaluation.     Thyroid ultrasound in February 2024 showed \"In the right lobe, a 3.7- x 2.3- x 2.2-cm solid or almost completely solid nodule was identified. This nodule is either hyperechoic or isoechoic.  TI-RADS 3.\" FNA was recommended. An additional nodule was identified in the left superior lobe. It measured 0.9 x 1.2 x 1.2 cm. This was TI-RADS 1, and no FNA was necessary.     She underwent ultrasound-guided FNA Biopsy of right thyroid nodule showed \"atypia of undetermined significance ([place] III) with presence of focal microfollicular architecture.    Saw Dr.Hossein Rosales, endocrinologist at [facility] in March 2024.    She is not enthusiastic for surgical removal. So the plan was to repeat ultrasound and FNA.  US thyroid re-read at [facility] showed an intermediate suspicion right thyroid nodule measuring up to 3.7 cm. This nodule was sampled at an outside institution on 3/1/2024 with outside pathology yielding atypia of undetermined significance. And an intermediate suspicion left thyroid nodule measuring up to 1.2 cm.     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     She has already had a plan to follow up at [facility] for molecular testing. She is considering for radiofrequency ablation instead of surgery.    Today, she would like to discuss about hypothyroidism and whether she has Hashimoto's thyroiditis or " not.   Patient was started on levothyroxine before her second pregnancy due to infertility. It looks like at that time she had subclinical hypothyroidism.  Since then she was started on levothyroxine 75 mcg daily.  She has been doing well since then.  She had positive TPO antibody and thyroglobulin antibody.  Recent labs on 5/9/2024 showed TSH 2.0, free T4 1.21, free T3 2.3 and Calcitonin less than 2.    She has been doing pretty well.  She feels she has good energy level.  She is still breast-feeding.  She had no difficulty swallowing.  She has visible right thyroid nodule.  She has had some ear pressure on the right side intermittently.  She denies family history of thyroid cancer.  She denies head and neck irradiation.    Past Medical History  Past Medical History:   Diagnosis Date     Depressive disorder 11/14/16    anxiety     History of blood transfusion 11/04/16     Hypertension 11/04/16     NO ACTIVE PROBLEMS        Allergies  Allergies   Allergen Reactions     Hydralazine Shortness Of Breath, Anxiety, Other (See Comments), Swelling and Rash     40-50 minutes after IV administration had swelling to the arm where hydralazine was given, had swelling to lips, flushing to face, generalized anxiety which lead to shortness of breath. Symptoms resolved with dose of 25 mg IV benadryl.      Medications  Current Outpatient Medications   Medication Sig Dispense Refill     levothyroxine (SYNTHROID/LEVOTHROID) 75 MCG tablet Take 1 tablet (75 mcg) by mouth daily 90 tablet 3     norethindrone (MICRONOR) 0.35 MG tablet Take 1 tablet (0.35 mg) by mouth daily (Patient not taking: Reported on 5/9/2024) 84 tablet 4     Prenatal Vit-Fe Fumarate-FA (PRENATAL MULTIVITAMIN W/IRON) 27-0.8 MG tablet Take 1 tablet by mouth daily       Family History  family history includes Hypertension in her mother; Osteoporosis in her maternal grandmother; Unknown/Adopted in her father, paternal grandfather, and paternal grandmother.    Social  History  Social History     Tobacco Use     Smoking status: Never     Smokeless tobacco: Never   Vaping Use     Vaping status: Never Used   Substance Use Topics     Alcohol use: No     Drug use: No   . Has 4 children. She is still breastfeeding 7-month-old   Stay at home mother    ROS  10 points ROS were negative otherwise mentioned in HPI    Physical Exam  /81 (BP Location: Left arm, Patient Position: Sitting, Cuff Size: Adult Regular)   Pulse 72   Wt 73.5 kg (162 lb)   LMP 06/01/2024 (Exact Date)   SpO2 99%   BMI 24.52 kg/m    Body mass index is 24.52 kg/m .  Constitutional: no distress, comfortable, pleasant   Eyes: anicteric, normal extra-ocular movements, no lid lag or retraction  Neck: visible right thyroid nodule ~3-4 cm, movable, round shape  Musculoskeletal: no edema   Skin:  no jaundice   Neurological: cranial nerves intact, normal gait, no tremor on outstretched hands bilaterally  Psychological: appropriate mood   Lymphatic: no cervical  lymphadenopathy.      RESULTS  I have personally reviewed labs and images. I also reviewed labs with patient and discussed the result and plan of care.    US thyroid 2/13/2024  RIGHT lobe: 4.9 x 2.9 x 2.8 cm. Homogeneous echotexture.  Isthmus: 3 mm.  LEFT lobe: 3.7 x 3.9 x 1.8 cm. Homogeneous echotexture.     NODULES:   Nodule 1: Right, 3.7 x  2.3 x 2.2 cm  Composition: Solid or almost completely solid, 2 points   Echogenicity: Hyperechoic or isoechoic, 1 point   Shape: Wider-than-tall, 0 points   Margin: Smooth, 0 points   Echogenic Foci: None, or large comet-tail artifacts, 0 points   Point Total: 3 points. TI-RADS 3. If 2.5 cm or larger, recommend FNA;  if 1.5 cm or larger, recommend follow up US at 1, 3, and 5 years.      Nodule 2: Left superior, lobular 0.9 x 1.2 x 1.2 cm  Composition: Solid or almost completely solid, 2 points   Echogenicity: Hyperechoic or isoechoic, 1 point   Shape: Not taller than wide, 0 points   Margin: Smooth, 0 points    Echogenic Foci: None, or large comet-tail artifacts, 0 points   Point Total: 0 points. TI-RADS 1. No FNA.                                                                    IMPRESSION:  Bilateral thyroid nodules. Right 3.7 cm TR3 nodule meets criteria for FNA.    US thyroid re-read at [facility]   1. Intermediate suspicion right thyroid nodule measuring up to 3.7 cm. This nodule was sampled at an outside institution on 3/1/2024 with outside pathology yielding atypia of undetermined significance.   2. Intermediate suspicion left thyroid nodule measuring up to 1.2 cm.   3. The cervical lymph nodes were not assessed on this examination.     FNA 3/1/2024  Final Diagnosis   Specimen A                 Interpretation:                  Atypia of Undetermined Significance ([place] III)  Other, see comment (abnormal)      The [place] implied risk of malignancy and recommended clinical management:  Atypia of undetermined significance diagnosis has a 22 (13-30)% risk of malignancy. Repeat FNA (least 4-6 weeks from previous aspiration with optimal time being 12 weeks after last aspiration), molecular testing, diagnostic lobectomy, or surveillance is recommended.                 Adequacy:                 Satisfactory for evaluation         Electronically signed by Too Ambrosio III, MD on 3/4/2024 at  3:07 PM   Comment    The specimen is cellular and shows presence of focal microfollicular architecture with some colloid in the background, supporting the diagnosis of AUS with atypia categorized as 'other'          Latest Ref Rng 5/9/2024  9:17 AM   ENDO THYROID LABS-[facility]     Calcitonin 0.0 - 5.1 pg/mL <2.0    TSH 0.40 - 4.00 mU/L    TSH 0.30 - 4.20 uIU/mL 2.00    Free T3 2.0 - 4.4 pg/mL 2.3    FREE T4 0.90 - 1.70 ng/dL 1.21    Thyroglobulin Antibody <40 IU/mL 124 (H)    Thyroid Peroxidase Antibody <35 IU/mL 54 (H)      ASSESSMENT:    Queta is a 38 year old female with hypothyroidism presents today for thyroid nodules    1)  Multiple thyroid nodules: found to have thyroid nodules after pregnancy. No compressive symptoms.  Ultrasound showed Right thyroid nodule a 3.7- x 2.3- x 2.2-cm solid nodule This nodule is either hyperechoic or isoechoic. An additional 0.9 x 1.2 x 1.2 cm nodule in the left superior lobe. She underwent FNA of the right nodule x2 which came back AUS with suggestive of follicular neoplasm. She is not interested in surgery and would like to explore option of RFA. She will follow up with [facility] for molecular testing in July and further plan.    2) Hashimoto's hypothyroidism: has been diagnosed during her previous pregnancy. Has been stable dose of levothyroxine 75 mcg daily. Recent labs in May 2024 was normal. Discussed Hashimoto's thyroidism diagnosis. Discuss the result and management.      PLAN:   - follow up with [facility] Endocrinology Clinic in July for thyroid nodules  - patient to continue with levothyroxine 75 mcg daily with annual TFT    Odell Renae MD  Division of Diabetes and Endocrinology  Department of Medicine      Again, thank you for allowing me to participate in the care of your patient.        Sincerely,        Odell Renae MD

## 2024-07-08 ENCOUNTER — NURSE TRIAGE (OUTPATIENT)
Dept: FAMILY MEDICINE | Facility: OTHER | Age: 39
End: 2024-07-08
Payer: COMMERCIAL

## 2024-07-08 NOTE — TELEPHONE ENCOUNTER
Called patient to discuss palpitations from appt comments.  Patient notes side effects of her levothyroxine can be insomnia and fast heart rate. Patient states she has been experiencing palpitations which only occur at night when she is trying to go to sleep and they are not happening every night. This started a few weeks ago. Patient notes she has lost some weight and is wondering if her medication should be decreased. No other symptoms to report related to the palpitations.  Denies shortness of breath, dizziness, lightheadedness, chest pain or skipped or extra beats. Patient has appt scheduled with PCP for 7/30. Would you like to see patient sooner, if so then when?  Any further recommendations?    Arlette Harmon RN on 7/8/2024 at 11:00 AM      Reason for Disposition   History of hyperthyroidism or taking thyroid medication    Additional Information   Negative: Passed out (i.e., lost consciousness, collapsed and was not responding)   Negative: Shock suspected (e.g., cold/pale/clammy skin, too weak to stand, low BP, rapid pulse)   Negative: Difficult to awaken or acting confused (e.g., disoriented, slurred speech)   Negative: Visible sweat on face or sweat dripping down face   Negative: Unable to walk, or can only walk with assistance (e.g., requires support)   Negative: Received SHOCK from implantable cardiac defibrillator and has persisting symptoms (i.e., palpitations, lightheadedness)   Negative: Dizziness, lightheadedness, or weakness and heart beating very rapidly (e.g., > 140 / minute)   Negative: Dizziness, lightheadedness, or weakness and heart beating very slowly (e.g., < 50 / minute)   Negative: Sounds like a life-threatening emergency to the triager   Negative: Chest pain   Negative: Difficulty breathing   Negative: Dizziness, lightheadedness, or weakness   Negative: Heart beating very rapidly (e.g., > 140 / minute) and present now  (Exception: During exercise.)   Negative: Heart beating very  slowly (e.g., < 50 / minute)  (Exception: Athlete and heart rate normal for caller.)   Negative: New or worsened shortness of breath with activity (dyspnea on exertion)   Negative: Patient sounds very sick or weak to the triager   Negative: Wearing a 'Holter monitor' or 'cardiac event monitor'   Negative: Received SHOCK from implantable cardiac defibrillator (and now feels well)   Negative: Heart beating very rapidly (e.g., > 140 / minute) and not present now  (Exception: During exercise.)   Negative: Skipped or extra beat(s) and increases with exercise or exertion   Negative: Skipped or extra beat(s) and occurs 4 or more times per minute   Negative: History of heart disease (i.e., heart attack, bypass surgery, angina, angioplasty)  (Exception: Brief heartbeat symptoms that went away and now feels well.)   Negative: Age > 60 years  (Exception: Brief heartbeat symptoms that went away and now feels well.)   Negative: Taking water pill (i.e., diuretic) or heart medication (e.g., digoxin)   Negative: Patient wants to be seen   Negative: Heart rhythm alert (e.g., 'you have irregular heartbeat') from personal wearable device (e.g., Apple Watch)    Protocols used: Heart Rate and Heartbeat Nryulkcdz-E-VA

## 2024-07-08 NOTE — TELEPHONE ENCOUNTER
As patient recently saw endocrinologist I recommend that she reach out to the specialist about whether levothyroxine dose should be adjusted.     She should track occurrence of symptoms with daily log/journal. I recommend including details such as intensity/duration/alleviating or aggravating factors. Also tracking recent meals, stress level, any OTC medications used, caffeine/alcohol/cannabis, etc.     If alarm symptoms occur (see below). She should seek more immediate care.    Erik Tong PA-C on 7/8/2024 at 12:48 PM    Call 911 anytime you think you may need emergency care. For example, call if:    You passed out (lost consciousness).     You have symptoms of a heart attack. These may include:  Chest pain or pressure, or a strange feeling in the chest.  Sweating.  Shortness of breath.  Pain, pressure, or a strange feeling in the back, neck, jaw, or upper belly or in one or both shoulders or arms.  Lightheadedness or sudden weakness.  A fast or irregular heartbeat.  After you call 911, the  may tell you to chew 1 adult-strength or 2 to 4 low-dose aspirin. Wait for an ambulance. Do not try to drive yourself.     You have symptoms of a stroke. These may include:  Sudden numbness, tingling, weakness, or loss of movement in your face, arm, or leg, especially on only one side of your body.  Sudden vision changes.  Sudden trouble speaking.  Sudden confusion or trouble understanding simple statements.  Sudden problems with walking or balance.  A sudden, severe headache that is different from past headaches.   Call your doctor now or seek immediate medical care if:    You have heart palpitations and:  Are dizzy or lightheaded, or you feel like you may faint.  Have new or increased shortness of breath.   Watch closely for changes in your health, and be sure to contact your doctor if:    You continue to have heart palpitations.

## 2024-07-08 NOTE — TELEPHONE ENCOUNTER
Called patient and relayed providers message. She expressed understanding. She is apprehensive to reach out to the endocrinologist as she felt like they were not hopeful but stated she will. Patient will keep a log of things noted in providers message. Will call with any concerns.    Arlette Harmon RN on 7/8/2024 at 1:48 PM

## 2024-07-08 NOTE — TELEPHONE ENCOUNTER
"Patient is scheduled 07/30 and appt notes say \"heart palpitation, insomnia, in general not feeling well\" and was thinking she should be triaged because of the \"heart palpitations\".    Cece Silveira, [facility]  "

## 2024-07-30 ENCOUNTER — OFFICE VISIT (OUTPATIENT)
Dept: FAMILY MEDICINE | Facility: OTHER | Age: 39
End: 2024-07-30
Payer: COMMERCIAL

## 2024-07-30 ENCOUNTER — ORDERS ONLY (AUTO-RELEASED) (OUTPATIENT)
Dept: FAMILY MEDICINE | Facility: OTHER | Age: 39
End: 2024-07-30
Payer: COMMERCIAL

## 2024-07-30 VITALS
HEIGHT: 69 IN | DIASTOLIC BLOOD PRESSURE: 88 MMHG | TEMPERATURE: 98.3 F | OXYGEN SATURATION: 98 % | SYSTOLIC BLOOD PRESSURE: 130 MMHG | HEART RATE: 77 BPM | RESPIRATION RATE: 16 BRPM | BODY MASS INDEX: 23.55 KG/M2 | WEIGHT: 159 LBS

## 2024-07-30 DIAGNOSIS — R00.2 PALPITATIONS: ICD-10-CM

## 2024-07-30 DIAGNOSIS — R06.02 SHORTNESS OF BREATH: ICD-10-CM

## 2024-07-30 DIAGNOSIS — Z13.6 CARDIOVASCULAR SCREENING; LDL GOAL LESS THAN 160: ICD-10-CM

## 2024-07-30 DIAGNOSIS — R07.89 OTHER CHEST PAIN: ICD-10-CM

## 2024-07-30 DIAGNOSIS — R07.89 OTHER CHEST PAIN: Primary | ICD-10-CM

## 2024-07-30 LAB
ANION GAP SERPL CALCULATED.3IONS-SCNC: 11 MMOL/L (ref 7–15)
BUN SERPL-MCNC: 14.2 MG/DL (ref 6–20)
CALCIUM SERPL-MCNC: 9.3 MG/DL (ref 8.8–10.4)
CHLORIDE SERPL-SCNC: 105 MMOL/L (ref 98–107)
CHOLEST SERPL-MCNC: 176 MG/DL
CREAT SERPL-MCNC: 1.09 MG/DL (ref 0.51–0.95)
EGFRCR SERPLBLD CKD-EPI 2021: 67 ML/MIN/1.73M2
ERYTHROCYTE [DISTWIDTH] IN BLOOD BY AUTOMATED COUNT: 12.1 % (ref 10–15)
FASTING STATUS PATIENT QL REPORTED: NO
FASTING STATUS PATIENT QL REPORTED: NO
GLUCOSE SERPL-MCNC: 98 MG/DL (ref 70–99)
HCO3 SERPL-SCNC: 25 MMOL/L (ref 22–29)
HCT VFR BLD AUTO: 38.8 % (ref 35–47)
HDLC SERPL-MCNC: 62 MG/DL
HGB BLD-MCNC: 12.4 G/DL (ref 11.7–15.7)
LDLC SERPL CALC-MCNC: 97 MG/DL
MCH RBC QN AUTO: 30.6 PG (ref 26.5–33)
MCHC RBC AUTO-ENTMCNC: 32 G/DL (ref 31.5–36.5)
MCV RBC AUTO: 96 FL (ref 78–100)
NONHDLC SERPL-MCNC: 114 MG/DL
PLATELET # BLD AUTO: 261 10E3/UL (ref 150–450)
POTASSIUM SERPL-SCNC: 4.1 MMOL/L (ref 3.4–5.3)
RBC # BLD AUTO: 4.05 10E6/UL (ref 3.8–5.2)
SODIUM SERPL-SCNC: 141 MMOL/L (ref 135–145)
TRIGL SERPL-MCNC: 85 MG/DL
TSH SERPL DL<=0.005 MIU/L-ACNC: 1.54 UIU/ML (ref 0.3–4.2)
WBC # BLD AUTO: 7.3 10E3/UL (ref 4–11)

## 2024-07-30 PROCEDURE — 85027 COMPLETE CBC AUTOMATED: CPT | Performed by: PHYSICIAN ASSISTANT

## 2024-07-30 PROCEDURE — 93000 ELECTROCARDIOGRAM COMPLETE: CPT | Performed by: PHYSICIAN ASSISTANT

## 2024-07-30 PROCEDURE — 84443 ASSAY THYROID STIM HORMONE: CPT | Performed by: PHYSICIAN ASSISTANT

## 2024-07-30 PROCEDURE — 36415 COLL VENOUS BLD VENIPUNCTURE: CPT | Performed by: PHYSICIAN ASSISTANT

## 2024-07-30 PROCEDURE — 80061 LIPID PANEL: CPT | Performed by: PHYSICIAN ASSISTANT

## 2024-07-30 PROCEDURE — 99214 OFFICE O/P EST MOD 30 MIN: CPT | Performed by: PHYSICIAN ASSISTANT

## 2024-07-30 PROCEDURE — 80048 BASIC METABOLIC PNL TOTAL CA: CPT | Performed by: PHYSICIAN ASSISTANT

## 2024-07-30 NOTE — PROGRESS NOTES
Assessment & Plan     1. Other chest pain    2. Shortness of breath    3. Palpitations    4. CARDIOVASCULAR SCREENING; LDL GOAL LESS THAN 160      Patient is a 38 year old female who presents today with concerns about palpitations, chest pains and dyspnea. She has been working with endocrinologists through [place] to address thyroid nodule which may be malignant. She is currently waiting on results of repeat testing. Per her understanding, if these results return with concerning findings then thyroidectomy would be recommended. Review of most recent TSH/T4 from May 2024 was within normal range. She has been tolerating the levothyroxine without issue.     Feels that her palpitations will occur intermittently throughout the week. Has noticed with her fitbit watch was reporting heart rate of 120 bpm or higher. The patient does state that her stress level has been elevated due to son with ADHD. Has also noticed increased heart rate at night while laying in bed.     Additional symptoms include some sporadic chest pains in the left cental chest. These do not seem to be associated with activity. Some sporadic sensations of numbness in either left or right upper extremity. Recalls that this has occurred while driving, but also at rest.     Did have some SOB while walking out to her garden. Denies dizziness, sensation of fainting or fall. Informs me that her brother is working with cardiologist to address a rapid heart rate. She thinks that he has been advised to have an ablation completed.     EKG returned without abnormal finding. Discussed further evaluation with kendall for 3 days to evaluate for arrhythmia. This will be mailed to patient. I also put in order for stress testing to further evaluate the patient's chest pains. She is aware that her chest pains may be non-cardiac (eg anxiety, musculoskeletal, respiratory).  Reference material attached to the AVS.     - ZIO PATCH MAIL OUT; Future  - EKG 12-lead complete  "w/read - Clinics  - Exercise Stress Test - Adult; Future  - CBC with platelets; Future  - Basic metabolic panel  (Ca, Cl, CO2, Creat, Gluc, K, Na, BUN); Future      Subjective   Queta is a 38 year old, presenting for the following health issues:  Heart palpitations, Insomnia, and Heartburn      7/30/2024     1:11 PM   Additional Questions   Roomed by Cece IYER   Accompanied by self     History of Present Illness       Reason for visit:  Heart health/ concerns  Symptom onset:  More than a month  Symptoms include:  Racing heart at night, heart burn, stress,  Symptom intensity:  Mild  Symptom progression:  Staying the same  Had these symptoms before:  No    She eats 2-3 servings of fruits and vegetables daily.She consumes 0 sweetened beverage(s) daily.She exercises with enough effort to increase her heart rate 20 to 29 minutes per day.  She exercises with enough effort to increase her heart rate 3 or less days per week.   She is taking medications regularly.     Did check with Endocrinology about adjusting dose on Levothyroxine but that was not suggested to be done, so just FYI.      Had strep in March and June and she feels like she is going to get it again in a few months so that is her only other question/concern/wondering if she should be concerned especially since nobody else around her was causing her to get strep/ill.      Review of Systems  Constitutional, HEENT, cardiovascular, pulmonary, gi and gu systems are negative, except as otherwise noted.      Objective    /88   Pulse 77   Temp 98.3  F (36.8  C) (Temporal)   Resp 16   Ht 1.759 m (5' 9.25\")   Wt 72.1 kg (159 lb)   LMP 07/02/2024 (Exact Date)   SpO2 98%   BMI 23.31 kg/m    Body mass index is 23.31 kg/m .  Physical Exam   GENERAL: alert and no distress  EYES: Eyes grossly normal to inspection, PERRL and conjunctivae and sclerae normal  HENT: ear canals and TM's normal, nose and mouth without ulcers or lesions  NECK: no adenopathy, no asymmetry, " masses, or scars  RESP: lungs clear to auscultation - no rales, rhonchi or wheezes  CV: regular rate and rhythm, normal S1 S2, no S3 or S4, no murmur, click or rub, no peripheral edema  MS: no gross musculoskeletal defects noted, no edema  NEURO: Normal strength and tone, mentation intact and speech normal  PSYCH: mentation appears normal, affect normal/bright    Results for orders placed or performed in visit on 07/30/24   CBC with platelets     Status: Normal   Result Value Ref Range    WBC Count 7.3 4.0 - 11.0 10e3/uL    RBC Count 4.05 3.80 - 5.20 10e6/uL    Hemoglobin 12.4 11.7 - 15.7 g/dL    Hematocrit 38.8 35.0 - 47.0 %    MCV 96 78 - 100 fL    MCH 30.6 26.5 - 33.0 pg    MCHC 32.0 31.5 - 36.5 g/dL    RDW 12.1 10.0 - 15.0 %    Platelet Count 261 150 - 450 10e3/uL       Signed Electronically by: Erik Tong PA-C

## 2024-08-08 ENCOUNTER — HOSPITAL ENCOUNTER (OUTPATIENT)
Dept: CARDIOLOGY | Facility: CLINIC | Age: 39
Discharge: HOME | End: 2024-08-08
Attending: PHYSICIAN ASSISTANT | Admitting: PHYSICIAN ASSISTANT
Payer: COMMERCIAL

## 2024-08-08 DIAGNOSIS — R00.2 PALPITATIONS: ICD-10-CM

## 2024-08-08 DIAGNOSIS — R07.89 OTHER CHEST PAIN: ICD-10-CM

## 2024-08-08 DIAGNOSIS — R06.02 SHORTNESS OF BREATH: ICD-10-CM

## 2024-08-08 PROCEDURE — 93018 CV STRESS TEST I&R ONLY: CPT | Performed by: INTERNAL MEDICINE

## 2024-08-08 PROCEDURE — 93016 CV STRESS TEST SUPVJ ONLY: CPT | Performed by: INTERNAL MEDICINE

## 2024-08-08 PROCEDURE — 93017 CV STRESS TEST TRACING ONLY: CPT

## 2024-08-12 ENCOUNTER — OFFICE VISIT (OUTPATIENT)
Dept: FAMILY MEDICINE | Facility: OTHER | Age: 39
End: 2024-08-12
Payer: COMMERCIAL

## 2024-08-12 VITALS
OXYGEN SATURATION: 98 % | SYSTOLIC BLOOD PRESSURE: 116 MMHG | HEIGHT: 69 IN | WEIGHT: 157.5 LBS | RESPIRATION RATE: 13 BRPM | DIASTOLIC BLOOD PRESSURE: 75 MMHG | TEMPERATURE: 98.7 F | HEART RATE: 86 BPM | BODY MASS INDEX: 23.33 KG/M2

## 2024-08-12 DIAGNOSIS — R30.0 DYSURIA: Primary | ICD-10-CM

## 2024-08-12 LAB

## 2024-08-12 PROCEDURE — 87186 SC STD MICRODIL/AGAR DIL: CPT | Performed by: PHYSICIAN ASSISTANT

## 2024-08-12 PROCEDURE — 99213 OFFICE O/P EST LOW 20 MIN: CPT | Performed by: PHYSICIAN ASSISTANT

## 2024-08-12 PROCEDURE — 87210 SMEAR WET MOUNT SALINE/INK: CPT | Performed by: PHYSICIAN ASSISTANT

## 2024-08-12 PROCEDURE — 81001 URINALYSIS AUTO W/SCOPE: CPT | Performed by: PHYSICIAN ASSISTANT

## 2024-08-12 PROCEDURE — 87086 URINE CULTURE/COLONY COUNT: CPT | Performed by: PHYSICIAN ASSISTANT

## 2024-08-12 RX ORDER — CEPHALEXIN 500 MG/1
500 CAPSULE ORAL 2 TIMES DAILY
Qty: 10 CAPSULE | Refills: 0 | Status: SHIPPED | OUTPATIENT
Start: 2024-08-12 | End: 2024-08-17

## 2024-08-12 ASSESSMENT — PAIN SCALES - GENERAL: PAINLEVEL: MILD PAIN (2)

## 2024-08-12 NOTE — PROGRESS NOTES
Assessment & Plan     Dysuria  Patient's symptoms are consistent with UTI. Labs initially did not show any evidence for infection, but did have moderate amount of blood present. Urine culture pending. I will start patient on empiric antibiotic. Reviewed breastfeeding information for keflex. Child is 1 year old and rarely breastfeeds at this time. Risk is low.   - UA Macroscopic with reflex to Microscopic and Culture - Lab Collect; Future  - Wet prep - lab collect; Future  - UA Macroscopic with reflex to Microscopic and Culture - Lab Collect  - Wet prep - lab collect  - UA Microscopic with Reflex to Culture  - Urine Culture  - cephALEXin (KEFLEX) 500 MG capsule; Take 1 capsule (500 mg) by mouth 2 times daily for 5 days      Subjective   Qeuta is a 39 year old, presenting for the following health issues:  UTI      8/12/2024    10:48 AM   Additional Questions   Roomed by lilly   Accompanied by kids     UTI    History of Present Illness       Reason for visit:  Possible UTI  Symptom onset:  Today  Symptoms include:  Burning vagina, suzanne itch, frequent urge to pee but no pee when i go  Symptom intensity:  Moderate  Symptom progression:  Worsening  Had these symptoms before:  Yes  Has tried/received treatment for these symptoms:  Yes  Previous treatment was successful:  Yes  Prior treatment description:  Antibiotics    She eats 2-3 servings of fruits and vegetables daily.She consumes 1 sweetened beverage(s) daily.She exercises with enough effort to increase her heart rate 10 to 19 minutes per day.  She exercises with enough effort to increase her heart rate 3 or less days per week.   She is taking medications regularly.       Genitourinary - Female  Onset/Duration: this morning  Description:   Painful urination (Dysuria): YES           Frequency: YES  Blood in urine (Hematuria): No  Delay in urine (Hesitency): YES  Intensity: moderate  Progression of Symptoms:  same  Accompanying Signs & Symptoms:  Fever/chills: No  Flank  "pain: No  Nausea and vomiting: No  Vaginal symptoms:  itching - burning  Abdominal/Pelvic Pain: No  History:   History of frequent UTI s: YES  History of kidney stones: No  Sexually Active: YES  Possibility of pregnancy: Yes  Precipitating or alleviating factors: None  Therapies tried and outcome: Increase fluid intake - not helpful        Review of Systems  Constitutional, HEENT, cardiovascular, pulmonary, gi and gu systems are negative, except as otherwise noted.      Objective    /75   Pulse 86   Temp 98.7  F (37.1  C) (Temporal)   Resp 13   Ht 1.753 m (5' 9\")   Wt 71.4 kg (157 lb 8 oz)   LMP 07/31/2024 (Exact Date)   SpO2 98%   BMI 23.26 kg/m    Body mass index is 23.26 kg/m .  Physical Exam   GENERAL: alert and no distress  RESP: lungs clear to auscultation - no rales, rhonchi or wheezes  CV: regular rate and rhythm, normal S1 S2, no S3 or S4, no murmur, click or rub, no peripheral edema  MS: no gross musculoskeletal defects noted, no edema  PSYCH: mentation appears normal, affect normal/bright    Results for orders placed or performed in visit on 08/12/24   UA Macroscopic with reflex to Microscopic and Culture - Lab Collect     Status: Abnormal    Specimen: Urine, Clean Catch   Result Value Ref Range    Color Urine Yellow Colorless, Straw, Light Yellow, Yellow    Appearance Urine Clear Clear    Glucose Urine Negative Negative mg/dL    Bilirubin Urine Negative Negative    Ketones Urine Negative Negative mg/dL    Specific Gravity Urine 1.010 1.003 - 1.035    Blood Urine Moderate (A) Negative    pH Urine 7.0 5.0 - 7.0    Protein Albumin Urine Negative Negative mg/dL    Urobilinogen Urine 0.2 0.2, 1.0 E.U./dL    Nitrite Urine Negative Negative    Leukocyte Esterase Urine Small (A) Negative   UA Microscopic with Reflex to Culture     Status: Abnormal   Result Value Ref Range    Bacteria Urine Few (A) None Seen /HPF    RBC Urine 2-5 (A) 0-2 /HPF /HPF    WBC Urine 10-25 (A) 0-5 /HPF /HPF   Wet prep - " lab collect     Status: Abnormal    Specimen: Vagina; Swab   Result Value Ref Range    Trichomonas Absent Absent    Yeast Absent Absent    Clue Cells Absent Absent    WBCs/high power field 2+ (A) None           Signed Electronically by: Erik Tong PA-C

## 2024-08-13 LAB — BACTERIA UR CULT: ABNORMAL

## 2024-08-20 ENCOUNTER — NURSE TRIAGE (OUTPATIENT)
Dept: FAMILY MEDICINE | Facility: OTHER | Age: 39
End: 2024-08-20
Payer: COMMERCIAL

## 2024-08-20 DIAGNOSIS — R30.0 DYSURIA: Primary | ICD-10-CM

## 2024-08-20 RX ORDER — FLUCONAZOLE 150 MG/1
150 TABLET ORAL ONCE
Qty: 1 TABLET | Refills: 0 | Status: SHIPPED | OUTPATIENT
Start: 2024-08-20 | End: 2024-08-20

## 2024-08-20 NOTE — TELEPHONE ENCOUNTER
Nurse Triage SBAR    Is this a 2nd Level Triage? YES, LICENSED PRACTITIONER REVIEW IS REQUIRED    Situation: Patient was treated for a UTI 8/12, and finished her last dose of antibiotics 8/18/24. She reports she is now having burning with urination, no fevers.    Background: Recent UTI    Assessment: Patient had a recent UTI and was treated with Keflex. Due to recent treatment with antibiotics, patient should be reevaluated.    Protocol Recommended Disposition:   See in Office Today, See More Appropriate Protocol    Recommendation: Can patient have another UA to check for a UTI or does she need a visit?    Routed to provider    Does the patient meet one of the following criteria for ADS visit consideration? No    RADHA CardN, RN        Reason for Disposition   Pain or burning with passing urine (urination) and female   All other females with painful urination, or patient wants to be seen    Additional Information   Negative: Shock suspected (e.g., cold/pale/clammy skin, too weak to stand, low BP, rapid pulse)   Negative: Sounds like a life-threatening emergency to the triager   Negative: Shock suspected (e.g., cold/pale/clammy skin, too weak to stand, low BP, rapid pulse)   Negative: Sounds like a life-threatening emergency to the triager   Negative: Taking antibiotic for urinary tract infection (UTI)   Negative: Unable to urinate (or only a few drops) and bladder feels very full   Negative: Vomiting   Negative: Patient sounds very sick or weak to the triager   Negative: SEVERE pain with urination   Negative: Fever > 100.4 F (38.0 C)   Negative: Side (flank) or lower back pain present   Negative: Taking antibiotic > 24 hours for UTI and fever persists   Negative: Taking antibiotic > 3 days for UTI and painful urination not improved   Negative: Unusual vaginal discharge   Negative: > 2 UTIs in last year   Negative: Patient is worried they have a sexually transmitted infection (STI)   Negative: Age > 50 years    Negative: Possibility of pregnancy   Negative: Painful urination AND EITHER frequency or urgency    Protocols used: Urinary Symptoms-A-OH, Urination Pain - Female-A-OH

## 2024-08-21 ENCOUNTER — MYC MEDICAL ADVICE (OUTPATIENT)
Dept: FAMILY MEDICINE | Facility: OTHER | Age: 39
End: 2024-08-21

## 2024-08-21 ENCOUNTER — LAB (OUTPATIENT)
Dept: LAB | Facility: OTHER | Age: 39
End: 2024-08-21
Payer: COMMERCIAL

## 2024-08-21 DIAGNOSIS — R30.0 DYSURIA: Primary | ICD-10-CM

## 2024-08-21 DIAGNOSIS — R30.0 DYSURIA: ICD-10-CM

## 2024-08-21 LAB
ALBUMIN UR-MCNC: NEGATIVE MG/DL
APPEARANCE UR: CLEAR
BACTERIA #/AREA URNS HPF: ABNORMAL /HPF
BILIRUB UR QL STRIP: NEGATIVE
CLUE CELLS: ABNORMAL
COLOR UR AUTO: YELLOW
GLUCOSE UR STRIP-MCNC: NEGATIVE MG/DL
HGB UR QL STRIP: ABNORMAL
KETONES UR STRIP-MCNC: ABNORMAL MG/DL
LEUKOCYTE ESTERASE UR QL STRIP: ABNORMAL
NITRATE UR QL: NEGATIVE
PH UR STRIP: 6 [PH] (ref 5–7)
RBC #/AREA URNS AUTO: ABNORMAL /HPF
SP GR UR STRIP: 1.01 (ref 1–1.03)
SQUAMOUS #/AREA URNS AUTO: ABNORMAL /LPF
TRICHOMONAS, WET PREP: ABNORMAL
UROBILINOGEN UR STRIP-ACNC: 0.2 E.U./DL
WBC #/AREA URNS AUTO: ABNORMAL /HPF
WBC CLUMPS #/AREA URNS HPF: PRESENT /HPF
WBC'S/HIGH POWER FIELD, WET PREP: ABNORMAL
YEAST, WET PREP: ABNORMAL

## 2024-08-21 PROCEDURE — 93244 EXT ECG>48HR<7D REV&INTERPJ: CPT | Performed by: INTERNAL MEDICINE

## 2024-08-21 PROCEDURE — 87210 SMEAR WET MOUNT SALINE/INK: CPT

## 2024-08-21 PROCEDURE — 81001 URINALYSIS AUTO W/SCOPE: CPT

## 2024-08-21 PROCEDURE — 87086 URINE CULTURE/COLONY COUNT: CPT

## 2024-08-21 PROCEDURE — 87186 SC STD MICRODIL/AGAR DIL: CPT

## 2024-08-21 NOTE — PROGRESS NOTES
Orders for urinalysis and wet prep placed. Patient should reach out to labs to reserve time to have these done.    Erik Tong PA-C on 8/21/2024 at 8:59 AM

## 2024-08-21 NOTE — PROGRESS NOTES
Patient advised provider put in lab orders.  Per patient she will call back to schedule a lab appt.    Jackie Mccartney CMA

## 2024-08-22 DIAGNOSIS — R30.0 DYSURIA: Primary | ICD-10-CM

## 2024-08-22 LAB — BACTERIA UR CULT: ABNORMAL

## 2024-08-22 RX ORDER — NITROFURANTOIN 25; 75 MG/1; MG/1
100 CAPSULE ORAL 2 TIMES DAILY
Qty: 10 CAPSULE | Refills: 0 | Status: SHIPPED | OUTPATIENT
Start: 2024-08-22 | End: 2024-08-27

## 2024-08-22 NOTE — TELEPHONE ENCOUNTER
S: Flank pain    B: Patient calls in today stating she had experience flank pain last night and was concerned that the infection is moving up into her kidneys. Patient is questioning if she is on the correct abx for a kidney infection as well as the UTI.    A: Patient states she has not picked up the abx yet, but will do so today. Patient reports flank pain, dull pain, chills, nausea, headache, burning at the end of when she is done peeing. Patient denies hx of kidney stones, fever. Laying down does not increase flank pain.    R: Routed to provider for review.    Danica Pham RN on 8/22/2024 at 1:32 PM

## 2024-08-22 NOTE — TELEPHONE ENCOUNTER
Please triage. If pain not well controlled she can be seen at local UC or emergency department. Or can see if patient meets criteria for ADS.     Erik Tong PA-C on 8/22/2024 at 2:58 PM

## 2024-08-22 NOTE — TELEPHONE ENCOUNTER
RN Triage    Patient Contact    Attempt # 1    RN did attempt to reach patient . No answer. Message left for patient to call the clinic back and ask to speak to a triage nurse.       Calling to triage per providers message.   Please triage. If pain not well controlled she can be seen at local UC or emergency department. Or can see if patient meets criteria for ADS.        Arlette Harmon RN on 8/22/2024 at 3:07 PM

## 2024-08-22 NOTE — TELEPHONE ENCOUNTER
Patient calling back. She notes that symptoms are resolved now with Tylenol. Patient will start the antibiotic and let us know if symptoms worsen or do not improve.     RADHA GustafsonN, RN

## 2024-10-16 ENCOUNTER — ALLIED HEALTH/NURSE VISIT (OUTPATIENT)
Dept: FAMILY MEDICINE | Facility: OTHER | Age: 39
End: 2024-10-16
Payer: COMMERCIAL

## 2024-10-16 DIAGNOSIS — Z23 NEED FOR PROPHYLACTIC VACCINATION AND INOCULATION AGAINST INFLUENZA: Primary | ICD-10-CM

## 2024-10-16 DIAGNOSIS — Z23 HIGH PRIORITY FOR 2019-NCOV VACCINE: ICD-10-CM

## 2024-10-16 PROCEDURE — 91320 SARSCV2 VAC 30MCG TRS-SUC IM: CPT

## 2024-10-16 PROCEDURE — 90471 IMMUNIZATION ADMIN: CPT

## 2024-10-16 PROCEDURE — 90656 IIV3 VACC NO PRSV 0.5 ML IM: CPT

## 2024-10-16 PROCEDURE — 99207 PR NO CHARGE NURSE ONLY: CPT

## 2024-10-16 PROCEDURE — 90480 ADMN SARSCOV2 VAC 1/ONLY CMP: CPT

## 2024-11-23 ENCOUNTER — MYC MEDICAL ADVICE (OUTPATIENT)
Dept: FAMILY MEDICINE | Facility: OTHER | Age: 39
End: 2024-11-23
Payer: COMMERCIAL

## 2024-11-23 DIAGNOSIS — E04.2 MULTINODULAR GOITER: Primary | ICD-10-CM

## 2024-11-26 ENCOUNTER — LAB (OUTPATIENT)
Dept: LAB | Facility: OTHER | Age: 39
End: 2024-11-26
Payer: COMMERCIAL

## 2024-11-26 DIAGNOSIS — E04.2 MULTINODULAR GOITER: ICD-10-CM

## 2024-11-26 LAB — TSH SERPL DL<=0.005 MIU/L-ACNC: 1.36 UIU/ML (ref 0.3–4.2)

## 2024-11-26 PROCEDURE — 36415 COLL VENOUS BLD VENIPUNCTURE: CPT

## 2024-11-26 PROCEDURE — 84443 ASSAY THYROID STIM HORMONE: CPT

## 2024-12-09 ENCOUNTER — ANCILLARY PROCEDURE (OUTPATIENT)
Dept: ULTRASOUND IMAGING | Facility: OTHER | Age: 39
End: 2024-12-09
Attending: OBSTETRICS & GYNECOLOGY
Payer: COMMERCIAL

## 2024-12-09 ENCOUNTER — OFFICE VISIT (OUTPATIENT)
Dept: OBGYN | Facility: OTHER | Age: 39
End: 2024-12-09
Payer: COMMERCIAL

## 2024-12-09 VITALS — WEIGHT: 161.6 LBS | DIASTOLIC BLOOD PRESSURE: 83 MMHG | BODY MASS INDEX: 23.86 KG/M2 | SYSTOLIC BLOOD PRESSURE: 119 MMHG

## 2024-12-09 DIAGNOSIS — R30.9 PAINFUL URINATION: ICD-10-CM

## 2024-12-09 DIAGNOSIS — R30.0 DYSURIA: Primary | ICD-10-CM

## 2024-12-09 DIAGNOSIS — E04.9 NODULAR GOITER: ICD-10-CM

## 2024-12-09 DIAGNOSIS — R30.9 PAINFUL URINATION: Primary | ICD-10-CM

## 2024-12-09 DIAGNOSIS — R10.9 FLANK PAIN: ICD-10-CM

## 2024-12-09 DIAGNOSIS — N39.0 RECURRENT UTI: ICD-10-CM

## 2024-12-09 LAB
ALBUMIN SERPL BCG-MCNC: 4.5 G/DL (ref 3.5–5.2)
ALBUMIN UR-MCNC: NEGATIVE MG/DL
ALP SERPL-CCNC: 50 U/L (ref 40–150)
ALT SERPL W P-5'-P-CCNC: 24 U/L (ref 0–50)
ANION GAP SERPL CALCULATED.3IONS-SCNC: 11 MMOL/L (ref 7–15)
APPEARANCE UR: ABNORMAL
AST SERPL W P-5'-P-CCNC: 21 U/L (ref 0–45)
BACTERIA #/AREA URNS HPF: ABNORMAL /HPF
BILIRUB SERPL-MCNC: 0.3 MG/DL
BILIRUB UR QL STRIP: NEGATIVE
BUN SERPL-MCNC: 11.8 MG/DL (ref 6–20)
CALCIUM SERPL-MCNC: 8.9 MG/DL (ref 8.8–10.4)
CHLORIDE SERPL-SCNC: 104 MMOL/L (ref 98–107)
COLOR UR AUTO: YELLOW
CREAT SERPL-MCNC: 0.8 MG/DL (ref 0.51–0.95)
EGFRCR SERPLBLD CKD-EPI 2021: >90 ML/MIN/1.73M2
ERYTHROCYTE [DISTWIDTH] IN BLOOD BY AUTOMATED COUNT: 11.7 % (ref 10–15)
GLUCOSE SERPL-MCNC: 88 MG/DL (ref 70–99)
GLUCOSE UR STRIP-MCNC: NEGATIVE MG/DL
HCO3 SERPL-SCNC: 24 MMOL/L (ref 22–29)
HCT VFR BLD AUTO: 38.8 % (ref 35–47)
HGB BLD-MCNC: 12.6 G/DL (ref 11.7–15.7)
HGB UR QL STRIP: ABNORMAL
KETONES UR STRIP-MCNC: NEGATIVE MG/DL
LEUKOCYTE ESTERASE UR QL STRIP: ABNORMAL
MCH RBC QN AUTO: 30.8 PG (ref 26.5–33)
MCHC RBC AUTO-ENTMCNC: 32.5 G/DL (ref 31.5–36.5)
MCV RBC AUTO: 95 FL (ref 78–100)
NITRATE UR QL: POSITIVE
PH UR STRIP: 7 [PH] (ref 5–7)
PLATELET # BLD AUTO: 226 10E3/UL (ref 150–450)
POTASSIUM SERPL-SCNC: 4.1 MMOL/L (ref 3.4–5.3)
PROT SERPL-MCNC: 7.2 G/DL (ref 6.4–8.3)
RBC # BLD AUTO: 4.09 10E6/UL (ref 3.8–5.2)
RBC #/AREA URNS AUTO: ABNORMAL /HPF
SODIUM SERPL-SCNC: 139 MMOL/L (ref 135–145)
SP GR UR STRIP: 1.01 (ref 1–1.03)
SQUAMOUS #/AREA URNS AUTO: ABNORMAL /LPF
UROBILINOGEN UR STRIP-ACNC: 0.2 E.U./DL
WBC # BLD AUTO: 8.8 10E3/UL (ref 4–11)
WBC #/AREA URNS AUTO: ABNORMAL /HPF
WBC CLUMPS #/AREA URNS HPF: PRESENT /HPF

## 2024-12-09 PROCEDURE — 85027 COMPLETE CBC AUTOMATED: CPT | Performed by: OBSTETRICS & GYNECOLOGY

## 2024-12-09 PROCEDURE — 76770 US EXAM ABDO BACK WALL COMP: CPT | Mod: TC | Performed by: RADIOLOGY

## 2024-12-09 PROCEDURE — 87086 URINE CULTURE/COLONY COUNT: CPT | Performed by: OBSTETRICS & GYNECOLOGY

## 2024-12-09 PROCEDURE — 99213 OFFICE O/P EST LOW 20 MIN: CPT | Performed by: OBSTETRICS & GYNECOLOGY

## 2024-12-09 PROCEDURE — 81001 URINALYSIS AUTO W/SCOPE: CPT | Performed by: OBSTETRICS & GYNECOLOGY

## 2024-12-09 PROCEDURE — 87186 SC STD MICRODIL/AGAR DIL: CPT | Performed by: OBSTETRICS & GYNECOLOGY

## 2024-12-09 PROCEDURE — 80053 COMPREHEN METABOLIC PANEL: CPT | Performed by: OBSTETRICS & GYNECOLOGY

## 2024-12-09 PROCEDURE — 36415 COLL VENOUS BLD VENIPUNCTURE: CPT | Performed by: OBSTETRICS & GYNECOLOGY

## 2024-12-09 RX ORDER — NITROFURANTOIN 25; 75 MG/1; MG/1
100 CAPSULE ORAL 2 TIMES DAILY
Qty: 14 CAPSULE | Refills: 0 | Status: SHIPPED | OUTPATIENT
Start: 2024-12-09

## 2024-12-09 NOTE — PATIENT INSTRUCTIONS
Please call if you any questions.    [address]   [id number]  [phone]        Harriet Rascon,

## 2024-12-09 NOTE — PROGRESS NOTES
Subjective  39 year old non-pregnant female presents today complaining of a possible UTI.  Patient has had a hard year.  Multiple episodes of strep and UTI's.  Patient was dx with a thyroid nodule last year.  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  She went to [place] and had it ablated.  She is now waiting until March to see what the last bx will show.  Her UTI starts on Saturday.  She had burning with urination towards the end of her stream.  She has bilateral flank pain.  The pain is terrible.  She is very nauseated but has not vomited.  She was in tears last night.  She did an over the counter dip stick this am and it was positive.  No fever, but chills.  No history of kidney stones.  She had a lot of UTI's and kidney problems growing up.  Patient did have intercourse last week, but she hasn't had any issues in the past.  No vaginal itching, discharge, or odor.        I also reviewed notes from previous office visits by Dr. Jeffries.    ROS: 10 point ROS neg other than the symptoms noted above in the HPI.  Past Medical History:   Diagnosis Date    Depressive disorder 16    anxiety    History of blood transfusion 16    Hypertension 16    NO ACTIVE PROBLEMS      Past Surgical History:   Procedure Laterality Date    APPENDECTOMY  2014     SECTION N/A 10/29/2016    Procedure:  SECTION;  Surgeon: Adonis Dooley MD;  Location: [facility]     Family History   Problem Relation Age of Onset    Hypertension Mother     Osteoporosis Maternal Grandmother     Unknown/Adopted Father     Unknown/Adopted Paternal Grandmother     Unknown/Adopted Paternal Grandfather      Social History     Tobacco Use    Smoking status: Never    Smokeless tobacco: Never   Substance Use Topics    Alcohol use: No         Objective  Vitals: /83 (BP Location: Right arm, Cuff Size: Adult Regular)   Wt 73.3 kg (161 lb 9.6 oz)   LMP 2024   Breastfeeding Yes   BMI 23.86 kg/m     BMI= Body mass index is 23.86 kg/m .    General appearance=well developed, well-nourished female  Gait=normal  Psych=mood is stable, alert and oriented x3  Musculo=Bilateral flank pain      Assessment  1.)  Dysuria  2.)  Flank pain  3.)  History of UTIs  4.)  Thyroid nodule      Plan  1.)  U/A  2.)  CBC and CMP  3.)  Bilateral renal ultrasound=rule out nephrolithiasis and/or pyelonephritis  4.)  Follow-up with Endocrine as scheduled      Acute, uncomplicated illness or injury and labs ordered.  Nursing notes read and reviewed    Harriet Rascon DO

## 2024-12-10 ENCOUNTER — MYC MEDICAL ADVICE (OUTPATIENT)
Dept: OBGYN | Facility: OTHER | Age: 39
End: 2024-12-10
Payer: COMMERCIAL

## 2024-12-10 LAB — BACTERIA UR CULT: ABNORMAL

## 2024-12-11 ENCOUNTER — OFFICE VISIT (OUTPATIENT)
Dept: ENDOCRINOLOGY | Facility: CLINIC | Age: 39
End: 2024-12-11
Payer: COMMERCIAL

## 2024-12-11 VITALS
BODY MASS INDEX: 23.63 KG/M2 | WEIGHT: 160 LBS | SYSTOLIC BLOOD PRESSURE: 141 MMHG | HEART RATE: 72 BPM | OXYGEN SATURATION: 100 % | DIASTOLIC BLOOD PRESSURE: 103 MMHG | RESPIRATION RATE: 16 BRPM

## 2024-12-11 DIAGNOSIS — E06.3 HASHIMOTO'S THYROIDITIS: ICD-10-CM

## 2024-12-11 DIAGNOSIS — E04.2 MULTIPLE THYROID NODULES: Primary | ICD-10-CM

## 2024-12-11 NOTE — PROGRESS NOTES
ASSESSMENT:      Queta is a 39 year old female with hypothyroidism seen for evaluation of thyroid nodules.     1.  Thyroid nodules  The patient has 2 thyroid nodules with similar ultrasound features.  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  Subsequently, the patient has a risk of 30% of nonaggressive thyroid cancer.  She opted to undergo radiofrequency ablation mid November.  Following the ablation, the right thyroid nodule has significantly decreased in size.  Clinically and biochemically, she is euthyroid.  Discussed with the patient that she is going to require long-term monitoring of this thyroid nodule which is sometimes cumbersome following the radiofrequency ablation, since the ultrasound features indicative of thyroid cancer are lost following the ablation.  Also, I am not aware of data promoting radiofrequency ablation for larger nodules, with indeterminate cytopathology results.  Plan:  Schedule a follow-up thyroid ultrasound in March  Recheck the thyroid hormone levels at that time    2. Hashimoto's hypothyroidism  Counseled the patient on the etiology of this condition, signs and symptoms of hypo and hyperthyroidism, treatment with levothyroxine.  Since her thyroid hormone levels have been normal on a stable dose of levothyroxine, I recommended to continue the same dose.    Orders Placed This Encounter   Procedures    US Thyroid    TSH    T4 free     =========================================================================================    HPI  Queta is a 39 year old female with hypothyroidism presents today for thyroid nodules.  The patient was previously seen by Dr. Renae, most recently in June 2024.     She was found to have lump in her right neck right after her last pregnancy in 2023. She saw her PCP in February 2024 for further evaluation.      The thyroid ultrasound from February 2024 showed a 3.7 cm right thyroid lobe nodule, almost completely solid, and an additional 1.2 cm nodule in the left superior lobe.  I reviewed the ultrasound images.  The right thyroid nodule is very hyperechoic, fairly well-delineated with the exception of the upper pole which has a somehow lobulated appearance, with no increased blood flow.  The surrounding tissue is very hypoechoic.  The left thyroid nodule, although smaller, has a similar ultrasound appearance as the dominant nodule and it does have a higher AP diameter in the transverse view.    Subsequently, the patient had 3 biopsies of the right thyroid nodule:  3/1/2024 AUS (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    Last time she saw Dr. Mervin Rosales at [facility] was in March 2024.   The patient declined pursuing thyroidectomy.  On 11/16/24, she underwent radiofrequency ablation at [facility]. The procedure was performed by Dr. Smith.   It was recommended to have a f/up thyroid US done in March 2025.     Queta was diagnosed with hypothyroidism in 2018, prior to her second pregnancy, while being evaluated for infertility.  The current levothyroxine dose is 75 mcg daily and the dose has been stable for a couple of years.  Most recent labs on 5/9/2024 showed TSH 2.0, free T4 1.21, free T3 2.3, positive thyroid peroxidase and antithyroglobulin antibodies, calcitonin less than 2.    There is no family history of thyroid disease or prior history of radiation exposure.  The patient reports being compliant in taking levothyroxine on an empty stomach, at least 1 hour before breakfast,  from  prenatal multivitamins and probiotics.  She is still breast-feeding.  Attributes the elevated blood pressure to the stress of driving in the snow.    Past Medical History  Past Medical History:   Diagnosis Date    Depressive disorder 11/14/16    anxiety    History of blood transfusion 11/04/16    Hypertension 11/04/16    NO ACTIVE PROBLEMS    Gestational HTN with all her pregnancies       Allergies  Allergies   Allergen Reactions    Hydralazine Shortness Of Breath, Anxiety, Other (See Comments), Swelling and Rash     40-50 minutes after IV administration had swelling to the arm where hydralazine was given, had swelling to lips, flushing to face, generalized anxiety which lead to shortness of breath. Symptoms resolved with dose of 25 mg IV benadryl.      Medications  Current Outpatient Medications   Medication Sig Dispense Refill    levothyroxine (SYNTHROID/LEVOTHROID) 75 MCG tablet Take 1 tablet (75 mcg) by mouth daily 90 tablet 3    nitroFURantoin macrocrystal-monohydrate (MACROBID) 100 MG capsule Take 1 capsule (100 mg) by mouth 2 times daily. 14 capsule 0    Prenatal Vit-Fe Fumarate-FA (PRENATAL MULTIVITAMIN W/IRON) 27-0.8 MG tablet Take 1 tablet by mouth daily       Family History  Doesn't know her biological father. Mother - HTN.     Social History  . 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     ROS  Systemic symptoms: close to her prepregnancy weight; she endorses longstanding fatigue which she attributes, at least, partially, to having to take care of her kids    Eye symptoms: No eye symptoms.  Otolaryngeal symptoms: No otolaryngeal symptoms.  Breast symptoms: No breast symptoms.  Cardiovascular symptoms: dx with SVT and she has been experiencing rapid rhythm palpitations once every other month   Pulmonary symptoms: No pulmonary symptoms.  Gastrointestinal symptoms: diarrhea since her last pregnancy; she hasn't identified food triggers   Genitourinary symptoms: more frequent UTIs   Endocrine  symptoms: longstanding cold intolerance; night sweats intermittently present for the last 6 months; MP are regular and heavy (bleeds for 3-4 days)   Hematologic symptoms: No hematologic symptoms.  Musculoskeletal symptoms: No musculoskeletal symptoms.  Neurological symptoms: migraines with the MPs   Psychological symptoms: worsening anxiety in the last 1-2 years   Skin symptoms: persistent hair loss since delivery     Physical Exam  Wt Readings from Last 10 Encounters:   12/11/24 72.6 kg (160 lb)   12/09/24 73.3 kg (161 lb 9.6 oz)   08/12/24 71.4 kg (157 lb 8 oz)   07/30/24 72.1 kg (159 lb)   06/17/24 73.5 kg (162 lb)   05/09/24 73.9 kg (163 lb)   03/13/24 77.1 kg (170 lb)   02/09/24 76.9 kg (169 lb 8 oz)   10/30/23 79.8 kg (175 lb 14.4 oz)   09/15/23 89.5 kg (197 lb 6.4 oz)     BP (!) 141/103   Pulse 72   Resp 16   Wt 72.6 kg (160 lb)   LMP 11/23/2024   SpO2 100%   BMI 23.63 kg/m      Physical Exam   General appearance: she is well-developed, well-nourished, and in no distress.  Eyes: conjunctivae and extraocular motions are normal. Pupils are equal, round, and reactive to light. No lid lag, no stare.  HENT: oropharynx clear and moist; neck no JVD, no bruits  Visible and palpable right thyroid nodule, hard on palpation, with an elongated longitudinal shape, measuring ~ 3 cm in lengh   Cardiovascular: regular rhythm, no murmurs, distal pulses palpable, no edema  Respiratory: chest clear, no rales, no rhonchi  Neurologic: reflexes normal and symmetric, no resting tremor  Psychiatric: affect and judgment normal   Skin: warm, no lesions on exposed skin     RESULTS  TSH   Date Value Ref Range Status   11/26/2024 1.36 0.30 - 4.20 uIU/mL Final   03/07/2023 1.49 0.40 - 4.00 mU/L Final   04/06/2021 1.41 0.40 - 4.00 mU/L Final     US thyroid 2/13/2024  RIGHT lobe: 4.9 x 2.9 x 2.8 cm. Homogeneous echotexture.  Isthmus: 3 mm.  LEFT lobe: 3.7 x 3.9 x 1.8 cm. Homogeneous echotexture.     NODULES:   Nodule 1: Right, 3.7 x   2.3 x 2.2 cm  Composition: Solid or almost completely solid, 2 points   Echogenicity: Hyperechoic or isoechoic, 1 point   Shape: Wider-than-tall, 0 points   Margin: Smooth, 0 points   Echogenic Foci: None, or large comet-tail artifacts, 0 points   Point Total: 3 points. TI-RADS 3. If 2.5 cm or larger, recommend FNA;  if 1.5 cm or larger, recommend follow up US at 1, 3, and 5 years.      Nodule 2: Left superior, lobular 0.9 x 1.2 x 1.2 cm  Composition: Solid or almost completely solid, 2 points   Echogenicity: Hyperechoic or isoechoic, 1 point   Shape: Not taller than wide, 0 points   Margin: Smooth, 0 points   Echogenic Foci: None, or large comet-tail artifacts, 0 points   Point Total: 0 points. TI-RADS 1. No FNA.                                                                    IMPRESSION:  Bilateral thyroid nodules. Right 3.7 cm TR3 nodule meets criteria for FNA.    US thyroid re-read at [facility]   1. Intermediate suspicion right thyroid nodule measuring up to 3.7 cm. This nodule was sampled at an outside institution on 3/1/2024 with outside pathology yielding atypia of undetermined significance.   2. Intermediate suspicion left thyroid nodule measuring up to 1.2 cm.   3. The cervical lymph nodes were not assessed on this examination.     FNA 3/1/2024  Final Diagnosis   Specimen A                 Interpretation:                  Atypia of Undetermined Significance ([place] [facility])  Other, see comment (abnormal)      The [place] implied risk of malignancy and recommended clinical management:  Atypia of undetermined significance diagnosis has a 22 (13-30)% risk of malignancy. Repeat FNA (least 4-6 weeks from previous aspiration with optimal time being 12 weeks after last aspiration), molecular testing, diagnostic lobectomy, or surveillance is recommended.                 Adequacy:                 Satisfactory for evaluation         Electronically signed by Too Ambrosio III, MD on 3/4/2024 at  3:07 PM   Comment     The specimen is cellular and shows presence of focal microfollicular architecture with some colloid in the background, supporting the diagnosis of AUS with atypia categorized as 'other'          Latest Ref Rng 5/9/2024  9:17 AM   ENDO THYROID LABS-[facility]     Calcitonin 0.0 - 5.1 pg/mL <2.0    TSH 0.40 - 4.00 mU/L    TSH 0.30 - 4.20 uIU/mL 2.00    Free T3 2.0 - 4.4 pg/mL 2.3    FREE T4 0.90 - 1.70 ng/dL 1.21    Thyroglobulin Antibody <40 IU/mL 124 (H)    Thyroid Peroxidase Antibody <35 IU/mL 54 (H)      40 minutes spent on the date of the encounter doing chart review, history and exam, documentation and further activities as noted above.

## 2024-12-11 NOTE — LETTER
12/11/2024      Queta Cortez  [address] [address]      Dear Colleague,    Thank you for referring your patient, Queta Cortez, to the [facility]. Please see a copy of my visit note below.      ASSESSMENT:      Queta is a 39 year old female with hypothyroidism seen for evaluation of thyroid nodules.     1.  Thyroid nodules  The patient has 2 thyroid nodules with similar ultrasound features.  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  Subsequently, the patient has a risk of 30% of nonaggressive thyroid cancer.  She opted to undergo radiofrequency ablation mid November.  Following the ablation, the right thyroid nodule has significantly decreased in size.  Clinically and biochemically, she is euthyroid.  Discussed with the patient that she is going to require long-term monitoring of this thyroid nodule which is sometimes cumbersome following the radiofrequency ablation, since the ultrasound features indicative of thyroid cancer are lost following the ablation.  Also, I am not aware of data promoting radiofrequency ablation for larger nodules, with indeterminate cytopathology results.  Plan:  Schedule a follow-up thyroid ultrasound in March  Recheck the thyroid hormone levels at that time    2. Hashimoto's hypothyroidism  Counseled the patient on the etiology of this condition, signs and symptoms of hypo and hyperthyroidism, treatment with levothyroxine.  Since her thyroid hormone levels have been normal on a stable dose of levothyroxine, I recommended to continue the same dose.    Orders Placed This Encounter   Procedures     US Thyroid     TSH     T4 free     =========================================================================================    HPI  Queta is a 39 year old female with hypothyroidism presents today for thyroid  nodules.  The patient was previously seen by Dr. Renae, most recently in June 2024.     She was found to have lump in her right neck right after her last pregnancy in 2023. She saw her PCP in February 2024 for further evaluation.     The thyroid ultrasound from February 2024 showed a 3.7 cm right thyroid lobe nodule, almost completely solid, and an additional 1.2 cm nodule in the left superior lobe.  I reviewed the ultrasound images.  The right thyroid nodule is very hyperechoic, fairly well-delineated with the exception of the upper pole which has a somehow lobulated appearance, with no increased blood flow.  The surrounding tissue is very hypoechoic.  The left thyroid nodule, although smaller, has a similar ultrasound appearance as the dominant nodule and it does have a higher AP diameter in the transverse view.    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    Last time she saw Dr. Mervin Rosales at [facility] was in March 2024.   The patient declined pursuing thyroidectomy.  On 11/16/24, she underwent radiofrequency ablation at [facility]. The procedure was performed by Dr. Smith.   It was recommended to have a f/up thyroid US done in March 2025.     Queta was diagnosed with hypothyroidism in 2018, prior to her second pregnancy, while being evaluated for infertility.  The current levothyroxine dose is 75 mcg daily and the dose has been stable for a couple of years.  Most recent labs on 5/9/2024 showed TSH 2.0, free T4 1.21, free T3 2.3, positive thyroid peroxidase and antithyroglobulin antibodies,  calcitonin less than 2.    There is no family history of thyroid disease or prior history of radiation exposure.  The patient reports being compliant in taking levothyroxine on an empty stomach, at least 1 hour before breakfast,  from prenatal multivitamins and probiotics.  She is still breast-feeding.  Attributes the elevated blood pressure to the stress of driving in the snow.    Past Medical History  Past Medical History:   Diagnosis Date     Depressive disorder 11/14/16    anxiety     History of blood transfusion 11/04/16     Hypertension 11/04/16     NO ACTIVE PROBLEMS    Gestational HTN with all her pregnancies       Allergies  Allergies   Allergen Reactions     Hydralazine Shortness Of Breath, Anxiety, Other (See Comments), Swelling and Rash     40-50 minutes after IV administration had swelling to the arm where hydralazine was given, had swelling to lips, flushing to face, generalized anxiety which lead to shortness of breath. Symptoms resolved with dose of 25 mg IV benadryl.      Medications  Current Outpatient Medications   Medication Sig Dispense Refill     levothyroxine (SYNTHROID/LEVOTHROID) 75 MCG tablet Take 1 tablet (75 mcg) by mouth daily 90 tablet 3     nitroFURantoin macrocrystal-monohydrate (MACROBID) 100 MG capsule Take 1 capsule (100 mg) by mouth 2 times daily. 14 capsule 0     Prenatal Vit-Fe Fumarate-FA (PRENATAL MULTIVITAMIN W/IRON) 27-0.8 MG tablet Take 1 tablet by mouth daily       Family History  Doesn't know her biological father. Mother - HTN.     Social History  . 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     ROS  Systemic symptoms: close to her prepregnancy weight; she endorses longstanding fatigue which she attributes, at least, partially, to having to take care of her kids    Eye symptoms: No eye symptoms.  Otolaryngeal symptoms: No otolaryngeal symptoms.  Breast symptoms: No breast symptoms.  Cardiovascular symptoms: dx with SVT and she has been experiencing  rapid rhythm palpitations once every other month   Pulmonary symptoms: No pulmonary symptoms.  Gastrointestinal symptoms: diarrhea since her last pregnancy; she hasn't identified food triggers   Genitourinary symptoms: more frequent UTIs   Endocrine symptoms: longstanding cold intolerance; night sweats intermittently present for the last 6 months; MP are regular and heavy (bleeds for 3-4 days)   Hematologic symptoms: No hematologic symptoms.  Musculoskeletal symptoms: No musculoskeletal symptoms.  Neurological symptoms: migraines with the MPs   Psychological symptoms: worsening anxiety in the last 1-2 years   Skin symptoms: persistent hair loss since delivery     Physical Exam  Wt Readings from Last 10 Encounters:   12/11/24 72.6 kg (160 lb)   12/09/24 73.3 kg (161 lb 9.6 oz)   08/12/24 71.4 kg (157 lb 8 oz)   07/30/24 72.1 kg (159 lb)   06/17/24 73.5 kg (162 lb)   05/09/24 73.9 kg (163 lb)   03/13/24 77.1 kg (170 lb)   02/09/24 76.9 kg (169 lb 8 oz)   10/30/23 79.8 kg (175 lb 14.4 oz)   09/15/23 89.5 kg (197 lb 6.4 oz)     BP (!) 141/103   Pulse 72   Resp 16   Wt 72.6 kg (160 lb)   LMP 11/23/2024   SpO2 100%   BMI 23.63 kg/m      Physical Exam   General appearance: she is well-developed, well-nourished, and in no distress.  Eyes: conjunctivae and extraocular motions are normal. Pupils are equal, round, and reactive to light. No lid lag, no stare.  HENT: oropharynx clear and moist; neck no JVD, no bruits  Visible and palpable right thyroid nodule, hard on palpation, with an elongated longitudinal shape, measuring ~ 3 cm in lengh   Cardiovascular: regular rhythm, no murmurs, distal pulses palpable, no edema  Respiratory: chest clear, no rales, no rhonchi  Neurologic: reflexes normal and symmetric, no resting tremor  Psychiatric: affect and judgment normal   Skin: warm, no lesions on exposed skin     RESULTS  TSH   Date Value Ref Range Status   11/26/2024 1.36 0.30 - 4.20 uIU/mL Final   03/07/2023 1.49 0.40 -  4.00 mU/L Final   04/06/2021 1.41 0.40 - 4.00 mU/L Final     US thyroid 2/13/2024  RIGHT lobe: 4.9 x 2.9 x 2.8 cm. Homogeneous echotexture.  Isthmus: 3 mm.  LEFT lobe: 3.7 x 3.9 x 1.8 cm. Homogeneous echotexture.     NODULES:   Nodule 1: Right, 3.7 x  2.3 x 2.2 cm  Composition: Solid or almost completely solid, 2 points   Echogenicity: Hyperechoic or isoechoic, 1 point   Shape: Wider-than-tall, 0 points   Margin: Smooth, 0 points   Echogenic Foci: None, or large comet-tail artifacts, 0 points   Point Total: 3 points. TI-RADS 3. If 2.5 cm or larger, recommend FNA;  if 1.5 cm or larger, recommend follow up US at 1, 3, and 5 years.      Nodule 2: Left superior, lobular 0.9 x 1.2 x 1.2 cm  Composition: Solid or almost completely solid, 2 points   Echogenicity: Hyperechoic or isoechoic, 1 point   Shape: Not taller than wide, 0 points   Margin: Smooth, 0 points   Echogenic Foci: None, or large comet-tail artifacts, 0 points   Point Total: 0 points. TI-RADS 1. No FNA.                                                                    IMPRESSION:  Bilateral thyroid nodules. Right 3.7 cm TR3 nodule meets criteria for FNA.    US thyroid re-read at [facility]   1. Intermediate suspicion right thyroid nodule measuring up to 3.7 cm. This nodule was sampled at an outside institution on 3/1/2024 with outside pathology yielding atypia of undetermined significance.   2. Intermediate suspicion left thyroid nodule measuring up to 1.2 cm.   3. The cervical lymph nodes were not assessed on this examination.     FNA 3/1/2024  Final Diagnosis   Specimen A                 Interpretation:                  Atypia of Undetermined Significance ([place] [facility])  Other, see comment (abnormal)      The [place] implied risk of malignancy and recommended clinical management:  Atypia of undetermined significance diagnosis has a 22 (13-30)% risk of malignancy. Repeat FNA (least 4-6 weeks from previous aspiration with optimal time being 12 weeks after  last aspiration), molecular testing, diagnostic lobectomy, or surveillance is recommended.                 Adequacy:                 Satisfactory for evaluation         Electronically signed by Too Ambrosio III, MD on 3/4/2024 at  3:07 PM   Comment    The specimen is cellular and shows presence of focal microfollicular architecture with some colloid in the background, supporting the diagnosis of AUS with atypia categorized as 'other'          Latest Ref Rng 5/9/2024  9:17 AM   ENDO THYROID LABS-[facility]     Calcitonin 0.0 - 5.1 pg/mL <2.0    TSH 0.40 - 4.00 mU/L    TSH 0.30 - 4.20 uIU/mL 2.00    Free T3 2.0 - 4.4 pg/mL 2.3    FREE T4 0.90 - 1.70 ng/dL 1.21    Thyroglobulin Antibody <40 IU/mL 124 (H)    Thyroid Peroxidase Antibody <35 IU/mL 54 (H)      40 minutes spent on the date of the encounter doing chart review, history and exam, documentation and further activities as noted above.      Again, thank you for allowing me to participate in the care of your patient.        Sincerely,      Sis Downs MD

## 2024-12-11 NOTE — PATIENT INSTRUCTIONS
Welcome to the [facility] Endocrinology and Diabetes Clinics     Our Endocrinology Clinics are here to provide you with a team-based, collaborative approach in the diagnosis and treatment of patients with diabetes and endocrine disorders. The team is made up of Physicians, Physician Assistants, Certified Diabetes Educators, Registered Nurses, Medical Assistants, Emergency Medical Technicians, and many others, all of whom have the unified goal of providing our patients with high quality care.     Please see below for some helpful tips to best navigate and use the [facility] Endocrinology clinic:     [place] Respect: At [facility], we are committed to a respectful and safe space for all patients, visitors, and staff.  We believe that mutual respect between patients and their care team is the foundation of quality care.  It is our expectation that you will be treated with respect by your care team.  In turn, we ask that all communication with the care team (written and verbal) be respectful and free from profanity, threatening, or abusive language.  Disrespectful communication undermines our therapeutic relationship with you and may result in us being unable to continue to provide your care.    Refills: A provider must see you at least annually to prescribe and refill medications. This is to ensure your safety as well as meet insurance and compliance regulations.    Scheduling: Many of our Providers offer both in-person or video visits. Please call to schedule any needed follow ups as soon as possible because our provider schedules fill up very quickly. Our care team has the right to require an in-person visit when they believe that it is medically necessary. Please remember that for any virtual visits, you must be in the [facility] at the time of the visit, otherwise we are unable to see you and you will need to be rescheduled.    Missed Appointments: If you need to cancel or miss your  scheduled appointment, please call the clinic at [phone] to reschedule.  Please note if you repeatedly miss appointments or repeatedly miss appointments without calling to inform us ahead of time (no-show), the clinic may elect to not allow you to reschedule without speaking to a manager, may require a Partnership In Care Agreement prior to rescheduling, or could result in you no longer being able to receive care from the clinic. Providing the clinic with timely notification if you have to miss an appointment, allows us to better serve the needs of all of our patients.    Primary Care Provider: Our Endocrinologists are Specialists in their field. We expect you to have a Primary Care Provider established to handle any needs outside of your diabetes and endocrine care.  We would be happy to assist you find a Primary Care Provider, if you do not have one.    Veracyte: Veracyte is a wonderful resource that allows you access to your Care Team via online or the ivania. Please ask a member of the team if you would like help creating an account. Please note that it may take up to 2 business days for a response. Veracyte messages are not reviewed on weekends or after business hours.  Emergent or urgent care needs should never be communicated via Veracyte.  If you experience a medical emergency call 911 or go to the nearest emergency room.    Labs: It is recommended that you stay within the [facility] for labs but you are welcome to obtain ordered labs (with some exceptions) from any location of your choice as long as they are able to complete and process the needed labs. If you need us to fax orders to your preferred lab, please provide us the name and fax number of the lab you would like to go to so we can fax the orders. If your labs are drawn outside of the [facility], please have them fax the results to [phone] ([place]) or [phone] ([place]) or via [facility]. It is your  responsibility to ensure that outside lab results are sent to us.    We look forward to working with you. Please do not hesitate to reach out with any questions.    Thank you,    The Endocrine Team    [facility] Address:   [place] Address:     [address] [place] [address]    Phone: [phone]  Fax: [phone] [address] [place] [address]    Phone: [phone]  Fax: [phone]     [facility] Cost Estimate Phone Number: [phone]    General Lab and Imaging Scheduling Phone Number: [phone]

## 2024-12-11 NOTE — NURSING NOTE
Queta Cortez's goals for this visit include:   Chief Complaint   Patient presents with    Follow Up    Endocrine Problem       She requests these members of her care team be copied on today's visit information: yes    PCP: Erik Tong    Referring Provider:  Referred Self, MD  No address on file    BP (!) 141/103   Pulse 72   Resp 16   Wt 72.6 kg (160 lb)   LMP 11/23/2024   SpO2 100%   BMI 23.63 kg/m      Do you need any medication refills at today's visit? Yes    Reed Espinal, EMT

## 2025-02-11 ENCOUNTER — PATIENT OUTREACH (OUTPATIENT)
Dept: CARE COORDINATION | Facility: CLINIC | Age: 40
End: 2025-02-11
Payer: COMMERCIAL

## 2025-02-25 ENCOUNTER — PATIENT OUTREACH (OUTPATIENT)
Dept: CARE COORDINATION | Facility: CLINIC | Age: 40
End: 2025-02-25
Payer: COMMERCIAL

## 2025-03-11 ENCOUNTER — LAB (OUTPATIENT)
Dept: LAB | Facility: CLINIC | Age: 40
End: 2025-03-11
Payer: COMMERCIAL

## 2025-03-11 ENCOUNTER — ANCILLARY PROCEDURE (OUTPATIENT)
Dept: ULTRASOUND IMAGING | Facility: CLINIC | Age: 40
End: 2025-03-11
Attending: INTERNAL MEDICINE
Payer: COMMERCIAL

## 2025-03-11 DIAGNOSIS — E04.2 MULTIPLE THYROID NODULES: ICD-10-CM

## 2025-03-11 LAB
T4 FREE SERPL-MCNC: 1.29 NG/DL (ref 0.9–1.7)
TSH SERPL DL<=0.005 MIU/L-ACNC: 1.79 UIU/ML (ref 0.3–4.2)

## 2025-03-11 PROCEDURE — 36415 COLL VENOUS BLD VENIPUNCTURE: CPT

## 2025-03-11 PROCEDURE — 84439 ASSAY OF FREE THYROXINE: CPT

## 2025-03-11 PROCEDURE — 76536 US EXAM OF HEAD AND NECK: CPT | Mod: GC | Performed by: RADIOLOGY

## 2025-03-11 PROCEDURE — 84443 ASSAY THYROID STIM HORMONE: CPT

## 2025-04-26 ENCOUNTER — HEALTH MAINTENANCE LETTER (OUTPATIENT)
Age: 40
End: 2025-04-26

## 2025-05-05 ENCOUNTER — NURSE TRIAGE (OUTPATIENT)
Dept: PEDIATRICS | Facility: OTHER | Age: 40
End: 2025-05-05

## 2025-05-05 NOTE — TELEPHONE ENCOUNTER
"Patient is scheduled for virtual visit at 3pm today for \"Frequent auras visual disturbance\". Please triage as this sounds like it needs to be fce to face. Patient can use a work in slot for this.    Erik Tong PA-C on 5/5/2025 at 6:56 AM    "

## 2025-05-05 NOTE — TELEPHONE ENCOUNTER
"RN called patient to triage. She notes that she has been having an \"aura\" that started about 3 weeks ago and is at random times. She notes that this happened x2 times total that first week, then 2nd week was maybe 3-4x, then last week was more consistent around 4 times. She notes this will last about 5-20 minutes total. Declines and blurry vision or issues with BP as she has checked this and notes they are good. Patient does feel mild nausea when this happens but feels this may be more due to anxiety or fear. Patient declines any pain, but does feel like head is a little more pressure like on temples when this happens. Patient did take ibuprofen last week and notes this helped that pressure feeling.     Patient was worried as her mom has similar episodes and mom ended up having a tumor behind eye. Patient notes her fear and anxiety come from that and worrying that she may have the same.     RN offered patient a sooner appointment than 5/15 and also advised again virtual today. Patient is scheduled 5/6. RN reviewed red flag symptoms with patient and when to see emergency care. They agreed and understood.     Future Appointments 5/5/2025 - 11/1/2025        Date Visit Type Length Department Provider     5/6/2025 10:00 AM OFFICE VISIT 30 min ER FAMILY PRACTICE Erik Tong, PAJosephineC    Location Instructions:     [facility] is located at [address] [place] [address], between [address]. Free lot parking is available. Upon entering the building, the clinic is to the left.              5/16/2025 10:15 AM MYCHART OB GYN ANNUAL PHYSICAL 30 min ER OB/GYN Harriet Rascon DO    Location Instructions:     Upon entering the building, the clinic is to the left.              7/22/2025 10:30 AM RETURN ENDOCRINE 30 min MG [facility] [place] MD Jennifer Christian, BSN, RN       Reason for Disposition   Blurred vision or visual changes and gradual onset (e.g., weeks, " months)   Brief (now gone) blurred vision and unexplained   MILD to MODERATE headache    Additional Information   Negative: Weakness of the face, arm or leg on one side of the body   Negative: Followed getting substance in the eye   Negative: Foreign body stuck in the eye   Negative: Followed an eye injury   Negative: Followed sun lamp or sun exposure (UV keratitis)   Negative: Yellow or green discharge (pus) in the eye   Negative: Pregnant   Negative: Complete loss of vision in one or both eyes   Negative: Severe eye pain   Negative: Severe headache   Negative: Double vision   Negative: Blurred vision or visual changes and present now and sudden onset or new (e.g., minutes, hours, days)  (Exception: Seeing floaters / black specks OR previously diagnosed migraine headaches with same symptoms.)   Negative: Patient sounds very sick or weak to the triager   Negative: Flashes of light  (Exception: Brief from pressing on the eyeball.)   Negative: Many floaters in the eye (Exception: Floater(s) are a chronic symptom and this is unchanged from patient's baseline pattern.)   Negative: Eye pain and brief (now gone) blurred vision or visual changes   Negative: Taking digoxin (e.g., Lanoxin, Digitek, Cardoxin, Lanoxicaps; Toloxin in [place]) and blurred vision, yellow vision, or yellow-green halos   Negative: Jaw pain while eating and age > 50 years   Negative: Headache and age > 50 years   Negative: Patient wants to be seen   Negative: Difficult to awaken or acting confused (e.g., disoriented, slurred speech)   Negative: Weakness of the face, arm or leg on one side of the body and new-onset   Negative: Numbness of the face, arm or leg on one side of the body and new-onset   Negative: Loss of speech or garbled speech and new-onset   Negative: Passed out (e.g., fainted, lost consciousness, blacked out and was not responding)   Negative: Sounds like a life-threatening emergency to the triager   Negative: Followed a head injury  within last 3 days   Negative: Traumatic Brain Injury (TBI) is suspected   Negative: Sinus pain or congestion is main symptom(s)   Negative: Influenza suspected (i.e., cough, fever, other respiratory symptoms; probable influenza exposure)   Negative: Pregnant   Negative: Unable to walk without falling   Negative: Stiff neck (can't touch chin to chest)   Negative: Other family members (or people in same household) with headaches and possibility of carbon monoxide exposure   Negative: SEVERE headache, states 'worst headache' of life   Negative: SEVERE headache, sudden-onset (i.e., reaching maximum intensity within seconds to 1 hour)   Negative: Severe pain in one eye   Negative: Loss of vision or double vision (Exception: Same as previously diagnosed migraines.)   Negative: Patient sounds very sick or weak to the triager   Negative: Fever > 103 F (39.4 C)   Negative: Fever > 100 F (37.8 C) and has diabetes mellitus or a weak immune system (e.g., HIV positive, cancer chemotherapy, organ transplant, splenectomy, chronic steroids)   Negative: SEVERE headache (e.g., excruciating) and has had severe headaches before   Negative: SEVERE headache and not relieved by pain meds   Negative: SEVERE headache and vomiting   Negative: SEVERE headache and fever   Negative: New-onset headache and weak immune system (e.g., HIV positive, cancer chemo, splenectomy, organ transplant, chronic steroids)   Negative: Fever present > 3 days (72 hours)   Negative: Patient wants to be seen   Negative: MODERATE headache (e.g., interferes with normal activities) present > 24 hours and unexplained   Negative: MILD - MODERATE headache present > 3 days (72 hours)   Negative: New-onset headache and age > 50 years   Negative: Headache started during exertion (e.g., sex, strenuous exercise, heavy lifting)   Negative: Headache is a chronic symptom (recurrent or ongoing AND lasting > 4 weeks)    Protocols used: Vision Loss or Change-A-OH,  Headache-A-OH

## 2025-05-06 ENCOUNTER — OFFICE VISIT (OUTPATIENT)
Dept: FAMILY MEDICINE | Facility: OTHER | Age: 40
End: 2025-05-06
Payer: COMMERCIAL

## 2025-05-06 VITALS
WEIGHT: 161 LBS | OXYGEN SATURATION: 99 % | TEMPERATURE: 97 F | BODY MASS INDEX: 23.85 KG/M2 | DIASTOLIC BLOOD PRESSURE: 86 MMHG | HEART RATE: 66 BPM | HEIGHT: 69 IN | SYSTOLIC BLOOD PRESSURE: 118 MMHG | RESPIRATION RATE: 16 BRPM

## 2025-05-06 DIAGNOSIS — H53.9 VISUAL DISTURBANCE: Primary | ICD-10-CM

## 2025-05-06 DIAGNOSIS — E04.9 NODULAR GOITER: ICD-10-CM

## 2025-05-06 DIAGNOSIS — G43.009 MIGRAINE WITHOUT AURA AND WITHOUT STATUS MIGRAINOSUS, NOT INTRACTABLE: ICD-10-CM

## 2025-05-06 DIAGNOSIS — Z84.89 FAMILY HISTORY OF BRAIN TUMOR: ICD-10-CM

## 2025-05-06 LAB
ANION GAP SERPL CALCULATED.3IONS-SCNC: 13 MMOL/L (ref 7–15)
BUN SERPL-MCNC: 12 MG/DL (ref 6–20)
CALCIUM SERPL-MCNC: 9.4 MG/DL (ref 8.8–10.4)
CHLORIDE SERPL-SCNC: 105 MMOL/L (ref 98–107)
CREAT SERPL-MCNC: 0.88 MG/DL (ref 0.51–0.95)
CRP SERPL-MCNC: <3 MG/L
EGFRCR SERPLBLD CKD-EPI 2021: 86 ML/MIN/1.73M2
ERYTHROCYTE [DISTWIDTH] IN BLOOD BY AUTOMATED COUNT: 11.7 % (ref 10–15)
ERYTHROCYTE [SEDIMENTATION RATE] IN BLOOD BY WESTERGREN METHOD: 7 MM/HR (ref 0–20)
GLUCOSE SERPL-MCNC: 80 MG/DL (ref 70–99)
HCO3 SERPL-SCNC: 22 MMOL/L (ref 22–29)
HCT VFR BLD AUTO: 41.5 % (ref 35–47)
HGB BLD-MCNC: 13.6 G/DL (ref 11.7–15.7)
MCH RBC QN AUTO: 31 PG (ref 26.5–33)
MCHC RBC AUTO-ENTMCNC: 32.8 G/DL (ref 31.5–36.5)
MCV RBC AUTO: 95 FL (ref 78–100)
PLATELET # BLD AUTO: 233 10E3/UL (ref 150–450)
POTASSIUM SERPL-SCNC: 4.4 MMOL/L (ref 3.4–5.3)
RBC # BLD AUTO: 4.39 10E6/UL (ref 3.8–5.2)
SODIUM SERPL-SCNC: 140 MMOL/L (ref 135–145)
WBC # BLD AUTO: 6.9 10E3/UL (ref 4–11)

## 2025-05-06 PROCEDURE — 99214 OFFICE O/P EST MOD 30 MIN: CPT | Performed by: PHYSICIAN ASSISTANT

## 2025-05-06 PROCEDURE — 86140 C-REACTIVE PROTEIN: CPT | Performed by: PHYSICIAN ASSISTANT

## 2025-05-06 PROCEDURE — 85027 COMPLETE CBC AUTOMATED: CPT | Performed by: PHYSICIAN ASSISTANT

## 2025-05-06 PROCEDURE — 36415 COLL VENOUS BLD VENIPUNCTURE: CPT | Performed by: PHYSICIAN ASSISTANT

## 2025-05-06 PROCEDURE — 3079F DIAST BP 80-89 MM HG: CPT | Performed by: PHYSICIAN ASSISTANT

## 2025-05-06 PROCEDURE — 3074F SYST BP LT 130 MM HG: CPT | Performed by: PHYSICIAN ASSISTANT

## 2025-05-06 PROCEDURE — 85652 RBC SED RATE AUTOMATED: CPT | Performed by: PHYSICIAN ASSISTANT

## 2025-05-06 PROCEDURE — 80048 BASIC METABOLIC PNL TOTAL CA: CPT | Performed by: PHYSICIAN ASSISTANT

## 2025-05-06 ASSESSMENT — PATIENT HEALTH QUESTIONNAIRE - PHQ9
10. IF YOU CHECKED OFF ANY PROBLEMS, HOW DIFFICULT HAVE THESE PROBLEMS MADE IT FOR YOU TO DO YOUR WORK, TAKE CARE OF THINGS AT HOME, OR GET ALONG WITH OTHER PEOPLE: NOT DIFFICULT AT ALL
SUM OF ALL RESPONSES TO PHQ QUESTIONS 1-9: 3
SUM OF ALL RESPONSES TO PHQ QUESTIONS 1-9: 3

## 2025-05-06 NOTE — PROGRESS NOTES
Assessment & Plan     1. Visual disturbance    2. Migraine without aura and without status migrainosus, not intractable    3. Family history of brain tumor    4. Nodular goiter      Patient is a 39 year old female who presents today with concerns about aura/kaleidoscope visual disturbances. She says that these started about 1 month ago and occurred twice the first week, then 3-4x each week thereafter. Describes the visual disturbances as increased floaters and kaleidoscope visual field that can last up to 20 minutes and is followed by nausea. She denies recent illness/injury, change to medication or health. She did undergo radiofrequency ablation of her right thyroid nodule with a provider out of [place]. She has since followed up with endocrinology here and nodules appear to shrinking. History of migraines, but these have not had auras in the past. Informs me that she had injured her head as a child while sledding which resulted in laceration exposing portion of the skull. Mother has history of brain tumor behind the left eye which presented with similar symptoms. Vitals and exam unremarkable. Discussed ruling out tumor with MRI given increasing frequency of symptoms. Patient was agreeable to that. Will check basic labs to evaluate for alternative causes. She will keep me updated if symptoms worsen, change or new symptoms develop.     - MR Brain w/o Contrast; Future  - ESR: Erythrocyte sedimentation rate; Future  - CRP, inflammation; Future  - CBC with platelets; Future  - Basic metabolic panel  (Ca, Cl, CO2, Creat, Gluc, K, Na, BUN); Future  - ESR: Erythrocyte sedimentation rate  - CRP, inflammation  - CBC with platelets  - Basic metabolic panel  (Ca, Cl, CO2, Creat, Gluc, K, Na, BUN)  - MR Brain w/o Contrast; Future    Subjective   Queta is a 39 year old, presenting for the following health issues:  Visulal Auras      5/6/2025     9:38 AM   Additional Questions   Roomed by Cece IYER   Accompanied by self     HPI  Fv  Hpi General Questionnaire    Question 5/2/2025 11:07 AM CDT - Filed by Patient   I understand that completing this form is intended to provide my doctor and/or care team with helpful information for my upcoming clinic visit. It is not to notify my doctor and/or care team of medical matters requiring urgent attention. If I have an urgent medical matter, I should call 911 or my doctor's office. Acknowledge   Please select a reason for your visit: New illness or symptom   What illness or symptom are you coming in for today? Headaches    Other   How many days per week do you miss taking your medication? 0   What is the reason for your visit today? Reoccurring auras visual disturbances headache   When did your symptoms begin? 3-4 weeks ago   What are your symptoms? Headache , aura episodes no headache after just nausea and pain left side of face eye area   How would you describe these symptoms? Moderate   Are your symptoms: Worsening   Have you had these symptoms before? No   Is there anything that makes you feel worse? No   Is there anything that makes you feel better? Pain relief medicine for headaches nothing for auras   Please respond to the following questions related to your headaches.  These will be reviewed by your provider at the time of your appointment and will become a permanent part of your medical record.    Please describe your headache symptoms: Worsened   How often are you getting headaches or migraines? Every other day   Are you able to do normal daily activities when you have a migraine? No   Are you taking rescue/relief medications? Ibuprofen (Advil, Motrin)    Tylenol   What is the effectiveness (relief) of those medications? I get some relief   Are you taking any medications to prevent migraines? No medications to prevent migraines   In the past 4 weeks, how often have you gone to Urgent Care or the Emergency Room because of your headaches? 0     Mariposa Crenshaw RN JC    5/5/25  8:41  "AM  Note  RN called patient to triage. She notes that she has been having an \"aura\" that started about 3 weeks ago and is at random times. She notes that this happened x2 times total that first week, then 2nd week was maybe 3-4x, then last week was more consistent around 4 times. She notes this will last about 5-20 minutes total. Declines and blurry vision or issues with BP as she has checked this and notes they are good. Patient does feel mild nausea when this happens but feels this may be more due to anxiety or fear. Patient declines any pain, but does feel like head is a little more pressure like on temples when this happens. Patient did take ibuprofen last week and notes this helped that pressure feeling.      Patient was worried as her mom has similar episodes and mom ended up having a tumor behind eye. Patient notes her fear and anxiety come from that and worrying that she may have the same.      RN offered patient a sooner appointment than 5/15 and also advised again virtual today. Patient is scheduled 5/6. RN reviewed red flag symptoms with patient and when to see emergency care. They agreed and understood.              Review of Systems  Constitutional, HEENT, cardiovascular, pulmonary, gi and gu systems are negative, except as otherwise noted.      Objective    /86   Pulse 66   Temp 97  F (36.1  C) (Temporal)   Resp 16   Ht 1.755 m (5' 9.09\")   Wt 73 kg (161 lb)   LMP 04/15/2025   SpO2 99%   Breastfeeding Yes   BMI 23.71 kg/m    Body mass index is 23.71 kg/m .  Physical Exam   GENERAL: alert and no distress  EYES: Eyes grossly normal to inspection, PERRL and conjunctivae and sclerae normal, normal EOM  HENT: ear canals and TM's normal, nose and mouth without ulcers or lesions  NECK: no adenopathy, no asymmetry, masses, or scars  RESP: lungs clear to auscultation - no rales, rhonchi or wheezes  CV: regular rate and rhythm, normal S1 S2, no S3 or S4, no murmur, click or rub, no peripheral " edema  MS: no gross musculoskeletal defects noted, no edema  NEURO: Normal strength and tone, mentation intact and speech normal  PSYCH: mentation appears normal, affect normal/bright    In process        Signed Electronically by: Erik Tong PA-C

## 2025-05-13 ENCOUNTER — MYC MEDICAL ADVICE (OUTPATIENT)
Dept: OBGYN | Facility: OTHER | Age: 40
End: 2025-05-13
Payer: COMMERCIAL

## 2025-05-19 ENCOUNTER — MYC MEDICAL ADVICE (OUTPATIENT)
Dept: FAMILY MEDICINE | Facility: OTHER | Age: 40
End: 2025-05-19
Payer: COMMERCIAL

## 2025-05-19 DIAGNOSIS — G43.009 MIGRAINE WITHOUT AURA AND WITHOUT STATUS MIGRAINOSUS, NOT INTRACTABLE: Primary | ICD-10-CM

## 2025-05-20 ENCOUNTER — PATIENT OUTREACH (OUTPATIENT)
Dept: CARE COORDINATION | Facility: CLINIC | Age: 40
End: 2025-05-20
Payer: COMMERCIAL

## 2025-05-22 ENCOUNTER — PATIENT OUTREACH (OUTPATIENT)
Dept: CARE COORDINATION | Facility: CLINIC | Age: 40
End: 2025-05-22
Payer: COMMERCIAL

## 2025-05-27 ENCOUNTER — ANCILLARY PROCEDURE (OUTPATIENT)
Dept: MRI IMAGING | Facility: CLINIC | Age: 40
End: 2025-05-27
Attending: PHYSICIAN ASSISTANT
Payer: COMMERCIAL

## 2025-05-27 ENCOUNTER — E-VISIT (OUTPATIENT)
Dept: FAMILY MEDICINE | Facility: OTHER | Age: 40
End: 2025-05-27
Payer: COMMERCIAL

## 2025-05-27 DIAGNOSIS — G43.009 MIGRAINE WITHOUT AURA AND WITHOUT STATUS MIGRAINOSUS, NOT INTRACTABLE: Primary | ICD-10-CM

## 2025-05-27 DIAGNOSIS — H53.9 VISUAL DISTURBANCE: ICD-10-CM

## 2025-05-27 DIAGNOSIS — Z84.89 FAMILY HISTORY OF BRAIN TUMOR: ICD-10-CM

## 2025-05-27 PROCEDURE — 70551 MRI BRAIN STEM W/O DYE: CPT | Mod: GC | Performed by: RADIOLOGY

## 2025-05-27 PROCEDURE — 99207 PR NON-BILLABLE SERV PER CHARTING: CPT | Performed by: PHYSICIAN ASSISTANT

## 2025-05-28 ENCOUNTER — RESULTS FOLLOW-UP (OUTPATIENT)
Dept: FAMILY MEDICINE | Facility: OTHER | Age: 40
End: 2025-05-28

## 2025-05-28 ENCOUNTER — LAB (OUTPATIENT)
Dept: LAB | Facility: OTHER | Age: 40
End: 2025-05-28
Payer: COMMERCIAL

## 2025-05-28 DIAGNOSIS — G43.009 MIGRAINE WITHOUT AURA AND WITHOUT STATUS MIGRAINOSUS, NOT INTRACTABLE: ICD-10-CM

## 2025-05-28 PROCEDURE — 36415 COLL VENOUS BLD VENIPUNCTURE: CPT

## 2025-05-28 NOTE — PATIENT INSTRUCTIONS
Thank you for choosing us for your care. I have placed the below lab(s) for you:  Orders Placed This Encounter   Procedures     Allergen cat epithellium IgE     Allergen dog epithelium IgE     Allergen Maco grass IgE     Allergen orchard grass IgE     Allergen mark IgE     Allergen D farinae IgE     Allergen D pteronyssinus IgE     Allergen alternaria alternata IgE     Allergen aspergillus fumigatus IgE     Allergen cladosporium herbarum IgE     Allergen Epicoccum purpurascens IgE     Allergen penicillium notatum IgE     Allergen diana white IgE     Allergen Porter IgE     Allergen cottonwood IgE     Allergen elm IgE     Allergen maple box elder IgE     Allergen oak white IgE     Allergen Red [place] IgE     Allergen silver  birch IgE     Allergen Tree White [place] IgE     Allergen [place] Tree     Allergen white pine IgE     Allergen English plantain IgE     Allergen giant ragweed IgE     Allergen lamb's quarter IgE     Allergen Mugwort IgE     Allergen ragweed short IgE     Allergen Sagebrush Wormwood IgE     Allergen Sheep Sorrel IgE     Allergen thistle Russian IgE     Allergen Weed Nettle IgE     Allergen, Kochia/Firebush        To schedule your lab appointment, please click the Schedule button in your [employer] Home Page.

## 2025-05-30 ENCOUNTER — RESULTS FOLLOW-UP (OUTPATIENT)
Dept: FAMILY MEDICINE | Facility: OTHER | Age: 40
End: 2025-05-30

## 2025-07-22 ENCOUNTER — OFFICE VISIT (OUTPATIENT)
Dept: ENDOCRINOLOGY | Facility: CLINIC | Age: 40
End: 2025-07-22
Payer: COMMERCIAL

## 2025-07-22 VITALS
DIASTOLIC BLOOD PRESSURE: 106 MMHG | HEART RATE: 63 BPM | BODY MASS INDEX: 24.45 KG/M2 | OXYGEN SATURATION: 98 % | RESPIRATION RATE: 16 BRPM | WEIGHT: 166 LBS | SYSTOLIC BLOOD PRESSURE: 153 MMHG
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
## 2025-07-22 DIAGNOSIS — E04.2 MULTIPLE THYROID NODULES: Primary | ICD-10-CM

## 2025-07-22 DIAGNOSIS — E06.3 HASHIMOTO'S THYROIDITIS: ICD-10-CM

## 2025-07-22 DIAGNOSIS — E03.9 HYPOTHYROIDISM, UNSPECIFIED TYPE: ICD-10-CM

## 2025-07-22 PROCEDURE — G2211 COMPLEX E/M VISIT ADD ON: HCPCS | Performed by: INTERNAL MEDICINE

## 2025-07-22 PROCEDURE — 3077F SYST BP >= 140 MM HG: CPT | Performed by: INTERNAL MEDICINE

## 2025-07-22 PROCEDURE — 99214 OFFICE O/P EST MOD 30 MIN: CPT | Performed by: INTERNAL MEDICINE

## 2025-07-22 PROCEDURE — 3079F DIAST BP 80-89 MM HG: CPT | Performed by: INTERNAL MEDICINE

## 2025-07-22 RX ORDER — LEVOTHYROXINE SODIUM 75 UG/1
75 TABLET ORAL DAILY
Qty: 90 TABLET | Refills: 3 | Status: SHIPPED | OUTPATIENT
Start: 2025-07-22

## 2025-07-22 NOTE — PATIENT INSTRUCTIONS
Welcome to the [facility] Endocrinology and Diabetes Clinics     Our Endocrinology Clinics are here to provide you with a team-based, collaborative approach in the diagnosis and treatment of patients with diabetes and endocrine disorders. The team is made up of Physicians, Physician Assistants, Certified Diabetes Educators, Registered Nurses, Medical Assistants, Emergency Medical Technicians, and many others, all of whom have the unified goal of providing our patients with high quality care.     Please see below for some helpful tips to best navigate and use the [facility] Endocrinology clinic:     [place] Respect: At [facility], we are committed to a respectful and safe space for all patients, visitors, and staff.  We believe that mutual respect between patients and their care team is the foundation of quality care.  It is our expectation that you will be treated with respect by your care team.  In turn, we ask that all communication with the care team (written and verbal) be respectful and free from profanity, threatening, or abusive language.  Disrespectful communication undermines our therapeutic relationship with you and may result in us being unable to continue to provide your care.    Refills: A provider must see you at least annually to prescribe and refill medications. This is to ensure your safety as well as meet insurance and compliance regulations.    Scheduling: Many of our Providers offer both in-person or video visits. Please call to schedule any needed follow ups as soon as possible because our provider schedules fill up very quickly. Our care team has the right to require an in-person visit when they believe that it is medically necessary. Please remember that for any virtual visits, you must be in the [facility] at the time of the visit, otherwise we are unable to see you and you will need to be rescheduled.    Missed Appointments: If you need to cancel or miss your  scheduled appointment, please call the clinic at [phone] to reschedule.  Please note if you repeatedly miss appointments or repeatedly miss appointments without calling to inform us ahead of time (no-show), the clinic may elect to not allow you to reschedule without speaking to a manager, may require a Partnership In Care Agreement prior to rescheduling, or could result in you no longer being able to receive care from the clinic. Providing the clinic with timely notification if you have to miss an appointment, allows us to better serve the needs of all of our patients.    Primary Care Provider: Our Endocrinologists are Specialists in their field. We expect you to have a Primary Care Provider established to handle any needs outside of your diabetes and endocrine care.  We would be happy to assist you find a Primary Care Provider, if you do not have one.    CareToSave: CareToSave is a wonderful resource that allows you access to your Care Team via online or the ivania. Please ask a member of the team if you would like help creating an account. Please note that it may take up to 2 business days for a response. CareToSave messages are not reviewed on weekends or after business hours.  Emergent or urgent care needs should never be communicated via CareToSave.  If you experience a medical emergency call 911 or go to the nearest emergency room.    Labs: It is recommended that you stay within the [facility] for labs but you are welcome to obtain ordered labs (with some exceptions) from any location of your choice as long as they are able to complete and process the needed labs. If you need us to fax orders to your preferred lab, please provide us the name and fax number of the lab you would like to go to so we can fax the orders. If your labs are drawn outside of the [facility], please have them fax the results to [phone] ([place]) or [phone] ([place]) or via [facility]. It is your  responsibility to ensure that outside lab results are sent to us.    We look forward to working with you. Please do not hesitate to reach out with any questions.    Thank you,    The Endocrine Team    [facility] Address:   [place] Address:     [place] [address]    Phone: [phone]  Fax: [phone]   [address] [place] [address]    Phone: [phone]  Fax: [phone]     [facility] Cost Estimate Phone Number: [phone]    General Lab and Imaging Scheduling Phone Number: [phone]      If you are interested in exploring research opportunities in the Division of Diabetes, Endocrinology and Metabolism and within the [facility] you are welcome to view the following QR codes by scanning them using your phone's camera to access a link to more information.  Participating in a research study is voluntary. Your decision whether or not to participate will not affect your current or future relations with the [facility] or [facility]. Current available studies are:     Type 1 Diabetes  Adults     Pediatrics     Type 2 Diabetes  Weight Loss - People Treated with Diet or Metformin Only     Pediatrics and Young Adults

## 2025-07-22 NOTE — NURSING NOTE
Queta Cortez's chief complaint for this visit includes:  Chief Complaint   Patient presents with    Follow Up    Thyroid Problem     hypothyroid     PCP: Erik Tong    Referring Provider:  Referred Self, MD  No address on file    BP (!) 147/85   Pulse 80   Resp 16   Wt 75.3 kg (166 lb)   SpO2 98%   BMI 24.45 kg/m      Reed Espinal, EMT

## 2025-07-22 NOTE — PROGRESS NOTES
ASSESSMENT:      Queta is a 39 year old female with hypothyroidism seen for evaluation of thyroid nodules.     1.  Thyroid nodules  The patient had 2 thyroid nodules with similar ultrasound features.  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  Subsequently, the patient has a risk of 30% of nonaggressive thyroid cancer.  She underwent radiofrequency ablation mid November 2024.  Following the ablation, the thyroid nodule volume decreased by 40%.  The smaller left thyroid nodule has slightly decreased in size.    The plan is to continue to monitor the thyroid nodules.  I recommended a follow-up ultrasound in March next year.      2. Hashimoto's hypothyroidism  Counseled the patient on the etiology of this condition, signs and symptoms of hypo and hyperthyroidism, treatment with levothyroxine.  Since her thyroid hormone levels have been normal on a stable dose of levothyroxine, I recommended to continue the same dose.  Plan to recheck the thyroid hormone levels in March.    Orders Placed This Encounter   Procedures    US Thyroid    TSH    T4 free     =========================================================================================    HPI12  Queta is a 39 year old female seen in f/up. She was first evaluated by me in 12/2024.     She was found to have lump in her right neck right after her last pregnancy in 2023. She saw her PCP in February 2024 for further evaluation.     The thyroid ultrasound from February 2024 showed a 3.7 cm right thyroid lobe nodule, almost completely solid, and an additional 1.2 cm nodule in the left superior lobe.  The right thyroid nodule was hyperechoic, fairly well-delineated with the exception of the upper pole which had a somehow lobulated appearance, with no increased blood flow.  The surrounding tissue was very hypoechoic.  The left thyroid  nodule, although smaller, had a similar ultrasound appearance as the dominant nodule and had a higher AP diameter in the transverse view.    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  The patient declined pursuing thyroidectomy.  On 11/16/24, she underwent radiofrequency ablation at [facility]. The procedure was performed by Dr. Smith.   I reviewed the ultrasound images from 3/11/2025.  The right thyroid nodule measured 3.1 x 2.1 x 1.7 cm, compared with the prior measurements of 3.7 x 2.2 x 2.3 cm, more heterogeneous.  The left thyroid lobe nodule appeared also slightly smaller, at 1 x 0.8 x 0.5 cm, compared with 1.2 x 1.2 x 0.9 cm.  I reviewed the ultrasound images.  Overall, the dominant right nodule has decreased in size by 40%.    Queta was diagnosed with hypothyroidism in 2018, prior to her second pregnancy, while being evaluated for infertility.  The current levothyroxine dose is 75 mcg daily and the dose has been stable for a couple of years.    Pertinent labs reviewed:  5/9/2024 positive thyroid peroxidase and antithyroglobulin antibodies, calcitonin less than 2.  3//25 TSH 1.7, free T4 1.29    There is no family history of thyroid disease or prior history of radiation exposure.  The patient reports being compliant in taking levothyroxine on an empty stomach, at least 1 hour before breakfast.  No skipped dosages.  She is still breast-feeding at night.    Past Medical History  Past Medical History:   Diagnosis Date    Depressive disorder 11/14/16    anxiety    History of blood transfusion 11/04/16     Hypertension 11/04/16    NO ACTIVE PROBLEMS    Gestational HTN with all her pregnancies       Allergies  Allergies   Allergen Reactions    Hydralazine Shortness Of Breath, Anxiety, Other (See Comments), Swelling and Rash     40-50 minutes after IV administration had swelling to the arm where hydralazine was given, had swelling to lips, flushing to face, generalized anxiety which lead to shortness of breath. Symptoms resolved with dose of 25 mg IV benadryl.      Medications  Current Outpatient Medications   Medication Sig Dispense Refill    levothyroxine (SYNTHROID/LEVOTHROID) 75 MCG tablet Take 1 tablet (75 mcg) by mouth daily. 90 tablet 3    nitroFURantoin macrocrystal-monohydrate (MACROBID) 100 MG capsule Take 1 capsule (100 mg) by mouth 2 times daily. (Patient not taking: Reported on 5/16/2025) 14 capsule 0    Prenatal Vit-Fe Fumarate-FA (PRENATAL MULTIVITAMIN W/IRON) 27-0.8 MG tablet Take 1 tablet by mouth daily (Patient not taking: Reported on 5/16/2025)       Family History  Doesn't know her biological father. Mother - HTN.     Social History  . 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     ROS  Systemic symptoms: some fatigue, maybe related to taking care of her 4 kids.   Eye symptoms: No eye symptoms.  Otolaryngeal symptoms: No otolaryngeal symptoms.  Breast symptoms: No breast symptoms.  Cardiovascular symptoms: dx with SVT and she has been experiencing rapid rhythm palpitations once a month - stable   Pulmonary symptoms: No pulmonary symptoms.  Gastrointestinal symptoms: episodes of diarrhea - improved with less coffee   Endocrine symptoms: longstanding cold intolerance. MP are regular   Hematologic symptoms: No hematologic symptoms.  Musculoskeletal symptoms: No musculoskeletal symptoms.  Neurological symptoms: occasional migraines with the MPs, no tremor    Psychological symptoms: longstanding anxiety   Skin symptoms: no dry skin or hair loss     Physical Exam  Wt Readings from Last 10  Encounters:   07/22/25 75.3 kg (166 lb)   05/16/25 74 kg (163 lb 1.6 oz)   05/06/25 73 kg (161 lb)   12/11/24 72.6 kg (160 lb)   12/09/24 73.3 kg (161 lb 9.6 oz)   08/12/24 71.4 kg (157 lb 8 oz)   07/30/24 72.1 kg (159 lb)   06/17/24 73.5 kg (162 lb)   05/09/24 73.9 kg (163 lb)   03/13/24 77.1 kg (170 lb)     BP (!) 147/85   Pulse 80   Resp 16   Wt 75.3 kg (166 lb)   SpO2 98%   BMI 24.45 kg/m    BP at home normal per patient     Physical Exam   General appearance: she is well-developed, well-nourished, and in no distress.  Eyes: conjunctivae and extraocular motions are normal. Pupils are equal, round, and reactive to light. No lid lag, no stare.  HENT: oropharynx clear and moist; neck no JVD, no bruits  Visible and palpable right thyroid nodule, hard on palpation, with an elongated longitudinal shape, measuring ~ 3 cm in lengh   Cardiovascular: regular rhythm, no murmurs, distal pulses palpable, no edema  Respiratory: chest clear, no rales, no rhonchi  Neurologic: reflexes normal and symmetric, no resting tremor  Psychiatric: affect and judgment normal   Skin: warm, no lesions on exposed skin     RESULTS  TSH   Date Value Ref Range Status   03/11/2025 1.79 0.30 - 4.20 uIU/mL Final   03/07/2023 1.49 0.40 - 4.00 mU/L Final   04/06/2021 1.41 0.40 - 4.00 mU/L Final     US thyroid 2/13/2024  RIGHT lobe: 4.9 x 2.9 x 2.8 cm. Homogeneous echotexture.  Isthmus: 3 mm.  LEFT lobe: 3.7 x 3.9 x 1.8 cm. Homogeneous echotexture.     NODULES:   Nodule 1: Right, 3.7 x  2.3 x 2.2 cm  Composition: Solid or almost completely solid, 2 points   Echogenicity: Hyperechoic or isoechoic, 1 point   Shape: Wider-than-tall, 0 points   Margin: Smooth, 0 points   Echogenic Foci: None, or large comet-tail artifacts, 0 points   Point Total: 3 points. TI-RADS 3. If 2.5 cm or larger, recommend FNA;  if 1.5 cm or larger, recommend follow up US at 1, 3, and 5 years.      Nodule 2: Left superior, lobular 0.9 x 1.2 x 1.2 cm  Composition: Solid or  almost completely solid, 2 points   Echogenicity: Hyperechoic or isoechoic, 1 point   Shape: Not taller than wide, 0 points   Margin: Smooth, 0 points   Echogenic Foci: None, or large comet-tail artifacts, 0 points   Point Total: 0 points. TI-RADS 1. No FNA.                                                                    IMPRESSION:  Bilateral thyroid nodules. Right 3.7 cm TR3 nodule meets criteria for FNA.    US thyroid re-read at [facility]   1. Intermediate suspicion right thyroid nodule measuring up to 3.7 cm. This nodule was sampled at an outside institution on 3/1/2024 with outside pathology yielding atypia of undetermined significance.   2. Intermediate suspicion left thyroid nodule measuring up to 1.2 cm.   3. The cervical lymph nodes were not assessed on this examination.     FNA 3/1/2024  Final Diagnosis   Specimen A                 Interpretation:                  Atypia of Undetermined Significance ([place] [facility])  Other, see comment (abnormal)      The [place] implied risk of malignancy and recommended clinical management:  Atypia of undetermined significance diagnosis has a 22 (13-30)% risk of malignancy. Repeat FNA (least 4-6 weeks from previous aspiration with optimal time being 12 weeks after last aspiration), molecular testing, diagnostic lobectomy, or surveillance is recommended.                 Adequacy:                 Satisfactory for evaluation         Electronically signed by Too Ambrosio III, MD on 3/4/2024 at  3:07 PM   Comment    The specimen is cellular and shows presence of focal microfollicular architecture with some colloid in the background, supporting the diagnosis of AUS with atypia categorized as 'other'          Latest Ref Rng 5/9/2024  9:17 AM   ENDO THYROID LABS-[facility]     Calcitonin 0.0 - 5.1 pg/mL <2.0    TSH 0.40 - 4.00 mU/L    TSH 0.30 - 4.20 uIU/mL 2.00    Free T3 2.0 - 4.4 pg/mL 2.3    FREE T4 0.90 - 1.70 ng/dL 1.21    Thyroglobulin Antibody <40 IU/mL 124 (H)     Thyroid Peroxidase Antibody <35 IU/mL 54 (H)      The longitudinal plan of care for the diagnosis(es)/condition(s) as documented were addressed during this visit. Due to the added complexity in care, I will continue to support Queta in the subsequent management and with ongoing continuity of care.

## 2025-07-22 NOTE — LETTER
7/22/2025      Queta Cortez  [address] [address]      Dear Colleague,    Thank you for referring your patient, Queta Cortez, to the [facility]. Please see a copy of my visit note below.      ASSESSMENT:      Queta is a 39 year old female with hypothyroidism seen for evaluation of thyroid nodules.     1.  Thyroid nodules  The patient had 2 thyroid nodules with similar ultrasound features.  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  Subsequently, the patient has a risk of 30% of nonaggressive thyroid cancer.  She underwent radiofrequency ablation mid November 2024.  Following the ablation, the thyroid nodule volume decreased by 40%.  The smaller left thyroid nodule has slightly decreased in size.    The plan is to continue to monitor the thyroid nodules.  I recommended a follow-up ultrasound in March next year.      2. Hashimoto's hypothyroidism  Counseled the patient on the etiology of this condition, signs and symptoms of hypo and hyperthyroidism, treatment with levothyroxine.  Since her thyroid hormone levels have been normal on a stable dose of levothyroxine, I recommended to continue the same dose.  Plan to recheck the thyroid hormone levels in March.    Orders Placed This Encounter   Procedures     US Thyroid     TSH     T4 free     =========================================================================================    HPI12  Queta is a 39 year old female seen in f/up. She was first evaluated by me in 12/2024.     She was found to have lump in her right neck right after her last pregnancy in 2023. She saw her PCP in February 2024 for further evaluation.     The thyroid ultrasound from February 2024 showed a 3.7 cm right thyroid lobe nodule, almost completely solid, and an additional 1.2 cm nodule in the left superior lobe.  The  right thyroid nodule was hyperechoic, fairly well-delineated with the exception of the upper pole which had a somehow lobulated appearance, with no increased blood flow.  The surrounding tissue was very hypoechoic.  The left thyroid nodule, although smaller, had a similar ultrasound appearance as the dominant nodule and had a higher AP diameter in the transverse view.    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  The patient declined pursuing thyroidectomy.  On 11/16/24, she underwent radiofrequency ablation at [facility]. The procedure was performed by Dr. Smith.   I reviewed the ultrasound images from 3/11/2025.  The right thyroid nodule measured 3.1 x 2.1 x 1.7 cm, compared with the prior measurements of 3.7 x 2.2 x 2.3 cm, more heterogeneous.  The left thyroid lobe nodule appeared also slightly smaller, at 1 x 0.8 x 0.5 cm, compared with 1.2 x 1.2 x 0.9 cm.  I reviewed the ultrasound images.  Overall, the dominant right nodule has decreased in size by 40%.    Queta was diagnosed with hypothyroidism in 2018, prior to her second pregnancy, while being evaluated for infertility.  The current levothyroxine dose is 75 mcg daily and the dose has been stable for a couple of years.    Pertinent labs reviewed:  5/9/2024 positive thyroid peroxidase and antithyroglobulin antibodies, calcitonin less than 2.  3//25 TSH 1.7, free T4 1.29    There is no family history of thyroid disease or prior history of radiation exposure.  The patient reports being compliant in taking levothyroxine on an empty stomach, at least 1 hour  before breakfast.  No skipped dosages.  She is still breast-feeding at night.    Past Medical History  Past Medical History:   Diagnosis Date     Depressive disorder 11/14/16    anxiety     History of blood transfusion 11/04/16     Hypertension 11/04/16     NO ACTIVE PROBLEMS    Gestational HTN with all her pregnancies       Allergies  Allergies   Allergen Reactions     Hydralazine Shortness Of Breath, Anxiety, Other (See Comments), Swelling and Rash     40-50 minutes after IV administration had swelling to the arm where hydralazine was given, had swelling to lips, flushing to face, generalized anxiety which lead to shortness of breath. Symptoms resolved with dose of 25 mg IV benadryl.      Medications  Current Outpatient Medications   Medication Sig Dispense Refill     levothyroxine (SYNTHROID/LEVOTHROID) 75 MCG tablet Take 1 tablet (75 mcg) by mouth daily. 90 tablet 3     nitroFURantoin macrocrystal-monohydrate (MACROBID) 100 MG capsule Take 1 capsule (100 mg) by mouth 2 times daily. (Patient not taking: Reported on 5/16/2025) 14 capsule 0     Prenatal Vit-Fe Fumarate-FA (PRENATAL MULTIVITAMIN W/IRON) 27-0.8 MG tablet Take 1 tablet by mouth daily (Patient not taking: Reported on 5/16/2025)       Family History  Doesn't know her biological father. Mother - HTN.     Social History  . 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     ROS  Systemic symptoms: some fatigue, maybe related to taking care of her 4 kids.   Eye symptoms: No eye symptoms.  Otolaryngeal symptoms: No otolaryngeal symptoms.  Breast symptoms: No breast symptoms.  Cardiovascular symptoms: dx with SVT and she has been experiencing rapid rhythm palpitations once a month - stable   Pulmonary symptoms: No pulmonary symptoms.  Gastrointestinal symptoms: episodes of diarrhea - improved with less coffee   Endocrine symptoms: longstanding cold intolerance. MP are regular   Hematologic symptoms: No hematologic symptoms.  Musculoskeletal symptoms:  No musculoskeletal symptoms.  Neurological symptoms: occasional migraines with the MPs, no tremor    Psychological symptoms: longstanding anxiety   Skin symptoms: no dry skin or hair loss     Physical Exam  Wt Readings from Last 10 Encounters:   07/22/25 75.3 kg (166 lb)   05/16/25 74 kg (163 lb 1.6 oz)   05/06/25 73 kg (161 lb)   12/11/24 72.6 kg (160 lb)   12/09/24 73.3 kg (161 lb 9.6 oz)   08/12/24 71.4 kg (157 lb 8 oz)   07/30/24 72.1 kg (159 lb)   06/17/24 73.5 kg (162 lb)   05/09/24 73.9 kg (163 lb)   03/13/24 77.1 kg (170 lb)     BP (!) 147/85   Pulse 80   Resp 16   Wt 75.3 kg (166 lb)   SpO2 98%   BMI 24.45 kg/m    BP at home normal per patient     Physical Exam   General appearance: she is well-developed, well-nourished, and in no distress.  Eyes: conjunctivae and extraocular motions are normal. Pupils are equal, round, and reactive to light. No lid lag, no stare.  HENT: oropharynx clear and moist; neck no JVD, no bruits  Visible and palpable right thyroid nodule, hard on palpation, with an elongated longitudinal shape, measuring ~ 3 cm in lengh   Cardiovascular: regular rhythm, no murmurs, distal pulses palpable, no edema  Respiratory: chest clear, no rales, no rhonchi  Neurologic: reflexes normal and symmetric, no resting tremor  Psychiatric: affect and judgment normal   Skin: warm, no lesions on exposed skin     RESULTS  TSH   Date Value Ref Range Status   03/11/2025 1.79 0.30 - 4.20 uIU/mL Final   03/07/2023 1.49 0.40 - 4.00 mU/L Final   04/06/2021 1.41 0.40 - 4.00 mU/L Final     US thyroid 2/13/2024  RIGHT lobe: 4.9 x 2.9 x 2.8 cm. Homogeneous echotexture.  Isthmus: 3 mm.  LEFT lobe: 3.7 x 3.9 x 1.8 cm. Homogeneous echotexture.     NODULES:   Nodule 1: Right, 3.7 x  2.3 x 2.2 cm  Composition: Solid or almost completely solid, 2 points   Echogenicity: Hyperechoic or isoechoic, 1 point   Shape: Wider-than-tall, 0 points   Margin: Smooth, 0 points   Echogenic Foci: None, or large comet-tail  artifacts, 0 points   Point Total: 3 points. TI-RADS 3. If 2.5 cm or larger, recommend FNA;  if 1.5 cm or larger, recommend follow up US at 1, 3, and 5 years.      Nodule 2: Left superior, lobular 0.9 x 1.2 x 1.2 cm  Composition: Solid or almost completely solid, 2 points   Echogenicity: Hyperechoic or isoechoic, 1 point   Shape: Not taller than wide, 0 points   Margin: Smooth, 0 points   Echogenic Foci: None, or large comet-tail artifacts, 0 points   Point Total: 0 points. TI-RADS 1. No FNA.                                                                    IMPRESSION:  Bilateral thyroid nodules. Right 3.7 cm TR3 nodule meets criteria for FNA.    US thyroid re-read at [facility]   1. Intermediate suspicion right thyroid nodule measuring up to 3.7 cm. This nodule was sampled at an outside institution on 3/1/2024 with outside pathology yielding atypia of undetermined significance.   2. Intermediate suspicion left thyroid nodule measuring up to 1.2 cm.   3. The cervical lymph nodes were not assessed on this examination.     FNA 3/1/2024  Final Diagnosis   Specimen A                 Interpretation:                  Atypia of Undetermined Significance ([place] [facility])  Other, see comment (abnormal)      The [place] implied risk of malignancy and recommended clinical management:  Atypia of undetermined significance diagnosis has a 22 (13-30)% risk of malignancy. Repeat FNA (least 4-6 weeks from previous aspiration with optimal time being 12 weeks after last aspiration), molecular testing, diagnostic lobectomy, or surveillance is recommended.                 Adequacy:                 Satisfactory for evaluation         Electronically signed by Too Ambrosio III, MD on 3/4/2024 at  3:07 PM   Comment    The specimen is cellular and shows presence of focal microfollicular architecture with some colloid in the background, supporting the diagnosis of AUS with atypia categorized as 'other'          Latest Ref Rng  5/9/2024  9:17 AM   ENDO THYROID LABS-[facility]     Calcitonin 0.0 - 5.1 pg/mL <2.0    TSH 0.40 - 4.00 mU/L    TSH 0.30 - 4.20 uIU/mL 2.00    Free T3 2.0 - 4.4 pg/mL 2.3    FREE T4 0.90 - 1.70 ng/dL 1.21    Thyroglobulin Antibody <40 IU/mL 124 (H)    Thyroid Peroxidase Antibody <35 IU/mL 54 (H)      The longitudinal plan of care for the diagnosis(es)/condition(s) as documented were addressed during this visit. Due to the added complexity in care, I will continue to support Queta in the subsequent management and with ongoing continuity of care.      Again, thank you for allowing me to participate in the care of your patient.        Sincerely,      Sis Downs MD    Electronically signed